# Patient Record
Sex: MALE | Race: WHITE | URBAN - METROPOLITAN AREA
[De-identification: names, ages, dates, MRNs, and addresses within clinical notes are randomized per-mention and may not be internally consistent; named-entity substitution may affect disease eponyms.]

---

## 2017-05-10 ENCOUNTER — INPATIENT (INPATIENT)
Facility: HOSPITAL | Age: 82
LOS: 1 days | Discharge: HOME | End: 2017-05-12
Attending: INTERNAL MEDICINE | Admitting: INTERNAL MEDICINE

## 2017-06-28 DIAGNOSIS — Z95.5 PRESENCE OF CORONARY ANGIOPLASTY IMPLANT AND GRAFT: ICD-10-CM

## 2017-06-28 DIAGNOSIS — J45.909 UNSPECIFIED ASTHMA, UNCOMPLICATED: ICD-10-CM

## 2017-06-28 DIAGNOSIS — M54.30 SCIATICA, UNSPECIFIED SIDE: ICD-10-CM

## 2017-06-28 DIAGNOSIS — E11.9 TYPE 2 DIABETES MELLITUS WITHOUT COMPLICATIONS: ICD-10-CM

## 2017-06-28 DIAGNOSIS — E87.2 ACIDOSIS: ICD-10-CM

## 2017-06-28 DIAGNOSIS — Z87.891 PERSONAL HISTORY OF NICOTINE DEPENDENCE: ICD-10-CM

## 2017-06-28 DIAGNOSIS — R00.1 BRADYCARDIA, UNSPECIFIED: ICD-10-CM

## 2017-06-28 DIAGNOSIS — I44.2 ATRIOVENTRICULAR BLOCK, COMPLETE: ICD-10-CM

## 2017-06-28 DIAGNOSIS — I10 ESSENTIAL (PRIMARY) HYPERTENSION: ICD-10-CM

## 2017-06-28 DIAGNOSIS — Z79.84 LONG TERM (CURRENT) USE OF ORAL HYPOGLYCEMIC DRUGS: ICD-10-CM

## 2017-06-28 DIAGNOSIS — I25.10 ATHEROSCLEROTIC HEART DISEASE OF NATIVE CORONARY ARTERY WITHOUT ANGINA PECTORIS: ICD-10-CM

## 2017-10-30 ENCOUNTER — INPATIENT (INPATIENT)
Facility: HOSPITAL | Age: 82
LOS: 3 days | Discharge: HOME HEALTH | End: 2017-11-03
Attending: INTERNAL MEDICINE | Admitting: INTERNAL MEDICINE

## 2017-10-30 DIAGNOSIS — E11.9 TYPE 2 DIABETES MELLITUS WITHOUT COMPLICATIONS: ICD-10-CM

## 2017-10-30 DIAGNOSIS — N19 UNSPECIFIED KIDNEY FAILURE: ICD-10-CM

## 2017-10-30 DIAGNOSIS — E78.5 HYPERLIPIDEMIA, UNSPECIFIED: ICD-10-CM

## 2017-10-30 DIAGNOSIS — J45.909 UNSPECIFIED ASTHMA, UNCOMPLICATED: ICD-10-CM

## 2017-10-30 DIAGNOSIS — N12 TUBULO-INTERSTITIAL NEPHRITIS, NOT SPECIFIED AS ACUTE OR CHRONIC: ICD-10-CM

## 2017-10-30 DIAGNOSIS — I10 ESSENTIAL (PRIMARY) HYPERTENSION: ICD-10-CM

## 2017-10-30 DIAGNOSIS — I44.2 ATRIOVENTRICULAR BLOCK, COMPLETE: ICD-10-CM

## 2017-10-30 DIAGNOSIS — I97.190 OTHER POSTPROCEDURAL CARDIAC FUNCTIONAL DISTURBANCES FOLLOWING CARDIAC SURGERY: ICD-10-CM

## 2017-10-30 DIAGNOSIS — R55 SYNCOPE AND COLLAPSE: ICD-10-CM

## 2017-11-06 DIAGNOSIS — N10 ACUTE PYELONEPHRITIS: ICD-10-CM

## 2017-11-06 DIAGNOSIS — E11.22 TYPE 2 DIABETES MELLITUS WITH DIABETIC CHRONIC KIDNEY DISEASE: ICD-10-CM

## 2017-11-06 DIAGNOSIS — B96.20 UNSPECIFIED ESCHERICHIA COLI [E. COLI] AS THE CAUSE OF DISEASES CLASSIFIED ELSEWHERE: ICD-10-CM

## 2017-11-06 DIAGNOSIS — Z79.84 LONG TERM (CURRENT) USE OF ORAL HYPOGLYCEMIC DRUGS: ICD-10-CM

## 2017-11-06 DIAGNOSIS — A41.51 SEPSIS DUE TO ESCHERICHIA COLI [E. COLI]: ICD-10-CM

## 2017-11-06 DIAGNOSIS — N32.1 VESICOINTESTINAL FISTULA: ICD-10-CM

## 2017-11-06 DIAGNOSIS — N39.0 URINARY TRACT INFECTION, SITE NOT SPECIFIED: ICD-10-CM

## 2017-11-06 DIAGNOSIS — N18.3 CHRONIC KIDNEY DISEASE, STAGE 3 (MODERATE): ICD-10-CM

## 2017-11-06 DIAGNOSIS — E88.09 OTHER DISORDERS OF PLASMA-PROTEIN METABOLISM, NOT ELSEWHERE CLASSIFIED: ICD-10-CM

## 2017-11-06 DIAGNOSIS — N17.9 ACUTE KIDNEY FAILURE, UNSPECIFIED: ICD-10-CM

## 2017-11-06 DIAGNOSIS — I44.2 ATRIOVENTRICULAR BLOCK, COMPLETE: ICD-10-CM

## 2017-11-06 DIAGNOSIS — J45.909 UNSPECIFIED ASTHMA, UNCOMPLICATED: ICD-10-CM

## 2017-11-06 DIAGNOSIS — Z95.0 PRESENCE OF CARDIAC PACEMAKER: ICD-10-CM

## 2017-11-06 DIAGNOSIS — N28.1 CYST OF KIDNEY, ACQUIRED: ICD-10-CM

## 2017-11-06 DIAGNOSIS — Z95.5 PRESENCE OF CORONARY ANGIOPLASTY IMPLANT AND GRAFT: ICD-10-CM

## 2017-11-06 DIAGNOSIS — I12.9 HYPERTENSIVE CHRONIC KIDNEY DISEASE WITH STAGE 1 THROUGH STAGE 4 CHRONIC KIDNEY DISEASE, OR UNSPECIFIED CHRONIC KIDNEY DISEASE: ICD-10-CM

## 2017-11-06 DIAGNOSIS — Z87.891 PERSONAL HISTORY OF NICOTINE DEPENDENCE: ICD-10-CM

## 2017-11-06 DIAGNOSIS — I25.10 ATHEROSCLEROTIC HEART DISEASE OF NATIVE CORONARY ARTERY WITHOUT ANGINA PECTORIS: ICD-10-CM

## 2017-11-06 DIAGNOSIS — K63.2 FISTULA OF INTESTINE: ICD-10-CM

## 2017-11-25 ENCOUNTER — INPATIENT (INPATIENT)
Facility: HOSPITAL | Age: 82
LOS: 3 days | Discharge: HOME | End: 2017-11-29
Attending: INTERNAL MEDICINE | Admitting: INTERNAL MEDICINE

## 2017-11-25 DIAGNOSIS — I10 ESSENTIAL (PRIMARY) HYPERTENSION: ICD-10-CM

## 2017-11-25 DIAGNOSIS — N19 UNSPECIFIED KIDNEY FAILURE: ICD-10-CM

## 2017-11-25 DIAGNOSIS — J45.909 UNSPECIFIED ASTHMA, UNCOMPLICATED: ICD-10-CM

## 2017-11-25 DIAGNOSIS — R55 SYNCOPE AND COLLAPSE: ICD-10-CM

## 2017-11-25 DIAGNOSIS — N12 TUBULO-INTERSTITIAL NEPHRITIS, NOT SPECIFIED AS ACUTE OR CHRONIC: ICD-10-CM

## 2017-11-25 DIAGNOSIS — I97.190 OTHER POSTPROCEDURAL CARDIAC FUNCTIONAL DISTURBANCES FOLLOWING CARDIAC SURGERY: ICD-10-CM

## 2017-11-25 DIAGNOSIS — E11.9 TYPE 2 DIABETES MELLITUS WITHOUT COMPLICATIONS: ICD-10-CM

## 2017-11-25 DIAGNOSIS — E78.5 HYPERLIPIDEMIA, UNSPECIFIED: ICD-10-CM

## 2017-11-25 DIAGNOSIS — I44.2 ATRIOVENTRICULAR BLOCK, COMPLETE: ICD-10-CM

## 2017-12-01 DIAGNOSIS — N18.4 CHRONIC KIDNEY DISEASE, STAGE 4 (SEVERE): ICD-10-CM

## 2017-12-01 DIAGNOSIS — Z87.891 PERSONAL HISTORY OF NICOTINE DEPENDENCE: ICD-10-CM

## 2017-12-01 DIAGNOSIS — E11.22 TYPE 2 DIABETES MELLITUS WITH DIABETIC CHRONIC KIDNEY DISEASE: ICD-10-CM

## 2017-12-01 DIAGNOSIS — J45.909 UNSPECIFIED ASTHMA, UNCOMPLICATED: ICD-10-CM

## 2017-12-01 DIAGNOSIS — N39.0 URINARY TRACT INFECTION, SITE NOT SPECIFIED: ICD-10-CM

## 2017-12-01 DIAGNOSIS — Z79.4 LONG TERM (CURRENT) USE OF INSULIN: ICD-10-CM

## 2017-12-01 DIAGNOSIS — B96.20 UNSPECIFIED ESCHERICHIA COLI [E. COLI] AS THE CAUSE OF DISEASES CLASSIFIED ELSEWHERE: ICD-10-CM

## 2017-12-01 DIAGNOSIS — K63.2 FISTULA OF INTESTINE: ICD-10-CM

## 2017-12-01 DIAGNOSIS — A41.9 SEPSIS, UNSPECIFIED ORGANISM: ICD-10-CM

## 2017-12-01 DIAGNOSIS — I12.9 HYPERTENSIVE CHRONIC KIDNEY DISEASE WITH STAGE 1 THROUGH STAGE 4 CHRONIC KIDNEY DISEASE, OR UNSPECIFIED CHRONIC KIDNEY DISEASE: ICD-10-CM

## 2017-12-01 DIAGNOSIS — N17.9 ACUTE KIDNEY FAILURE, UNSPECIFIED: ICD-10-CM

## 2017-12-01 DIAGNOSIS — Z95.5 PRESENCE OF CORONARY ANGIOPLASTY IMPLANT AND GRAFT: ICD-10-CM

## 2017-12-01 DIAGNOSIS — Z95.0 PRESENCE OF CARDIAC PACEMAKER: ICD-10-CM

## 2017-12-01 DIAGNOSIS — I25.10 ATHEROSCLEROTIC HEART DISEASE OF NATIVE CORONARY ARTERY WITHOUT ANGINA PECTORIS: ICD-10-CM

## 2017-12-26 PROBLEM — Z00.00 ENCOUNTER FOR PREVENTIVE HEALTH EXAMINATION: Status: ACTIVE | Noted: 2017-12-26

## 2017-12-27 ENCOUNTER — OUTPATIENT (OUTPATIENT)
Dept: OUTPATIENT SERVICES | Facility: HOSPITAL | Age: 82
LOS: 1 days | Discharge: HOME | End: 2017-12-27

## 2017-12-27 DIAGNOSIS — I97.190 OTHER POSTPROCEDURAL CARDIAC FUNCTIONAL DISTURBANCES FOLLOWING CARDIAC SURGERY: ICD-10-CM

## 2017-12-27 DIAGNOSIS — E78.5 HYPERLIPIDEMIA, UNSPECIFIED: ICD-10-CM

## 2017-12-27 DIAGNOSIS — R55 SYNCOPE AND COLLAPSE: ICD-10-CM

## 2017-12-27 DIAGNOSIS — J45.909 UNSPECIFIED ASTHMA, UNCOMPLICATED: ICD-10-CM

## 2017-12-27 DIAGNOSIS — I44.2 ATRIOVENTRICULAR BLOCK, COMPLETE: ICD-10-CM

## 2017-12-27 DIAGNOSIS — N19 UNSPECIFIED KIDNEY FAILURE: ICD-10-CM

## 2017-12-27 DIAGNOSIS — I10 ESSENTIAL (PRIMARY) HYPERTENSION: ICD-10-CM

## 2017-12-27 DIAGNOSIS — K63.2 FISTULA OF INTESTINE: ICD-10-CM

## 2017-12-27 DIAGNOSIS — E11.9 TYPE 2 DIABETES MELLITUS WITHOUT COMPLICATIONS: ICD-10-CM

## 2017-12-27 DIAGNOSIS — Z01.818 ENCOUNTER FOR OTHER PREPROCEDURAL EXAMINATION: ICD-10-CM

## 2017-12-27 DIAGNOSIS — K57.92 DIVERTICULITIS OF INTESTINE, PART UNSPECIFIED, WITHOUT PERFORATION OR ABSCESS WITHOUT BLEEDING: ICD-10-CM

## 2017-12-27 DIAGNOSIS — N12 TUBULO-INTERSTITIAL NEPHRITIS, NOT SPECIFIED AS ACUTE OR CHRONIC: ICD-10-CM

## 2018-01-04 ENCOUNTER — INPATIENT (INPATIENT)
Facility: HOSPITAL | Age: 83
LOS: 7 days | Discharge: HOME | End: 2018-01-12
Attending: SPECIALIST

## 2018-01-04 DIAGNOSIS — R55 SYNCOPE AND COLLAPSE: ICD-10-CM

## 2018-01-04 DIAGNOSIS — K57.92 DIVERTICULITIS OF INTESTINE, PART UNSPECIFIED, WITHOUT PERFORATION OR ABSCESS WITHOUT BLEEDING: ICD-10-CM

## 2018-01-04 DIAGNOSIS — N19 UNSPECIFIED KIDNEY FAILURE: ICD-10-CM

## 2018-01-04 DIAGNOSIS — I10 ESSENTIAL (PRIMARY) HYPERTENSION: ICD-10-CM

## 2018-01-04 DIAGNOSIS — E11.9 TYPE 2 DIABETES MELLITUS WITHOUT COMPLICATIONS: ICD-10-CM

## 2018-01-04 DIAGNOSIS — J45.909 UNSPECIFIED ASTHMA, UNCOMPLICATED: ICD-10-CM

## 2018-01-04 DIAGNOSIS — I44.2 ATRIOVENTRICULAR BLOCK, COMPLETE: ICD-10-CM

## 2018-01-04 DIAGNOSIS — E78.5 HYPERLIPIDEMIA, UNSPECIFIED: ICD-10-CM

## 2018-01-04 DIAGNOSIS — I97.190 OTHER POSTPROCEDURAL CARDIAC FUNCTIONAL DISTURBANCES FOLLOWING CARDIAC SURGERY: ICD-10-CM

## 2018-01-04 DIAGNOSIS — N12 TUBULO-INTERSTITIAL NEPHRITIS, NOT SPECIFIED AS ACUTE OR CHRONIC: ICD-10-CM

## 2018-01-11 DIAGNOSIS — J45.909 UNSPECIFIED ASTHMA, UNCOMPLICATED: ICD-10-CM

## 2018-01-11 DIAGNOSIS — Z95.5 PRESENCE OF CORONARY ANGIOPLASTY IMPLANT AND GRAFT: ICD-10-CM

## 2018-01-11 DIAGNOSIS — I25.10 ATHEROSCLEROTIC HEART DISEASE OF NATIVE CORONARY ARTERY WITHOUT ANGINA PECTORIS: ICD-10-CM

## 2018-01-11 DIAGNOSIS — E78.00 PURE HYPERCHOLESTEROLEMIA, UNSPECIFIED: ICD-10-CM

## 2018-01-11 DIAGNOSIS — N39.0 URINARY TRACT INFECTION, SITE NOT SPECIFIED: ICD-10-CM

## 2018-01-11 DIAGNOSIS — E11.9 TYPE 2 DIABETES MELLITUS WITHOUT COMPLICATIONS: ICD-10-CM

## 2018-01-11 DIAGNOSIS — I10 ESSENTIAL (PRIMARY) HYPERTENSION: ICD-10-CM

## 2018-01-11 DIAGNOSIS — I49.9 CARDIAC ARRHYTHMIA, UNSPECIFIED: ICD-10-CM

## 2018-01-13 ENCOUNTER — INPATIENT (INPATIENT)
Facility: HOSPITAL | Age: 83
LOS: 11 days | Discharge: HOME HEALTH | End: 2018-01-25
Attending: SPECIALIST | Admitting: INTERNAL MEDICINE

## 2018-01-17 DIAGNOSIS — K57.32 DIVERTICULITIS OF LARGE INTESTINE WITHOUT PERFORATION OR ABSCESS WITHOUT BLEEDING: ICD-10-CM

## 2018-01-17 DIAGNOSIS — J45.909 UNSPECIFIED ASTHMA, UNCOMPLICATED: ICD-10-CM

## 2018-01-17 DIAGNOSIS — I12.9 HYPERTENSIVE CHRONIC KIDNEY DISEASE WITH STAGE 1 THROUGH STAGE 4 CHRONIC KIDNEY DISEASE, OR UNSPECIFIED CHRONIC KIDNEY DISEASE: ICD-10-CM

## 2018-01-17 DIAGNOSIS — N18.9 CHRONIC KIDNEY DISEASE, UNSPECIFIED: ICD-10-CM

## 2018-01-17 DIAGNOSIS — K63.2 FISTULA OF INTESTINE: ICD-10-CM

## 2018-01-17 DIAGNOSIS — Z95.0 PRESENCE OF CARDIAC PACEMAKER: ICD-10-CM

## 2018-01-17 DIAGNOSIS — E11.22 TYPE 2 DIABETES MELLITUS WITH DIABETIC CHRONIC KIDNEY DISEASE: ICD-10-CM

## 2018-01-17 DIAGNOSIS — E78.5 HYPERLIPIDEMIA, UNSPECIFIED: ICD-10-CM

## 2018-01-17 DIAGNOSIS — I25.10 ATHEROSCLEROTIC HEART DISEASE OF NATIVE CORONARY ARTERY WITHOUT ANGINA PECTORIS: ICD-10-CM

## 2018-01-17 DIAGNOSIS — N32.1 VESICOINTESTINAL FISTULA: ICD-10-CM

## 2018-01-17 DIAGNOSIS — N39.0 URINARY TRACT INFECTION, SITE NOT SPECIFIED: ICD-10-CM

## 2018-01-30 DIAGNOSIS — Z85.828 PERSONAL HISTORY OF OTHER MALIGNANT NEOPLASM OF SKIN: ICD-10-CM

## 2018-01-30 DIAGNOSIS — Z95.5 PRESENCE OF CORONARY ANGIOPLASTY IMPLANT AND GRAFT: ICD-10-CM

## 2018-01-30 DIAGNOSIS — E87.1 HYPO-OSMOLALITY AND HYPONATREMIA: ICD-10-CM

## 2018-01-30 DIAGNOSIS — J45.909 UNSPECIFIED ASTHMA, UNCOMPLICATED: ICD-10-CM

## 2018-01-30 DIAGNOSIS — I10 ESSENTIAL (PRIMARY) HYPERTENSION: ICD-10-CM

## 2018-01-30 DIAGNOSIS — E78.5 HYPERLIPIDEMIA, UNSPECIFIED: ICD-10-CM

## 2018-01-30 DIAGNOSIS — Z95.0 PRESENCE OF CARDIAC PACEMAKER: ICD-10-CM

## 2018-01-30 DIAGNOSIS — K65.0 GENERALIZED (ACUTE) PERITONITIS: ICD-10-CM

## 2018-01-30 DIAGNOSIS — I25.10 ATHEROSCLEROTIC HEART DISEASE OF NATIVE CORONARY ARTERY WITHOUT ANGINA PECTORIS: ICD-10-CM

## 2018-01-30 DIAGNOSIS — K63.1 PERFORATION OF INTESTINE (NONTRAUMATIC): ICD-10-CM
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
## 2018-01-30 DIAGNOSIS — E83.42 HYPOMAGNESEMIA: ICD-10-CM

## 2018-01-30 DIAGNOSIS — A41.9 SEPSIS, UNSPECIFIED ORGANISM: ICD-10-CM

## 2018-01-30 DIAGNOSIS — K94.12 ENTEROSTOMY INFECTION: ICD-10-CM

## 2018-01-30 DIAGNOSIS — I47.2 VENTRICULAR TACHYCARDIA: ICD-10-CM

## 2018-01-30 DIAGNOSIS — Z87.891 PERSONAL HISTORY OF NICOTINE DEPENDENCE: ICD-10-CM

## 2018-01-30 DIAGNOSIS — T82.338A: ICD-10-CM

## 2018-01-30 DIAGNOSIS — E11.9 TYPE 2 DIABETES MELLITUS WITHOUT COMPLICATIONS: ICD-10-CM

## 2018-02-02 DIAGNOSIS — A41.9 SEPSIS, UNSPECIFIED ORGANISM: ICD-10-CM

## 2018-02-04 DIAGNOSIS — I44.2 ATRIOVENTRICULAR BLOCK, COMPLETE: ICD-10-CM

## 2018-02-04 DIAGNOSIS — K57.92 DIVERTICULITIS OF INTESTINE, PART UNSPECIFIED, WITHOUT PERFORATION OR ABSCESS WITHOUT BLEEDING: ICD-10-CM

## 2018-02-04 DIAGNOSIS — I97.190 OTHER POSTPROCEDURAL CARDIAC FUNCTIONAL DISTURBANCES FOLLOWING CARDIAC SURGERY: ICD-10-CM

## 2018-02-04 DIAGNOSIS — I10 ESSENTIAL (PRIMARY) HYPERTENSION: ICD-10-CM

## 2018-02-04 DIAGNOSIS — E11.9 TYPE 2 DIABETES MELLITUS WITHOUT COMPLICATIONS: ICD-10-CM

## 2018-02-04 DIAGNOSIS — E78.5 HYPERLIPIDEMIA, UNSPECIFIED: ICD-10-CM

## 2018-02-04 DIAGNOSIS — N12 TUBULO-INTERSTITIAL NEPHRITIS, NOT SPECIFIED AS ACUTE OR CHRONIC: ICD-10-CM

## 2018-02-04 DIAGNOSIS — J45.909 UNSPECIFIED ASTHMA, UNCOMPLICATED: ICD-10-CM

## 2018-02-04 DIAGNOSIS — N19 UNSPECIFIED KIDNEY FAILURE: ICD-10-CM

## 2018-02-04 DIAGNOSIS — R55 SYNCOPE AND COLLAPSE: ICD-10-CM

## 2018-02-04 RX ADMIN — Medication 50 MILLIGRAM(S): at 06:36

## 2018-02-08 ENCOUNTER — INPATIENT (INPATIENT)
Facility: HOSPITAL | Age: 83
LOS: 5 days | Discharge: HOME HEALTH | End: 2018-02-14
Attending: INTERNAL MEDICINE | Admitting: INTERNAL MEDICINE

## 2018-02-08 VITALS
RESPIRATION RATE: 19 BRPM | DIASTOLIC BLOOD PRESSURE: 74 MMHG | TEMPERATURE: 96 F | SYSTOLIC BLOOD PRESSURE: 136 MMHG | HEART RATE: 115 BPM | OXYGEN SATURATION: 96 %

## 2018-02-08 LAB
ALBUMIN SERPL ELPH-MCNC: 2.8 G/DL — LOW (ref 3–5.5)
ALP SERPL-CCNC: 83 U/L — SIGNIFICANT CHANGE UP (ref 30–115)
ALT FLD-CCNC: 12 U/L — SIGNIFICANT CHANGE UP (ref 0–41)
ANION GAP SERPL CALC-SCNC: 11 MMOL/L — SIGNIFICANT CHANGE UP (ref 7–14)
APPEARANCE UR: CLEAR — SIGNIFICANT CHANGE UP
APTT BLD: 24.2 SEC — LOW (ref 27–39.2)
AST SERPL-CCNC: 16 U/L — SIGNIFICANT CHANGE UP (ref 0–41)
BASOPHILS # BLD AUTO: 0.05 K/UL — SIGNIFICANT CHANGE UP (ref 0–0.2)
BASOPHILS NFR BLD AUTO: 0.3 % — SIGNIFICANT CHANGE UP (ref 0–1)
BILIRUB DIRECT SERPL-MCNC: 0.2 MG/DL — SIGNIFICANT CHANGE UP (ref 0–0.2)
BILIRUB INDIRECT FLD-MCNC: 0.5 MG/DL — SIGNIFICANT CHANGE UP
BILIRUB SERPL-MCNC: 0.7 MG/DL — SIGNIFICANT CHANGE UP (ref 0.2–1.2)
BILIRUB UR-MCNC: NEGATIVE — SIGNIFICANT CHANGE UP
BUN SERPL-MCNC: 35 MG/DL — HIGH (ref 10–20)
CALCIUM SERPL-MCNC: 9.8 MG/DL — SIGNIFICANT CHANGE UP (ref 8.5–10.1)
CHLORIDE SERPL-SCNC: 103 MMOL/L — SIGNIFICANT CHANGE UP (ref 98–110)
CO2 SERPL-SCNC: 21 MMOL/L — SIGNIFICANT CHANGE UP (ref 17–32)
COLOR SPEC: YELLOW — SIGNIFICANT CHANGE UP
CREAT SERPL-MCNC: 1.8 MG/DL — HIGH (ref 0.7–1.5)
DIFF PNL FLD: NEGATIVE — SIGNIFICANT CHANGE UP
EOSINOPHIL # BLD AUTO: 0.08 K/UL — SIGNIFICANT CHANGE UP (ref 0–0.7)
EOSINOPHIL NFR BLD AUTO: 0.5 % — SIGNIFICANT CHANGE UP (ref 0–8)
GLUCOSE SERPL-MCNC: 118 MG/DL — HIGH (ref 70–110)
GLUCOSE UR QL: NEGATIVE — SIGNIFICANT CHANGE UP
HCT VFR BLD CALC: 32.8 % — LOW (ref 42–52)
HGB BLD-MCNC: 10.9 G/DL — LOW (ref 14–18)
IMM GRANULOCYTES NFR BLD AUTO: 1 % — HIGH (ref 0.1–0.3)
INR BLD: 1.14 RATIO — SIGNIFICANT CHANGE UP (ref 0.65–1.3)
KETONES UR-MCNC: NEGATIVE — SIGNIFICANT CHANGE UP
LACTATE SERPL-SCNC: 1.3 MMOL/L — SIGNIFICANT CHANGE UP (ref 0.5–2.2)
LEUKOCYTE ESTERASE UR-ACNC: NEGATIVE — SIGNIFICANT CHANGE UP
LIDOCAIN IGE QN: 39 U/L — SIGNIFICANT CHANGE UP (ref 7–60)
LYMPHOCYTES # BLD AUTO: 1.19 K/UL — LOW (ref 1.2–3.4)
LYMPHOCYTES # BLD AUTO: 7.8 % — LOW (ref 20.5–51.1)
MCHC RBC-ENTMCNC: 27.9 PG — SIGNIFICANT CHANGE UP (ref 27–31)
MCHC RBC-ENTMCNC: 33.2 G/DL — SIGNIFICANT CHANGE UP (ref 32–37)
MCV RBC AUTO: 83.9 FL — SIGNIFICANT CHANGE UP (ref 80–94)
MONOCYTES # BLD AUTO: 0.95 K/UL — HIGH (ref 0.1–0.6)
MONOCYTES NFR BLD AUTO: 6.2 % — SIGNIFICANT CHANGE UP (ref 1.7–9.3)
NEUTROPHILS # BLD AUTO: 12.88 K/UL — HIGH (ref 1.4–6.5)
NEUTROPHILS NFR BLD AUTO: 84.2 % — HIGH (ref 42.2–75.2)
NITRITE UR-MCNC: NEGATIVE — SIGNIFICANT CHANGE UP
NRBC # BLD: 0 /100 WBCS — SIGNIFICANT CHANGE UP (ref 0–0)
PH UR: 8 — SIGNIFICANT CHANGE UP (ref 5–8)
PLATELET # BLD AUTO: 324 K/UL — SIGNIFICANT CHANGE UP (ref 130–400)
POTASSIUM SERPL-MCNC: 4.6 MMOL/L — SIGNIFICANT CHANGE UP (ref 3.5–5)
POTASSIUM SERPL-SCNC: 4.6 MMOL/L — SIGNIFICANT CHANGE UP (ref 3.5–5)
PROT SERPL-MCNC: 5.7 G/DL — LOW (ref 6–8)
PROT UR-MCNC: NEGATIVE — SIGNIFICANT CHANGE UP
PROTHROM AB SERPL-ACNC: 12.3 SEC — SIGNIFICANT CHANGE UP (ref 9.95–12.87)
RBC # BLD: 3.91 M/UL — LOW (ref 4.7–6.1)
RBC # FLD: 16.8 % — HIGH (ref 11.5–14.5)
SODIUM SERPL-SCNC: 135 MMOL/L — SIGNIFICANT CHANGE UP (ref 135–146)
SP GR SPEC: 1.02 — SIGNIFICANT CHANGE UP (ref 1.01–1.03)
UROBILINOGEN FLD QL: 0.2 — SIGNIFICANT CHANGE UP (ref 0.2–0.2)
WBC # BLD: 15.3 K/UL — HIGH (ref 4.8–10.8)
WBC # FLD AUTO: 15.3 K/UL — HIGH (ref 4.8–10.8)

## 2018-02-08 RX ORDER — SODIUM CHLORIDE 9 MG/ML
3 INJECTION INTRAMUSCULAR; INTRAVENOUS; SUBCUTANEOUS ONCE
Qty: 0 | Refills: 0 | Status: COMPLETED | OUTPATIENT
Start: 2018-02-08 | End: 2018-02-08

## 2018-02-08 RX ORDER — ONDANSETRON 8 MG/1
4 TABLET, FILM COATED ORAL ONCE
Qty: 0 | Refills: 0 | Status: COMPLETED | OUTPATIENT
Start: 2018-02-08 | End: 2018-02-08

## 2018-02-08 RX ORDER — SODIUM CHLORIDE 9 MG/ML
1000 INJECTION, SOLUTION INTRAVENOUS
Qty: 0 | Refills: 0 | Status: DISCONTINUED | OUTPATIENT
Start: 2018-02-08 | End: 2018-02-14

## 2018-02-08 RX ORDER — DIATRIZOATE MEGLUMINE 180 MG/ML
30 INJECTION, SOLUTION INTRAVESICAL ONCE
Qty: 0 | Refills: 0 | Status: COMPLETED | OUTPATIENT
Start: 2018-02-08 | End: 2018-02-08

## 2018-02-08 RX ADMIN — ONDANSETRON 4 MILLIGRAM(S): 8 TABLET, FILM COATED ORAL at 20:05

## 2018-02-08 RX ADMIN — DIATRIZOATE MEGLUMINE 30 MILLILITER(S): 180 INJECTION, SOLUTION INTRAVESICAL at 17:28

## 2018-02-08 RX ADMIN — ONDANSETRON 4 MILLIGRAM(S): 8 TABLET, FILM COATED ORAL at 23:14

## 2018-02-08 RX ADMIN — SODIUM CHLORIDE 3 MILLILITER(S): 9 INJECTION INTRAMUSCULAR; INTRAVENOUS; SUBCUTANEOUS at 19:20

## 2018-02-08 RX ADMIN — ONDANSETRON 4 MILLIGRAM(S): 8 TABLET, FILM COATED ORAL at 19:20

## 2018-02-08 RX ADMIN — SODIUM CHLORIDE 1000 MILLILITER(S): 9 INJECTION, SOLUTION INTRAVENOUS at 17:28

## 2018-02-08 NOTE — ED PROVIDER NOTE - CARE PLAN
Assessment and plan of treatment:	UTI vs colitis vs gastritis/ gasrtoenteritis. P: labs, ct abd, iv,f antiemetic, surgery consult, reassess. Principal Discharge DX:	Dehydration  Assessment and plan of treatment:	UTI vs colitis vs gastritis/ gasrtoenteritis. P: labs, ct abd, iv,f antiemetic, surgery consult, reassess.  Secondary Diagnosis:	Vomiting, intractability of vomiting not specified, presence of nausea not specified, unspecified vomiting type

## 2018-02-08 NOTE — ED PROVIDER NOTE - PLAN OF CARE
UTI vs colitis vs gastritis/ gasrtoenteritis. P: labs, ct abd, iv,f antiemetic, surgery consult, reassess.

## 2018-02-08 NOTE — ED PROVIDER NOTE - PHYSICAL EXAMINATION
VITAL SIGNS: I have reviewed nursing notes and confirm.  CONSTITUTIONAL: tired  SKIN: Warm dry  HEAD: NCAT  EYES: NL inspection  ENT: dry mm  NECK: Supple; non tender.  CARD: RRR  RESP: CTAB  ABD: soft, non ttp, no r/g; + R colostomy, + pink stoma, + brown stool, non ttp  EXT: no pedal edema  NEURO: Grossly unremarkable  PSYCH: Cooperative, appropriate.

## 2018-02-08 NOTE — ED PROVIDER NOTE - NS ED ROS FT
Constitutional:  See HPI + generalized weakness  Eyes:  No visual changes  ENMT: No neck pain or stiffness  Cardiac:  No chest pain  Respiratory:  No cough or respiratory distress.   GI:  See HPi  :  + dysuria, See Hpi  MS:  No back pain.  Neuro:  No headache   Skin:  No skin rash  Except as documented in the HPI,  all other systems are negative

## 2018-02-08 NOTE — ED PROVIDER NOTE - OBJECTIVE STATEMENT
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 Huston 1/4/18, dc'ed 2 wks ago, on ertpenem w/ picc, p/w progressive nausea, poor PO, dehydration, straining to urinate, 1wk.  colostomy outpt firm, consistent.  No fever, cp, back pain, sob.  sx Gradual onset, no clear relieving or exacerbating factors.  Sx are moderate.

## 2018-02-08 NOTE — ED PROVIDER NOTE - PROGRESS NOTE DETAILS
I spoke w/ Dr. Whalen who spoke w/ Dr. Geiger.  Aware of CT reading, but feel that pt does not have SBO, jaylin since pt has good peristent outpt; feel problem is dehydration, and recc medical admission for IVF. I spoke w/ Dr. Mcelroy, accepts admission.

## 2018-02-08 NOTE — ED PROVIDER NOTE - MEDICAL DECISION MAKING DETAILS
Pt felt better after IVhydration.  CT reading concerning for SBO, but clinically pt is very comfortable, abd not distended, and good output from ileostomy.  Surgery felt pt clinically does not have SBO.  Will admit to medicine for dehydration, and vomiting, for cont ivf, anti emetic, close reasesemtn for response to tx.

## 2018-02-08 NOTE — CONSULT NOTE ADULT - SUBJECTIVE AND OBJECTIVE BOX
General Surgery Consultation  Date/Time: 18 @ 22:11  Patient Location: [facility] ED ([facility])  Patient: ZEESHAN MCCLENDON , MRN: [id number]  Requesting Physician: Ryan Madrid ,   Consultant: Dr. Martinez  Resident/PA: Dr. Bey    CC:   Reason for Consult: Nausea and vomiting, lethargy, d/c from hospital 2 weeks ago    HPI:  This patient is a 84y Male with h/o sigmoid diverticulitis and colo vesicular fistula, w/ recurrent UTI, s/p sigmoid colon resection + resection of colo vesicular fistula, primary anastomosis 18 complicated by perforated viscus, was subsequently reoperated on 18 had diverting loop ileostomy, was recently discharged home from hospital 18. Presents now with 2 weeks of intermittent NBNB nausea and vomiting poor PO intake, and lethargy. Associated fevers and chills. Has PICC line R arm was receiving IV abx. He called Dr. Martinez's office, was told to come to ED    PMH:  Diverticulitis, colo vesicular fistula, perforated viscus  DM  CAD s/p PCI + stents x2  HTN  HLD  Basal cell Ca    PSH:  as above, see HPI    Home Medications:  Lipitor, metformin, metoprolol, plavix, Pro Air HFA, Ranexa, Viagra    Hospital Medications:  diatrizoate meglumine/diatrizoate sodium 30 milliLiter(s) Oral once  ondansetron    Tablet 4 milliGRAM(s) Oral Once  ondansetron Injectable 4 milliGRAM(s) IV Push once  sodium chloride 0.9% lock flush 3 milliLiter(s) IV Push once    Allergies:  No Known Allergies    Social History:  Tobacco: past smoker    Physical Examination  Vital Signs: T(F): 99 (18 @ 19:38), Max: 99 (18 @ 19:38)  HR: 105 (18 @ 19:38)  BP: 120/63 (18 @ 19:38)  RR: 18 (18 @ 19:38)  SpO2: 97% (18 @ 19:38)  General Appearance: NAD, alert and cooperative  HEENT: WNL  Heart: S1 and S2. No murmurs. Rhythm is regular   Lungs: Clear to auscultation BL without rales, rhonchi, wheezing or diminished breath sounds.  Abdomen:  Positive bowel sounds. Soft, nondistended, nontender. No rigidity, guarding, or rebound tenderness. No masses palpated. Ileostomy RLQ w/ liquid stool in ostomy bag, mucosa pink and viable, no e/o bleeding  MSK/Extremities: WNL, R arm PICC line  Neuro: WNL  Skin: Warm/dry, Normal color    Labs:  CBC: 18 @ 17:10  WBC: 15.30 K/uL<H> N-- M-- L-- E--  HEMO: 10.9 g/dL<L> RBC3.91 M/uL<L>  HCT: 32.8 %<L>  PLT: 324 K/uL , --    BMP:18 @ 17:10   mmol/L  mmol/L BUN 35 mg/dL<H> GFR 38 mL/min/1.73M2<L>  K 4.6 mmol/L CO2 21 mmol/L CR 1.8 mg/dL<H> GLUC 118 mg/dL<H>    CA 9.8 mg/dL  MG --  PO4 --    LFT: 18 @ 17:10  TPROT 5.7 g/dL<L> TBILI 0.7 mg/dL AST 16 U/L ALP 83 U/L  ALB 2.8 g/dL<L> DBILI 0.5 mg/dL ALT 12 U/L    COA18 @ 17:10  PT: 12.30 sec  PTT: 24.2 sec<L>  INR: 1.14 ratio    OTHER LABS:   Lactate, Blood: 1.3 mmol/L (18 @ 17:10)  Lipase, Serum: 39 U/L (18 @ 17:10)    Imaging:  CT Abdomen and Pelvis w/ Oral Cont:   EXAM:  CT ABDOMEN AND PELVIS OC        PROCEDURE DATE:  2018    Comparison made with CT abdomen and pelvis 2018.  FINDINGS:  LOWER CHEST: Partially imaged pacing leads.  HEPATOBILIARY: Unremarkable.  SPLEEN: Unremarkable.  PANCREAS: Unremarkable.  ADRENAL GLANDS: Unremarkable.  KIDNEYS: Right lower renal parapelvic cyst.  Multifocal left renal cortical thinning, likely scarring. Subcentimeter bilateral renal hypodensities, too small to fully characterize. No hydronephrosis.  ABDOMINOPELVIC NODES: Unremarkable.  PELVIC ORGANS: Unremarkable.  PERITONEUM/MESENTERY/BOWEL: Diffuse dilation of small bowel up to 3.7 cm up to point of right lower quadrant ileostomy (series 5, image 301). Small free pelvic fluid. No gross free air. Colonic diverticulosis. Status post sigmoidectomy with anastomotic chain.BONES/SOFT TISSUES: Degenerative change, lumbar spine.  OTHER: Atherosclerotic calcification of the aorta and its major branches.  IMPRESSION:   1. Diffuse dilation of small bowel up to 3.7 cm up to point of right lower quadrant ileostomy; findings suspicious for small bowel obstruction.  KAL MATHIS M.D., ATTENDING RADIOLOGIST  This document has been electronically signed. 2018  9:00PM     (18 @ 21:00)

## 2018-02-08 NOTE — CONSULT NOTE ADULT - ASSESSMENT
Assessment:  84y Male patient with multiple recent surgeries, good ileostomy output, poor PO intake , likely dehydration , Physical exam, labs, and imaging findings as above.    Plan:   no acute surgical intervention at this time  IVF rehydration  anti nausea medsavelina    02-08-18 Emergency

## 2018-02-09 DIAGNOSIS — R63.4 ABNORMAL WEIGHT LOSS: ICD-10-CM

## 2018-02-09 DIAGNOSIS — I10 ESSENTIAL (PRIMARY) HYPERTENSION: ICD-10-CM

## 2018-02-09 DIAGNOSIS — Z98.890 OTHER SPECIFIED POSTPROCEDURAL STATES: Chronic | ICD-10-CM

## 2018-02-09 DIAGNOSIS — R11.10 VOMITING, UNSPECIFIED: ICD-10-CM

## 2018-02-09 DIAGNOSIS — I50.20 UNSPECIFIED SYSTOLIC (CONGESTIVE) HEART FAILURE: ICD-10-CM

## 2018-02-09 DIAGNOSIS — Z95.0 PRESENCE OF CARDIAC PACEMAKER: Chronic | ICD-10-CM

## 2018-02-09 DIAGNOSIS — E86.0 DEHYDRATION: ICD-10-CM

## 2018-02-09 DIAGNOSIS — N17.9 ACUTE KIDNEY FAILURE, UNSPECIFIED: ICD-10-CM

## 2018-02-09 DIAGNOSIS — D72.829 ELEVATED WHITE BLOOD CELL COUNT, UNSPECIFIED: ICD-10-CM

## 2018-02-09 DIAGNOSIS — E11.9 TYPE 2 DIABETES MELLITUS WITHOUT COMPLICATIONS: ICD-10-CM

## 2018-02-09 LAB
ANION GAP SERPL CALC-SCNC: 11 MMOL/L — SIGNIFICANT CHANGE UP (ref 7–14)
BASOPHILS # BLD AUTO: 0.03 K/UL — SIGNIFICANT CHANGE UP (ref 0–0.2)
BASOPHILS NFR BLD AUTO: 0.2 % — SIGNIFICANT CHANGE UP (ref 0–1)
BUN SERPL-MCNC: 30 MG/DL — HIGH (ref 10–20)
CALCIUM SERPL-MCNC: 9.4 MG/DL — SIGNIFICANT CHANGE UP (ref 8.5–10.1)
CHLORIDE SERPL-SCNC: 98 MMOL/L — SIGNIFICANT CHANGE UP (ref 98–110)
CK MB CFR SERPL CALC: 2.2 NG/ML — SIGNIFICANT CHANGE UP (ref 0.6–6.3)
CK SERPL-CCNC: 18 U/L — SIGNIFICANT CHANGE UP (ref 0–225)
CO2 SERPL-SCNC: 24 MMOL/L — SIGNIFICANT CHANGE UP (ref 17–32)
CREAT SERPL-MCNC: 1.7 MG/DL — HIGH (ref 0.7–1.5)
EOSINOPHIL # BLD AUTO: 0.1 K/UL — SIGNIFICANT CHANGE UP (ref 0–0.7)
EOSINOPHIL NFR BLD AUTO: 0.7 % — SIGNIFICANT CHANGE UP (ref 0–8)
ESTIMATED AVERAGE GLUCOSE: 140 MG/DL — HIGH (ref 68–114)
GLUCOSE SERPL-MCNC: 90 MG/DL — SIGNIFICANT CHANGE UP (ref 70–110)
HBA1C BLD-MCNC: 6.5 % — HIGH (ref 4–5.6)
HCT VFR BLD CALC: 32.7 % — LOW (ref 42–52)
HGB BLD-MCNC: 10.9 G/DL — LOW (ref 14–18)
IMM GRANULOCYTES NFR BLD AUTO: 0.6 % — HIGH (ref 0.1–0.3)
LYMPHOCYTES # BLD AUTO: 0.9 K/UL — LOW (ref 1.2–3.4)
LYMPHOCYTES # BLD AUTO: 6.3 % — LOW (ref 20.5–51.1)
MCHC RBC-ENTMCNC: 28.1 PG — SIGNIFICANT CHANGE UP (ref 27–31)
MCHC RBC-ENTMCNC: 33.3 G/DL — SIGNIFICANT CHANGE UP (ref 32–37)
MCV RBC AUTO: 84.3 FL — SIGNIFICANT CHANGE UP (ref 80–94)
MONOCYTES # BLD AUTO: 0.97 K/UL — HIGH (ref 0.1–0.6)
MONOCYTES NFR BLD AUTO: 6.8 % — SIGNIFICANT CHANGE UP (ref 1.7–9.3)
NEUTROPHILS # BLD AUTO: 12.23 K/UL — HIGH (ref 1.4–6.5)
NEUTROPHILS NFR BLD AUTO: 85.4 % — HIGH (ref 42.2–75.2)
PLATELET # BLD AUTO: 299 K/UL — SIGNIFICANT CHANGE UP (ref 130–400)
POTASSIUM SERPL-MCNC: 4.2 MMOL/L — SIGNIFICANT CHANGE UP (ref 3.5–5)
POTASSIUM SERPL-SCNC: 4.2 MMOL/L — SIGNIFICANT CHANGE UP (ref 3.5–5)
RBC # BLD: 3.88 M/UL — LOW (ref 4.7–6.1)
RBC # FLD: 16.8 % — HIGH (ref 11.5–14.5)
SODIUM SERPL-SCNC: 133 MMOL/L — LOW (ref 135–146)
TROPONIN I SERPL-MCNC: 0.02 NG/ML — SIGNIFICANT CHANGE UP (ref 0–0.05)
WBC # BLD: 14.31 K/UL — HIGH (ref 4.8–10.8)
WBC # FLD AUTO: 14.31 K/UL — HIGH (ref 4.8–10.8)

## 2018-02-09 RX ORDER — RANOLAZINE 500 MG/1
500 TABLET, FILM COATED, EXTENDED RELEASE ORAL
Qty: 0 | Refills: 0 | Status: DISCONTINUED | OUTPATIENT
Start: 2018-02-09 | End: 2018-02-14

## 2018-02-09 RX ORDER — ACETAMINOPHEN 500 MG
650 TABLET ORAL EVERY 4 HOURS
Qty: 0 | Refills: 0 | Status: DISCONTINUED | OUTPATIENT
Start: 2018-02-09 | End: 2018-02-14

## 2018-02-09 RX ORDER — HEPARIN SODIUM 5000 [USP'U]/ML
5000 INJECTION INTRAVENOUS; SUBCUTANEOUS EVERY 8 HOURS
Qty: 0 | Refills: 0 | Status: DISCONTINUED | OUTPATIENT
Start: 2018-02-09 | End: 2018-02-11

## 2018-02-09 RX ORDER — METOPROLOL TARTRATE 50 MG
50 TABLET ORAL DAILY
Qty: 0 | Refills: 0 | Status: DISCONTINUED | OUTPATIENT
Start: 2018-02-09 | End: 2018-02-14

## 2018-02-09 RX ORDER — ONDANSETRON 8 MG/1
4 TABLET, FILM COATED ORAL EVERY 8 HOURS
Qty: 0 | Refills: 0 | Status: DISCONTINUED | OUTPATIENT
Start: 2018-02-09 | End: 2018-02-14

## 2018-02-09 RX ORDER — ASPIRIN/CALCIUM CARB/MAGNESIUM 324 MG
81 TABLET ORAL DAILY
Qty: 0 | Refills: 0 | Status: DISCONTINUED | OUTPATIENT
Start: 2018-02-09 | End: 2018-02-13

## 2018-02-09 RX ORDER — CLOPIDOGREL BISULFATE 75 MG/1
75 TABLET, FILM COATED ORAL DAILY
Qty: 0 | Refills: 0 | Status: DISCONTINUED | OUTPATIENT
Start: 2018-02-09 | End: 2018-02-14

## 2018-02-09 RX ORDER — ATORVASTATIN CALCIUM 80 MG/1
40 TABLET, FILM COATED ORAL AT BEDTIME
Qty: 0 | Refills: 0 | Status: DISCONTINUED | OUTPATIENT
Start: 2018-02-09 | End: 2018-02-11

## 2018-02-09 RX ORDER — SODIUM CHLORIDE 9 MG/ML
1000 INJECTION, SOLUTION INTRAVENOUS
Qty: 0 | Refills: 0 | Status: DISCONTINUED | OUTPATIENT
Start: 2018-02-09 | End: 2018-02-14

## 2018-02-09 RX ORDER — SODIUM CHLORIDE 9 MG/ML
1000 INJECTION INTRAMUSCULAR; INTRAVENOUS; SUBCUTANEOUS
Qty: 0 | Refills: 0 | Status: DISCONTINUED | OUTPATIENT
Start: 2018-02-09 | End: 2018-02-14

## 2018-02-09 RX ADMIN — SODIUM CHLORIDE 1000 MILLILITER(S): 9 INJECTION, SOLUTION INTRAVENOUS at 03:53

## 2018-02-09 RX ADMIN — HEPARIN SODIUM 5000 UNIT(S): 5000 INJECTION INTRAVENOUS; SUBCUTANEOUS at 06:25

## 2018-02-09 RX ADMIN — HEPARIN SODIUM 5000 UNIT(S): 5000 INJECTION INTRAVENOUS; SUBCUTANEOUS at 21:58

## 2018-02-09 RX ADMIN — HEPARIN SODIUM 5000 UNIT(S): 5000 INJECTION INTRAVENOUS; SUBCUTANEOUS at 13:12

## 2018-02-09 NOTE — H&P ADULT - NSHPLABSRESULTS_GEN_ALL_CORE
10.9   15.30 )-----------( 324      ( 2018 17:10 )             32.8           135  |  103  |  35<H>  ----------------------------<  118<H>  4.6   |  21  |  1.8<H>  Baseline Cr: 1.1  Ca    9.8      2018 17:10    TPro  5.7<L>  /  Alb  2.8<L>  /  TBili  0.7  /  DBili  0.2  /  AST  16  /  ALT  12  /  AlkPhos  83          Urinalysis Basic - ( 2018 16:07 )    Color: Yellow / Appearance: Clear / S.025 / pH: x  Gluc: x / Ketone: Negative  / Bili: Negative / Urobili: 0.2   Blood: x / Protein: Negative / Nitrite: Negative   Leuk Esterase: Negative / RBC: x / WBC x   Sq Epi: x / Non Sq Epi: x / Bacteria: x    PT/INR - ( 2018 17:10 )   PT: 12.30 sec;   INR: 1.14 ratio       PTT - ( 2018 17:10 )  PTT:24.2 sec    Lactate Trend   @ 17:10 Lactate:1.3

## 2018-02-09 NOTE — H&P ADULT - HISTORY OF PRESENT ILLNESS
84M with pmhx of sigmoid diverticultis resulting in colo-vesicular fistula which underwent sigmoid colon resection and resection of colo vesicular fistula, primary anastomosis 1/4/18 complicated by perforated viscus, was subsequently reoperated on 1/13/18 had diverting loop ileostomy and hospital discharge on 1/25 presented to hospital today for Decreased PO intake, persistent nausea, intermittent vomiting (non-bloody) and intermittent abdominal pain which spontaneously resolved. States ileostomy has had good out put. Patient has denied any fevers, chills, CP, palpitations, diarrhea, pain with eating, or focal neurological symptoms. Spoke with home nurse and Dr. Martinez today and was told to come to hospital for admission.   Pt was previously treated with Ertapenem requiring a PICC line.    Patient also states he has been having worsening dyspnea on exertion since surgery but denies any CP, Palpitations, leg swelling, prolonged periods of immobility or recent travel, myalgias. Able to walk less than 1 block before onset of symptoms. Walk with a walker.

## 2018-02-09 NOTE — H&P ADULT - NSHPPHYSICALEXAM_GEN_ALL_CORE
Vital Signs Last 24 Hrs  T(C): 36.7 (09 Feb 2018 02:22), Max: 37.2 (08 Feb 2018 19:38)  T(F): 98 (09 Feb 2018 02:22), Max: 99 (08 Feb 2018 19:38)  HR: 102 (09 Feb 2018 02:22) (102 - 115)  BP: 103/67 (09 Feb 2018 02:22) (103/67 - 136/74)  RR: 17 (09 Feb 2018 02:22) (17 - 19)  SpO2: 97% (09 Feb 2018 02:22) (96% - 97%)      GENERAL: NAD, well-developed, Non-toxic, stated age   HEAD:  Atraumatic, Normocephalic  EYES: EOMI, PERRLA, conjunctiva and sclera clear  NECK: Supple, No JVD  CHEST/LUNG: Clear to auscultation bilaterally; No wheezing, rhonchi, or crackles  HEART: Regular rate and rhythm; s1, s2, No murmurs, rubs, or gallops  ABDOMEN: Soft, Nontender, Distended; Bowel sounds present, No rebound or guarding noted. Ileostomy present with mucosa showing, soft stool in bag, no blood noted. Actively vomiting small amounts of green liquid during interview.   EXTREMITIES:  No lower extremity edema or calf tenderness to palpation.  No clubbing or cyanosis  PSYCH: AAOx3  NEUROLOGY: non-focal  SKIN: No rashes or lesions

## 2018-02-09 NOTE — H&P ADULT - PROBLEM SELECTOR PLAN 6
Not on ace at home, unclear as to why. Last Echo in 5/2017 showed ef of 45-50%   Continue home meds.   Will repeat echo due to SANTAMARIA, but no evidence of decompensated HF at this time Not on ace at home, unclear as to why. Last Echo in 5/2017 showed ef of 45-50%   Continue home meds.   Will repeat echo due to SANTAMARIA, but no evidence of decompensated HF at this time  check Venous Duplex, Wells score for DVT:1 but weakness, long hospitalization higher risk

## 2018-02-09 NOTE — H&P ADULT - ATTENDING COMMENTS
Patient seen today remains concerned about not eating and his nutritional status. Patient has not been eating since his last discharge home on 1/25/2018. Patient has NGT. Patient remains on IVF, give with caution given history of mld systolic heart failure. Surgery notes appreciated. Will consult Dr Alatorre for nutrition.

## 2018-02-09 NOTE — H&P ADULT - ASSESSMENT
84M with pmhx of sigmoid diverticultis resulting in colo-vesicular fistula which underwent sigmoid colon resection and resection of colo vesicular fistula, primary anastomosis 1/4/18 complicated by perforated viscus, was subsequently reoperated on 1/13/18 had diverting loop ileostomy and hospital discharge on 1/25 presented to hospital today for Decreased PO intake, persistent nausea, intermittent vomiting 84M with pmhx of sigmoid diverticultis resulting in colo-vesicular fistula which underwent sigmoid colon resection and resection of colo vesicular fistula, primary anastomosis 1/4/18 complicated by perforated viscus, was subsequently reoperated on 1/13/18 had diverting loop ileostomy and hospital discharge on 1/25 presented to hospital today for Decreased PO intake, persistent nausea, intermittent vomiting      Diet: NPO  GI PPX: No indicated  DVT Ppx: Heparin  Dnr/Dni

## 2018-02-09 NOTE — H&P ADULT - NSHPOUTPATIENTPROVIDERS_GEN_ALL_CORE
Surgeon: Dr. Martinez PMD: Dr. De aL Torre  Cardio:Dr. Fowler   EPS: Dr. Arnold  Surgeon: Dr. Martinez

## 2018-02-09 NOTE — H&P ADULT - NSHPSOCIALHISTORY_GEN_ALL_CORE
Marital Status:  ( x  )    (   ) Single    (   )    (  )   Lives with: (  ) alone  (  ) children   ( x ) spouse   (  ) parents  (  ) other  Recent Travel: None    Substance Use (street drugs): ( x ) never used  (  ) other:  Tobacco Usage:  (   ) never smoked   ( x  ) former smoker   (   ) current smoker  (     ) pack year: 3 pack years, quit 50 years ago  Alcohol Usage: Socially

## 2018-02-09 NOTE — H&P ADULT - NSHPREVIEWOFSYSTEMS_GEN_ALL_CORE
CONSTITUTIONAL: No weakness, fevers or chills  EYES/ENT: No visual changes;  No vertigo or throat pain   NECK: No pain or stiffness  RESPIRATORY: No cough, wheezing, hemoptysis; No shortness of breath  CARDIOVASCULAR: No chest pain or palpitations  GASTROINTESTINAL: See HPI  GENITOURINARY: No dysuria, frequency or hematuria  NEUROLOGICAL: No numbness or weakness  MUSCULOSKELETAL: No myalgias, arthralgias, or joint swelling  SKIN: No itching, rashes

## 2018-02-09 NOTE — H&P ADULT - PROBLEM SELECTOR PLAN 4
Doubt presence of infection at this time, likely stress response.  UA negative, CXR clear, no fever  No Abx at this time.

## 2018-02-09 NOTE — H&P ADULT - PROBLEM SELECTOR PLAN 3
Baseline Cr: 1.1  Likely pre-renal given history  Trend Cr.  If no improvement after fluids will need Urine studies

## 2018-02-09 NOTE — H&P ADULT - PROBLEM SELECTOR PLAN 1
SBO vs Gastroparesis   Surgery following: Doesn't thing its SBO  Placed patient on NGT suctioning for active vomiting and abdo distention  Zofran  NPO   Advance diet as tolerated

## 2018-02-09 NOTE — H&P ADULT - PMH
CAD (coronary artery disease)    Diabetes    Fistula of large intestine  colo-vesicula fistula  H/O diverticulitis of colon    HLD (hyperlipidemia)    HTN (hypertension)    Recurrent UTI (urinary tract infection)

## 2018-02-10 DIAGNOSIS — N39.0 URINARY TRACT INFECTION, SITE NOT SPECIFIED: ICD-10-CM

## 2018-02-10 DIAGNOSIS — E11.22 TYPE 2 DIABETES MELLITUS WITH DIABETIC CHRONIC KIDNEY DISEASE: ICD-10-CM

## 2018-02-10 DIAGNOSIS — I10 ESSENTIAL (PRIMARY) HYPERTENSION: ICD-10-CM

## 2018-02-10 DIAGNOSIS — E78.2 MIXED HYPERLIPIDEMIA: ICD-10-CM

## 2018-02-10 DIAGNOSIS — K63.2 FISTULA OF INTESTINE: ICD-10-CM

## 2018-02-10 DIAGNOSIS — D72.829 ELEVATED WHITE BLOOD CELL COUNT, UNSPECIFIED: ICD-10-CM

## 2018-02-10 DIAGNOSIS — I50.22 CHRONIC SYSTOLIC (CONGESTIVE) HEART FAILURE: ICD-10-CM

## 2018-02-10 DIAGNOSIS — I25.10 ATHEROSCLEROTIC HEART DISEASE OF NATIVE CORONARY ARTERY WITHOUT ANGINA PECTORIS: ICD-10-CM

## 2018-02-10 LAB
ALBUMIN SERPL ELPH-MCNC: 2.3 G/DL — LOW (ref 3–5.5)
ALP SERPL-CCNC: 68 U/L — SIGNIFICANT CHANGE UP (ref 30–115)
ALT FLD-CCNC: 9 U/L — SIGNIFICANT CHANGE UP (ref 0–41)
ANION GAP SERPL CALC-SCNC: 9 MMOL/L — SIGNIFICANT CHANGE UP (ref 7–14)
AST SERPL-CCNC: 13 U/L — SIGNIFICANT CHANGE UP (ref 0–41)
BILIRUB SERPL-MCNC: 1.1 MG/DL — SIGNIFICANT CHANGE UP (ref 0.2–1.2)
BUN SERPL-MCNC: 25 MG/DL — HIGH (ref 10–20)
CALCIUM SERPL-MCNC: 8.9 MG/DL — SIGNIFICANT CHANGE UP (ref 8.5–10.1)
CHLORIDE SERPL-SCNC: 107 MMOL/L — SIGNIFICANT CHANGE UP (ref 98–110)
CO2 SERPL-SCNC: 23 MMOL/L — SIGNIFICANT CHANGE UP (ref 17–32)
CREAT SERPL-MCNC: 1.5 MG/DL — SIGNIFICANT CHANGE UP (ref 0.7–1.5)
CULTURE RESULTS: NO GROWTH — SIGNIFICANT CHANGE UP
GLUCOSE SERPL-MCNC: 69 MG/DL — LOW (ref 70–110)
HCT VFR BLD CALC: 28.9 % — LOW (ref 42–52)
HGB BLD-MCNC: 9.3 G/DL — LOW (ref 14–18)
MAGNESIUM SERPL-MCNC: 1.6 MG/DL — LOW (ref 1.8–2.4)
MCHC RBC-ENTMCNC: 27.6 PG — SIGNIFICANT CHANGE UP (ref 27–31)
MCHC RBC-ENTMCNC: 32.2 G/DL — SIGNIFICANT CHANGE UP (ref 32–37)
MCV RBC AUTO: 85.8 FL — SIGNIFICANT CHANGE UP (ref 80–94)
NRBC # BLD: 0 /100 WBCS — SIGNIFICANT CHANGE UP (ref 0–0)
PLATELET # BLD AUTO: 245 K/UL — SIGNIFICANT CHANGE UP (ref 130–400)
POTASSIUM SERPL-MCNC: 4.4 MMOL/L — SIGNIFICANT CHANGE UP (ref 3.5–5)
POTASSIUM SERPL-SCNC: 4.4 MMOL/L — SIGNIFICANT CHANGE UP (ref 3.5–5)
PROT SERPL-MCNC: 4.6 G/DL — LOW (ref 6–8)
RBC # BLD: 3.37 M/UL — LOW (ref 4.7–6.1)
RBC # FLD: 16.8 % — HIGH (ref 11.5–14.5)
SODIUM SERPL-SCNC: 139 MMOL/L — SIGNIFICANT CHANGE UP (ref 135–146)
SPECIMEN SOURCE: SIGNIFICANT CHANGE UP
WBC # BLD: 10.94 K/UL — HIGH (ref 4.8–10.8)
WBC # FLD AUTO: 10.94 K/UL — HIGH (ref 4.8–10.8)

## 2018-02-10 RX ADMIN — HEPARIN SODIUM 5000 UNIT(S): 5000 INJECTION INTRAVENOUS; SUBCUTANEOUS at 05:23

## 2018-02-10 RX ADMIN — HEPARIN SODIUM 5000 UNIT(S): 5000 INJECTION INTRAVENOUS; SUBCUTANEOUS at 22:08

## 2018-02-10 RX ADMIN — SODIUM CHLORIDE 100 MILLILITER(S): 9 INJECTION INTRAMUSCULAR; INTRAVENOUS; SUBCUTANEOUS at 01:46

## 2018-02-10 RX ADMIN — Medication 50 MILLIGRAM(S): at 05:23

## 2018-02-10 RX ADMIN — RANOLAZINE 500 MILLIGRAM(S): 500 TABLET, FILM COATED, EXTENDED RELEASE ORAL at 18:51

## 2018-02-10 RX ADMIN — RANOLAZINE 500 MILLIGRAM(S): 500 TABLET, FILM COATED, EXTENDED RELEASE ORAL at 05:23

## 2018-02-10 RX ADMIN — ATORVASTATIN CALCIUM 40 MILLIGRAM(S): 80 TABLET, FILM COATED ORAL at 22:08

## 2018-02-10 RX ADMIN — Medication 81 MILLIGRAM(S): at 12:39

## 2018-02-10 NOTE — PROGRESS NOTE ADULT - SUBJECTIVE AND OBJECTIVE BOX
ZEESHAN MCCLENDON  84y  Male      Patient is a 84y old  Male who presents with a chief complaint of Generalized weakness, decreased PO intake, nausea, intermittent vomittng (09 Feb 2018 02:12)     History of Present Illness:  Chief Complaint/Reason for Admission: Generalized weakness, decreased PO intake, nausea, intermittent vomittng  History of Present Illness:   84M with pmhx of sigmoid diverticultis resulting in colo-vesicular fistula which underwent sigmoid colon resection and resection of colo vesicular fistula, primary anastomosis 1/4/18 complicated by perforated viscus, was subsequently reoperated on 1/13/18 had diverting loop ileostomy and hospital discharge on 1/25 presented to hospital today for Decreased PO intake, persistent nausea, intermittent vomiting (non-bloody) and intermittent abdominal pain which spontaneously resolved. States ileostomy has had good out put. Patient has denied any fevers, chills, CP, palpitations, diarrhea, pain with eating, or focal neurological symptoms. Spoke with home nurse and Dr. Martinez today and was told to come to hospital for admission.   Pt was previously treated with Ertapenem requiring a PICC line.    Patient also states he has been having worsening dyspnea on exertion since surgery but denies any CP, Palpitations, leg swelling, prolonged periods of immobility or recent travel, myalgias. Able to walk less than 1 block before onset of symptoms. Walk with a walker.       DEHYDRATION  No pertinent family history in first degree relatives        INTERVAL HPI/OVERNIGHT EVENTS: drinking clear liquids, & SX much less; had his ileostomy break-RN just replaced.  Daily     Daily   Vital Signs Last 24 Hrs  T(C): 35.9 (10 Feb 2018 07:28), Max: 36.4 (09 Feb 2018 21:49)  T(F): 96.7 (10 Feb 2018 07:28), Max: 97.6 (09 Feb 2018 21:49)  HR: 104 (10 Feb 2018 07:28) (18 - 110)  BP: 91/60 (10 Feb 2018 07:28) (91/60 - 120/58)  BP(mean): --  RR: 18 (10 Feb 2018 07:28) (16 - 18)  SpO2: 100% (10 Feb 2018 07:28) (95% - 100%)    PE:  GEN; WD, WN, NAD, A&Ox  H: AT/NC  TH: MMM; AIRWAY- 2/4  Neck- NO, LN, JVD, Thyroid  LUNGS- Clear;  [ -] rales, [- ] rhonchi, [ - ]wheezing  HT-   RR, No M, R, G  ABD-    [ ] obese, [ ] Flat,  BS-DEC; ileostomy w bilious drainage -R ABD wall; NT, No HSM  EXT- No CCE  NEURO- see MS, CNI, No gross Focal Deficit      02-09-18 @ 07:01  -  02-10-18 @ 07:00  --------------------------------------------------------  IN: 1200 mL / OUT: 1500 mL / NET: -300 mL      LABS:                        9.3    10.94 )-----------( 245      ( 10 Feb 2018 04:30 )             28.9   02-10    139  |  107  |  25<H>  ----------------------------<  69<L>  4.4   |  23  |  1.5    Ca    8.9      10 Feb 2018 04:30  Mg     1.6     02-10    TPro  4.6<L>  /  Alb  2.3<L>  /  TBili  1.1  /  DBili  x   /  AST  13  /  ALT  9   /  AlkPhos  68  02-10          RADIOLOGY & ADDITIONAL TESTS:  < from: VA Duplex Lower Ext Vein Scan, Bilat (02.09.18 @ 08:30) >  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    Impression:    Venous thrombosis bilateral soleal veins.    < end of copied text >      < from: CT Abdomen and Pelvis w/ Oral Cont (02.08.18 @ 21:00) >    IMPRESSION:   1. Diffuse dilation of small bowel up to 3.7 cm up to point of right   lower quadrant ileostomy; findings suspicious for small bowel obstruction.            < end of copied text >        Imaging Personally Reviewed:  [ ] YES  [x ] NO    acetaminophen   Tablet. 650 milliGRAM(s) Oral every 4 hours PRN  aspirin enteric coated 81 milliGRAM(s) Oral daily  atorvastatin Oral Tab/Cap - Peds 40 milliGRAM(s) Oral at bedtime  clopidogrel Tablet 75 milliGRAM(s) Oral daily  heparin SubCutaneous Injection - Peds 5000 Unit(s) SubCutaneous every 8 hours  lactated ringers. 1000 milliLiter(s) IV Continuous <Continuous>  lactated ringers. 1000 milliLiter(s) IV Continuous <Continuous>  metoprolol succinate ER 50 milliGRAM(s) Oral daily  ondansetron  IVPB 4 milliGRAM(s) IV Intermittent every 8 hours PRN  ranolazine 500 milliGRAM(s) Oral two times a day  sodium chloride 0.9%. 1000 milliLiter(s) IV Continuous <Continuous>          HEALTH ISSUES - PROBLEM Dx:  Weight loss: Weight loss  Leukocytosis: Leukocytosis  HTN (hypertension): HTN   Systolic heart failure: Systolic heart failure  BUBBA (acute kidney injury)  Diabetes: Diabetes w CKD  Dehydration: Dehydration  Vomiting, intractability of vomiting not specified, presence of nausea not specified, unspecified vomiting type: Vomiting, intractability of vomiting not specified, presence of nausea not specified, unspecified vomiting type

## 2018-02-10 NOTE — PROGRESS NOTE ADULT - SUBJECTIVE AND OBJECTIVE BOX
ZEESHAN MCCLENDON  84y Male   [id number]    Hospital Day:   Post Operative Day:  Procedure:  Patient is a 84y old  Male who presents with a chief complaint of Generalized weakness, decreased PO intake, nausea, intermittent vomittng (2018 02:12)    PAST MEDICAL & SURGICAL HISTORY:  HLD (hyperlipidemia)  HTN (hypertension)  Fistula of large intestine: colo-vesicula fistula  H/O diverticulitis of colon  Recurrent UTI (urinary tract infection)  Diabetes  CAD (coronary artery disease)  Artificial cardiac pacemaker  History of partial colectomy  H/O ileostomy      Events of the Last 24h:  Vital Signs Last 24 Hrs  T(C): 36.4 (2018 21:49), Max: 36.6 (2018 07:57)  T(F): 97.6 (2018 21:49), Max: 97.9 (2018 07:57)  HR: 110 (2018 21:49) (18 - 110)  BP: 103/62 (2018 21:49) (103/62 - 120/58)  BP(mean): --  RR: 18 (2018 21:49) (18 - 19)  SpO2: 95% (2018 21:49) (95% - 96%)        Diet, NPO:   Except Medications (18 @ 03:13)      I&O's Summary    2018 07:  -  2018 07:00  --------------------------------------------------------  IN: 300 mL / OUT: 650 mL / NET: -350 mL    2018 07:01  -  10 Feb 2018 05:13  --------------------------------------------------------  IN: 600 mL / OUT: 1350 mL / NET: -750 mL     I&O's Detail    2018 07:  -  2018 07:00  --------------------------------------------------------  IN:    sodium chloride 0.9%.: 300 mL  Total IN: 300 mL    OUT:    Nasoenteral Tube: 600 mL    Voided: 50 mL  Total OUT: 650 mL    Total NET: -350 mL      2018 07:01  -  10 Feb 2018 05:13  --------------------------------------------------------  IN:    sodium chloride 0.9%.: 600 mL  Total IN: 600 mL    OUT:    Colostomy: 200 mL    Nasoenteral Tube: 550 mL    Voided: 600 mL  Total OUT: 1350 mL    Total NET: -750 mL          MEDICATIONS  (STANDING):  aspirin enteric coated 81 milliGRAM(s) Oral daily  atorvastatin Oral Tab/Cap - Peds 40 milliGRAM(s) Oral at bedtime  clopidogrel Tablet 75 milliGRAM(s) Oral daily  heparin SubCutaneous Injection - Peds 5000 Unit(s) SubCutaneous every 8 hours  lactated ringers. 1000 milliLiter(s) (1000 mL/Hr) IV Continuous <Continuous>  lactated ringers. 1000 milliLiter(s) (1000 mL/Hr) IV Continuous <Continuous>  metoprolol succinate ER 50 milliGRAM(s) Oral daily  ranolazine 500 milliGRAM(s) Oral two times a day  sodium chloride 0.9%. 1000 milliLiter(s) (100 mL/Hr) IV Continuous <Continuous>    MEDICATIONS  (PRN):  acetaminophen   Tablet. 650 milliGRAM(s) Oral every 4 hours PRN Mild Pain (1 - 3)  ondansetron  IVPB 4 milliGRAM(s) IV Intermittent every 8 hours PRN Nausea and/or Vomiting      PHYSICAL EXAM:    GENERAL: NAD    HEENT: NCAT    CHEST/LUNGS: CTAB    HEART: RRR,  No murmurs, rubs, or gallops    ABDOMEN: SNTND +BS    EXTREMITIES:  FROM, No clubbing, cyanosis, or edema, palpable pulse    NEURO: No focal neurological deficits    SKIN: No rashes or lesions    INCISION/WOUNDS:                          10.9   14.31 )-----------( 299      ( 2018 07:55 )             32.7        CBC Full  -  ( 2018 07:55 )  WBC Count : 14.31 K/uL  Hemoglobin : 10.9 g/dL  Hematocrit : 32.7 %  Platelet Count - Automated : 299 K/uL  Mean Cell Volume : 84.3 fL  Mean Cell Hemoglobin : 28.1 pg  Mean Cell Hemoglobin Concentration : 33.3 g/dL  Auto Neutrophil # : 12.23 K/uL  Auto Lymphocyte # : 0.90 K/uL  Auto Monocyte # : 0.97 K/uL  Auto Eosinophil # : 0.10 K/uL  Auto Basophil # : 0.03 K/uL  Auto Neutrophil % : 85.4 %  Auto Lymphocyte % : 6.3 %  Auto Monocyte % : 6.8 %  Auto Eosinophil % : 0.7 %  Auto Basophil % : 0.2 %               133   |  98    |  30                 Ca: 9.4    BMP:   ----------------------------< 90     Mg: x     (18 @ 07:55)             4.2    |  24    | 1.7                Ph: x        LFT:     TPro: 5.7 / Alb: 2.8 / TBili: 0.7 / DBili: 0.2 / AST: 16 / ALT: 12 / AlkPhos: 83   (18 @ 17:10)    LIVER FUNCTIONS - ( 2018 17:10 )  Alb: 2.8 g/dL / Pro: 5.7 g/dL / ALK PHOS: 83 U/L / ALT: 12 U/L / AST: 16 U/L / GGT: x           PT/INR - ( 2018 17:10 )   PT: 12.30 sec;   INR: 1.14 ratio         PTT - ( 2018 17:10 )  PTT:24.2 sec  CARDIAC MARKERS ( 2018 07:55 )  0.02 ng/mL / x     / 18 U/L / x     / 2.2 ng/mL      Urinalysis Basic - ( 2018 16:07 )    Color: Yellow / Appearance: Clear / S.025 / pH: x  Gluc: x / Ketone: Negative  / Bili: Negative / Urobili: 0.2   Blood: x / Protein: Negative / Nitrite: Negative   Leuk Esterase: Negative / RBC: x / WBC x   Sq Epi: x / Non Sq Epi: x / Bacteria: x          IMAGING:    PATHOLOGY:      SPECTRA: ZEESHAN MCCLENDON  84y Male   [id number]    Patient is a 84y old  Male who presents with a chief complaint of Generalized weakness, decreased PO intake, nausea, intermittent vomittng (2018 02:12)    PAST MEDICAL & SURGICAL HISTORY:  HLD (hyperlipidemia)  HTN (hypertension)  Fistula of large intestine: colo-vesicula fistula  H/O diverticulitis of colon  Recurrent UTI (urinary tract infection)  Diabetes  CAD (coronary artery disease)  Artificial cardiac pacemaker  History of partial colectomy  H/O ileostomy      Events of the Last 24h: no acute events   Vital Signs Last 24 Hrs  T(C): 36.4 (2018 21:49), Max: 36.6 (2018 07:57)  T(F): 97.6 (2018 21:49), Max: 97.9 (2018 07:57)  HR: 110 (2018 21:49) (18 - 110)  BP: 103/62 (2018 21:49) (103/62 - 120/58)  BP(mean): --  RR: 18 (2018 21:49) (18 - 19)  SpO2: 95% (2018 21:49) (95% - 96%)        Diet, NPO:   Except Medications (18 @ 03:13)      I&O's Summary    2018 07:  -  2018 07:00  --------------------------------------------------------  IN: 300 mL / OUT: 650 mL / NET: -350 mL    2018 07:01  -  10 Feb 2018 05:13  --------------------------------------------------------  IN: 600 mL / OUT: 1350 mL / NET: -750 mL     I&O's Detail    2018 07:  -  2018 07:00  --------------------------------------------------------  IN:    sodium chloride 0.9%.: 300 mL  Total IN: 300 mL    OUT:    Nasoenteral Tube: 600 mL    Voided: 50 mL  Total OUT: 650 mL    Total NET: -350 mL      2018 07:01  -  10 Feb 2018 05:13  --------------------------------------------------------  IN:    sodium chloride 0.9%.: 600 mL  Total IN: 600 mL    OUT:    Colostomy: 200 mL    Nasoenteral Tube: 550 mL    Voided: 600 mL  Total OUT: 1350 mL    Total NET: -750 mL          MEDICATIONS  (STANDING):  aspirin enteric coated 81 milliGRAM(s) Oral daily  atorvastatin Oral Tab/Cap - Peds 40 milliGRAM(s) Oral at bedtime  clopidogrel Tablet 75 milliGRAM(s) Oral daily  heparin SubCutaneous Injection - Peds 5000 Unit(s) SubCutaneous every 8 hours  lactated ringers. 1000 milliLiter(s) (1000 mL/Hr) IV Continuous <Continuous>  lactated ringers. 1000 milliLiter(s) (1000 mL/Hr) IV Continuous <Continuous>  metoprolol succinate ER 50 milliGRAM(s) Oral daily  ranolazine 500 milliGRAM(s) Oral two times a day  sodium chloride 0.9%. 1000 milliLiter(s) (100 mL/Hr) IV Continuous <Continuous>    MEDICATIONS  (PRN):  acetaminophen   Tablet. 650 milliGRAM(s) Oral every 4 hours PRN Mild Pain (1 - 3)  ondansetron  IVPB 4 milliGRAM(s) IV Intermittent every 8 hours PRN Nausea and/or Vomiting      PHYSICAL EXAM:    GENERAL: NAD    HEENT: NCAT    CHEST/LUNGS: CTAB    HEART: RRR,  No murmurs, rubs, or gallops    ABDOMEN: SNTND +BS    EXTREMITIES:  FROM, No clubbing, cyanosis, or edema, palpable pulse    NEURO: No focal neurological deficits    SKIN: No rashes or lesions    INCISION/WOUNDS:                          10.9   14.31 )-----------( 299      ( 2018 07:55 )             32.7        CBC Full  -  ( 2018 07:55 )  WBC Count : 14.31 K/uL  Hemoglobin : 10.9 g/dL  Hematocrit : 32.7 %  Platelet Count - Automated : 299 K/uL  Mean Cell Volume : 84.3 fL  Mean Cell Hemoglobin : 28.1 pg  Mean Cell Hemoglobin Concentration : 33.3 g/dL  Auto Neutrophil # : 12.23 K/uL  Auto Lymphocyte # : 0.90 K/uL  Auto Monocyte # : 0.97 K/uL  Auto Eosinophil # : 0.10 K/uL  Auto Basophil # : 0.03 K/uL  Auto Neutrophil % : 85.4 %  Auto Lymphocyte % : 6.3 %  Auto Monocyte % : 6.8 %  Auto Eosinophil % : 0.7 %  Auto Basophil % : 0.2 %               133   |  98    |  30                 Ca: 9.4    BMP:   ----------------------------< 90     Mg: x     (18 @ 07:55)             4.2    |  24    | 1.7                Ph: x        LFT:     TPro: 5.7 / Alb: 2.8 / TBili: 0.7 / DBili: 0.2 / AST: 16 / ALT: 12 / AlkPhos: 83   (18 @ 17:10)    LIVER FUNCTIONS - ( 2018 17:10 )  Alb: 2.8 g/dL / Pro: 5.7 g/dL / ALK PHOS: 83 U/L / ALT: 12 U/L / AST: 16 U/L / GGT: x           PT/INR - ( 2018 17:10 )   PT: 12.30 sec;   INR: 1.14 ratio         PTT - ( 2018 17:10 )  PTT:24.2 sec  CARDIAC MARKERS ( 2018 07:55 )  0.02 ng/mL / x     / 18 U/L / x     / 2.2 ng/mL      Urinalysis Basic - ( 2018 16:07 )    Color: Yellow / Appearance: Clear / S.025 / pH: x  Gluc: x / Ketone: Negative  / Bili: Negative / Urobili: 0.2   Blood: x / Protein: Negative / Nitrite: Negative   Leuk Esterase: Negative / RBC: x / WBC x   Sq Epi: x / Non Sq Epi: x / Bacteria: x

## 2018-02-11 DIAGNOSIS — M62.50 MUSCLE WASTING AND ATROPHY, NOT ELSEWHERE CLASSIFIED, UNSPECIFIED SITE: ICD-10-CM

## 2018-02-11 RX ORDER — SIMETHICONE 80 MG/1
80 TABLET, CHEWABLE ORAL
Qty: 0 | Refills: 0 | Status: DISCONTINUED | OUTPATIENT
Start: 2018-02-11 | End: 2018-02-11

## 2018-02-11 RX ORDER — HEPARIN SODIUM 5000 [USP'U]/ML
5000 INJECTION INTRAVENOUS; SUBCUTANEOUS EVERY 8 HOURS
Qty: 0 | Refills: 0 | Status: DISCONTINUED | OUTPATIENT
Start: 2018-02-11 | End: 2018-02-14

## 2018-02-11 RX ORDER — ATORVASTATIN CALCIUM 80 MG/1
40 TABLET, FILM COATED ORAL AT BEDTIME
Qty: 0 | Refills: 0 | Status: DISCONTINUED | OUTPATIENT
Start: 2018-02-11 | End: 2018-02-14

## 2018-02-11 RX ADMIN — HEPARIN SODIUM 5000 UNIT(S): 5000 INJECTION INTRAVENOUS; SUBCUTANEOUS at 14:58

## 2018-02-11 RX ADMIN — ATORVASTATIN CALCIUM 40 MILLIGRAM(S): 80 TABLET, FILM COATED ORAL at 21:37

## 2018-02-11 RX ADMIN — Medication 50 MILLIGRAM(S): at 05:53

## 2018-02-11 RX ADMIN — Medication 81 MILLIGRAM(S): at 12:48

## 2018-02-11 RX ADMIN — HEPARIN SODIUM 5000 UNIT(S): 5000 INJECTION INTRAVENOUS; SUBCUTANEOUS at 06:29

## 2018-02-11 RX ADMIN — HEPARIN SODIUM 5000 UNIT(S): 5000 INJECTION INTRAVENOUS; SUBCUTANEOUS at 21:37

## 2018-02-11 NOTE — PROGRESS NOTE ADULT - SUBJECTIVE AND OBJECTIVE BOX
Procedure:    PAST MEDICAL & SURGICAL HISTORY:  HLD (hyperlipidemia)  HTN (hypertension)  Fistula of large intestine: colo-vesicula fistula  H/O diverticulitis of colon  Recurrent UTI (urinary tract infection)  Diabetes  CAD (coronary artery disease)  Artificial cardiac pacemaker  History of partial colectomy  H/O ileostomy      HPI:  84M with pmhx of sigmoid diverticultis resulting in colo-vesicular fistula which underwent sigmoid colon resection and resection of colo vesicular fistula, primary anastomosis 18 complicated by perforated viscus, was subsequently reoperated on 18 had diverting loop ileostomy and hospital discharge on  presented to hospital today for Decreased PO intake, persistent nausea, intermittent vomiting (non-bloody) and intermittent abdominal pain which spontaneously resolved. States ileostomy has had good out put. Patient has denied any fevers, chills, CP, palpitations, diarrhea, pain with eating, or focal neurological symptoms. Spoke with home nurse and Dr. Martinez today and was told to come to hospital for admission.   Pt was previously treated with Ertapenem requiring a PICC line.    Patient also states he has been having worsening dyspnea on exertion since surgery but denies any CP, Palpitations, leg swelling, prolonged periods of immobility or recent travel, myalgias. Able to walk less than 1 block before onset of symptoms. Walk with a walker. (2018 02:12)      Vital Signs Last 24 Hrs  T(C): 36.4 (10 Feb 2018 23:07), Max: 36.6 (10 Feb 2018 16:52)  T(F): 97.5 (10 Feb 2018 23:07), Max: 97.8 (10 Feb 2018 16:52)  HR: 95 (10 Feb 2018 23:07) (95 - 108)  BP: 105/58 (10 Feb 2018 23:07) (91/60 - 111/63)  BP(mean): --  RR: 20 (10 Feb 2018 23:07) (16 - 20)  SpO2: 96% (10 Feb 2018 23:07) (96% - 100%)        I&O's Detail    2018 07:01  -  10 Feb 2018 07:00  --------------------------------------------------------  IN:    sodium chloride 0.9%.: 1200 mL  Total IN: 1200 mL    OUT:    Colostomy: 200 mL    Nasoenteral Tube: 550 mL    Voided: 750 mL  Total OUT: 1500 mL    Total NET: -300 mL      10 Feb 2018 07:01  -  2018 05:05  --------------------------------------------------------  IN:  Total IN: 0 mL    OUT:    Colostomy: 1500 mL    Voided: 400 mL  Total OUT: 1900 mL    Total NET: -1900 mL            Culture - Urine (collected 2018 16:52)  Source: .Urine Clean Catch (Midstream)  Final Report (10 Feb 2018 13:07):    No growth                       9.3    10.94 )-----------( 245      ( 02-10 @ 04:30 )             28.9                10.9   14.31 )-----------( 299      (  @ 07:55 )             32.7                    139   |  107   |  25                 Ca: 8.9    BMP:   ----------------------------< 69     M.6   (02-10-18 @ 04:30)             4.4    |  23    | 1.5                Ph: x        LFT:     TPro: 4.6 / Alb: 2.3 / TBili: 1.1 / DBili: x / AST: 13 / ALT: 9 / AlkPhos: 68   (02-10-18 @ 04:30)              CARDIAC MARKERS ( 2018 07:55 )  0.02 ng/mL / x     / 18 U/L / x     / 2.2 ng/mL                  Culture - Urine (collected 2018 16:52)  Source: .Urine Clean Catch (Midstream)  Final Report (10 Feb 2018 13:07):    No growth                MEDICATIONS  (STANDING):  aspirin enteric coated 81 milliGRAM(s) Oral daily  atorvastatin Oral Tab/Cap - Peds 40 milliGRAM(s) Oral at bedtime  clopidogrel Tablet 75 milliGRAM(s) Oral daily  heparin SubCutaneous Injection - Peds 5000 Unit(s) SubCutaneous every 8 hours  lactated ringers. 1000 milliLiter(s) (1000 mL/Hr) IV Continuous <Continuous>  lactated ringers. 1000 milliLiter(s) (1000 mL/Hr) IV Continuous <Continuous>  metoprolol succinate ER 50 milliGRAM(s) Oral daily  ranolazine 500 milliGRAM(s) Oral two times a day  sodium chloride 0.9%. 1000 milliLiter(s) (100 mL/Hr) IV Continuous <Continuous>    MEDICATIONS  (PRN):  acetaminophen   Tablet. 650 milliGRAM(s) Oral every 4 hours PRN Mild Pain (1 - 3)  ondansetron  IVPB 4 milliGRAM(s) IV Intermittent every 8 hours PRN Nausea and/or Vomiting      Diet, Full Liquid (02-10-18 @ 09:03)  Diet, Low Fiber (02-10-18 @ 14:02)  Diet, Consistent Carbohydrate Clear Liquid (02-10-18 @ 14:02)  Diet, Consistent Carbohydrate Full Liquid (02-10-18 @ 14:02)  Diet, Consistent Carbohydrate/No Snacks (02-10-18 @ 14:02)      General Appearance: Appears well, NAD  Neck: Supple  Chest: Equal expansion bilaterally, equal breath sounds  CV: S1, S2, RRR  Abdomen: Soft, NT, ND, ostomy working  Extremities: Grossly symmetric, WNL  Neuro: A&Ox3

## 2018-02-11 NOTE — PROGRESS NOTE ADULT - SUBJECTIVE AND OBJECTIVE BOX
ZEESHAN MCCLENDON  84y  Male      Patient is a 84y old  Male who presents with a chief complaint of Generalized weakness, decreased PO intake, nausea, intermittent vomittng (09 Feb 2018 02:12)  Pt is feeling sl better, stronger, and eating, drinking better. Ileostomy output remains high.   History of Present Illness:  Chief Complaint/Reason for Admission: Generalized weakness, decreased PO intake, nausea, intermittent vomittng  History of Present Illness:   84M with pmhx of sigmoid diverticultis resulting in colo-vesicular fistula which underwent sigmoid colon resection and resection of colo vesicular fistula, primary anastomosis 1/4/18 complicated by perforated viscus, was subsequently reoperated on 1/13/18 had diverting loop ileostomy and hospital discharge on 1/25 presented to hospital today for Decreased PO intake, persistent nausea, intermittent vomiting (non-bloody) and intermittent abdominal pain which spontaneously resolved. States ileostomy has had good out put. Patient has denied any fevers, chills, CP, palpitations, diarrhea, pain with eating, or focal neurological symptoms. Spoke with home nurse and Dr. Martinez today and was told to come to hospital for admission.   Pt was previously treated with Ertapenem requiring a PICC line.    Patient also states he has been having worsening dyspnea on exertion since surgery but denies any CP, Palpitations, leg swelling, prolonged periods of immobility or recent travel, myalgias. Able to walk less than 1 block before onset of symptoms. Walk with a walker.       DEHYDRATION  No pertinent family history in first degree relatives        INTERVAL HPI/OVERNIGHT EVENTS: drinking clear liquids, & SX much less; had his ileostomy break-RN just replaced.  Daily     Daily   Vital Signs Last 24 Hrs  T(F): 96.7 (10 Feb 2018 07:28), Max: 97.6 (09 Feb 2018 21:49)  HR: 104 (10 Feb 2018 07:28) (18 - 110)  BP: 91/60 (10 Feb 2018 07:28) (91/60 - 120/58)  BP(mean): --  RR: 18 (10 Feb 2018 07:28) (16 - 18)  SpO2: 100% (10 Feb 2018 07:28) (95% - 100%)    Vital Signs Last 24 Hrs  T(C): 36.8 (11 Feb 2018 07:57), Max: 36.8 (11 Feb 2018 07:57)  T(F): 98.2 (11 Feb 2018 07:57), Max: 98.2 (11 Feb 2018 07:57)  HR: 97 (11 Feb 2018 05:50) (95 - 103)  BP: 106/63 (11 Feb 2018 05:50) (99/57 - 111/63)  BP(mean): --  RR: 20 (10 Feb 2018 23:07) (18 - 20)  SpO2: 96% (10 Feb 2018 23:07) (96% - 98%)  I&O's Summary    10 Feb 2018 07:01  -  11 Feb 2018 07:00  --------------------------------------------------------  IN: 0 mL / OUT: 1900 mL / NET: -1900 mL    11 Feb 2018 07:01  -  11 Feb 2018 10:51  --------------------------------------------------------  IN: 0 mL / OUT: 450 mL / NET: -450 mL      PE:  GEN; WD, WN, NAD, A&Ox  H: AT/NC  TH: MMM; AIRWAY- 2/4  Neck- NO, LN, JVD, Thyroid  LUNGS- Clear;  [ -] rales, [- ] rhonchi, [ - ]wheezing  HT-   RR, No M, R, G  ABD-    [ ] obese, [ ] Flat,  BS-DEC; ileostomy w bilious drainage -R ABD wall; NT, No HSM  EXT- No CCE; PICC in RUE-nontender  NEURO- see MS, CNI, No gross Focal Deficit      02-09-18 @ 07:01  -  02-10-18 @ 07:00  --------------------------------------------------------  IN: 1200 mL / OUT: 1500 mL / NET: -300 mL; getting IV /HR + PO      LABS:                                9.3    10.94 )-----------( 245      ( 10 Feb 2018 04:30 )             28.9   02-10    139  |  107  |  25<H>  ----------------------------<  69<L>  4.4   |  23  |  1.5    Ca    8.9      10 Feb 2018 04:30  Mg     1.6     02-10    TPro  4.6<L>  /  Alb  2.3<L>  /  TBili  1.1  /  DBili  x   /  AST  13  /  ALT  9   /  AlkPhos  68  02-10          RADIOLOGY & ADDITIONAL TESTS:  < from: [facility] Duplex Lower Ext Vein Scan, Bilat (02.09.18 @ 08:30) >  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    Impression:    Venous thrombosis bilateral soleal veins.    < end of copied text >      < from: CT Abdomen and Pelvis w/ Oral Cont (02.08.18 @ 21:00) >    IMPRESSION:   1. Diffuse dilation of small bowel up to 3.7 cm up to point of right   lower quadrant ileostomy; findings suspicious for small bowel obstruction.            < end of copied text >        Imaging Personally Reviewed:  [ ] YES  [x ] NO    acetaminophen   Tablet. 650 milliGRAM(s) Oral every 4 hours PRN  aspirin enteric coated 81 milliGRAM(s) Oral daily  atorvastatin Oral Tab/Cap - Peds 40 milliGRAM(s) Oral at bedtime  clopidogrel Tablet 75 milliGRAM(s) Oral daily  heparin SubCutaneous Injection - Peds 5000 Unit(s) SubCutaneous every 8 hours  lactated ringers. 1000 milliLiter(s) IV Continuous; S/P  metoprolol succinate ER 50 milliGRAM(s) Oral daily  ondansetron  IVPB 4 milliGRAM(s) IV Intermittent every 8 hours PRN  ranolazine 500 milliGRAM(s) Oral two times a day  sodium chloride 0.9%. 1000 milliLiter(s) IV Continuous <Continuous>-getting 100ml/hr          HEALTH ISSUES - PROBLEM Dx:  Weight loss: Weight loss  Leukocytosis: Leukocytosis  HTN (hypertension): HTN   Systolic heart failure: Systolic heart failure  BUBBA (acute kidney injury)  Diabetes: Diabetes w CKD  Dehydration: Dehydration  Vomiting, intractability of vomiting not specified, presence of nausea not specified, unspecified vomiting type: Vomiting, intractability of vomiting not specified, presence of nausea not specified, unspecified vomiting type

## 2018-02-12 ENCOUNTER — TRANSCRIPTION ENCOUNTER (OUTPATIENT)
Age: 83
End: 2018-02-12

## 2018-02-12 LAB
ANION GAP SERPL CALC-SCNC: 6 MMOL/L — LOW (ref 7–14)
BUN SERPL-MCNC: 17 MG/DL — SIGNIFICANT CHANGE UP (ref 10–20)
CALCIUM SERPL-MCNC: 8 MG/DL — LOW (ref 8.5–10.1)
CHLORIDE SERPL-SCNC: 111 MMOL/L — HIGH (ref 98–110)
CO2 SERPL-SCNC: 20 MMOL/L — SIGNIFICANT CHANGE UP (ref 17–32)
CREAT SERPL-MCNC: 1.4 MG/DL — SIGNIFICANT CHANGE UP (ref 0.7–1.5)
GLUCOSE SERPL-MCNC: 153 MG/DL — HIGH (ref 70–110)
HCT VFR BLD CALC: 28.3 % — LOW (ref 42–52)
HGB BLD-MCNC: 9.2 G/DL — LOW (ref 14–18)
MCHC RBC-ENTMCNC: 27.6 PG — SIGNIFICANT CHANGE UP (ref 27–31)
MCHC RBC-ENTMCNC: 32.5 G/DL — SIGNIFICANT CHANGE UP (ref 32–37)
MCV RBC AUTO: 85 FL — SIGNIFICANT CHANGE UP (ref 80–94)
NRBC # BLD: 0 /100 WBCS — SIGNIFICANT CHANGE UP (ref 0–0)
PLATELET # BLD AUTO: 223 K/UL — SIGNIFICANT CHANGE UP (ref 130–400)
POTASSIUM SERPL-MCNC: 4.1 MMOL/L — SIGNIFICANT CHANGE UP (ref 3.5–5)
POTASSIUM SERPL-SCNC: 4.1 MMOL/L — SIGNIFICANT CHANGE UP (ref 3.5–5)
RBC # BLD: 3.33 M/UL — LOW (ref 4.7–6.1)
RBC # FLD: 16.9 % — HIGH (ref 11.5–14.5)
SODIUM SERPL-SCNC: 137 MMOL/L — SIGNIFICANT CHANGE UP (ref 135–146)
WBC # BLD: 7.91 K/UL — SIGNIFICANT CHANGE UP (ref 4.8–10.8)
WBC # FLD AUTO: 7.91 K/UL — SIGNIFICANT CHANGE UP (ref 4.8–10.8)

## 2018-02-12 RX ADMIN — HEPARIN SODIUM 5000 UNIT(S): 5000 INJECTION INTRAVENOUS; SUBCUTANEOUS at 05:24

## 2018-02-12 RX ADMIN — HEPARIN SODIUM 5000 UNIT(S): 5000 INJECTION INTRAVENOUS; SUBCUTANEOUS at 22:21

## 2018-02-12 RX ADMIN — ATORVASTATIN CALCIUM 40 MILLIGRAM(S): 80 TABLET, FILM COATED ORAL at 22:20

## 2018-02-12 RX ADMIN — HEPARIN SODIUM 5000 UNIT(S): 5000 INJECTION INTRAVENOUS; SUBCUTANEOUS at 13:15

## 2018-02-12 RX ADMIN — Medication 50 MILLIGRAM(S): at 05:24

## 2018-02-12 RX ADMIN — Medication 81 MILLIGRAM(S): at 11:19

## 2018-02-12 NOTE — PROGRESS NOTE ADULT - ATTENDING COMMENTS
Case discussed with HO & MS. Soleal Thrombosis also noted , await Vascular advice. DC planning when surgery ckears- feel ongoing IV [id number]/day; pt instructed that measuring Ostomy output is doable & important. IV fluis at SNF or ? at home.

## 2018-02-12 NOTE — PROGRESS NOTE ADULT - SUBJECTIVE AND OBJECTIVE BOX
ZEESHAN MCCLENDON  84y  Male; HD-#4      Patient is a 84y old  Male who presents with a chief complaint of Generalized weakness, decreased PO intake, nausea, intermittent vomiting (09 Feb 2018 02:12)  Pt is feeling sl better, stronger, and eating, drinking better. Ileostomy output remains high.   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 States ileostomy has had good out put. Patient has denied any fevers, chills, CP, palpitations, diarrhea, pain with eating, or focal neurological symptoms. Spoke with home nurse  and  came to hospital for admission.   Pt was previously treated with Ertapenem requiring a PICC line. That finished 2days PTA. The PICC remains in place    Patient also states he has been having worsening dyspnea on exertion since surgery but denies any CP, Palpitations, leg swelling, prolonged periods of immobility or recent travel, myalgias. Able to walk less than 1 block before onset of symptoms. Walk with a walker.     ient History:    Past Medical History:  CAD (coronary artery disease)    Diabetes    Fistula of large intestine  colo-vesicula fistula  H/O diverticulitis of colon    HLD (hyperlipidemia)    HTN (hypertension)    Recurrent UTI (urinary tract infection).     Past Surgical History:  Artificial cardiac pacemaker    H/O ileostomy    History of partial colectomy.    Family History:  No pertinent family history in first degree relatives.     Social History:  Social History (marital status, living situation, occupation, tobacco use, alcohol and drug use, and sexual history): Marital Status:  ( x  )    (   ) Single    (   )    (  )   Lives with: (  ) alone  (  ) children   ( x ) spouse   (  ) parents  (  ) other  Recent Travel: None   Substance Use (street drugs): ( x ) never used  (  ) other:  Tobacco Usage:  (   ) never smoked   ( x  ) former smoker   (   ) current smoker  (     ) pack year: 3 pack years, quit 50 years ago Alcohol Usage: Socially	    DEHYDRATION  No pertinent family history in first degree relatives        INTERVAL HPI/OVERNIGHT EVENTS: drinking clear liquids, & SX much less; had his ileostomy break-RN just replaced.  Daily     Daily   Vital Signs Last 24 Hrs  TVital Signs Last 24 Hrs  T(C): 36.8 (11 Feb 2018 07:57), Max: 36.8 (11 Feb 2018 07:57)  T(F): 98.2 (11 Feb 2018 07:57), Max: 98.2 (11 Feb 2018 07:57)  HR: 97 (11 Feb 2018 05:50) (95 - 103)  BP: 106/63 (11 Feb 2018 05:50) (99/57 - 111/63)  BP(mean): --  RR: 20 (10 Feb 2018 23:07) (18 - 20)  SpO2: 96% (10 Feb 2018 23:07) (96% - 98)    Vital Signs Last 24 Hrs  T(C): 36.3 (11 Feb 2018 23:40), Max: 36.7 (11 Feb 2018 15:30)  T(F): 97.3 (11 Feb 2018 23:40), Max: 98.1 (11 Feb 2018 15:30)  HR: 103 (12 Feb 2018 05:22) (75 - 103)  BP: 118/59 (12 Feb 2018 05:22) (105/48 - 118/59)  BP(mean): --  RR: 20 (11 Feb 2018 23:40) (19 - 20)  SpO2: 98% (11 Feb 2018 23:40) (98% - 98%)  I&O's Summary    10 Feb 2018 07:01  -  11 Feb 2018 07:00  --------------------------------------------------------  IN: 0 mL / OUT: 1900 mL / NET: -1900 mL    11 Feb 2018 07:01  -  11 Feb 2018 10:51  --------------------------------------------------------  IN: 0 mL / OUT: 450 mL / NET: -450 mL      PE:  GEN; WD, WN, NAD, A&Ox3; appears comfortable  H: AT/NC  TH: MMM; AIRWAY- 2/4  Neck- NO, LN, JVD, Thyroid  LUNGS- Clear;  [ -] rales, [- ] rhonchi, [ - ]wheezing  HT-   RR, No M, R, G  ABD-    [ ] obese, [ ] Flat,  BS-DEC; ileostomy w semisolid  drainage -R ABD wall; NT, No HSM  EXT- No CCE; PICC in RUE-nontender  NEURO- see MS, CNI, No gross Focal Deficit      02-09-18 @ 07:01  -  02-10-18 @ 07:00  --------------------------------------------------------  IN: 1200 mL / OUT: 1500 mL / NET: -300 mL; getting IV /HR + PO      LABS:                            9.3    10.94 )-----------( 245      ( 10 Feb 2018 04:30 )             28.9   02-10    139  |  107  |  25<H>  ----------------------------<  69<L>  4.4   |  23  |  1.5    Ca    8.9      10 Feb 2018 04:30  Mg     1.6     02-10    TPro  4.6<L>  /  Alb  2.3<L>  /  TBili  1.1  /  DBili  x   /  AST  13  /  ALT  9   /  AlkPhos  68  02-10          RADIOLOGY & ADDITIONAL TESTS:  < from: [facility] Duplex Lower Ext Vein Scan, Bilat (02.09.18 @ 08:30) >  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    Impression:    Venous thrombosis bilateral soleal veins.    < end of copied text >      < from: CT Abdomen and Pelvis w/ Oral Cont (02.08.18 @ 21:00) >    IMPRESSION:   1. Diffuse dilation of small bowel up to 3.7 cm up to point of right   lower quadrant ileostomy; findings suspicious for small bowel obstruction.            < end of copied text >        Imaging Personally Reviewed:  [ ] YES  [x ] NO    acetaminophen   Tablet. 650 milliGRAM(s) Oral every 4 hours PRN  aspirin enteric coated 81 milliGRAM(s) Oral daily  atorvastatin Oral Tab/Cap - Peds 40 milliGRAM(s) Oral at bedtime  clopidogrel Tablet 75 milliGRAM(s) Oral daily  heparin SubCutaneous Injection - Peds 5000 Unit(s) SubCutaneous every 8 hours  lactated ringers. 1000 milliLiter(s) IV Continuous; S/P  metoprolol succinate ER 50 milliGRAM(s) Oral daily  ondansetron  IVPB 4 milliGRAM(s) IV Intermittent every 8 hours PRN  ranolazine 500 milliGRAM(s) Oral two times a day  sodium chloride 0.9%. 1000 milliLiter(s) IV Continuous <Continuous>-getting 100ml/hr          HEALTH ISSUES - PROBLEM Dx:  Weight loss: Weight loss  Leukocytosis: Leukocytosis  HTN (hypertension): HTN   Systolic heart failure: Systolic heart failure  BUBBA (acute kidney injury)  Diabetes: Diabetes w CKD  Dehydration: Dehydration  Vomiting, intractability of vomiting not specified, presence of nausea not specified, unspecified vomiting type: Vomiting, intractability of vomiting not specified, presence of nausea not specified, unspecified vomiting type

## 2018-02-12 NOTE — DISCHARGE NOTE ADULT - INSTRUCTIONS
Take multivitamin with minerals; low sugar diet; avoid raw vegetables and unpeeled fruits Take multivitamin with minerals; low sugar diet; avoid raw vegetables and unpeeled fruits; glucerna 1-2 shakes daily Illeostomy instructions and care provided

## 2018-02-12 NOTE — CONSULT NOTE ADULT - ATTENDING COMMENTS
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 No need for anticoagulation at this time. no activity restrictions.     Start ASA if no contraindications.  f\u duplex in 4 weeks

## 2018-02-12 NOTE — DISCHARGE NOTE ADULT - CARE PROVIDER_API CALL
Deniz Martinez), Surgery; Surgical Critical Care  [address] [place] [address]  Phone: [phone]  Fax: [phone]    Dominga Alatorre), Nutritional Support  [address] [place] [address]  Phone: [phone]  Fax: [phone]

## 2018-02-12 NOTE — DISCHARGE NOTE ADULT - PLAN OF CARE
prevention of recurrence administer 1 L NS every other day; avoid sugary foods, raw vegetables, and unpeeled fruits

## 2018-02-12 NOTE — CONSULT NOTE ADULT - SUBJECTIVE AND OBJECTIVE BOX
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 States ileostomy has had good out put. Patient has denied any fevers, chills, CP, palpitations, diarrhea, pain with eating, or focal neurological symptoms. Spoke with home nurse and Dr. Martinez today and was told to come to hospital for admission.   Pt was previously treated with Ertapenem requiring a PICC line.    Patient also states he has been having worsening dyspnea on exertion since surgery but denies any CP, Palpitations, leg swelling, prolonged periods of immobility or recent travel, myalgias. Able to walk less than 1 block before onset of symptoms. Walk with a walker. (2018 02:12)      Vital Signs Last 24 Hrs  T(C): 36.3 (2018 23:40), Max: 36.7 (2018 15:30)  T(F): 97.3 (2018 23:40), Max: 98.1 (2018 15:30)  HR: 103 (2018 05:22) (75 - 103)  BP: 118/59 (2018 05:22) (105/48 - 118/59)  BP(mean): --  RR: 20 (2018 23:40) (19 - 20)  SpO2: 98% (2018 23:40) (98% - 98%)        I&O's Detail    2018 07:01  -  2018 07:00  --------------------------------------------------------  IN:    Oral Fluid: 600 mL    sodium chloride 0.9%.: 800 mL  Total IN: 1400 mL    OUT:    Colostomy: 1410 mL    Voided: 400 mL  Total OUT: 1810 mL    Total NET: -410 mL      2018 07:01  -  2018 10:26  --------------------------------------------------------  IN:    sodium chloride 0.9%.: 1200 mL  Total IN: 1200 mL    OUT:    Colostomy: 650 mL  Total OUT: 650 mL    Total NET: 550 mL                 139   |  107   |  25                 Ca: 8.9    BMP:   ----------------------------< 69     M.6   (02-10-18 @ 04:30)             4.4    |  23    | 1.5                Ph: x        LFT:     TPro: 4.6 / Alb: 2.3 / TBili: 1.1 / DBili: x / AST: 13 / ALT: 9 / AlkPhos: 68   (02-10-18 @ 04:30)      MEDICATIONS  (STANDING):  aspirin enteric coated 81 milliGRAM(s) Oral daily  atorvastatin 40 milliGRAM(s) Oral at bedtime  clopidogrel Tablet 75 milliGRAM(s) Oral daily  heparin  Injectable 5000 Unit(s) SubCutaneous every 8 hours  lactated ringers. 1000 milliLiter(s) (1000 mL/Hr) IV Continuous <Continuous>  lactated ringers. 1000 milliLiter(s) (1000 mL/Hr) IV Continuous <Continuous>  metoprolol succinate ER 50 milliGRAM(s) Oral daily  ranolazine 500 milliGRAM(s) Oral two times a day  sodium chloride 0.9%. 1000 milliLiter(s) (100 mL/Hr) IV Continuous <Continuous>    MEDICATIONS  (PRN):  acetaminophen   Tablet. 650 milliGRAM(s) Oral every 4 hours PRN Mild Pain (1 - 3)  ondansetron  IVPB 4 milliGRAM(s) IV Intermittent every 8 hours PRN Nausea and/or Vomiting      RADIOLOGY RESULTS:        Diet, Low Fiber (02-10-18 @ 14:02)  Diet, Consistent Carbohydrate/No Snacks (18 @ 16:52)      General Appearance: Appears well, NAD  Neck: Supple  Chest: Equal expansion bilaterally, equal breath sounds  CV: S1, S2, RRR  Abdomen: Soft, NT, ND, ileostomy functioning  Extremities: Grossly symmetric, pulses b/l, no swelling, erythema or tenderness on LLE  Neuro: A&Ox3

## 2018-02-12 NOTE — CONSULT NOTE ADULT - CONSULT REASON
"Pt readmitted. Has PICC from prior admit and intraabdominal infection. His ID f/u was for Thursday-the day he was admitted. DC PICC ?"
B/l soleal DVT
pt with ileostomy, adm c/o vomiting
weakness
Nausea and vomiting, lethargy, d/c from hospital 2 weeks ago

## 2018-02-12 NOTE — DISCHARGE NOTE ADULT - HOSPITAL COURSE
2/12: Per Rehab, not a candidate for SNF, as patient is ambulating on own with walker. Per vascular, no AC for soleal vein thrombosis.    2/13: Need strict in's & outs to accurately assess fluid status. Discussed with nursing.

## 2018-02-12 NOTE — CONSULT NOTE ADULT - ASSESSMENT
1. ileostomy  2. persistent vs recurrent infection  3. dehydration/ BUBBA  4. ?? weight loss  5. mod acute malnutrition 1. ileostomy  2. persistent vs recurrent infection  3. dehydration/ BUBBA  4. ?? weight loss  5. mod acute malnutrition    SUGGESTIONS:  1. weigh the pt !! It has been ordered repeatedly but never done. How can we "diagnose" weight loss" yet never have weighed him?  2. unclear why peripheral IV access placed, and PICC never used.   3. await f/u with Infectious Diseases regarding his course of antibiotics, which seems to have been completed from the previous admission, as well as his being on contact isolation.  4. repeat CBC if indicated  5. check Zinc level, 25-OH vitamin D, B12 level, Fe and ferritin  6. After the above are drawn, start once daily MV with minerals - for home would advise [employer] women[employer] or equivalent, for at least a month.  7. most importantly - diet order: carb consistent diet (no reason for the "no snacks" order)       pt instructed to avoid all sugared drinks and frosted or concentrated sugared foods       drink low sugar hydration fluids such as G2 or CeryLyte or broths       he will likely do better with well cooked vegetables than raw, and with peeled fruits, not r/t nutrition or absorption, but possibly w comfort   8. nurses, and once he's home the pt, must monitor ileostomy output and urine output volumes

## 2018-02-12 NOTE — PROGRESS NOTE ADULT - SUBJECTIVE AND OBJECTIVE BOX
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 States ileostomy has had good out put. Patient has denied any fevers, chills, CP, palpitations, diarrhea, pain with eating, or focal neurological symptoms. Spoke with home nurse and Dr. Martinez today and was told to come to hospital for admission.   Pt was previously treated with Ertapenem requiring a PICC line.    Patient also states he has been having worsening dyspnea on exertion since surgery but denies any CP, Palpitations, leg swelling, prolonged periods of immobility or recent travel, myalgias. Able to walk less than 1 block before onset of symptoms. Walk with a walker. (09 Feb 2018 02:12)    Home Medications:  Aspirin Low Dose 81 mg oral delayed release tablet: 1 tab(s) orally once a day (09 Feb 2018 02:28)  Lipitor 40 mg oral tablet: 1 tab(s) orally once a day (09 Feb 2018 02:28)  metFORMIN 500 mg oral tablet: 1 tab(s) orally 2 times a day (09 Feb 2018 02:28)  Metoprolol Succinate ER 50 mg oral tablet, extended release: 1 tab(s) orally once a day (09 Feb 2018 02:28)  Plavix 75 mg oral tablet: 1 tab(s) orally once a day (09 Feb 2018 02:28)  Ranexa 500 mg oral tablet, extended release: 1 tab(s) orally 2 times a day (09 Feb 2018 02:28)    Allergies    No Known Allergies    Intolerances    ADMISSION DIAGNOSIS:HEALTH ISSUES - PROBLEM Dx:  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  Mixed hyperlipidemia: Mixed hyperlipidemia  Recurrent UTI (urinary tract infection): Recurrent UTI (urinary tract infection)  Essential hypertension: Essential hypertension  Leukocytosis, unspecified type: Leukocytosis, unspecified type  Coronary artery disease involving native coronary artery of native heart without angina pectoris: Coronary artery disease involving native coronary artery of native heart without angina pectoris  Chronic systolic heart failure: Chronic systolic heart failure  Type 2 diabetes mellitus with stage 3 chronic kidney disease, unspecified long term insulin use status: Type 2 diabetes mellitus with stage 3 chronic kidney disease, unspecified long term insulin use status  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  Weight loss: Weight loss  Leukocytosis: Leukocytosis  HTN (hypertension): HTN (hypertension)  Systolic heart failure: Systolic heart failure  BUBBA (acute kidney injury): BUBBA (acute kidney injury)  Diabetes: Diabetes  Dehydration: Dehydration  Vomiting, intractability of vomiting not specified, presence of nausea not specified, unspecified vomiting type: Vomiting, intractability of vomiting not specified, presence of nausea not specified, unspecified vomiting type    VITALS: Vital Signs Last 24 Hrs  T(C): 36.3 (11 Feb 2018 23:40), Max: 36.8 (11 Feb 2018 07:57)  T(F): 97.3 (11 Feb 2018 23:40), Max: 98.2 (11 Feb 2018 07:57)  HR: 103 (12 Feb 2018 05:22) (75 - 103)  BP: 118/59 (12 Feb 2018 05:22) (105/48 - 118/59)  BP(mean): --  RR: 20 (11 Feb 2018 23:40) (19 - 20)  SpO2: 98% (11 Feb 2018 23:40) (98% - 98%)  I/O:I&O's Summary    11 Feb 2018 07:01  -  12 Feb 2018 07:00  --------------------------------------------------------  IN: 1400 mL / OUT: 1810 mL / NET: -410 mL    12 Feb 2018 07:01 - 12 Feb 2018 07:51  --------------------------------------------------------  IN: 1200 mL / OUT: 600 mL / NET: 600 mL       02-11-18 @ 07:01 - 02-12-18 @ 07:00  --------------------------------------------------------  IN: 1400 mL / OUT: 1810 mL / NET: -410 mL    02-12-18 @ 07:01  - 02-12-18 @ 07:51  --------------------------------------------------------  IN: 1200 mL / OUT: 600 mL / NET: 600 mL      BOWEL MOVEMENT: Y/N YES  FLATUS: Y/N YES    PHYSICAL EXAM: Elderly male in no acute distress. Abdomen soft and nontender. Ileosttomy functioning with gas and stool in bag.       MEDICATIONS: MEDICATIONS  (STANDING):  aspirin enteric coated 81 milliGRAM(s) Oral daily  atorvastatin 40 milliGRAM(s) Oral at bedtime  clopidogrel Tablet 75 milliGRAM(s) Oral daily  heparin  Injectable 5000 Unit(s) SubCutaneous every 8 hours  lactated ringers. 1000 milliLiter(s) (1000 mL/Hr) IV Continuous <Continuous>  lactated ringers. 1000 milliLiter(s) (1000 mL/Hr) IV Continuous <Continuous>  metoprolol succinate ER 50 milliGRAM(s) Oral daily  ranolazine 500 milliGRAM(s) Oral two times a day  sodium chloride 0.9%. 1000 milliLiter(s) (100 mL/Hr) IV Continuous <Continuous>    MEDICATIONS  (PRN):  acetaminophen   Tablet. 650 milliGRAM(s) Oral every 4 hours PRN Mild Pain (1 - 3)  ondansetron  IVPB 4 milliGRAM(s) IV Intermittent every 8 hours PRN Nausea and/or Vomiting      LABS:                          RADIOLOGY & ADDITIONAL STUDIES:

## 2018-02-12 NOTE — DISCHARGE NOTE ADULT - CARE PLAN
Principal Discharge DX:	Dehydration  Goal:	prevention of recurrence  Assessment and plan of treatment:	administer 1 L NS every other day; avoid sugary foods, raw vegetables, and unpeeled fruits

## 2018-02-12 NOTE — PROGRESS NOTE ADULT - SUBJECTIVE AND OBJECTIVE BOX
SUBJECTIVE:    Patient is a 84y old Male who presents with a chief complaint of Generalized weakness, decreased PO intake, nausea, intermittent vomittng (12 Feb 2018 12:09)    Currently admitted to medicine with the primary diagnosis of Dehydration     Today is hospital day 3d. This morning he is resting comfortably in bed and reports no new issues or overnight events.     PAST MEDICAL & SURGICAL HISTORY  HLD (hyperlipidemia)  HTN (hypertension)  Fistula of large intestine: colo-vesicula fistula  H/O diverticulitis of colon  Recurrent UTI (urinary tract infection)  Diabetes  CAD (coronary artery disease)  Artificial cardiac pacemaker  History of partial colectomy  H/O ileostomy    SOCIAL HISTORY:  Negative for smoking/alcohol/drug use.     ALLERGIES:  No Known Allergies    MEDICATIONS:  STANDING MEDICATIONS  aspirin enteric coated 81 milliGRAM(s) Oral daily  atorvastatin 40 milliGRAM(s) Oral at bedtime  clopidogrel Tablet 75 milliGRAM(s) Oral daily  heparin  Injectable 5000 Unit(s) SubCutaneous every 8 hours  lactated ringers. 1000 milliLiter(s) IV Continuous <Continuous>  lactated ringers. 1000 milliLiter(s) IV Continuous <Continuous>  metoprolol succinate ER 50 milliGRAM(s) Oral daily  ranolazine 500 milliGRAM(s) Oral two times a day  Complete Blood Count in AM (02.10.18 @ 04:30)    Nucleated RBC: 0 /100 WBCs    WBC Count: 10.94 K/uL    RBC Count: 3.37 M/uL    Hemoglobin: 9.3 g/dL    Hematocrit: 28.9 %    Mean Cell Volume: 85.8 fL    Mean Cell Hemoglobin: 27.6 pg    Mean Cell Hemoglobin Conc: 32.2 g/dL    Red Cell Distrib Width: 16.8 %    Platelet Count - Automated: 245 K/uL    sodium chloride 0.9%. 1000 milliLiter(s) IV Continuous <Continuous>    PRN MEDICATIONS  acetaminophen   Tablet. 650 milliGRAM(s) Oral every 4 hours PRN  ondansetron  IVPB 4 milliGRAM(s) IV Intermittent every 8 hours PRN    VITALS:   T(F): 97.3  HR: 103  BP: 118/59  RR: 20  SpO2: 98%    LABS:    Complete Blood Count in AM (02.10.18 @ 04:30)    Nucleated RBC: 0 /100 WBCs    WBC Count: 10.94 K/uL    RBC Count: 3.37 M/uL    Hemoglobin: 9.3 g/dL    Hematocrit: 28.9 %    Mean Cell Volume: 85.8 fL    Mean Cell Hemoglobin: 27.6 pg    Mean Cell Hemoglobin Conc: 32.2 g/dL    Red Cell Distrib Width: 16.8 %    Platelet Count - Automated: 245 K/uL    Basic Metabolic Panel (02.09.18 @ 07:55)    Sodium, Serum: 133 mmol/L    Potassium, Serum: 4.2 mmol/L    Chloride, Serum: 98 mmol/L    Carbon Dioxide, Serum: 24 mmol/L    Anion Gap, Serum: 11 mmol/L    Blood Urea Nitrogen, Serum: 30 mg/dL    Creatinine, Serum: 1.7 mg/dL    Glucose, Serum: 90 mg/dL    Calcium, Total Serum: 9.4 mg/dL    eGFR if Non : 35    RADIOLOGY:    Venous Duplex: Venous thrombosis bilateral soleal veins.  CXR: No radiographic evidence of acute cardiopulmonary disease.  CT a/p: Diffuse dilation of small bowel up to 3.7 cm up to point of right lower quadrant ileostomy; findings suspicious for small bowel obstruction.    PHYSICAL EXAM:  GEN: No acute distress  LUNGS: Clear to auscultation bilaterally   HEART: S1/S2 present. RRR.   ABD: Soft, non-tender, non-distended. Bowel sounds present  EXT: NC/NC/NE/2+PP/BURROWS  NEURO: AAOX3

## 2018-02-12 NOTE — PROGRESS NOTE ADULT - PROBLEM SELECTOR PLAN 10
from repeated hospitalization; rehab evaluation pending. PT/rehab, ? SNF
continue meds
from repeated hospitalization; rehab evaluation pending. PT/rehab, ? SNF

## 2018-02-12 NOTE — CONSULT NOTE ADULT - ASSESSMENT
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 He completed a course of abx for perforated viscus and peritonitis (meropenem while here in hospital from 1/18-1/25, then switched to ?ertapenem). 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 He was found to have ESBL peritonitis on that admission as peritoneal culture from ex-lap on 1/13 grew ESBL. He received a PICC line and was sent home on 14d of ertapenem successfully completed 2/5. He presented to hospital now w/ nausea, anorexia found to be dehydrated and CT findings concerning for SBO but per surgery clincally w/ no signs of acute abdomen.   Pt was supposed to f/u w/ Dr. Weiss on the day he was admitted and thus ID was c/s for need for further abx.     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 He was found to have ESBL peritonitis on that admission as peritoneal culture from ex-lap on 1/13 grew ESBL. He received a PICC line and was sent home on 14d of ertapenem successfully completed 2/5. He presented to hospital now w/ nausea, anorexia found to be dehydrated and CT findings concerning for SBO but per surgery clincally w/ no signs of acute abdomen.   Pt was supposed to f/u w/ Dr. Weiss on the day he was admitted and thus ID was c/s for need for further abx.     #H/o ESBL peritonitis (1/13)  - completed ertapenem 14d course  - no signs/sxs of infection    Will d/w attending, changes to follow. 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 He was found to have ESBL peritonitis on that admission as peritoneal culture from ex-lap on 1/13 grew ESBL. He received a PICC line and was sent home on 14d of ertapenem successfully completed 2/5. He presented to hospital now w/ nausea, anorexia found to be dehydrated and CT findings concerning for SBO but per surgery clincally w/ no signs of acute abdomen.   Pt was supposed to f/u w/ Dr. Weiss on the day he was admitted and thus ID was c/s for need for further abx.     #H/o ESBL peritonitis (1/13)  on PE on ongoing peritonitis  - completed ertapenem 14d course  - no signs/sxs of infection  see no need for ABx.    PICC:  site looks clean

## 2018-02-12 NOTE — DISCHARGE NOTE ADULT - MEDICATION SUMMARY - MEDICATIONS TO TAKE
I will START or STAY ON the medications listed below when I get home from the hospital:    Aspirin Low Dose 81 mg oral delayed release tablet  -- 1 tab(s) by mouth once a day  -- Indication: For CAD (coronary artery disease)    Ranexa 500 mg oral tablet, extended release  -- 1 tab(s) by mouth 2 times a day  -- Indication: For CAD (coronary artery disease)    metFORMIN 500 mg oral tablet  -- 1 tab(s) by mouth 2 times a day  -- Indication: For Diabetes    Lipitor 40 mg oral tablet  -- 1 tab(s) by mouth once a day  -- Indication: For CAD (coronary artery disease)    Plavix 75 mg oral tablet  -- 1 tab(s) by mouth once a day  -- Indication: For CAD (coronary artery disease)    Metoprolol Succinate ER 50 mg oral tablet, extended release  -- 1 tab(s) by mouth once a day  -- Indication: For CAD (coronary artery disease)    sodium chloride 0.9% injectable solution  -- 1 liter(s) injectable every 48 hours  -- Indication: For Dehydration    Multiple Vitamins with Minerals oral tablet  -- 1 tab(s) by mouth once a day  -- Indication: For Dehydration

## 2018-02-12 NOTE — CONSULT NOTE ADULT - ASSESSMENT
Asymptomatic bilateral soleal DVTs. No anticoagulation needed. Continue with patient ambulation and prophylactic HSQ. Recheck venous duplex in 1 week. Recall as needed. Dr Posey attending to see patient and sign off.

## 2018-02-12 NOTE — CONSULT NOTE ADULT - SUBJECTIVE AND OBJECTIVE BOX
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 States ileostomy has had good out put. Patient has denied any fevers, chills, CP, palpitations, diarrhea, pain with eating, or focal neurological symptoms. Spoke with home nurse and Dr. Martinez today and was told to come to hospital for admission.   Pt was previously treated with Ertapenem requiring a PICC line.    Patient also states he has been having worsening dyspnea on exertion since surgery but denies any CP, Palpitations, leg swelling, prolonged periods of immobility or recent travel, myalgias. Able to walk less than 1 block before onset of symptoms. Walk with a walker. (09 Feb 2018 02:12)    PAST MEDICAL & SURGICAL HISTORY:  HLD (hyperlipidemia)  HTN (hypertension)  Fistula of large intestine: colo-vesicula fistula due to     diverticulitis of colon  Recurrent UTI (urinary tract infection) due to the above  Diabetes  CAD (coronary artery disease)  Artificial cardiac pacemaker  recent laparoscopic takedown of fistula, followed by HPI:  84M with pmhx of sigmoid diverticultis resulting in colo-vesicular fistula which underwent sigmoid colon resection and resection of colo vesicular fistula, primary anastomosis 1/4/18 complicated by perforated viscus, was subsequently reoperated on 1/13/18 had diverting loop ileostomy and hospital discharge on 1/25 presented to hospital today for Decreased PO intake, persistent nausea, intermittent vomiting (non-bloody) and intermittent abdominal pain which spontaneously resolved. States ileostomy has had good out put. Patient has denied any fevers, chills, CP, palpitations, diarrhea, pain with eating, or focal neurological symptoms. Spoke with home nurse and Dr. Martinez today and was told to come to hospital for admission.   Pt was previously treated with Ertapenem requiring a PICC line.    Patient also states he has been having worsening dyspnea on exertion since surgery but denies any CP, Palpitations, leg swelling, prolonged periods of immobility or recent travel, myalgias. Able to walk less than 1 block before onset of symptoms. Walk with a walker. 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 - lap sigmoidectomy & rsx of fistula & closure of bladder 1/4/18  anastamotic leak w peritonitis then surgical revision, drainage, diverting loop ileostomy 1/13/18  Recurrent UTI (urinary tract infection) due to the above- mentioned fistula  Diabetes  CAD (coronary artery disease) w stents  Artificial cardiac pacemaker    PHYSICAL EXAM:  A&O x 3  HEENT: anicteric, mouth moist, no oral or lip lesions, no c/o oral discomfort or issue with eating  Respiratory: no distress, no O2 required at present  Gastrointestinal: abd soft, NT, + ileostomy in LLQ with dark green liquid as well as muddy brown stool output  Extremities: noc/c, repoerts +LE edema, but he's been in bed and no signif seen now  Vascular: L ac periph IV access, RUE single lumen PICC  Skin: turgor good    CT abd on 2/8 + diffusely dilated SB to 3.7 cm up to point of RLQ ileostomy, suspicious for SBO. 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 States ileostomy has had good out put. Patient has denied any fevers, chills, CP, palpitations, diarrhea, pain with eating, or focal neurological symptoms. Spoke with home nurse and Dr. Martinez today and was told to come to hospital for admission.   Pt was previously treated with Ertapenem requiring a PICC line.    Patient also states he has been having worsening dyspnea on exertion since surgery but denies any CP, Palpitations, leg swelling, prolonged periods of immobility or recent travel, myalgias. Able to walk less than 1 block before onset of symptoms. Walk with a walker. 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 - lap sigmoidectomy & rsx of fistula & closure of bladder 1/4/18  anastamotic leak w peritonitis then surgical revision, drainage, diverting loop ileostomy 1/13/18  Recurrent UTI (urinary tract infection) due to the above- mentioned fistula  Diabetes  CAD (coronary artery disease) w stents  Artificial cardiac pacemaker    PHYSICAL EXAM:  A&O x 3  HEENT: anicteric, mouth moist, no oral or lip lesions, no c/o oral discomfort or issue with eating  Respiratory: no distress, no O2 required at present  Gastrointestinal: abd soft, NT, + ileostomy in LLQ with dark green liquid as well as muddy brown stool output  Extremities: noc/c, repoerts +LE edema, but he's been in bed and no signif seen now  Vascular: L ac periph IV access, RUE single lumen PICC  Skin: turgor good  pt with bitemporal wasting c/w age, some decreased LBM over the past month due to repeated hospitalizations and episodes of being bedbound or relatively inactive. He denies inability to walk or perform his usual ADLs, but he is less able to perform these than on his initial admission, and erquires help due to resultant weakness.   pt has been eating since shortly after admission. He has had no further c/0 vomiting or nausea. He states being somewhat surprised that he was told he was dehydrated.    CT abd on 2/8 + diffusely dilated SB to 3.7 cm up to point of RLQ ileostomy, suspicious for SBO.  CBC on adm and 2/10 noted - no repeats seen. 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 States ileostomy has had good out put. Patient has denied any fevers, chills, CP, palpitations, diarrhea, pain with eating, or focal neurological symptoms. Spoke with home nurse and Dr. Martinez today and was told to come to hospital for admission.   Pt was previously treated with Ertapenem requiring a PICC line.    Patient also states he has been having worsening dyspnea on exertion since surgery but denies any CP, Palpitations, leg swelling, prolonged periods of immobility or recent travel, myalgias. Able to walk less than 1 block before onset of symptoms. Walk with a walker. 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 - lap sigmoidectomy & rsx of fistula & closure of bladder 1/4/18  anastamotic leak w peritonitis then surgical revision, drainage, diverting loop ileostomy 1/13/18  Recurrent UTI (urinary tract infection) due to the above- mentioned fistula  Diabetes  CAD (coronary artery disease) w stents  Artificial cardiac pacemaker    PHYSICAL EXAM:  A&O x 3  HEENT: anicteric, mouth moist, no oral or lip lesions, no c/o oral discomfort or issue with eating  Respiratory: no distress, no O2 required at present  Gastrointestinal: abd soft, NT, + ileostomy in LLQ with dark green liquid as well as muddy brown stool output  Extremities: noc/c, repoerts +LE edema, but he's been in bed and no signif seen now  Vascular: L ac periph IV access, RUE single lumen PICC  Skin: turgor good  pt with bitemporal wasting c/w age, some decreased LBM over the past month due to repeated hospitalizations and episodes of being bedbound or relatively inactive. He denies inability to walk or perform his usual ADLs, but he is less able to perform these than on his initial admission, and erquires help due to resultant weakness.   pt has been eating since shortly after admission. He has had no further c/0 vomiting or nausea. He states being somewhat surprised that he was told he was dehydrated.    CT abd on 2/8 + diffusely dilated SB to 3.7 cm up to point of RLQ ileostomy, suspicious for SBO.  Adm BUN 35/1.8, wbc 15.3 w 84.2% gran, Hb 10.9  2/10 BUN 25/1.5, wbc 10.94 but no diff, Hb 9.3, tp 4.6, alb 2.3

## 2018-02-12 NOTE — DISCHARGE NOTE ADULT - PATIENT PORTAL LINK FT
You can access the [facility], offered by [facility], by registering with the following website: [email or web address]

## 2018-02-12 NOTE — CONSULT NOTE ADULT - SUBJECTIVE AND OBJECTIVE BOX
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 States ileostomy has had good out put. Patient has denied any fevers, chills, CP, palpitations, diarrhea, pain with eating, or focal neurological symptoms. Spoke with home nurse and Dr. Martinez today and was told to come to hospital for admission.   Pt was previously treated with Ertapenem requiring a PICC line (R arm).    Patient also states he has been having worsening dyspnea on exertion since surgery but denies any CP, Palpitations, leg swelling, prolonged periods of immobility or recent travel, myalgias. Able to walk less than 1 block before onset of symptoms. Walk with a walker. (09 Feb 2018 02:12)    FAMILY HISTORY:  No pertinent family history in first degree relatives    PAST MEDICAL & SURGICAL HISTORY:  HLD (hyperlipidemia)  HTN (hypertension)  Fistula of large intestine: colo-vesicula fistula  H/O diverticulitis of colon  Recurrent UTI (urinary tract infection)  Diabetes  CAD (coronary artery disease)  Artificial cardiac pacemaker  History of partial colectomy  H/O ileostomy        VITALS:  T(F): 97.3, Max: 98.1 (02-11-18 @ 15:30)  HR: 103  BP: 118/59  RR: 20Vital Signs Last 24 Hrs  T(C): 36.3 (11 Feb 2018 23:40), Max: 36.7 (11 Feb 2018 15:30)  T(F): 97.3 (11 Feb 2018 23:40), Max: 98.1 (11 Feb 2018 15:30)  HR: 103 (12 Feb 2018 05:22) (75 - 103)  BP: 118/59 (12 Feb 2018 05:22) (105/48 - 118/59)  BP(mean): --  RR: 20 (11 Feb 2018 23:40) (19 - 20)  SpO2: 98% (11 Feb 2018 23:40) (98% - 98%)    TESTS & MEASUREMENTS:      Culture - Urine (collected 02-08-18 @ 16:52)  Source: .Urine Clean Catch (Midstream)  Final Report (02-10-18 @ 13:07):    No growth        RADIOLOGY & ADDITIONAL TESTS:  < from: CT Abdomen and Pelvis w/ Oral Cont (02.08.18 @ 21:00) >  IMPRESSION:   1. Diffuse dilation of small bowel up to 3.7 cm up to point of right   lower quadrant ileostomy; findings suspicious for small bowel obstruction.    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 States ileostomy has had good out put. Patient has denied any fevers, chills, CP, palpitations, diarrhea, pain with eating, or focal neurological symptoms. Spoke with home nurse and Dr. Martinez today and was told to come to hospital for admission.   Pt was previously treated with Ertapenem requiring a PICC line (R arm).    Patient also states he has been having worsening dyspnea on exertion since surgery but denies any CP, Palpitations, leg swelling, prolonged periods of immobility or recent travel, myalgias. Able to walk less than 1 block before onset of symptoms. Walk with a walker. (09 Feb 2018 02:12)    FAMILY HISTORY:  No pertinent family history in first degree relatives    PAST MEDICAL & SURGICAL HISTORY:  HLD (hyperlipidemia)  HTN (hypertension)  Fistula of large intestine: colo-vesicula fistula  H/O diverticulitis of colon  Recurrent UTI (urinary tract infection)  Diabetes  CAD (coronary artery disease)  Artificial cardiac pacemaker  History of partial colectomy  H/O ileostomy        VITALS:  T(F): 97.3, Max: 98.1 (02-11-18 @ 15:30)  HR: 103  BP: 118/59  RR: 20Vital Signs Last 24 Hrs  T(C): 36.3 (11 Feb 2018 23:40), Max: 36.7 (11 Feb 2018 15:30)  T(F): 97.3 (11 Feb 2018 23:40), Max: 98.1 (11 Feb 2018 15:30)  HR: 103 (12 Feb 2018 05:22) (75 - 103)  BP: 118/59 (12 Feb 2018 05:22) (105/48 - 118/59)  BP(mean): --  RR: 20 (11 Feb 2018 23:40) (19 - 20)  SpO2: 98% (11 Feb 2018 23:40) (98% - 98%)    PHYSICAL EXAM:  Gen: well appearing in NAD  HEENT: NCAT  Pulm: CTA BL  Abd: R ostomy w/ yellow-brown bilious o/p, abd soft       TESTS & MEASUREMENTS:    Culture - Urine (collected 02-08-18 @ 16:52)  Source: .Urine Clean Catch (Midstream)  Final Report (02-10-18 @ 13:07):    No growth        RADIOLOGY & ADDITIONAL TESTS:  < from: CT Abdomen and Pelvis w/ Oral Cont (02.08.18 @ 21:00) >  IMPRESSION:   1. Diffuse dilation of small bowel up to 3.7 cm up to point of right   lower quadrant ileostomy; findings suspicious for small bowel obstruction.    < end of copied text >    ANTIBIOTICS:

## 2018-02-12 NOTE — CONSULT NOTE ADULT - SUBJECTIVE AND OBJECTIVE BOX
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 States ileostomy has had good out put. Patient has denied any fevers, chills, CP, palpitations, diarrhea, pain with eating, or focal neurological symptoms. Spoke with home nurse and Dr. Martinez today and was told to come to hospital for admission.   Pt was previously treated with Ertapenem requiring a PICC line.    Patient also states he has been having worsening dyspnea on exertion since surgery but denies any CP, Palpitations, leg swelling, prolonged periods of immobility or recent travel, myalgias. Able to walk less than 1 block before onset of symptoms. Walk with a walker. (09 Feb 2018 02:12)      T(C): 36.6 (02-12-18 @ 16:07), Max: 36.6 (02-12-18 @ 16:07)  HR: 103 (02-12-18 @ 16:07) (101 - 103)  BP: 115/65 (02-12-18 @ 16:07) (110/62 - 118/59)  RR: 18 (02-12-18 @ 16:07) (18 - 20)  SpO2: 98% (02-11-18 @ 23:40) (98% - 98%)    MOTOR EXAM ms 5/5      Physical Medicine And Rehabilitation Services are not indicated in this patient for the following Reason(s):    [    ] Patient is medically unstable      [    ]  Patient does not have appropriate activity orders      [     ] Patient has no weight bearing status for:      [   x  ] Patient is independently ambulating AMBULATING ON UNIT WITH ROLLING WALKER HAS ONE TO USE AT HOME      [     ]  Patient is from Skilled Nursing Facility and is appropriate to return      [     ] Patient was non-ambulatory prior to admission      DC HOME WITH VN ASSESSMENT- CONTINUE AMBULATION ON UNIT WITH WALKER WITH STAFF      WILL CANCEL PM&R / PT request

## 2018-02-13 DIAGNOSIS — Z87.19 PERSONAL HISTORY OF OTHER DISEASES OF THE DIGESTIVE SYSTEM: ICD-10-CM

## 2018-02-13 LAB
ALBUMIN SERPL ELPH-MCNC: 2.1 G/DL — LOW (ref 3–5.5)
ALP SERPL-CCNC: 65 U/L — SIGNIFICANT CHANGE UP (ref 30–115)
ALT FLD-CCNC: 8 U/L — SIGNIFICANT CHANGE UP (ref 0–41)
ANION GAP SERPL CALC-SCNC: 9 MMOL/L — SIGNIFICANT CHANGE UP (ref 7–14)
AST SERPL-CCNC: 17 U/L — SIGNIFICANT CHANGE UP (ref 0–41)
BILIRUB SERPL-MCNC: 0.5 MG/DL — SIGNIFICANT CHANGE UP (ref 0.2–1.2)
BUN SERPL-MCNC: 12 MG/DL — SIGNIFICANT CHANGE UP (ref 10–20)
CALCIUM SERPL-MCNC: 7.9 MG/DL — LOW (ref 8.5–10.1)
CHLORIDE SERPL-SCNC: 109 MMOL/L — SIGNIFICANT CHANGE UP (ref 98–110)
CO2 SERPL-SCNC: 17 MMOL/L — SIGNIFICANT CHANGE UP (ref 17–32)
CREAT SERPL-MCNC: 1.3 MG/DL — SIGNIFICANT CHANGE UP (ref 0.7–1.5)
GLUCOSE SERPL-MCNC: 145 MG/DL — HIGH (ref 70–110)
HCT VFR BLD CALC: 32.6 % — LOW (ref 42–52)
HGB BLD-MCNC: 10.4 G/DL — LOW (ref 14–18)
IRON SATN MFR SERPL: 25 UG/DL — LOW (ref 35–150)
MAGNESIUM SERPL-MCNC: 1.3 MG/DL — LOW (ref 1.8–2.4)
MCHC RBC-ENTMCNC: 28.2 PG — SIGNIFICANT CHANGE UP (ref 27–31)
MCHC RBC-ENTMCNC: 31.9 G/DL — LOW (ref 32–37)
MCV RBC AUTO: 88.3 FL — SIGNIFICANT CHANGE UP (ref 80–94)
NRBC # BLD: 0 /100 WBCS — SIGNIFICANT CHANGE UP (ref 0–0)
PLATELET # BLD AUTO: 196 K/UL — SIGNIFICANT CHANGE UP (ref 130–400)
POTASSIUM SERPL-MCNC: 4.4 MMOL/L — SIGNIFICANT CHANGE UP (ref 3.5–5)
POTASSIUM SERPL-SCNC: 4.4 MMOL/L — SIGNIFICANT CHANGE UP (ref 3.5–5)
PROT SERPL-MCNC: 4.9 G/DL — LOW (ref 6–8)
RBC # BLD: 3.69 M/UL — LOW (ref 4.7–6.1)
RBC # FLD: 17 % — HIGH (ref 11.5–14.5)
SODIUM SERPL-SCNC: 135 MMOL/L — SIGNIFICANT CHANGE UP (ref 135–146)
WBC # BLD: 7.09 K/UL — SIGNIFICANT CHANGE UP (ref 4.8–10.8)
WBC # FLD AUTO: 7.09 K/UL — SIGNIFICANT CHANGE UP (ref 4.8–10.8)

## 2018-02-13 RX ORDER — ASPIRIN/CALCIUM CARB/MAGNESIUM 324 MG
81 TABLET ORAL DAILY
Qty: 0 | Refills: 0 | Status: DISCONTINUED | OUTPATIENT
Start: 2018-02-13 | End: 2018-02-14

## 2018-02-13 RX ADMIN — HEPARIN SODIUM 5000 UNIT(S): 5000 INJECTION INTRAVENOUS; SUBCUTANEOUS at 21:50

## 2018-02-13 RX ADMIN — HEPARIN SODIUM 5000 UNIT(S): 5000 INJECTION INTRAVENOUS; SUBCUTANEOUS at 14:32

## 2018-02-13 RX ADMIN — ATORVASTATIN CALCIUM 40 MILLIGRAM(S): 80 TABLET, FILM COATED ORAL at 21:49

## 2018-02-13 RX ADMIN — HEPARIN SODIUM 5000 UNIT(S): 5000 INJECTION INTRAVENOUS; SUBCUTANEOUS at 06:35

## 2018-02-13 RX ADMIN — Medication 81 MILLIGRAM(S): at 17:21

## 2018-02-13 RX ADMIN — Medication 50 MILLIGRAM(S): at 06:35

## 2018-02-13 NOTE — PROGRESS NOTE ADULT - SUBJECTIVE AND OBJECTIVE BOX
ZEESHAN MCCLENDON  84y  Male    INTERVAL EVENTS: Patient seen today without distress, Patient feeling much better. Patient has multiple concerns and is overwhelmed with changing of ostomy, PO intake, etc/    T(C): 36.1 (02-13-18 @ 09:03), Max: 36.7 (02-13-18 @ 00:01)  HR: 76 (02-13-18 @ 09:03) (76 - 107)  BP: 116/60 (02-13-18 @ 09:03) (114/68 - 116/60)  RR: 20 (02-13-18 @ 09:03) (18 - 20)  SpO2: 99% (02-13-18 @ 09:03) (99% - 99%)  Wt(kg): --Vital Signs Last 24 Hrs  T(C): 36.1 (13 Feb 2018 09:03), Max: 36.7 (13 Feb 2018 00:01)  T(F): 97 (13 Feb 2018 09:03), Max: 98.1 (13 Feb 2018 00:01)  HR: 76 (13 Feb 2018 09:03) (76 - 107)  BP: 116/60 (13 Feb 2018 09:03) (114/68 - 116/60)  BP(mean): --  RR: 20 (13 Feb 2018 09:03) (18 - 20)  SpO2: 99% (13 Feb 2018 09:03) (99% - 99%)    PHYSICAL EXAM:  GENERAL: NAD  NECK: Supple, No JVD  CHEST/LUNG: Clear; No crackles or wheezing  HEART: S1, S2, Regular rate and rhythm;   ABDOMEN: Soft, Nontender, Nondistended; Bowel sounds present  EXTREMITIES: No edema  SKIN: No rashes or lesions    LABS:  Labs:                        10.4   7.09  )-----------( 196      ( 13 Feb 2018 09:56 )             32.6             02-12    137  |  111<H>  |  17  ----------------------------<  153<H>  4.1   |  20  |  1.4    Ca    8.0<L>      12 Feb 2018 20:38        RADIOLOGY & ADDITIONAL TESTS:  no new imaging

## 2018-02-13 NOTE — PROGRESS NOTE ADULT - SUBJECTIVE AND OBJECTIVE BOX
SUBJECTIVE:    Patient is a 84y old Male who presents with a chief complaint of Generalized weakness, decreased PO intake, nausea, intermittent vomittng (12 Feb 2018 12:09)    Currently admitted to medicine with the primary diagnosis of Dehydration     Today is hospital day 4d. This morning he is resting comfortably in bed and reports no new issues or overnight events.     PAST MEDICAL & SURGICAL HISTORY  HLD (hyperlipidemia)  HTN (hypertension)  Fistula of large intestine: colo-vesicula fistula  H/O diverticulitis of colon  Recurrent UTI (urinary tract infection)  Diabetes  CAD (coronary artery disease)  Artificial cardiac pacemaker  History of partial colectomy  H/O ileostomy    SOCIAL HISTORY:  Negative for smoking/alcohol/drug use.     ALLERGIES:  No Known Allergies    MEDICATIONS:  STANDING MEDICATIONS  aspirin  chewable 81 milliGRAM(s) Oral daily  atorvastatin 40 milliGRAM(s) Oral at bedtime  clopidogrel Tablet 75 milliGRAM(s) Oral daily  heparin  Injectable 5000 Unit(s) SubCutaneous every 8 hours  lactated ringers. 1000 milliLiter(s) IV Continuous <Continuous>  lactated ringers. 1000 milliLiter(s) IV Continuous <Continuous>  metoprolol succinate ER 50 milliGRAM(s) Oral daily  ranolazine 500 milliGRAM(s) Oral two times a day  sodium chloride 0.9%. 1000 milliLiter(s) IV Continuous <Continuous>    PRN MEDICATIONS  acetaminophen   Tablet. 650 milliGRAM(s) Oral every 4 hours PRN  ondansetron  IVPB 4 milliGRAM(s) IV Intermittent every 8 hours PRN    VITALS:   T(F): 97  HR: 76  BP: 116/60  RR: 20  SpO2: 99%    LABS:                        10.4   7.09  )-----------( 196      ( 13 Feb 2018 09:56 )             32.6     02-13    135  |  109  |  12  ----------------------------<  145<H>  4.4   |  17  |  1.3    Ca    7.9<L>      13 Feb 2018 09:56  Mg     1.3     02-13    TPro  4.9<L>  /  Alb  2.1<L>  /  TBili  0.5  /  DBili  x   /  AST  17  /  ALT  8   /  AlkPhos  65  02-13    RADIOLOGY:    Venous Duplex: Venous thrombosis bilateral soleal veins.  CXR: No radiographic evidence of acute cardiopulmonary disease.  CT a/p: Diffuse dilation of small bowel up to 3.7 cm up to point of right lower quadrant ileostomy; findings suspicious for small bowel obstruction.    PHYSICAL EXAM:  GEN: No acute distress; ambulates around unit with walker.  LUNGS: Clear to auscultation bilaterally   HEART: S1/S2 present. RRR.   ABD: Soft, non-tender, non-distended. Bowel sounds present  EXT: NC/NC/NE/2+PP/BURROWS  NEURO: AAOX3

## 2018-02-13 NOTE — PROGRESS NOTE ADULT - PROBLEM SELECTOR PLAN 6
monitor with all care; PMH of HTN-controlled-also
Continue home meds.
monitor with all care;
monitor with all care; PMH of HTN-controlled-also

## 2018-02-13 NOTE — PROGRESS NOTE ADULT - PROBLEM SELECTOR PROBLEM 4
Type 2 diabetes mellitus with stage 3 chronic kidney disease, unspecified long term insulin use status
H/O diverticulitis of colon
Leukocytosis
Type 2 diabetes mellitus with stage 3 chronic kidney disease, unspecified long term insulin use status
Type 2 diabetes mellitus with stage 3 chronic kidney disease, unspecified long term insulin use status

## 2018-02-13 NOTE — PROGRESS NOTE ADULT - PROBLEM SELECTOR PROBLEM 5
Chronic systolic heart failure
Diabetes

## 2018-02-13 NOTE — PROGRESS NOTE ADULT - SUBJECTIVE AND OBJECTIVE BOX
ZEESHAN MCCLENDON  84y, Male      OVERNIGHT EVENTS:    no fevers, no abdominal pain, feels well    VITALS:  T(F): 97, Max: 98.1 (02-13-18 @ 00:01)  HR: 76  BP: 116/60  RR: 20Vital Signs Last 24 Hrs  T(C): 36.1 (13 Feb 2018 09:03), Max: 36.7 (13 Feb 2018 00:01)  T(F): 97 (13 Feb 2018 09:03), Max: 98.1 (13 Feb 2018 00:01)  HR: 76 (13 Feb 2018 09:03) (76 - 107)  BP: 116/60 (13 Feb 2018 09:03) (114/68 - 116/60)  BP(mean): --  RR: 20 (13 Feb 2018 09:03) (18 - 20)  SpO2: 99% (13 Feb 2018 09:03) (99% - 99%)    TESTS & MEASUREMENTS:                        10.4   7.09  )-----------( 196      ( 13 Feb 2018 09:56 )             32.6     02-12    137  |  111<H>  |  17  ----------------------------<  153<H>  4.1   |  20  |  1.4    Ca    8.0<L>      12 Feb 2018 20:38          Culture - Urine (collected 02-08-18 @ 16:52)  Source: .Urine Clean Catch (Midstream)  Final Report (02-10-18 @ 13:07):    No growth            RADIOLOGY & ADDITIONAL TESTS:    ANTIBIOTICS:

## 2018-02-13 NOTE — PROGRESS NOTE ADULT - PROBLEM SELECTOR PROBLEM 6
Coronary artery disease involving native coronary artery of native heart without angina pectoris
Systolic heart failure

## 2018-02-13 NOTE — PROGRESS NOTE ADULT - SUBJECTIVE AND OBJECTIVE BOX
[facility]  ZEESHAN MCCLENDON  84yMale  [id number]    SURGICAL INTERN PROGRESS NOTE  HPI: 84M with pmhx of sigmoid diverticultis resulting in colo-vesicular fistula which underwent sigmoid colon resection and resection of colo vesicular fistula, primary anastomosis 1/4/18 complicated by perforated viscus, was subsequently reoperated on 1/13/18 had diverting loop ileostomy and hospital discharge on 1/25 presented to hospital today for Decreased PO intake, persistent nausea, intermittent vomiting (non-bloody) and intermittent abdominal pain which spontaneously resolved. States ileostomy has had good out put. Patient has denied any fevers, chills, CP, palpitations, diarrhea, pain with eating, or focal neurological symptoms. Spoke with home nurse and Dr. Martinez today and was told to come to hospital for admission.   Pt was previously treated with Ertapenem requiring a PICC line.    Patient also states he has been having worsening dyspnea on exertion since surgery but denies any CP, Palpitations, leg swelling, prolonged periods of immobility or recent travel, myalgias. Able to walk less than 1 block before onset of symptoms. Walk with a walker. (09 Feb 2018 02:12)      VITALS: Vital Signs Last 24 Hrs  T(C): 36.7 (13 Feb 2018 00:01), Max: 36.7 (13 Feb 2018 00:01)  T(F): 98.1 (13 Feb 2018 00:01), Max: 98.1 (13 Feb 2018 00:01)  HR: 107 (13 Feb 2018 00:01) (103 - 107)  BP: 114/68 (13 Feb 2018 00:01) (114/68 - 115/65)  BP(mean): --  RR: 20 (13 Feb 2018 00:01) (18 - 20)  SpO2: --  I/O:I&O's Summary    12 Feb 2018 07:01  -  13 Feb 2018 07:00  --------------------------------------------------------  IN: 2400 mL / OUT: 4300 mL / NET: -1900 mL       02-12-18 @ 07:01  -  02-13-18 @ 07:00  --------------------------------------------------------  IN: 2400 mL / OUT: 4300 mL / NET: -1900 mL      BOWEL MOVEMENT: YES (ileostomy)  FLATUS: YES    PHYSICAL EXAM: Elderly  male in no acute distress. Abdomen soft and nontender with ileostomy bag, stool and gas present.     MEDICATIONS: MEDICATIONS  (STANDING):  aspirin enteric coated 81 milliGRAM(s) Oral daily  atorvastatin 40 milliGRAM(s) Oral at bedtime  clopidogrel Tablet 75 milliGRAM(s) Oral daily  heparin  Injectable 5000 Unit(s) SubCutaneous every 8 hours  lactated ringers. 1000 milliLiter(s) (1000 mL/Hr) IV Continuous <Continuous>  lactated ringers. 1000 milliLiter(s) (1000 mL/Hr) IV Continuous <Continuous>  metoprolol succinate ER 50 milliGRAM(s) Oral daily  ranolazine 500 milliGRAM(s) Oral two times a day  sodium chloride 0.9%. 1000 milliLiter(s) (100 mL/Hr) IV Continuous <Continuous>    MEDICATIONS  (PRN):  acetaminophen   Tablet. 650 milliGRAM(s) Oral every 4 hours PRN Mild Pain (1 - 3)  ondansetron  IVPB 4 milliGRAM(s) IV Intermittent every 8 hours PRN Nausea and/or Vomiting      LABS:                        9.2    7.91  )-----------( 223      ( 12 Feb 2018 20:38 )             28.3     02-12    137  |  111<H>  |  17  ----------------------------<  153<H>  4.1   |  20  |  1.4    Ca    8.0<L>      12 Feb 2018 20:38 [facility]  ZEESHAN MCCLENDON  84yMale  [id number]    SURGICAL INTERN PROGRESS NOTE  HPI: 84M with pmhx of sigmoid diverticultis resulting in colo-vesicular fistula which underwent sigmoid colon resection and resection of colo vesicular fistula, primary anastomosis 1/4/18 complicated by perforated viscus, was subsequently reoperated on 1/13/18 had diverting loop ileostomy and hospital discharge on 1/25 presented to hospital today for Decreased PO intake, persistent nausea, intermittent vomiting (non-bloody) and intermittent abdominal pain which spontaneously resolved. States ileostomy has had good out put. Patient has denied any fevers, chills, CP, palpitations, diarrhea, pain with eating, or focal neurological symptoms. Spoke with home nurse and Dr. Martinez today and was told to come to hospital for admission.   Pt was previously treated with Ertapenem requiring a PICC line.    Patient also states he has been having worsening dyspnea on exertion since surgery but denies any CP, Palpitations, leg swelling, prolonged periods of immobility or recent travel, myalgias. Able to walk less than 1 block before onset of symptoms. Walk with a walker. (09 Feb 2018 02:12)      VITALS: Vital Signs Last 24 Hrs  T(C): 36.7 (13 Feb 2018 00:01), Max: 36.7 (13 Feb 2018 00:01)  T(F): 98.1 (13 Feb 2018 00:01), Max: 98.1 (13 Feb 2018 00:01)  HR: 107 (13 Feb 2018 00:01) (103 - 107)  BP: 114/68 (13 Feb 2018 00:01) (114/68 - 115/65)  BP(mean): --  RR: 20 (13 Feb 2018 00:01) (18 - 20)  SpO2: --  I/O:I&O's Summary    12 Feb 2018 07:01  -  13 Feb 2018 07:00  --------------------------------------------------------  IN: 2400 mL / OUT: 4300 mL / NET: -1900 mL       02-12-18 @ 07:01  -  02-13-18 @ 07:00  --------------------------------------------------------  IN: 2400 mL / OUT: 4300 mL / NET: -1900 mL      BOWEL MOVEMENT: YES (ileostomy)  FLATUS: YES    PHYSICAL EXAM: Elderly  male in no acute distress. Abdomen soft and nontender with ileostomy bag, stool and gas present.     MEDICATIONS: MEDICATIONS  (STANDING):  aspirin enteric coated 81 milliGRAM(s) Oral daily  atorvastatin 40 milliGRAM(s) Oral at bedtime  clopidogrel Tablet 75 milliGRAM(s) Oral daily  heparin  Injectable 5000 Unit(s) SubCutaneous every 8 hours  lactated ringers. 1000 milliLiter(s) (1000 mL/Hr) IV Continuous <Continuous>  lactated ringers. 1000 milliLiter(s) (1000 mL/Hr) IV Continuous <Continuous>  metoprolol succinate ER 50 milliGRAM(s) Oral daily  ranolazine 500 milliGRAM(s) Oral two times a day  sodium chloride 0.9%. 1000 milliLiter(s) (100 mL/Hr) IV Continuous <Continuous>    MEDICATIONS  (PRN):  acetaminophen   Tablet. 650 milliGRAM(s) Oral every 4 hours PRN Mild Pain (1 - 3)  ondansetron  IVPB 4 milliGRAM(s) IV Intermittent every 8 hours PRN Nausea and/or Vomiting      LABS:                        9.2    7.91  )-----------( 223      ( 12 Feb 2018 20:38 )             28.3     02-12    137  |  111<H>  |  17  ----------------------------<  153<H>  4.1   |  20  |  1.4    Ca    8.0<L>      12 Feb 2018 20:38

## 2018-02-13 NOTE — PROGRESS NOTE ADULT - PROBLEM SELECTOR PLAN 4
monitor BS, w control; maintain hydration
resolved
s/p resection  Nutrition c/s- omit raw fruits and vegetables, sugar as these will increase ostomy output

## 2018-02-13 NOTE — PROGRESS NOTE ADULT - PROBLEM SELECTOR PROBLEM 1
Dehydration
Vomiting, intractability of vomiting not specified, presence of nausea not specified, unspecified vomiting type

## 2018-02-13 NOTE — PROGRESS NOTE ADULT - PROBLEM SELECTOR PROBLEM 2
Vomiting, intractability of vomiting not specified, presence of nausea not specified, unspecified vomiting type
BUBBA (acute kidney injury)
Dehydration
Vomiting, intractability of vomiting not specified, presence of nausea not specified, unspecified vomiting type
Vomiting, intractability of vomiting not specified, presence of nausea not specified, unspecified vomiting type

## 2018-02-13 NOTE — PROGRESS NOTE ADULT - PROBLEM SELECTOR PLAN 2
as above and anti nausea meds
Cr trending down. Baseline 1.1. Currently 1.4. Admission 1.8
as above and anti nausea meds
as above and anti nausea meds
resolved  continues on IVF

## 2018-02-13 NOTE — PROGRESS NOTE ADULT - PROBLEM SELECTOR PLAN 8
monitor
Protein malnutrition mild  consult with Dr Alatorre  noted  await her input for discharge  will patient need continued IVF?  Discharge planning pending re-evaluation  patient will require nutrition f/u as outpatient
monitor
monitor & control

## 2018-02-13 NOTE — PROGRESS NOTE ADULT - PROBLEM SELECTOR PROBLEM 8
Recurrent UTI (urinary tract infection)
Essential hypertension
Recurrent UTI (urinary tract infection)
Weight loss

## 2018-02-13 NOTE — PROGRESS NOTE ADULT - PROBLEM SELECTOR PROBLEM 3
Fistula of large intestine
BUBBA (acute kidney injury)
Fistula of large intestine

## 2018-02-14 VITALS
DIASTOLIC BLOOD PRESSURE: 59 MMHG | TEMPERATURE: 96 F | HEART RATE: 89 BPM | OXYGEN SATURATION: 98 % | RESPIRATION RATE: 18 BRPM | SYSTOLIC BLOOD PRESSURE: 102 MMHG

## 2018-02-14 LAB
ANION GAP SERPL CALC-SCNC: 5 MMOL/L — LOW (ref 7–14)
BUN SERPL-MCNC: 12 MG/DL — SIGNIFICANT CHANGE UP (ref 10–20)
CALCIUM SERPL-MCNC: 8 MG/DL — LOW (ref 8.5–10.1)
CHLORIDE SERPL-SCNC: 112 MMOL/L — HIGH (ref 98–110)
CO2 SERPL-SCNC: 18 MMOL/L — SIGNIFICANT CHANGE UP (ref 17–32)
CREAT SERPL-MCNC: 1.3 MG/DL — SIGNIFICANT CHANGE UP (ref 0.7–1.5)
FERRITIN SERPL-MCNC: 300 NG/ML — SIGNIFICANT CHANGE UP (ref 30–400)
GLUCOSE SERPL-MCNC: 102 MG/DL — SIGNIFICANT CHANGE UP (ref 70–110)
POTASSIUM SERPL-MCNC: 4.5 MMOL/L — SIGNIFICANT CHANGE UP (ref 3.5–5)
POTASSIUM SERPL-SCNC: 4.5 MMOL/L — SIGNIFICANT CHANGE UP (ref 3.5–5)
SODIUM SERPL-SCNC: 135 MMOL/L — SIGNIFICANT CHANGE UP (ref 135–146)
VIT B12 SERPL-MCNC: 1066 PG/ML — SIGNIFICANT CHANGE UP (ref 232–1245)

## 2018-02-14 RX ORDER — SODIUM CHLORIDE 9 MG/ML
1 INJECTION INTRAMUSCULAR; INTRAVENOUS; SUBCUTANEOUS
Qty: 0 | Refills: 0 | COMMUNITY
Start: 2018-02-14 | End: 2018-02-28

## 2018-02-14 RX ORDER — SODIUM CHLORIDE 9 MG/ML
1 INJECTION INTRAMUSCULAR; INTRAVENOUS; SUBCUTANEOUS
Qty: 0 | Refills: 0 | COMMUNITY
Start: 2018-02-14 | End: 2018-07-30

## 2018-02-14 RX ORDER — MAGNESIUM SULFATE 500 MG/ML
4 VIAL (ML) INJECTION
Qty: 40 | Refills: 0 | Status: DISCONTINUED | OUTPATIENT
Start: 2018-02-14 | End: 2018-02-14

## 2018-02-14 RX ORDER — MULTIVIT-MIN/FERROUS GLUCONATE 9 MG/15 ML
1 LIQUID (ML) ORAL
Qty: 0 | Refills: 0 | COMMUNITY
Start: 2018-02-14

## 2018-02-14 RX ORDER — MULTIVIT-MIN/FERROUS GLUCONATE 9 MG/15 ML
1 LIQUID (ML) ORAL DAILY
Qty: 0 | Refills: 0 | Status: DISCONTINUED | OUTPATIENT
Start: 2018-02-14 | End: 2018-02-14

## 2018-02-14 RX ORDER — MAGNESIUM OXIDE 400 MG ORAL TABLET 241.3 MG
1 TABLET ORAL
Qty: 0 | Refills: 0 | COMMUNITY
Start: 2018-02-14 | End: 2018-02-28

## 2018-02-14 RX ORDER — MAGNESIUM SULFATE 500 MG/ML
2 VIAL (ML) INJECTION ONCE
Qty: 0 | Refills: 0 | Status: DISCONTINUED | OUTPATIENT
Start: 2018-02-14 | End: 2018-02-14

## 2018-02-14 RX ADMIN — Medication 81 MILLIGRAM(S): at 13:51

## 2018-02-14 RX ADMIN — HEPARIN SODIUM 5000 UNIT(S): 5000 INJECTION INTRAVENOUS; SUBCUTANEOUS at 05:34

## 2018-02-14 RX ADMIN — Medication 50 MILLIGRAM(S): at 05:34

## 2018-02-14 RX ADMIN — Medication 1 TABLET(S): at 13:52

## 2018-02-14 NOTE — PROGRESS NOTE ADULT - ASSESSMENT
Pt with some clinical improvement. Tolerated breakfast, will advance. Was OOB, and into a medical bed. Await nutrition, ID, & Rehab consults/plans.
84M with PSH sigmoidectomy for diverticulitis complicated by anastamotic leak necessitating ex-lap, washout and ileostomy. Now admitted on 2/8 with weakness, BUBBA, and dehydration.
84M with past surgical history of partial colon resection and primary anastomosis for recurrent diverticulitis, complicated by anastomotic leak encessitating ex-lap and ileostomy. Patient now admitted with lethargy, weakness, BUBBA, dehydration.
84M with pmhx of sigmoid diverticultis resulting in colo-vesicular fistula which underwent sigmoid colon resection and resection of colo vesicular fistula, primary anastomosis 1/4/18 complicated by perforated viscus, was subsequently reoperated on 1/13/18 had diverting loop ileostomy and hospital discharge on 1/25 presented to hospital today for Decreased PO intake, persistent nausea, intermittent vomiting present on admission, now resolved.      Diet: PO diet tolerated
NG tube out. Tolerated full liquids. Possible advance diet today per Dr Martinez.
Problem/Plan - 1:  ·  Problem: Dehydration.  Plan: IV & PO hydration; monitor SMP/GFR.      Problem/Plan - 2:  ·  Problem: Vomiting, intractability of vomiting not specified, presence of nausea not specified, unspecified vomiting type.  Plan: as above and anti nausea meds.      Problem/Plan - 3:  ·  Problem: Fistula of large intestine.  Plan: s/p-was reason for original surgery; with post op complications, including re-op and multi weak antibiotics-just completed. I rev'd with surg. HO-large ileostomy output a primary cause of the patient's SX and presentation. They feel a SNF will provide better support, & hydration for the patient.      Problem/Plan - 4:  ·  Problem: Type 2 diabetes mellitus with stage 3 chronic kidney disease, unspecified long term insulin use status.  Plan: monitor BS, w control; maintain hydration.      Problem/Plan - 5:  ·  Problem: Chronic systolic heart failure.  Plan: hydrate with caution.      Problem/Plan - 6:  Problem: Coronary artery disease involving native coronary artery of native heart without angina pectoris. Plan: monitor with all care; PMH of HTN-controlled-also.     Problem/Plan - 7:  ·  Problem: Leukocytosis, unspecified type.  Plan: improving; finished.      Problem/Plan - 8:  ·  Problem: Recurrent UTI (urinary tract infection).  Plan: monitor.      Problem/Plan - 9:  ·  Problem: Mixed hyperlipidemia.  Plan: continue meds.      Problem/Plan - 10:  Problem: Disuse atrophy of muscle. Plan; from repeated hospitalization; rehab evaluation pending. PT/rehab, ? SNF.     Problem/Plan - 11:  Problem: Soleal Vein DVT. Per vascular, no indication for AC at this time.
Problem/Plan - 1:  ·  Problem: Vomiting, intractability of vomiting not specified, presence of nausea not specified, unspecified vomiting type.  Plan: resolved  remains on IVF tolerating PO diet.     Problem/Plan - 2:  ·  Problem: Dehydration.  Plan: resolved  continues on IVF.     Problem/Plan - 3:  ·  Problem: BUBBA (acute kidney injury).  Plan: resolved  If no improvement after fluids will need Urine studies.     Problem/Plan - 4:  ·  Problem: Leukocytosis.  Plan: resolved.     Problem/Plan - 5:  ·  Problem: Diabetes.  Plan: monitor FSG  coverage as needed.     Problem/Plan - 6:  Problem: Systolic heart failure. Plan: Continue home meds.    Problem/Plan - 7:  ·  Problem: HTN (hypertension).  Plan: Cont home medications.     Problem/Plan - 8:  ·  Problem: Weight loss.  Plan: Protein malnutrition mild  consult with Dr Alatorre  noted  await her input for discharge  will patient need continued IVF?  Discharge planning pending re-evaluation  patient will require nutrition f/u as outpatient.
Problem/Plan - 1:  ·  Problem: Vomiting, intractability of vomiting not specified, presence of nausea not specified, unspecified vomiting type.  Plan: resolved  remains on IVF tolerating PO diet.     Problem/Plan - 2:  ·  Problem: Dehydration.  Plan: resolved  continues on IVF. DC plan includes 1L IV NS q48h. Pt agrees    Problem/Plan - 3:  ·  Problem: BUBBA (acute kidney injury).  Plan: resolved  If no improvement after fluids will need Urine studies.     Problem/Plan - 4:  ·  Problem: Leukocytosis.  Plan: resolved.     Problem/Plan - 5:  ·  Problem: Diabetes.  Plan: monitor FSG  coverage as needed.  Continue metformin.    Problem/Plan - 6:  Problem: Systolic heart failure. Plan: Continue home meds.    Problem/Plan - 7:  ·  Problem: HTN (hypertension).  Plan: Cont home medications.     Problem/Plan - 8:  ·  Problem: Weight loss.  Plan: Protein malnutrition mild  consult with Dr Alatorre  noted  await her input for discharge  will patient need continued IVF?  Discharge planning pending re-evaluation  patient will require nutrition f/u as outpatient.     Hypomagnesemia:-supplement here and PO at home; recheck CMP & Mg after dc-5-7 days.    Mod Protein Malnutrition- Take supplemental [id number]-5-6/ day at home along with meals.     for home would advise [employer] women[employer] or equivalent, for at least a month.  7. most importantly - diet order: carb consistent diet (no reason for the "no snacks" order)       pt instructed to avoid all sugared drinks and frosted or concentrated sugared foods       drink low sugar hydration fluids such as G2 or CeryLyte or broths       he will likely do better with well cooked vegetables than raw, and with peeled fruits, not r/t nutrition or absorption, but possibly w comfort       Pt to f/u with Dr Alatorre, Myself & Surgery.
Pt with some clinical improvement. Tolerated breakfast, will advance. Was OOB, and into a medical bed. Await nutrition, ID, & Rehab consults/plans.
Pt with some clinical improvement. Tolerating clear liquids, will advance. Try to get OOB, and into a medical bed. Await nutrition, ID, & Rehab consults/plans.
Elevated WBC, likely due to stress response well continue to trend. f/u INR as well as nutrition c/s
#H/o ESBL peritonitis (1/13)  on PE on ongoing peritonitis  - completed ertapenem 14d course  - no signs/sxs of infection  see no need for ABx.    PICC:  site looks clean    recall prn please

## 2018-02-14 NOTE — PROGRESS NOTE ADULT - NSHPATTENDINGPLANDISCUSS_GEN_ALL_CORE
HO, PT & message to son
HO, PT, and DTR-in-law,Nieves; and Surg. HO
NANCY PT; and Surg. HO
NANCY, PT, and DTR-in-law,Nieves

## 2018-02-14 NOTE — PROGRESS NOTE ADULT - SUBJECTIVE AND OBJECTIVE BOX
SUBJECTIVE:    Patient is a 84y old Male who presents with a chief complaint of Generalized weakness, decreased PO intake, nausea, intermittent vomittng (12 Feb 2018 12:09)    Currently admitted to medicine with the primary diagnosis of Dehydration     Today is hospital day #6. This morning he is resting comfortably in bed and reports no new issues or overnight events. He continues to feel stronger.    PAST MEDICAL & SURGICAL HISTORY  HLD (hyperlipidemia)  HTN (hypertension)  Fistula of large intestine: colo-vesicula fistula  H/O diverticulitis of colon  Recurrent UTI (urinary tract infection)  Diabetes  CAD (coronary artery disease)  Artificial cardiac pacemaker  History of partial colectomy  H/O ileostomy    SOCIAL HISTORY:  Negative for smoking/alcohol/drug use.     ALLERGIES:  No Known Allergies    MEDICATIONS:  STANDING MEDICATIONS  aspirin  chewable 81 milliGRAM(s) Oral daily  atorvastatin 40 milliGRAM(s) Oral at bedtime  clopidogrel Tablet 75 milliGRAM(s) Oral daily  heparin  Injectable 5000 Unit(s) SubCutaneous every 8 hours  lactated ringers. 1000 milliLiter(s) IV Continuous <Continuous>  lactated ringers. 1000 milliLiter(s) IV Continuous <Continuous>  metoprolol succinate ER 50 milliGRAM(s) Oral daily  ranolazine 500 milliGRAM(s) Oral two times a day  sodium chloride 0.9%. 1000 milliLiter(s) IV Continuous <Continuous>    PRN MEDICATIONS  acetaminophen   Tablet. 650 milliGRAM(s) Oral every 4 hours PRN  ondansetron  IVPB 4 milliGRAM(s) IV Intermittent every 8 hours PRN    VITALS:   Vital Signs Last 24 Hrs  T(C): 35.7 (14 Feb 2018 07:42), Max: 37.1 (13 Feb 2018 15:48)  T(F): 96.3 (14 Feb 2018 07:42), Max: 98.8 (13 Feb 2018 15:48)  HR: 104 (14 Feb 2018 07:42) (90 - 111)  BP: 113/70 (14 Feb 2018 07:42) (107/61 - 114/59)  BP(mean): --  RR: 19 (14 Feb 2018 07:42) (19 - 20)  SpO2: --  LABS:                        10.4   7.09  )-----------( 196      ( 13 Feb 2018 09:56 )             32.6     02-13    135  |  109  |  12  ----------------------------<  145<H>  4.4   |  17  |  1.3    Ca    7.9<L>      13 Feb 2018 09:56  Mg     1.3     02-13    TPro  4.9<L>  /  Alb  2.1<L>  /  TBili  0.5  /  DBili  x   /  AST  17  /  ALT  8   /  AlkPhos  65  02-13    RADIOLOGY:    Venous Duplex: Venous thrombosis bilateral soleal veins.  CXR: No radiographic evidence of acute cardiopulmonary disease.  CT a/p: Diffuse dilation of small bowel up to 3.7 cm up to point of right lower quadrant ileostomy; findings suspicious for small bowel obstruction.    PHYSICAL EXAM:  GEN: No acute distress; ambulates around unit with walker.  LUNGS: Clear to auscultation bilaterally   HEART: S1/S2 present. RR; No M,R,G  ABD: Soft, non-tender, non-distended. Bowel sounds present  EXT: NC/NC/NE/2+PP/BURROWS  NEURO: AAOX3

## 2018-02-14 NOTE — CHART NOTE - NSCHARTNOTEFT_GEN_A_CORE
Patient is a 84y old  Male who presents with a chief complaint of Generalized weakness, decreased PO intake, nausea, intermittent vomiting. Currently comfortable and has been tolerating diet with increased intake. Plan for d/c today.    Afebrile, VSS  Awake, alert and comfortable  Abd: soft, ND  +ileostomy 1620cc/24hr    I&O's Detail    13 Feb 2018 07:01  -  14 Feb 2018 07:00  --------------------------------------------------------  IN:    Oral Fluid: 300 mL    sodium chloride 0.9%.: 400 mL  Total IN: 700 mL    OUT:    Colostomy: 1620 mL    Voided: 180 mL  Total OUT: 1800 mL    Total NET: -1100 mL    02-14    135  |  112<H>  |  12  ----------------------------<  102  4.5   |  18  |  1.3    Ca    8.0<L>      14 Feb 2018 06:09  Mg     1.3     02-13    TPro  4.9<L>  /  Alb  2.1<L>  /  TBili  0.5  /  DBili  x   /  AST  17  /  ALT  8   /  AlkPhos  65  02-13                         10.4   7.09  )-----------( 196      ( 13 Feb 2018 09:56 )             32.6     A/P: Continue PO diet- carb consistent diet (no reason for the "no snacks" order).    Avoid all sugared drinks and frosted or concentrated sugared foods.   Drink low sugar hydration fluids such as G2 or CeryLyte or broths. Only well cooked vegetables not raw, and only peeled fruits.   Add Glucerna shake 240ml PO q12hr.   Once he's home the pt, must monitor ileostomy output and urine output volumes.   Needs replacement of magnesium (1.3) and suggested earlier to give 2g Mg sulfate in 500ml 1/2 NS over 12 hrs.   F/U with Dr. Alatorre as an outpatient prn

## 2018-02-14 NOTE — PROGRESS NOTE ADULT - PROVIDER SPECIALTY LIST ADULT
Infectious Disease
Internal Medicine
Surgery
Internal Medicine

## 2018-02-16 DIAGNOSIS — I82.493 ACUTE EMBOLISM AND THROMBOSIS OF OTHER SPECIFIED DEEP VEIN OF LOWER EXTREMITY, BILATERAL: ICD-10-CM

## 2018-02-16 DIAGNOSIS — E44.0 MODERATE PROTEIN-CALORIE MALNUTRITION: ICD-10-CM

## 2018-02-16 DIAGNOSIS — I13.0 HYPERTENSIVE HEART AND CHRONIC KIDNEY DISEASE WITH HEART FAILURE AND STAGE 1 THROUGH STAGE 4 CHRONIC KIDNEY DISEASE, OR UNSPECIFIED CHRONIC KIDNEY DISEASE: ICD-10-CM

## 2018-02-16 DIAGNOSIS — Z79.84 LONG TERM (CURRENT) USE OF ORAL HYPOGLYCEMIC DRUGS: ICD-10-CM

## 2018-02-16 DIAGNOSIS — E86.0 DEHYDRATION: ICD-10-CM

## 2018-02-16 DIAGNOSIS — Z90.49 ACQUIRED ABSENCE OF OTHER SPECIFIED PARTS OF DIGESTIVE TRACT: ICD-10-CM

## 2018-02-16 DIAGNOSIS — I25.10 ATHEROSCLEROTIC HEART DISEASE OF NATIVE CORONARY ARTERY WITHOUT ANGINA PECTORIS: ICD-10-CM

## 2018-02-16 DIAGNOSIS — N17.9 ACUTE KIDNEY FAILURE, UNSPECIFIED: ICD-10-CM

## 2018-02-16 DIAGNOSIS — Z93.2 ILEOSTOMY STATUS: ICD-10-CM

## 2018-02-16 DIAGNOSIS — K56.609 UNSPECIFIED INTESTINAL OBSTRUCTION, UNSPECIFIED AS TO PARTIAL VERSUS COMPLETE OBSTRUCTION: ICD-10-CM

## 2018-02-16 DIAGNOSIS — E83.42 HYPOMAGNESEMIA: ICD-10-CM

## 2018-02-16 DIAGNOSIS — N18.3 CHRONIC KIDNEY DISEASE, STAGE 3 (MODERATE): ICD-10-CM

## 2018-02-16 DIAGNOSIS — I50.22 CHRONIC SYSTOLIC (CONGESTIVE) HEART FAILURE: ICD-10-CM

## 2018-02-16 DIAGNOSIS — Z95.0 PRESENCE OF CARDIAC PACEMAKER: ICD-10-CM

## 2018-02-16 DIAGNOSIS — N39.0 URINARY TRACT INFECTION, SITE NOT SPECIFIED: ICD-10-CM

## 2018-02-16 DIAGNOSIS — E11.22 TYPE 2 DIABETES MELLITUS WITH DIABETIC CHRONIC KIDNEY DISEASE: ICD-10-CM

## 2018-02-16 LAB
24R-OH-CALCIDIOL SERPL-MCNC: 28 NG/ML — LOW (ref 30–100)
ZINC SERPL-MCNC: 58 UG/DL — SIGNIFICANT CHANGE UP (ref 56–134)

## 2018-07-13 ENCOUNTER — OUTPATIENT (OUTPATIENT)
Dept: OUTPATIENT SERVICES | Facility: HOSPITAL | Age: 83
LOS: 1 days | Discharge: HOME | End: 2018-07-13

## 2018-07-13 VITALS
HEIGHT: 65 IN | RESPIRATION RATE: 18 BRPM | SYSTOLIC BLOOD PRESSURE: 135 MMHG | WEIGHT: 136.69 LBS | DIASTOLIC BLOOD PRESSURE: 71 MMHG | TEMPERATURE: 97 F | OXYGEN SATURATION: 99 % | HEART RATE: 68 BPM

## 2018-07-13 DIAGNOSIS — Z41.9 ENCOUNTER FOR PROCEDURE FOR PURPOSES OTHER THAN REMEDYING HEALTH STATE, UNSPECIFIED: Chronic | ICD-10-CM

## 2018-07-13 DIAGNOSIS — Z98.890 OTHER SPECIFIED POSTPROCEDURAL STATES: Chronic | ICD-10-CM

## 2018-07-13 DIAGNOSIS — C44.91 BASAL CELL CARCINOMA OF SKIN, UNSPECIFIED: Chronic | ICD-10-CM

## 2018-07-13 DIAGNOSIS — Z01.818 ENCOUNTER FOR OTHER PREPROCEDURAL EXAMINATION: ICD-10-CM

## 2018-07-13 DIAGNOSIS — K63.2 FISTULA OF INTESTINE: ICD-10-CM

## 2018-07-13 DIAGNOSIS — Z95.0 PRESENCE OF CARDIAC PACEMAKER: Chronic | ICD-10-CM

## 2018-07-13 LAB
ALBUMIN SERPL ELPH-MCNC: 4.4 G/DL — SIGNIFICANT CHANGE UP (ref 3.5–5.2)
ALP SERPL-CCNC: 69 U/L — SIGNIFICANT CHANGE UP (ref 30–115)
ALT FLD-CCNC: 47 U/L — HIGH (ref 0–41)
ANION GAP SERPL CALC-SCNC: 11 MMOL/L — SIGNIFICANT CHANGE UP (ref 7–14)
APTT BLD: 27.6 SEC — SIGNIFICANT CHANGE UP (ref 27–39.2)
AST SERPL-CCNC: 29 U/L — SIGNIFICANT CHANGE UP (ref 0–41)
BASOPHILS # BLD AUTO: 0.05 K/UL — SIGNIFICANT CHANGE UP (ref 0–0.2)
BASOPHILS NFR BLD AUTO: 0.6 % — SIGNIFICANT CHANGE UP (ref 0–1)
BILIRUB SERPL-MCNC: 0.4 MG/DL — SIGNIFICANT CHANGE UP (ref 0.2–1.2)
BLD GP AB SCN SERPL QL: SIGNIFICANT CHANGE UP
BUN SERPL-MCNC: 41 MG/DL — HIGH (ref 10–20)
CALCIUM SERPL-MCNC: 10 MG/DL — SIGNIFICANT CHANGE UP (ref 8.5–10.1)
CHLORIDE SERPL-SCNC: 105 MMOL/L — SIGNIFICANT CHANGE UP (ref 98–110)
CO2 SERPL-SCNC: 23 MMOL/L — SIGNIFICANT CHANGE UP (ref 17–32)
CREAT SERPL-MCNC: 1.9 MG/DL — HIGH (ref 0.7–1.5)
EOSINOPHIL # BLD AUTO: 0.3 K/UL — SIGNIFICANT CHANGE UP (ref 0–0.7)
EOSINOPHIL NFR BLD AUTO: 3.4 % — SIGNIFICANT CHANGE UP (ref 0–8)
ESTIMATED AVERAGE GLUCOSE: 160 MG/DL — HIGH (ref 68–114)
GLUCOSE SERPL-MCNC: 112 MG/DL — HIGH (ref 70–99)
HBA1C BLD-MCNC: 7.2 % — HIGH (ref 4–5.6)
HCT VFR BLD CALC: 35.3 % — LOW (ref 42–52)
HGB BLD-MCNC: 11.4 G/DL — LOW (ref 14–18)
IMM GRANULOCYTES NFR BLD AUTO: 0.3 % — SIGNIFICANT CHANGE UP (ref 0.1–0.3)
INR BLD: 1.01 RATIO — SIGNIFICANT CHANGE UP (ref 0.65–1.3)
LYMPHOCYTES # BLD AUTO: 1.63 K/UL — SIGNIFICANT CHANGE UP (ref 1.2–3.4)
LYMPHOCYTES # BLD AUTO: 18.5 % — LOW (ref 20.5–51.1)
MCHC RBC-ENTMCNC: 28.5 PG — SIGNIFICANT CHANGE UP (ref 27–31)
MCHC RBC-ENTMCNC: 32.3 G/DL — SIGNIFICANT CHANGE UP (ref 32–37)
MCV RBC AUTO: 88.3 FL — SIGNIFICANT CHANGE UP (ref 80–94)
MONOCYTES # BLD AUTO: 0.69 K/UL — HIGH (ref 0.1–0.6)
MONOCYTES NFR BLD AUTO: 7.8 % — SIGNIFICANT CHANGE UP (ref 1.7–9.3)
NEUTROPHILS # BLD AUTO: 6.09 K/UL — SIGNIFICANT CHANGE UP (ref 1.4–6.5)
NEUTROPHILS NFR BLD AUTO: 69.4 % — SIGNIFICANT CHANGE UP (ref 42.2–75.2)
NRBC # BLD: 0 /100 WBCS — SIGNIFICANT CHANGE UP (ref 0–0)
PLATELET # BLD AUTO: 162 K/UL — SIGNIFICANT CHANGE UP (ref 130–400)
POTASSIUM SERPL-MCNC: 6.1 MMOL/L — CRITICAL HIGH (ref 3.5–5)
POTASSIUM SERPL-SCNC: 6.1 MMOL/L — CRITICAL HIGH (ref 3.5–5)
PROT SERPL-MCNC: 7.2 G/DL — SIGNIFICANT CHANGE UP (ref 6–8)
PROTHROM AB SERPL-ACNC: 10.9 SEC — SIGNIFICANT CHANGE UP (ref 9.95–12.87)
RBC # BLD: 4 M/UL — LOW (ref 4.7–6.1)
RBC # FLD: 14.4 % — SIGNIFICANT CHANGE UP (ref 11.5–14.5)
SODIUM SERPL-SCNC: 139 MMOL/L — SIGNIFICANT CHANGE UP (ref 135–146)
TYPE + AB SCN PNL BLD: SIGNIFICANT CHANGE UP
WBC # BLD: 8.79 K/UL — SIGNIFICANT CHANGE UP (ref 4.8–10.8)
WBC # FLD AUTO: 8.79 K/UL — SIGNIFICANT CHANGE UP (ref 4.8–10.8)

## 2018-07-13 RX ORDER — ATORVASTATIN CALCIUM 80 MG/1
1 TABLET, FILM COATED ORAL
Qty: 0 | Refills: 0 | COMMUNITY

## 2018-07-13 RX ORDER — CLOPIDOGREL BISULFATE 75 MG/1
1 TABLET, FILM COATED ORAL
Qty: 0 | Refills: 0 | COMMUNITY

## 2018-07-13 RX ORDER — METOPROLOL TARTRATE 50 MG
1 TABLET ORAL
Qty: 0 | Refills: 0 | COMMUNITY

## 2018-07-13 RX ORDER — RANOLAZINE 500 MG/1
1 TABLET, FILM COATED, EXTENDED RELEASE ORAL
Qty: 0 | Refills: 0 | COMMUNITY

## 2018-07-13 NOTE — H&P PST ADULT - FAMILY HISTORY
CVA (cerebral vascular accident), hemorrhage     Mother  Still living? Unknown  Family history of colon cancer in mother, Age at diagnosis: Age Unknown     Grandparent  Still living? No  Family history of colon cancer in mother, Age at diagnosis: Age Unknown

## 2018-07-13 NOTE — H&P PST ADULT - HISTORY OF PRESENT ILLNESS
Patient is scheduled  for reversal of ileostomy. Denies any c/o Cp ,palpitation, fever, cough or dysuria. Patientc/o SANTAMARIA. Exercise tolerance of 1/2 flight stairs walk with out SOB. No QUANG. Patient is scheduled  for reversal of ileostomy. Denies any c/o Cp ,palpitation, fever, cough or dysuria. Patient c/o SANTAMARIA. Exercise tolerance of 1/2 flight stairs walk with out SOB. No QUANG. Patient has left upper arm PICC line and receiving 0.9% normal saline infusion 1000ml alternate with 500ml Daily for dehydration.

## 2018-07-13 NOTE — H&P PST ADULT - PMH
Anemia  slight  Asthma  MIld only lasy attack yrs ago  CAD (coronary artery disease)    Diabetes    Eye problem  with diabetes  Fistula of large intestine  colo-vesicula fistula  GERD without esophagitis    H/O diverticulitis of colon    HLD (hyperlipidemia)    HTN (hypertension)    Recurrent UTI (urinary tract infection)    SOB (shortness of breath) on exertion    Stented coronary artery  Proximal LAD 08/31/2016 Anemia  slight  Asthma  Mild only last attack yrs ago  CAD (coronary artery disease)    Diabetes    Eye problem  with diabetes  Fistula of large intestine  colo-vesicula fistula  GERD without esophagitis    H/O diverticulitis of colon    HLD (hyperlipidemia)    HTN (hypertension)    Recurrent UTI (urinary tract infection)    SOB (shortness of breath) on exertion    Stented coronary artery  Proximal LAD 08/31/2016

## 2018-07-13 NOTE — H&P PST ADULT - PSH
Artificial cardiac pacemaker    Basal cell carcinoma  removal 07/18/2018  Elective surgery  PCI with 2 stents  H/O ileostomy    History of eye surgery  laser  History of partial colectomy

## 2018-07-15 PROBLEM — J45.909 UNSPECIFIED ASTHMA, UNCOMPLICATED: Chronic | Status: ACTIVE | Noted: 2018-07-13

## 2018-07-25 ENCOUNTER — OUTPATIENT (OUTPATIENT)
Dept: OUTPATIENT SERVICES | Facility: HOSPITAL | Age: 83
LOS: 1 days | Discharge: HOME | End: 2018-07-25

## 2018-07-25 DIAGNOSIS — Z01.818 ENCOUNTER FOR OTHER PREPROCEDURAL EXAMINATION: ICD-10-CM

## 2018-07-25 DIAGNOSIS — Z95.0 PRESENCE OF CARDIAC PACEMAKER: Chronic | ICD-10-CM

## 2018-07-25 DIAGNOSIS — C44.91 BASAL CELL CARCINOMA OF SKIN, UNSPECIFIED: Chronic | ICD-10-CM

## 2018-07-25 DIAGNOSIS — Z98.890 OTHER SPECIFIED POSTPROCEDURAL STATES: Chronic | ICD-10-CM

## 2018-07-25 DIAGNOSIS — Z41.9 ENCOUNTER FOR PROCEDURE FOR PURPOSES OTHER THAN REMEDYING HEALTH STATE, UNSPECIFIED: Chronic | ICD-10-CM

## 2018-07-25 PROBLEM — D64.9 ANEMIA, UNSPECIFIED: Chronic | Status: ACTIVE | Noted: 2018-07-13

## 2018-07-25 PROBLEM — R06.02 SHORTNESS OF BREATH: Chronic | Status: ACTIVE | Noted: 2018-07-13

## 2018-07-25 PROBLEM — K63.2 FISTULA OF INTESTINE: Chronic | Status: ACTIVE | Noted: 2018-02-09

## 2018-07-25 PROBLEM — K21.9 GASTRO-ESOPHAGEAL REFLUX DISEASE WITHOUT ESOPHAGITIS: Chronic | Status: ACTIVE | Noted: 2018-07-13

## 2018-07-25 PROBLEM — Z95.5 PRESENCE OF CORONARY ANGIOPLASTY IMPLANT AND GRAFT: Chronic | Status: ACTIVE | Noted: 2018-07-13

## 2018-07-25 PROBLEM — N39.0 URINARY TRACT INFECTION, SITE NOT SPECIFIED: Chronic | Status: ACTIVE | Noted: 2018-02-09

## 2018-07-25 PROBLEM — I10 ESSENTIAL (PRIMARY) HYPERTENSION: Chronic | Status: ACTIVE | Noted: 2018-02-09

## 2018-07-25 PROBLEM — E11.9 TYPE 2 DIABETES MELLITUS WITHOUT COMPLICATIONS: Chronic | Status: ACTIVE | Noted: 2018-02-09

## 2018-07-25 PROBLEM — I25.10 ATHEROSCLEROTIC HEART DISEASE OF NATIVE CORONARY ARTERY WITHOUT ANGINA PECTORIS: Chronic | Status: ACTIVE | Noted: 2018-02-09

## 2018-07-25 PROBLEM — E78.5 HYPERLIPIDEMIA, UNSPECIFIED: Chronic | Status: ACTIVE | Noted: 2018-02-09

## 2018-07-25 PROBLEM — Z87.19 PERSONAL HISTORY OF OTHER DISEASES OF THE DIGESTIVE SYSTEM: Chronic | Status: ACTIVE | Noted: 2018-02-09

## 2018-07-25 PROBLEM — H57.9 UNSPECIFIED DISORDER OF EYE AND ADNEXA: Chronic | Status: ACTIVE | Noted: 2018-07-13

## 2018-07-25 LAB
ANION GAP SERPL CALC-SCNC: 15 MMOL/L — HIGH (ref 7–14)
BUN SERPL-MCNC: 44 MG/DL — HIGH (ref 10–20)
CALCIUM SERPL-MCNC: 10 MG/DL — SIGNIFICANT CHANGE UP (ref 8.5–10.1)
CHLORIDE SERPL-SCNC: 100 MMOL/L — SIGNIFICANT CHANGE UP (ref 98–110)
CO2 SERPL-SCNC: 22 MMOL/L — SIGNIFICANT CHANGE UP (ref 17–32)
CREAT SERPL-MCNC: 2 MG/DL — HIGH (ref 0.7–1.5)
GLUCOSE SERPL-MCNC: 171 MG/DL — HIGH (ref 70–99)
POTASSIUM SERPL-MCNC: 5.2 MMOL/L — HIGH (ref 3.5–5)
POTASSIUM SERPL-SCNC: 5.2 MMOL/L — HIGH (ref 3.5–5)
SODIUM SERPL-SCNC: 137 MMOL/L — SIGNIFICANT CHANGE UP (ref 135–146)

## 2018-07-26 DIAGNOSIS — K63.2 FISTULA OF INTESTINE: ICD-10-CM

## 2018-07-27 ENCOUNTER — INPATIENT (INPATIENT)
Facility: HOSPITAL | Age: 83
LOS: 7 days | Discharge: HOME HEALTH | End: 2018-08-04
Attending: SPECIALIST | Admitting: SPECIALIST
Payer: MEDICARE

## 2018-07-27 ENCOUNTER — RESULT REVIEW (OUTPATIENT)
Age: 83
End: 2018-07-27

## 2018-07-27 VITALS
HEART RATE: 68 BPM | WEIGHT: 136.69 LBS | SYSTOLIC BLOOD PRESSURE: 135 MMHG | RESPIRATION RATE: 18 BRPM | DIASTOLIC BLOOD PRESSURE: 71 MMHG | HEIGHT: 65 IN | OXYGEN SATURATION: 99 % | TEMPERATURE: 97 F

## 2018-07-27 DIAGNOSIS — Z41.9 ENCOUNTER FOR PROCEDURE FOR PURPOSES OTHER THAN REMEDYING HEALTH STATE, UNSPECIFIED: Chronic | ICD-10-CM

## 2018-07-27 DIAGNOSIS — Z98.890 OTHER SPECIFIED POSTPROCEDURAL STATES: Chronic | ICD-10-CM

## 2018-07-27 DIAGNOSIS — Z95.0 PRESENCE OF CARDIAC PACEMAKER: Chronic | ICD-10-CM

## 2018-07-27 DIAGNOSIS — C44.91 BASAL CELL CARCINOMA OF SKIN, UNSPECIFIED: Chronic | ICD-10-CM

## 2018-07-27 RX ORDER — OXYCODONE AND ACETAMINOPHEN 5; 325 MG/1; MG/1
1 TABLET ORAL ONCE
Qty: 0 | Refills: 0 | Status: DISCONTINUED | OUTPATIENT
Start: 2018-07-27 | End: 2018-07-27

## 2018-07-27 RX ORDER — MEPERIDINE HYDROCHLORIDE 50 MG/ML
12.5 INJECTION INTRAMUSCULAR; INTRAVENOUS; SUBCUTANEOUS
Qty: 0 | Refills: 0 | Status: DISCONTINUED | OUTPATIENT
Start: 2018-07-27 | End: 2018-07-27

## 2018-07-27 RX ORDER — LISINOPRIL 2.5 MG/1
2.5 TABLET ORAL DAILY
Qty: 0 | Refills: 0 | Status: DISCONTINUED | OUTPATIENT
Start: 2018-07-27 | End: 2018-07-28

## 2018-07-27 RX ORDER — ACETAMINOPHEN 500 MG
975 TABLET ORAL ONCE
Qty: 0 | Refills: 0 | Status: DISCONTINUED | OUTPATIENT
Start: 2018-07-27 | End: 2018-07-27

## 2018-07-27 RX ORDER — SODIUM CHLORIDE 9 MG/ML
1000 INJECTION, SOLUTION INTRAVENOUS
Qty: 0 | Refills: 0 | Status: DISCONTINUED | OUTPATIENT
Start: 2018-07-27 | End: 2018-07-27

## 2018-07-27 RX ORDER — ATORVASTATIN CALCIUM 80 MG/1
20 TABLET, FILM COATED ORAL AT BEDTIME
Qty: 0 | Refills: 0 | Status: DISCONTINUED | OUTPATIENT
Start: 2018-07-27 | End: 2018-08-04

## 2018-07-27 RX ORDER — HEPARIN SODIUM 5000 [USP'U]/ML
5000 INJECTION INTRAVENOUS; SUBCUTANEOUS EVERY 8 HOURS
Qty: 0 | Refills: 0 | Status: DISCONTINUED | OUTPATIENT
Start: 2018-07-27 | End: 2018-08-04

## 2018-07-27 RX ORDER — CEFOTETAN DISODIUM 1 G
2 VIAL (EA) INJECTION EVERY 12 HOURS
Qty: 0 | Refills: 0 | Status: COMPLETED | OUTPATIENT
Start: 2018-07-27 | End: 2018-07-28

## 2018-07-27 RX ORDER — SODIUM CHLORIDE 9 MG/ML
1000 INJECTION INTRAMUSCULAR; INTRAVENOUS; SUBCUTANEOUS
Qty: 0 | Refills: 0 | Status: DISCONTINUED | OUTPATIENT
Start: 2018-07-27 | End: 2018-07-30

## 2018-07-27 RX ORDER — HYDROMORPHONE HYDROCHLORIDE 2 MG/ML
0.5 INJECTION INTRAMUSCULAR; INTRAVENOUS; SUBCUTANEOUS
Qty: 0 | Refills: 0 | Status: DISCONTINUED | OUTPATIENT
Start: 2018-07-27 | End: 2018-07-27

## 2018-07-27 RX ORDER — HYDROMORPHONE HYDROCHLORIDE 2 MG/ML
1 INJECTION INTRAMUSCULAR; INTRAVENOUS; SUBCUTANEOUS
Qty: 0 | Refills: 0 | Status: DISCONTINUED | OUTPATIENT
Start: 2018-07-27 | End: 2018-07-27

## 2018-07-27 RX ORDER — ONDANSETRON 8 MG/1
4 TABLET, FILM COATED ORAL ONCE
Qty: 0 | Refills: 0 | Status: DISCONTINUED | OUTPATIENT
Start: 2018-07-27 | End: 2018-07-27

## 2018-07-27 RX ORDER — METOPROLOL TARTRATE 50 MG
25 TABLET ORAL DAILY
Qty: 0 | Refills: 0 | Status: DISCONTINUED | OUTPATIENT
Start: 2018-07-27 | End: 2018-08-04

## 2018-07-27 RX ORDER — PANTOPRAZOLE SODIUM 20 MG/1
40 TABLET, DELAYED RELEASE ORAL DAILY
Qty: 0 | Refills: 0 | Status: DISCONTINUED | OUTPATIENT
Start: 2018-07-27 | End: 2018-07-30

## 2018-07-27 RX ORDER — ASPIRIN/CALCIUM CARB/MAGNESIUM 324 MG
81 TABLET ORAL DAILY
Qty: 0 | Refills: 0 | Status: DISCONTINUED | OUTPATIENT
Start: 2018-07-27 | End: 2018-08-04

## 2018-07-27 RX ORDER — OXYCODONE HYDROCHLORIDE 5 MG/1
5 TABLET ORAL ONCE
Qty: 0 | Refills: 0 | Status: DISCONTINUED | OUTPATIENT
Start: 2018-07-27 | End: 2018-07-27

## 2018-07-27 RX ADMIN — Medication 25 MILLIGRAM(S): at 23:52

## 2018-07-27 RX ADMIN — HYDROMORPHONE HYDROCHLORIDE 0.5 MILLIGRAM(S): 2 INJECTION INTRAMUSCULAR; INTRAVENOUS; SUBCUTANEOUS at 18:08

## 2018-07-27 RX ADMIN — SODIUM CHLORIDE 100 MILLILITER(S): 9 INJECTION INTRAMUSCULAR; INTRAVENOUS; SUBCUTANEOUS at 18:43

## 2018-07-27 RX ADMIN — HYDROMORPHONE HYDROCHLORIDE 0.5 MILLIGRAM(S): 2 INJECTION INTRAMUSCULAR; INTRAVENOUS; SUBCUTANEOUS at 17:53

## 2018-07-27 RX ADMIN — HYDROMORPHONE HYDROCHLORIDE 0.5 MILLIGRAM(S): 2 INJECTION INTRAMUSCULAR; INTRAVENOUS; SUBCUTANEOUS at 18:30

## 2018-07-27 RX ADMIN — OXYCODONE HYDROCHLORIDE 5 MILLIGRAM(S): 5 TABLET ORAL at 23:51

## 2018-07-27 RX ADMIN — SODIUM CHLORIDE 100 MILLILITER(S): 9 INJECTION, SOLUTION INTRAVENOUS at 17:17

## 2018-07-27 RX ADMIN — Medication 100 GRAM(S): at 18:02

## 2018-07-27 RX ADMIN — HEPARIN SODIUM 5000 UNIT(S): 5000 INJECTION INTRAVENOUS; SUBCUTANEOUS at 21:29

## 2018-07-27 RX ADMIN — HYDROMORPHONE HYDROCHLORIDE 0.5 MILLIGRAM(S): 2 INJECTION INTRAMUSCULAR; INTRAVENOUS; SUBCUTANEOUS at 18:07

## 2018-07-27 NOTE — CHART NOTE - NSCHARTNOTEFT_GEN_A_CORE
PACU ANESTHESIA ADMISSION NOTE      Procedure: Small bowel resection, exteriorized segment  Closure, revision, or reversal of colostomy or ileostomy: reversal of loop ileostomy    Post op diagnosis:  History of ileostomy      ____  Intubated  TV:______       Rate: ______      FiO2: ______    ____  Patent Airway    ___X_  Full return of protective reflexes    __X__  Full recovery from anesthesia / back to baseline status    Vitals:X___ Awake   __X___ Alert   _____ Drowsy   _____ Sedated    Nausea/Vomiting:  _X___ NO  ______Yes,   See Post - Op Orders          Pain Scale (0-10):  _____    Treatment: ____ None    _X___ See Post - Op/PCA Orders    Post - Operative Fluids:   ____ Oral   __X__ See Post - Op Orders    Plan: Discharge:   ____Home       __X___Floor     _____Critical Care    _____  Other:_________________    Comments: Patient had smooth intraoperative course, no event, no anesthesia complications.

## 2018-07-27 NOTE — BRIEF OPERATIVE NOTE - PROCEDURE
<<-----Click on this checkbox to enter Procedure Closure, revision, or reversal of colostomy or ileostomy  07/27/2018  reversal of loop ileostomy  Active  APAL1 Small bowel resection, exteriorized segment  07/27/2018    Active  APAL1

## 2018-07-28 LAB
ANION GAP SERPL CALC-SCNC: 16 MMOL/L — HIGH (ref 7–14)
ANION GAP SERPL CALC-SCNC: 17 MMOL/L — HIGH (ref 7–14)
ANION GAP SERPL CALC-SCNC: 17 MMOL/L — HIGH (ref 7–14)
APPEARANCE UR: CLEAR — SIGNIFICANT CHANGE UP
BASOPHILS # BLD AUTO: 0.01 K/UL — SIGNIFICANT CHANGE UP (ref 0–0.2)
BASOPHILS NFR BLD AUTO: 0.1 % — SIGNIFICANT CHANGE UP (ref 0–1)
BILIRUB UR-MCNC: NEGATIVE — SIGNIFICANT CHANGE UP
BUN SERPL-MCNC: 44 MG/DL — HIGH (ref 10–20)
BUN SERPL-MCNC: 48 MG/DL — HIGH (ref 10–20)
BUN SERPL-MCNC: 49 MG/DL — HIGH (ref 10–20)
CALCIUM SERPL-MCNC: 9.2 MG/DL — SIGNIFICANT CHANGE UP (ref 8.5–10.1)
CALCIUM SERPL-MCNC: 9.3 MG/DL — SIGNIFICANT CHANGE UP (ref 8.5–10.1)
CALCIUM SERPL-MCNC: 9.5 MG/DL — SIGNIFICANT CHANGE UP (ref 8.5–10.1)
CHLORIDE SERPL-SCNC: 101 MMOL/L — SIGNIFICANT CHANGE UP (ref 98–110)
CHLORIDE SERPL-SCNC: 101 MMOL/L — SIGNIFICANT CHANGE UP (ref 98–110)
CHLORIDE SERPL-SCNC: 99 MMOL/L — SIGNIFICANT CHANGE UP (ref 98–110)
CO2 SERPL-SCNC: 16 MMOL/L — LOW (ref 17–32)
CO2 SERPL-SCNC: 18 MMOL/L — SIGNIFICANT CHANGE UP (ref 17–32)
CO2 SERPL-SCNC: 19 MMOL/L — SIGNIFICANT CHANGE UP (ref 17–32)
COLOR SPEC: YELLOW — SIGNIFICANT CHANGE UP
CREAT ?TM UR-MCNC: 53 MG/DL — SIGNIFICANT CHANGE UP
CREAT SERPL-MCNC: 2.3 MG/DL — HIGH (ref 0.7–1.5)
CREAT SERPL-MCNC: 2.5 MG/DL — HIGH (ref 0.7–1.5)
CREAT SERPL-MCNC: 2.5 MG/DL — HIGH (ref 0.7–1.5)
DIFF PNL FLD: NEGATIVE — SIGNIFICANT CHANGE UP
EOSINOPHIL # BLD AUTO: 0 K/UL — SIGNIFICANT CHANGE UP (ref 0–0.7)
EOSINOPHIL NFR BLD AUTO: 0 % — SIGNIFICANT CHANGE UP (ref 0–8)
EPI CELLS # UR: ABNORMAL /HPF
GLUCOSE SERPL-MCNC: 111 MG/DL — HIGH (ref 70–99)
GLUCOSE SERPL-MCNC: 168 MG/DL — HIGH (ref 70–99)
GLUCOSE SERPL-MCNC: 233 MG/DL — HIGH (ref 70–99)
GLUCOSE UR QL: NEGATIVE MG/DL — SIGNIFICANT CHANGE UP
HCT VFR BLD CALC: 34.6 % — LOW (ref 42–52)
HCT VFR BLD CALC: 37.7 % — LOW (ref 42–52)
HGB BLD-MCNC: 11.4 G/DL — LOW (ref 14–18)
HGB BLD-MCNC: 12.2 G/DL — LOW (ref 14–18)
IMM GRANULOCYTES NFR BLD AUTO: 0.8 % — HIGH (ref 0.1–0.3)
KETONES UR-MCNC: NEGATIVE — SIGNIFICANT CHANGE UP
LEUKOCYTE ESTERASE UR-ACNC: NEGATIVE — SIGNIFICANT CHANGE UP
LYMPHOCYTES # BLD AUTO: 0.62 K/UL — LOW (ref 1.2–3.4)
LYMPHOCYTES # BLD AUTO: 5.4 % — LOW (ref 20.5–51.1)
MAGNESIUM SERPL-MCNC: 1.8 MG/DL — SIGNIFICANT CHANGE UP (ref 1.8–2.4)
MAGNESIUM SERPL-MCNC: 1.9 MG/DL — SIGNIFICANT CHANGE UP (ref 1.8–2.4)
MCHC RBC-ENTMCNC: 28.1 PG — SIGNIFICANT CHANGE UP (ref 27–31)
MCHC RBC-ENTMCNC: 28.4 PG — SIGNIFICANT CHANGE UP (ref 27–31)
MCHC RBC-ENTMCNC: 32.4 G/DL — SIGNIFICANT CHANGE UP (ref 32–37)
MCHC RBC-ENTMCNC: 32.9 G/DL — SIGNIFICANT CHANGE UP (ref 32–37)
MCV RBC AUTO: 86.3 FL — SIGNIFICANT CHANGE UP (ref 80–94)
MCV RBC AUTO: 86.9 FL — SIGNIFICANT CHANGE UP (ref 80–94)
MONOCYTES # BLD AUTO: 0.25 K/UL — SIGNIFICANT CHANGE UP (ref 0.1–0.6)
MONOCYTES NFR BLD AUTO: 2.2 % — SIGNIFICANT CHANGE UP (ref 1.7–9.3)
NEUTROPHILS # BLD AUTO: 10.54 K/UL — HIGH (ref 1.4–6.5)
NEUTROPHILS NFR BLD AUTO: 91.5 % — HIGH (ref 42.2–75.2)
NITRITE UR-MCNC: NEGATIVE — SIGNIFICANT CHANGE UP
NRBC # BLD: 0 /100 WBCS — SIGNIFICANT CHANGE UP (ref 0–0)
PH UR: 5.5 — SIGNIFICANT CHANGE UP (ref 5–8)
PHOSPHATE SERPL-MCNC: 4.2 MG/DL — SIGNIFICANT CHANGE UP (ref 2.1–4.9)
PHOSPHATE SERPL-MCNC: 5.7 MG/DL — HIGH (ref 2.1–4.9)
PLATELET # BLD AUTO: 163 K/UL — SIGNIFICANT CHANGE UP (ref 130–400)
PLATELET # BLD AUTO: 177 K/UL — SIGNIFICANT CHANGE UP (ref 130–400)
POTASSIUM SERPL-MCNC: 4.6 MMOL/L — SIGNIFICANT CHANGE UP (ref 3.5–5)
POTASSIUM SERPL-MCNC: 5.1 MMOL/L — HIGH (ref 3.5–5)
POTASSIUM SERPL-MCNC: 6 MMOL/L — CRITICAL HIGH (ref 3.5–5)
POTASSIUM SERPL-SCNC: 4.6 MMOL/L — SIGNIFICANT CHANGE UP (ref 3.5–5)
POTASSIUM SERPL-SCNC: 5.1 MMOL/L — HIGH (ref 3.5–5)
POTASSIUM SERPL-SCNC: 6 MMOL/L — CRITICAL HIGH (ref 3.5–5)
PROT UR-MCNC: 30 MG/DL
RBC # BLD: 4.01 M/UL — LOW (ref 4.7–6.1)
RBC # BLD: 4.34 M/UL — LOW (ref 4.7–6.1)
RBC # FLD: 14.3 % — SIGNIFICANT CHANGE UP (ref 11.5–14.5)
RBC # FLD: 14.5 % — SIGNIFICANT CHANGE UP (ref 11.5–14.5)
SODIUM SERPL-SCNC: 134 MMOL/L — LOW (ref 135–146)
SODIUM SERPL-SCNC: 135 MMOL/L — SIGNIFICANT CHANGE UP (ref 135–146)
SODIUM SERPL-SCNC: 135 MMOL/L — SIGNIFICANT CHANGE UP (ref 135–146)
SODIUM UR-SCNC: 44 MMOL/L — SIGNIFICANT CHANGE UP
SP GR SPEC: 1.01 — SIGNIFICANT CHANGE UP (ref 1.01–1.03)
UROBILINOGEN FLD QL: 0.2 MG/DL — SIGNIFICANT CHANGE UP (ref 0.2–0.2)
WBC # BLD: 11.51 K/UL — HIGH (ref 4.8–10.8)
WBC # BLD: 15.1 K/UL — HIGH (ref 4.8–10.8)
WBC # FLD AUTO: 11.51 K/UL — HIGH (ref 4.8–10.8)
WBC # FLD AUTO: 15.1 K/UL — HIGH (ref 4.8–10.8)

## 2018-07-28 RX ORDER — DEXTROSE 50 % IN WATER 50 %
12.5 SYRINGE (ML) INTRAVENOUS ONCE
Qty: 0 | Refills: 0 | Status: DISCONTINUED | OUTPATIENT
Start: 2018-07-28 | End: 2018-08-04

## 2018-07-28 RX ORDER — ACETAMINOPHEN 500 MG
650 TABLET ORAL EVERY 6 HOURS
Qty: 0 | Refills: 0 | Status: DISCONTINUED | OUTPATIENT
Start: 2018-07-28 | End: 2018-07-28

## 2018-07-28 RX ORDER — GLUCAGON INJECTION, SOLUTION 0.5 MG/.1ML
1 INJECTION, SOLUTION SUBCUTANEOUS ONCE
Qty: 0 | Refills: 0 | Status: DISCONTINUED | OUTPATIENT
Start: 2018-07-28 | End: 2018-08-04

## 2018-07-28 RX ORDER — CALCIUM GLUCONATE 100 MG/ML
1 VIAL (ML) INTRAVENOUS DAILY
Qty: 0 | Refills: 0 | Status: DISCONTINUED | OUTPATIENT
Start: 2018-07-28 | End: 2018-07-28

## 2018-07-28 RX ORDER — IPRATROPIUM/ALBUTEROL SULFATE 18-103MCG
3 AEROSOL WITH ADAPTER (GRAM) INHALATION EVERY 6 HOURS
Qty: 0 | Refills: 0 | Status: DISCONTINUED | OUTPATIENT
Start: 2018-07-28 | End: 2018-08-04

## 2018-07-28 RX ORDER — HYDROMORPHONE HYDROCHLORIDE 2 MG/ML
0.5 INJECTION INTRAMUSCULAR; INTRAVENOUS; SUBCUTANEOUS EVERY 4 HOURS
Qty: 0 | Refills: 0 | Status: DISCONTINUED | OUTPATIENT
Start: 2018-07-28 | End: 2018-08-01

## 2018-07-28 RX ORDER — OXYCODONE HYDROCHLORIDE 5 MG/1
5 TABLET ORAL EVERY 6 HOURS
Qty: 0 | Refills: 0 | Status: DISCONTINUED | OUTPATIENT
Start: 2018-07-28 | End: 2018-08-01

## 2018-07-28 RX ORDER — MORPHINE SULFATE 50 MG/1
2 CAPSULE, EXTENDED RELEASE ORAL EVERY 6 HOURS
Qty: 0 | Refills: 0 | Status: DISCONTINUED | OUTPATIENT
Start: 2018-07-28 | End: 2018-07-28

## 2018-07-28 RX ORDER — CALCIUM GLUCONATE 100 MG/ML
1 VIAL (ML) INTRAVENOUS ONCE
Qty: 0 | Refills: 0 | Status: COMPLETED | OUTPATIENT
Start: 2018-07-28 | End: 2018-07-28

## 2018-07-28 RX ORDER — DEXTROSE 50 % IN WATER 50 %
25 SYRINGE (ML) INTRAVENOUS ONCE
Qty: 0 | Refills: 0 | Status: DISCONTINUED | OUTPATIENT
Start: 2018-07-28 | End: 2018-08-04

## 2018-07-28 RX ORDER — DEXTROSE 50 % IN WATER 50 %
15 SYRINGE (ML) INTRAVENOUS ONCE
Qty: 0 | Refills: 0 | Status: DISCONTINUED | OUTPATIENT
Start: 2018-07-28 | End: 2018-08-04

## 2018-07-28 RX ORDER — INSULIN HUMAN 100 [IU]/ML
10 INJECTION, SOLUTION SUBCUTANEOUS ONCE
Qty: 0 | Refills: 0 | Status: COMPLETED | OUTPATIENT
Start: 2018-07-28 | End: 2018-07-28

## 2018-07-28 RX ORDER — SODIUM POLYSTYRENE SULFONATE 4.1 MEQ/G
30 POWDER, FOR SUSPENSION ORAL ONCE
Qty: 0 | Refills: 0 | Status: COMPLETED | OUTPATIENT
Start: 2018-07-28 | End: 2018-07-28

## 2018-07-28 RX ORDER — ACETAMINOPHEN 500 MG
650 TABLET ORAL EVERY 6 HOURS
Qty: 0 | Refills: 0 | Status: DISCONTINUED | OUTPATIENT
Start: 2018-07-28 | End: 2018-08-04

## 2018-07-28 RX ORDER — INSULIN LISPRO 100/ML
VIAL (ML) SUBCUTANEOUS EVERY 4 HOURS
Qty: 0 | Refills: 0 | Status: DISCONTINUED | OUTPATIENT
Start: 2018-07-28 | End: 2018-08-01

## 2018-07-28 RX ORDER — DEXTROSE 50 % IN WATER 50 %
50 SYRINGE (ML) INTRAVENOUS ONCE
Qty: 0 | Refills: 0 | Status: COMPLETED | OUTPATIENT
Start: 2018-07-28 | End: 2018-07-28

## 2018-07-28 RX ORDER — SODIUM CHLORIDE 9 MG/ML
1000 INJECTION, SOLUTION INTRAVENOUS
Qty: 0 | Refills: 0 | Status: DISCONTINUED | OUTPATIENT
Start: 2018-07-28 | End: 2018-08-04

## 2018-07-28 RX ADMIN — Medication 25 MILLIGRAM(S): at 23:19

## 2018-07-28 RX ADMIN — PANTOPRAZOLE SODIUM 40 MILLIGRAM(S): 20 TABLET, DELAYED RELEASE ORAL at 12:37

## 2018-07-28 RX ADMIN — LISINOPRIL 2.5 MILLIGRAM(S): 2.5 TABLET ORAL at 05:34

## 2018-07-28 RX ADMIN — HEPARIN SODIUM 5000 UNIT(S): 5000 INJECTION INTRAVENOUS; SUBCUTANEOUS at 05:35

## 2018-07-28 RX ADMIN — HYDROMORPHONE HYDROCHLORIDE 0.5 MILLIGRAM(S): 2 INJECTION INTRAMUSCULAR; INTRAVENOUS; SUBCUTANEOUS at 12:36

## 2018-07-28 RX ADMIN — SODIUM CHLORIDE 100 MILLILITER(S): 9 INJECTION INTRAMUSCULAR; INTRAVENOUS; SUBCUTANEOUS at 12:37

## 2018-07-28 RX ADMIN — Medication 100 GRAM(S): at 05:34

## 2018-07-28 RX ADMIN — HEPARIN SODIUM 5000 UNIT(S): 5000 INJECTION INTRAVENOUS; SUBCUTANEOUS at 14:33

## 2018-07-28 RX ADMIN — INSULIN HUMAN 10 UNIT(S): 100 INJECTION, SOLUTION SUBCUTANEOUS at 03:34

## 2018-07-28 RX ADMIN — SODIUM POLYSTYRENE SULFONATE 30 GRAM(S): 4.1 POWDER, FOR SUSPENSION ORAL at 05:58

## 2018-07-28 RX ADMIN — Medication 81 MILLIGRAM(S): at 12:37

## 2018-07-28 RX ADMIN — Medication 50 MILLILITER(S): at 03:34

## 2018-07-28 RX ADMIN — OXYCODONE HYDROCHLORIDE 5 MILLIGRAM(S): 5 TABLET ORAL at 23:18

## 2018-07-28 RX ADMIN — HEPARIN SODIUM 5000 UNIT(S): 5000 INJECTION INTRAVENOUS; SUBCUTANEOUS at 23:20

## 2018-07-28 RX ADMIN — ATORVASTATIN CALCIUM 20 MILLIGRAM(S): 80 TABLET, FILM COATED ORAL at 23:20

## 2018-07-28 RX ADMIN — MORPHINE SULFATE 2 MILLIGRAM(S): 50 CAPSULE, EXTENDED RELEASE ORAL at 09:07

## 2018-07-28 RX ADMIN — Medication 200 GRAM(S): at 05:35

## 2018-07-28 NOTE — PROGRESS NOTE ADULT - SUBJECTIVE AND OBJECTIVE BOX
Progress Note: General Surgery  Patient: ZEESHAN MCCLENDON , 84y (26-Oct-1933)Male   MRN: [id number]  Location: [place] [address] A  Visit: 07-27-18 Inpatient  Date: 07-28-18 @ 11:43  Hospital Day: 1  Post-op Day: 2    Procedure/Diagnosis: S/p reversal of loop ileostomy, small bowel resection, primary anastomosis  Events over 24h: Patient hyperkalemic on routine midnight blood work, K+ 6, most recently K+ of 5.1. Patient is asymptomatic, and denies palpitations, SOB, chest pain, nausea, vomiting.  Worsening renal function found to have elevated creatinine of 2.5. Patient complains of incisional pain. No recent bowel moments, no flatus.    Vitals: T(F): 98.6 (07-28-18 @ 05:31), Max: 98.6 (07-28-18 @ 05:31)  HR: 69 (07-28-18 @ 05:31)  BP: 137/63 (07-28-18 @ 05:31) (107/66 - 169/77)  RR: 18 (07-28-18 @ 05:31)  SpO2: 96% (07-27-18 @ 18:45)    In:   07-27-18 @ 07:01  -  07-28-18 @ 07:00  --------------------------------------------------------  IN: 1350 mL    07-28-18 @ 07:01  -  07-28-18 @ 11:43  --------------------------------------------------------  IN: 0 mL    Out:   07-27-18 @ 07:01  -  07-28-18 @ 07:00  --------------------------------------------------------  OUT:    Voided: 650 mL  Total OUT: 650 mL    07-28-18 @ 07:01  -  07-28-18 @ 11:43  --------------------------------------------------------  OUT:    Voided: 900 mL  Total OUT: 900 mL    Net:   07-27-18 @ 07:01  -  07-28-18 @ 07:00  --------------------------------------------------------  NET: 700 mL    07-28-18 @ 07:01  -  07-28-18 @ 11:43  --------------------------------------------------------  NET: -900 mL    Diet: Diet, Clear Liquid (07-28-18 @ 08:45)  IV Fluids: yes, Type: sodium chloride 0.9%. 1000 milliLiter(s) (100 mL/Hr) IV Continuous <Continuous>    Physical Examination:  General Appearance: NAD, alert and cooperative  Abdomen:  Soft, nondistended, chon-incisional tenderness. No rigidity, guarding, or rebound tenderness.   Skin: Warm/dry, Normal color  Incisions/Wounds: Dressings in place, clean, dry and intact, no signs of infection/active bleeding/drainage    Medications: [Standing]  aspirin enteric coated 81 milliGRAM(s) Oral daily  atorvastatin 20 milliGRAM(s) Oral at bedtime  heparin  Injectable 5000 Unit(s) SubCutaneous every 8 hours  lisinopril 2.5 milliGRAM(s) Oral daily  metoprolol succinate ER 25 milliGRAM(s) Oral daily  pantoprazole  Injectable 40 milliGRAM(s) IV Push daily  sodium chloride 0.9%. 1000 milliLiter(s) (100 mL/Hr) IV Continuous <Continuous>    DVT Prophylaxis: heparin  Injectable 5000 Unit(s) SubCutaneous every 8 hours  GI Prophylaxis: pantoprazole  Injectable 40 milliGRAM(s) IV Push daily    Medications:[PRN]  acetaminophen   Tablet. 650 milliGRAM(s) Oral every 6 hours PRN  morphine  - Injectable 2 milliGRAM(s) IV Push every 6 hours PRN    Labs:                        12.2   11.51 )-----------( 163      ( 28 Jul 2018 01:24 )             37.7     07-28    135  |  99  |  48<H>  ----------------------------<  233<H>  5.1<H>   |  19  |  2.5<H>    Ca    9.5      28 Jul 2018 04:41  Phos  5.7     07-28  Mg     1.9     07-28    Assessment:  84y Male patient admitted S/P reversal of loop ileostomy, small bowel resection, primary anastomosis    Plan:  Admission to ICU for worsening renal function  Sliding scale for glucose control  Repeat blood work, monitor for hyperkalemia and renal function  FeNa, urinalysis  Continue home medications  F/u with PMD, nephrologist  Pain control with tylenol and morphine q6 hours  Encourage ambulation, ICS use, OOB    Date/Time: 07-28-18 @ 11:43

## 2018-07-28 NOTE — CHART NOTE - NSCHARTNOTEFT_GEN_A_CORE
Post Operative Note  Patient: ZEESHAN MCCLENDON 84y (26-Oct-1933) Male   MRN: [id number]  Location: [place] [address] A  Visit: 07-27-18 Inpatient  Date: 07-28-18 @ 00:16    Procedure: S/P reversal of loop ileostomy, small bowel resection with primary anastomosis      Subjective: Patient feels well overall. He states he does have pain but it is mostly chon-incisional. He has not been out of bed yet and requested a one time dose of pain meds so he would be able to sleep more comfortably.   Nausea: no, Vomiting: no, Ambulating: no, Flatus: no  Pain Assessment: yes     Objective:  Vitals: T(F): 97.8 (07-27-18 @ 20:19), Max: 97.8 (07-27-18 @ 20:19)  HR: 70 (07-27-18 @ 20:19)  BP: 169/77 (07-27-18 @ 20:19) (107/66 - 169/77)  RR: 18 (07-27-18 @ 20:19)  SpO2: 96% (07-27-18 @ 18:45)  Vent Settings:     In:   07-27-18 @ 07:01  -  07-28-18 @ 00:16  --------------------------------------------------------  IN: 150 mL      IV Fluids: sodium chloride 0.9%. 1000 milliLiter(s) (100 mL/Hr) IV Continuous <Continuous>      Out:   07-27-18 @ 07:01  -  07-28-18 @ 00:16  --------------------------------------------------------  OUT: 0 mL      EBL:     Voided Urine:   07-27-18 @ 07:01  -  07-28-18 @ 00:16  --------------------------------------------------------  OUT: 0 mL      Kauffman Catheter: yes no   Drains:   POOJA:    ,   Chest Tube:      NG Tube:       Physical Examination:  General Appearance: NAD  HEENT: NCAT  Heart: S1 and S2. No murmurs. Rhythm is regular  Lungs: Clear to auscultation BL without rales, rhonchi, wheezing  Abdomen: Soft, nondistended, +chon-incisional tenderness nontender.  Wounds/Incions: incisions w/ dressings in place, clean, dry, intact, no signs of infection    Medications: [Standing]  aspirin enteric coated 81 milliGRAM(s) Oral daily  atorvastatin 20 milliGRAM(s) Oral at bedtime  cefoTEtan  IVPB 2 Gram(s) IV Intermittent every 12 hours  heparin  Injectable 5000 Unit(s) SubCutaneous every 8 hours  lisinopril 2.5 milliGRAM(s) Oral daily  metoprolol succinate ER 25 milliGRAM(s) Oral daily  pantoprazole  Injectable 40 milliGRAM(s) IV Push daily  sodium chloride 0.9%. 1000 milliLiter(s) IV Continuous <Continuous>    Medications: [PRN]  aspirin enteric coated 81 milliGRAM(s) Oral daily  atorvastatin 20 milliGRAM(s) Oral at bedtime  cefoTEtan  IVPB 2 Gram(s) IV Intermittent every 12 hours  heparin  Injectable 5000 Unit(s) SubCutaneous every 8 hours  lisinopril 2.5 milliGRAM(s) Oral daily  metoprolol succinate ER 25 milliGRAM(s) Oral daily  pantoprazole  Injectable 40 milliGRAM(s) IV Push daily  sodium chloride 0.9%. 1000 milliLiter(s) IV Continuous <Continuous>    Assessment:  84yMale patient S/P reversal of loop ileostomy, small bowel resection with primary anastomosis. Doing well post-operatively     Plan:  - Restart PO home medications  - Can start on Clears saturday morning   - Admit to tele   -     Date/Time: 07-28-18 @ 00:16

## 2018-07-28 NOTE — CONSULT NOTE ADULT - SUBJECTIVE AND OBJECTIVE BOX
SICU Consultation Note  =====================================================    HPI:  84M with PMH of sigmoid diverticulitis with colo-vesicular fistula S/P sigmoid colon resection and resection of colo vesicular fistula with primary anastomosis on 1/18, complicated by perforated viscus, was subsequently re-operated on 1/13/18 with creation of diverting loop ileostomy and was discharged on 1/25, re-admitted 2/9/18 2/2 dehydration, had PICC line in place from receiving Ertapenem IV, was DC with daily NS infusions via PICC line at home. Ileostomy has had good output. He presents this admission for elective reversal of his ileostomy. He is currently POD1 S/p reversal of loop ileostomy, small bowel resection, and primary anastomosis, procedure was uncomplicated. Patient's pre-op labs significant for K+ 6.1 on 7/13, repeat was 5.1 prior to the procedure. He remains hyperkalemic on routine blood work post-operatively, K+ 6.0 at midnight. Patient is asymptomatic, and denies palpitations, SOB, chest pain, nausea, vomiting.  Worsening renal function found to have elevated creatinine of 2.5. Patient complains of incisional pain. No recent bowel moments, no flatus.    Surgery Information  OR time:      EBL:          IV Fluids:       Blood Products:   UOP:        PAST MEDICAL & SURGICAL HISTORY:  Stented coronary artery: Proximal LAD 08/31/2016  Eye problem: with diabetes  Anemia: slight  GERD without esophagitis  SOB (shortness of breath) on exertion  Asthma: Mild only last attack yrs ago  HLD (hyperlipidemia)  HTN (hypertension)  Fistula of large intestine: colo-vesicula fistula  H/O diverticulitis of colon  Recurrent UTI (urinary tract infection)  Diabetes  CAD (coronary artery disease)  Elective surgery: PCI with 2 stents  Basal cell carcinoma: removal 07/18/2018  History of eye surgery: laser  Artificial cardiac pacemaker  History of partial colectomy  H/O ileostomy    Home Meds: Home Medications:  0.9% Normal saline 1000ml alternate with 500ml: intracavernously once a day (27 Jul 2018 13:14)  Aspirin Low Dose 81 mg oral delayed release tablet: 1 tab(s) orally once a day (27 Jul 2018 13:14)  CoQ10: 100 milligram(s) orally once a day (27 Jul 2018 13:14)  Crestor 20 mg oral tablet: 1 tab(s) orally once a day (at bedtime) (27 Jul 2018 13:14)  lisinopril 2.5 mg oral tablet: 1 tab(s) orally once a day (27 Jul 2018 13:14)  metFORMIN 500 mg oral tablet: 1 tab(s) orally 2 times a day (27 Jul 2018 13:14)  Metoprolol Succinate ER 25 mg oral tablet, extended release: 1 tab(s) orally once a day (27 Jul 2018 13:14)  Multiple Vitamins with Minerals oral tablet: 1 tab(s) orally once a day    OneADay Women&#x27;s or equivalent (27 Jul 2018 13:14)  sodium chloride 0.9% injectable solution: 1 liter(s) injectable once a day (27 Jul 2018 13:14)  Vitamin D3 2000 intl units oral tablet: 1 tab(s) orally once a day (27 Jul 2018 13:14)    Allergies: Allergies  No Known Allergies    Soc:   Advanced Directives: Presumed Full Code     ROS:    REVIEW OF SYSTEMS    [ x ] A ten-point review of systems was otherwise negative except as noted.    CURRENT MEDICATIONS:   --------------------------------------------------------------------------------------  Neurologic Medications  acetaminophen   Tablet. 650 milliGRAM(s) Oral every 6 hours PRN Mild Pain (1 - 3)  HYDROmorphone  Injectable 0.5 milliGRAM(s) IV Push every 4 hours PRN Severe Pain (7 - 10)  oxyCODONE    IR 5 milliGRAM(s) Oral every 6 hours PRN Moderate Pain (4 - 6)    Cardiovascular Medications  lisinopril 2.5 milliGRAM(s) Oral daily  metoprolol succinate ER 25 milliGRAM(s) Oral daily    Gastrointestinal Medications  pantoprazole  Injectable 40 milliGRAM(s) IV Push daily  sodium chloride 0.9%. 1000 milliLiter(s) IV Continuous <Continuous>    Hematologic/Oncologic Medications  aspirin enteric coated 81 milliGRAM(s) Oral daily  heparin  Injectable 5000 Unit(s) SubCutaneous every 8 hours    Endocrine/Metabolic Medications  atorvastatin 20 milliGRAM(s) Oral at bedtime    --------------------------------------------------------------------------------------  VITAL SIGNS, INS/OUTS (last 24 hours):  --------------------------------------------------------------------------------------  ICU Vital Signs Last 24 Hrs  T(C): 36.7 (28 Jul 2018 11:00), Max: 37 (28 Jul 2018 05:31)  T(F): 98 (28 Jul 2018 11:00), Max: 98.6 (28 Jul 2018 05:31)  HR: 70 (28 Jul 2018 12:00) (69 - 74)  BP: 137/63 (28 Jul 2018 05:31) (107/66 - 169/77)  RR: 18 (28 Jul 2018 05:31) (12 - 27)  SpO2: 97% (28 Jul 2018 12:00) (95% - 100%)    I&O's Summary  27 Jul 2018 07:01  -  28 Jul 2018 07:00  --------------------------------------------------------  IN: 1350 mL / OUT: 650 mL / NET: 700 mL  28 Jul 2018 07:01  -  28 Jul 2018 13:05  --------------------------------------------------------  IN: 500 mL / OUT: 900 mL / NET: -400 mL    --------------------------------------------------------------------------------------    EXAM:  General/Neuro  RASS:   GCS:   Exam: Normal, NAD, alert, oriented x 3, no focal deficits. PERRLA  ***    Respiratory  Exam: Lungs clear to auscultation, Normal expansion/effort.  ***  [] Tracheostomy   [] Intubated  Mechanical Ventilation:     Cardiovascular  Exam: S1, S2.  Regular rate and rhythm.  Peripheral edema  ***  Cardiac Rhythm: Normal Sinus Rhythm  ECHO:     GI  Exam: Abdomen soft, Non-tender, Non-distended.  Gastrostomy / Jejunostomy tube in place.  Nasogastric tube in place.  Colostomy / Ileostomy.  ***  Wound:   ***  Current Diet:  NPO***    Tubes/Lines/Drains  ***  [x] Peripheral IV  [] Central Venous Line     	[] R	[] L	[] IJ	[] Fem	[] SC        Type:	    Date Placed:   [] Arterial Line		[] R	[] L	[] Fem	[] Rad	[] Ax	Date Placed:   [] PICC:         	[] Midline		[] Mediport           [] Urinary Catheter		Date Placed:     Extremities  Exam: Extremities warm, pink, well-perfused.      Derm:  Exam: Good skin turgor, no skin breakdown.      :   Exam: Kauffman catheter in place.     LABS  --------------------------------------------------------------------------------------  Labs:  CAPILLARY BLOOD GLUCOSE  189 (28 Jul 2018 08:41)  156 (27 Jul 2018 20:45)                       12.2   11.51 )-----------( 163      ( 28 Jul 2018 01:24 )             37.7       Auto Immature Granulocyte %: 0.8 % (07-28-18 @ 01:24)  Auto Neutrophil %: 91.5 % (07-28-18 @ 01:24)    07-28    135  |  99  |  48<H>  ----------------------------<  233<H>  5.1<H>   |  19  |  2.5<H>    Calcium, Total Serum: 9.5 mg/dL (07-28-18 @ 04:41)  Calcium, Total Serum: 9.3 mg/dL (07-28-18 @ 01:24)  Magnesium, Serum: 1.9 mg/dL (07-28-18 @ 01:24)    --------------------------------------------------------------------------------------  IMAGING RESULTS  None this admission    ASSESSMENT:  84y Male ***    PLAN:   Neurologic:   Respiratory:   Cardiovascular:   Gastrointestinal/Nutrition:   Renal/Genitourinary:   Hematologic:   Infectious Disease:   Lines/Tubes:  Endocrine:   Disposition:     --------------------------------------------------------------------------------------    Critical Care Diagnoses: SICU Consultation Note  =====================================================    HPI:  84M with PMH of sigmoid diverticulitis with colo-vesicular fistula S/P sigmoid colon resection and resection of colo vesicular fistula with primary anastomosis on 1/18, complicated by perforated viscus, was subsequently re-operated on 1/13/18 with creation of diverting loop ileostomy and was discharged on 1/25, re-admitted 2/9/18 2/2 dehydration, had PICC line in place from receiving Ertapenem IV, was DC with daily NS infusions via PICC line at home. Ileostomy has had good output. He presents this admission for elective reversal of his ileostomy. He is currently POD1 S/p reversal of loop ileostomy, small bowel resection, and primary anastomosis, procedure was uncomplicated. Patient's pre-op labs significant for K+ 6.1 on 7/13, repeat was 5.1 prior to the procedure. He remains hyperkalemic on routine blood work post-operatively, K+ 6.0 at midnight. Patient is asymptomatic, and denies palpitations, SOB, chest pain, nausea, vomiting, diarrhea. Also with BUBBA on AM labs, elevated creatinine of 2.5 was 1.9 on pre-op labs. Patient given 1g Ca++ gluconate, 10U insulin, and Kayexalate repeat K+ 5.1. EKG relatively unchanged from pre-op EKG, ventricularly paced rhythm. Consulted for ICU for cardiac and renal monitoring.    PAST MEDICAL & SURGICAL HISTORY:  Stented coronary artery: Proximal LAD 08/31/2016  Eye problem: with diabetes  Anemia: slight  GERD without esophagitis  SOB (shortness of breath) on exertion  Asthma: Mild only last attack yrs ago  HLD (hyperlipidemia)  HTN (hypertension)  Fistula of large intestine: colo-vesicula fistula  H/O diverticulitis of colon  Recurrent UTI (urinary tract infection)  Diabetes  CAD (coronary artery disease)  Elective surgery: PCI with 2 stents  Basal cell carcinoma: removal 07/18/2018  History of eye surgery: laser  Artificial cardiac pacemaker  History of partial colectomy  H/O ileostomy    Home Meds: Home Medications:  0.9% Normal saline 1000ml alternate with 500ml: intracavernously once a day (27 Jul 2018 13:14)  Aspirin Low Dose 81 mg oral delayed release tablet: 1 tab(s) orally once a day (27 Jul 2018 13:14)  CoQ10: 100 milligram(s) orally once a day (27 Jul 2018 13:14)  Crestor 20 mg oral tablet: 1 tab(s) orally once a day (at bedtime) (27 Jul 2018 13:14)  lisinopril 2.5 mg oral tablet: 1 tab(s) orally once a day (27 Jul 2018 13:14)  metFORMIN 500 mg oral tablet: 1 tab(s) orally 2 times a day (27 Jul 2018 13:14)  Metoprolol Succinate ER 25 mg oral tablet, extended release: 1 tab(s) orally once a day (27 Jul 2018 13:14)  Multiple Vitamins with Minerals oral tablet: 1 tab(s) orally once a day    OneADay Women&#x27;s or equivalent (27 Jul 2018 13:14)  sodium chloride 0.9% injectable solution: 1 liter(s) injectable once a day (27 Jul 2018 13:14)  Vitamin D3 2000 intl units oral tablet: 1 tab(s) orally once a day (27 Jul 2018 13:14)    Allergies: Allergies  No Known Allergies    Soc:   Advanced Directives: Presumed Full Code     ROS:    REVIEW OF SYSTEMS    [ x ] A ten-point review of systems was otherwise negative except as noted.    CURRENT MEDICATIONS:   --------------------------------------------------------------------------------------  Neurologic Medications  acetaminophen   Tablet. 650 milliGRAM(s) Oral every 6 hours PRN Mild Pain (1 - 3)  HYDROmorphone  Injectable 0.5 milliGRAM(s) IV Push every 4 hours PRN Severe Pain (7 - 10)  oxyCODONE    IR 5 milliGRAM(s) Oral every 6 hours PRN Moderate Pain (4 - 6)    Cardiovascular Medications  lisinopril 2.5 milliGRAM(s) Oral daily  metoprolol succinate ER 25 milliGRAM(s) Oral daily    Gastrointestinal Medications  pantoprazole  Injectable 40 milliGRAM(s) IV Push daily  sodium chloride 0.9%. 1000 milliLiter(s) IV Continuous <Continuous>    Hematologic/Oncologic Medications  aspirin enteric coated 81 milliGRAM(s) Oral daily  heparin  Injectable 5000 Unit(s) SubCutaneous every 8 hours    Endocrine/Metabolic Medications  atorvastatin 20 milliGRAM(s) Oral at bedtime    --------------------------------------------------------------------------------------  VITAL SIGNS, INS/OUTS (last 24 hours):  --------------------------------------------------------------------------------------  ICU Vital Signs Last 24 Hrs  T(C): 36.7 (28 Jul 2018 11:00), Max: 37 (28 Jul 2018 05:31)  T(F): 98 (28 Jul 2018 11:00), Max: 98.6 (28 Jul 2018 05:31)  HR: 70 (28 Jul 2018 12:00) (69 - 74)  BP: 137/63 (28 Jul 2018 05:31) (107/66 - 169/77)  RR: 18 (28 Jul 2018 05:31) (12 - 27)  SpO2: 97% (28 Jul 2018 12:00) (95% - 100%)    I&O's Summary  27 Jul 2018 07:01  -  28 Jul 2018 07:00  --------------------------------------------------------  IN: 1350 mL / OUT: 650 mL / NET: 700 mL  28 Jul 2018 07:01  -  28 Jul 2018 13:05  --------------------------------------------------------  IN: 500 mL / OUT: 900 mL / NET: -400 mL  --------------------------------------------------------------------------------------    EXAM:  General/Neuro  GCS: 15  Exam: Normal, NAD, alert, oriented x 3, no focal deficits. PERRLA    Respiratory  Exam: Lungs clear to auscultation, Normal expansion/effort. Saturating well on RA    Cardiovascular  Exam: S1, S2.  Regular rate and rhythm.  Cardiac Rhythm: ventricular paced Rhythm    GI  Exam: Abdomen soft, + per-incisional tenderness, Non-distended.  Wound:     Current Diet:  clear liquids    Tubes/Lines/Drains   [x] Peripheral IV  [x] PICC:      R arm   	    Extremities  Exam: Extremities warm, pink, well-perfused.      Derm:  Exam: Good skin turgor, no skin breakdown.      :   Exam: voiding freely, no Kauffman catheter in place.     LABS  --------------------------------------------------------------------------------------  Labs:  CAPILLARY BLOOD GLUCOSE  189 (28 Jul 2018 08:41)  156 (27 Jul 2018 20:45)                       12.2   11.51 )-----------( 163      ( 28 Jul 2018 01:24 )             37.7       Auto Immature Granulocyte %: 0.8 % (07-28-18 @ 01:24)  Auto Neutrophil %: 91.5 % (07-28-18 @ 01:24)    07-28    135  |  99  |  48<H>  ----------------------------<  233<H>  5.1<H>   |  19  |  2.5<H>    Calcium, Total Serum: 9.5 mg/dL (07-28-18 @ 04:41)  Calcium, Total Serum: 9.3 mg/dL (07-28-18 @ 01:24)  Magnesium, Serum: 1.9 mg/dL (07-28-18 @ 01:24)    --------------------------------------------------------------------------------------  IMAGING RESULTS  None this admission

## 2018-07-28 NOTE — CONSULT NOTE ADULT - ASSESSMENT
ASSESSMENT:  84y Male with hyperkalemia S/P 1g Ca++ gluconate, 10U insulin, and Kayexalate and BUBBA S/P elective reversal of ileostomy    PLAN:   Neurologic: Pain control: tylenol/oxycodone/dilaudid  Respiratory: maintains saturation on RA, h/o mild, controlled asthma on duonebs q6 PRN  Cardiovascular: h/o CAD/HTN on home antihypertensives, held lisinopril 2/2 hyperkalemia, continued on ASA 81mg and atorvastatin, on telemetry monitor, will recheck K+ on STAT labs, h/o CHF last Echo 2/18, EF 35-40%, G1DD, PPM, cardiology C/S  Gastrointestinal/Nutrition: On clear liquid diet POD1 s/p SBR and Primary anastomosis, IVF NS @100mL/h, GI ppx PPI  Renal/Genitourinary: BUBBA BL Cr 1.9 currently 2.5, C/W IVF, Nephrology consult  Hematologic: H/H stable post op, will re-check on STAT labs  Infectious Disease: no indication for antibiotics  Lines/Tubes: R arm PICC line, PIV  Endocrine: h/o DM, held metformin, started on Insulin SS, q4h FS  Disposition: ICU for cardiac and renal monitoring    --------------------------------------------------------------------------------------    Critical Care Diagnoses:

## 2018-07-29 LAB
ANION GAP SERPL CALC-SCNC: 13 MMOL/L — SIGNIFICANT CHANGE UP (ref 7–14)
BASOPHILS # BLD AUTO: 0.06 K/UL — SIGNIFICANT CHANGE UP (ref 0–0.2)
BASOPHILS NFR BLD AUTO: 0.5 % — SIGNIFICANT CHANGE UP (ref 0–1)
BUN SERPL-MCNC: 41 MG/DL — HIGH (ref 10–20)
CALCIUM SERPL-MCNC: 8.8 MG/DL — SIGNIFICANT CHANGE UP (ref 8.5–10.1)
CHLORIDE SERPL-SCNC: 103 MMOL/L — SIGNIFICANT CHANGE UP (ref 98–110)
CO2 SERPL-SCNC: 18 MMOL/L — SIGNIFICANT CHANGE UP (ref 17–32)
CREAT SERPL-MCNC: 2.1 MG/DL — HIGH (ref 0.7–1.5)
EOSINOPHIL # BLD AUTO: 0.09 K/UL — SIGNIFICANT CHANGE UP (ref 0–0.7)
EOSINOPHIL NFR BLD AUTO: 0.7 % — SIGNIFICANT CHANGE UP (ref 0–8)
GLUCOSE SERPL-MCNC: 135 MG/DL — HIGH (ref 70–99)
HCT VFR BLD CALC: 34.5 % — LOW (ref 42–52)
HGB BLD-MCNC: 11.3 G/DL — LOW (ref 14–18)
IMM GRANULOCYTES NFR BLD AUTO: 0.6 % — HIGH (ref 0.1–0.3)
LYMPHOCYTES # BLD AUTO: 1.44 K/UL — SIGNIFICANT CHANGE UP (ref 1.2–3.4)
LYMPHOCYTES # BLD AUTO: 11.6 % — LOW (ref 20.5–51.1)
MAGNESIUM SERPL-MCNC: 1.7 MG/DL — LOW (ref 1.8–2.4)
MCHC RBC-ENTMCNC: 28.4 PG — SIGNIFICANT CHANGE UP (ref 27–31)
MCHC RBC-ENTMCNC: 32.8 G/DL — SIGNIFICANT CHANGE UP (ref 32–37)
MCV RBC AUTO: 86.7 FL — SIGNIFICANT CHANGE UP (ref 80–94)
MONOCYTES # BLD AUTO: 0.78 K/UL — HIGH (ref 0.1–0.6)
MONOCYTES NFR BLD AUTO: 6.3 % — SIGNIFICANT CHANGE UP (ref 1.7–9.3)
NEUTROPHILS # BLD AUTO: 9.99 K/UL — HIGH (ref 1.4–6.5)
NEUTROPHILS NFR BLD AUTO: 80.3 % — HIGH (ref 42.2–75.2)
NRBC # BLD: 0 /100 WBCS — SIGNIFICANT CHANGE UP (ref 0–0)
PHOSPHATE SERPL-MCNC: 3.2 MG/DL — SIGNIFICANT CHANGE UP (ref 2.1–4.9)
PLATELET # BLD AUTO: 165 K/UL — SIGNIFICANT CHANGE UP (ref 130–400)
POTASSIUM SERPL-MCNC: 4.6 MMOL/L — SIGNIFICANT CHANGE UP (ref 3.5–5)
POTASSIUM SERPL-SCNC: 4.6 MMOL/L — SIGNIFICANT CHANGE UP (ref 3.5–5)
RBC # BLD: 3.98 M/UL — LOW (ref 4.7–6.1)
RBC # FLD: 14.5 % — SIGNIFICANT CHANGE UP (ref 11.5–14.5)
SODIUM SERPL-SCNC: 134 MMOL/L — LOW (ref 135–146)
WBC # BLD: 12.44 K/UL — HIGH (ref 4.8–10.8)
WBC # FLD AUTO: 12.44 K/UL — HIGH (ref 4.8–10.8)

## 2018-07-29 PROCEDURE — 99233 SBSQ HOSP IP/OBS HIGH 50: CPT

## 2018-07-29 RX ORDER — MAGNESIUM SULFATE 500 MG/ML
2 VIAL (ML) INJECTION ONCE
Qty: 0 | Refills: 0 | Status: COMPLETED | OUTPATIENT
Start: 2018-07-29 | End: 2018-07-29

## 2018-07-29 RX ORDER — PANTOPRAZOLE SODIUM 20 MG/1
40 TABLET, DELAYED RELEASE ORAL
Qty: 0 | Refills: 0 | Status: DISCONTINUED | OUTPATIENT
Start: 2018-07-29 | End: 2018-08-04

## 2018-07-29 RX ORDER — ONDANSETRON 8 MG/1
4 TABLET, FILM COATED ORAL ONCE
Qty: 0 | Refills: 0 | Status: COMPLETED | OUTPATIENT
Start: 2018-07-29 | End: 2018-07-29

## 2018-07-29 RX ADMIN — HYDROMORPHONE HYDROCHLORIDE 0.5 MILLIGRAM(S): 2 INJECTION INTRAMUSCULAR; INTRAVENOUS; SUBCUTANEOUS at 08:25

## 2018-07-29 RX ADMIN — PANTOPRAZOLE SODIUM 40 MILLIGRAM(S): 20 TABLET, DELAYED RELEASE ORAL at 12:53

## 2018-07-29 RX ADMIN — Medication 25 MILLIGRAM(S): at 21:54

## 2018-07-29 RX ADMIN — Medication 81 MILLIGRAM(S): at 12:53

## 2018-07-29 RX ADMIN — Medication 3 MILLILITER(S): at 19:58

## 2018-07-29 RX ADMIN — HYDROMORPHONE HYDROCHLORIDE 0.5 MILLIGRAM(S): 2 INJECTION INTRAMUSCULAR; INTRAVENOUS; SUBCUTANEOUS at 04:22

## 2018-07-29 RX ADMIN — HEPARIN SODIUM 5000 UNIT(S): 5000 INJECTION INTRAVENOUS; SUBCUTANEOUS at 06:38

## 2018-07-29 RX ADMIN — Medication 50 GRAM(S): at 04:14

## 2018-07-29 RX ADMIN — ONDANSETRON 4 MILLIGRAM(S): 8 TABLET, FILM COATED ORAL at 01:00

## 2018-07-29 RX ADMIN — Medication 3 MILLILITER(S): at 09:09

## 2018-07-29 RX ADMIN — OXYCODONE HYDROCHLORIDE 5 MILLIGRAM(S): 5 TABLET ORAL at 04:16

## 2018-07-29 RX ADMIN — HEPARIN SODIUM 5000 UNIT(S): 5000 INJECTION INTRAVENOUS; SUBCUTANEOUS at 21:54

## 2018-07-29 RX ADMIN — HEPARIN SODIUM 5000 UNIT(S): 5000 INJECTION INTRAVENOUS; SUBCUTANEOUS at 15:01

## 2018-07-29 RX ADMIN — HYDROMORPHONE HYDROCHLORIDE 0.5 MILLIGRAM(S): 2 INJECTION INTRAMUSCULAR; INTRAVENOUS; SUBCUTANEOUS at 20:06

## 2018-07-29 RX ADMIN — HYDROMORPHONE HYDROCHLORIDE 0.5 MILLIGRAM(S): 2 INJECTION INTRAMUSCULAR; INTRAVENOUS; SUBCUTANEOUS at 14:06

## 2018-07-29 RX ADMIN — SODIUM CHLORIDE 100 MILLILITER(S): 9 INJECTION INTRAMUSCULAR; INTRAVENOUS; SUBCUTANEOUS at 20:07

## 2018-07-29 RX ADMIN — HYDROMORPHONE HYDROCHLORIDE 0.5 MILLIGRAM(S): 2 INJECTION INTRAMUSCULAR; INTRAVENOUS; SUBCUTANEOUS at 20:07

## 2018-07-29 RX ADMIN — ATORVASTATIN CALCIUM 20 MILLIGRAM(S): 80 TABLET, FILM COATED ORAL at 21:54

## 2018-07-29 NOTE — CONSULT NOTE ADULT - ASSESSMENT
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 Torres) sigmoid diverticulitis s/p elective reversal of ileostomy on 7/27    complicated by BUBBA likely due to periop hemodynamic changes  Now cr stable and near baseline     - trend Cr  - avoid contrast/renal toxins   - monitor UOP  - as increases PO intake, can cut back on IVF  - hyperK likely due to BUBBA- resolved     Anemia  - Hgb at goal    HTN  controlled    MBD  - check phos, PTH, vit D

## 2018-07-29 NOTE — CONSULT NOTE ADULT - SUBJECTIVE AND OBJECTIVE BOX
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 Torres) sigmoid diverticulitis with colo-vesicular fistula S/P sigmoid colon resection and resection of colo vesicular fistula with primary anastomosis on , complicated by perforated viscus, was subsequently re-operated on 18 with creation of diverting loop ileostomy and was discharged on , re-admitted 18 2/2 dehydration, had PICC line in place from receiving Ertapenem IV, was DC with daily NS infusions via PICC line at home due t oincreased output via ostomy.    underwent elective reversal of his ileostomy on . complicated by BUBBA Cr 1.9 -> 2.5 and hyperK (6)      Allergies:  No Known Allergies    Hospital Medications:   MEDICATIONS  (STANDING):  ALBUTerol/ipratropium for Nebulization 3 milliLiter(s) Nebulizer every 6 hours  aspirin enteric coated 81 milliGRAM(s) Oral daily  atorvastatin 20 milliGRAM(s) Oral at bedtime  dextrose 5%. 1000 milliLiter(s) (50 mL/Hr) IV e  heparin  Injectable 5000 Unit(s) SubCutaneous every 8 hours  insulin lispro (HumaLOG) corrective regimen sliding scale   SubCutaneous every 4 hours  metoprolol succinate ER 25 milliGRAM(s) Oral daily  pantoprazole  Injectable 40 milliGRAM(s) IV Push daily  sodium chloride 0.9%. 1000 milliLiter(s) (100 mL/Hr) IV Continuous <Continuous>        VITALS:  T(F): 97.6 (18 @ 08:00), Max: 97.6 (18 @ 08:00)  HR: 70 (18 @ 10:00)  BP: 146/71 (18 @ 10:00)  RR: --  SpO2: 99% (18 @ 10:00)  Wt(kg): --     @ 07:01  -   @ 07:00  --------------------------------------------------------  IN: 1350 mL / OUT: 650 mL / NET: 700 mL     @ 07:01   @ 07:00  --------------------------------------------------------  IN: 1800 mL / OUT: 1800 mL / NET: 0 mL     @ 07: @ 12:12  --------------------------------------------------------  IN: 520 mL / OUT: 300 mL / NET: 220 mL          PHYSICAL EXAM:  Constitutional: NAD  HEENT: anicteric sclera, oropharynx clear, MMM  Neck: No JVD  Respiratory: CTAB, no wheezes, rales or rhonchi  Cardiovascular: S1, S2, RRR  Gastrointestinal: BS+, soft, NT/ND  Extremities: No cyanosis or clubbing. No peripheral edema  :  No armendariz.   Skin: No rashes  Rt {PICC    LABS:      134<L>  |  103  |  41<H>  ----------------------------<  135<H>  4.6   |  18  |  2.1<H>    Ca    8.8      2018 01:07  Phos  3.2       Mg     1.7                                 11.3   12.44 )-----------( 165      ( 2018 01:07 )             34.5       Urine Studies:  Urinalysis Basic - ( 2018 18:00 )    Color: Yellow / Appearance: Clear / S.015 / pH:   Gluc:  / Ketone: Negative  / Bili: Negative / Urobili: 0.2 mg/dL   Blood:  / Protein: 30 mg/dL / Nitrite: Negative   Leuk Esterase: Negative / RBC:  / WBC    Sq Epi:  / Non Sq Epi: Occasional /HPF / Bacteria:       Creatinine, Random Urine: 53 mg/dL ( @ 18:00)  Sodium, Random Urine: 44.0 mmoL/L ( @ 18:00)    RADIOLOGY & ADDITIONAL STUDIES:

## 2018-07-29 NOTE — PROGRESS NOTE ADULT - SUBJECTIVE AND OBJECTIVE BOX
ZEESHAN STEINMATTHEW  [id number]  84y Male    Indication for ICU admission: BUBBA and hyperkalemia S/P reversal of ileostomy, SBR and primary anastomosis  Admit Date:07-27-18  ICU Date: 7/28/18  OR Date: 7/27/18    Allergies:   No Known Allergies    PAST MEDICAL & SURGICAL HISTORY:  Stented coronary artery: Proximal LAD 08/31/2016  Eye problem: with diabetes  Anemia: slight  GERD without esophagitis  SOB (shortness of breath) on exertion  Asthma: Mild only last attack yrs ago  HLD (hyperlipidemia)  HTN (hypertension)  Fistula of large intestine: colo-vesicula fistula  H/O diverticulitis of colon  Recurrent UTI (urinary tract infection)  Diabetes  CAD (coronary artery disease)  Elective surgery: PCI with 2 stents  Basal cell carcinoma: removal 07/18/2018  History of eye surgery: laser  Artificial cardiac pacemaker  History of partial colectomy  H/O ileostomy    Home Medications:  0.9% Normal saline 1000ml alternate with 500ml: intracavernously once a day (27 Jul 2018 13:14)  Aspirin Low Dose 81 mg oral delayed release tablet: 1 tab(s) orally once a day (27 Jul 2018 13:14)  CoQ10: 100 milligram(s) orally once a day (27 Jul 2018 13:14)  Crestor 20 mg oral tablet: 1 tab(s) orally once a day (at bedtime) (27 Jul 2018 13:14)  lisinopril 2.5 mg oral tablet: 1 tab(s) orally once a day (27 Jul 2018 13:14)  metFORMIN 500 mg oral tablet: 1 tab(s) orally 2 times a day (27 Jul 2018 13:14)  Metoprolol Succinate ER 25 mg oral tablet, extended release: 1 tab(s) orally once a day (27 Jul 2018 13:14)  Multiple Vitamins with Minerals oral tablet: 1 tab(s) orally once a day    OneADay Women&#x27;s or equivalent (27 Jul 2018 13:14)  sodium chloride 0.9% injectable solution: 1 liter(s) injectable once a day (27 Jul 2018 13:14)  Vitamin D3 2000 intl units oral tablet: 1 tab(s) orally once a day (27 Jul 2018 13:14)    24HRS EVENT:  POD #2, s/p reversal of ileostomy, small bowel resection, primary anastomosis    K+ 6.0 at midnight 7/28: Patient remained asymptomatic, no palpitations, SOB, chest pain, nausea, vomiting, diarrhea. Also with elevated creatinine of 2.5 was 1.9 on pre-op labs. Patient given 1g Ca++ gluconate, 10U insulin, and Kayexalate, repeat K+ 5.1. EKG relatively unchanged from pre-op EKG, ventricularly paced rhythm    Neurologic: Pain controlled: tylenol/oxycodone/dilaudid  Respiratory: started on duonebs q6 PRN for h/o asthma   Cardiovascular: held lisinopril 2/2 hyperkalemia, continued on ASA 81mg and atorvastatin, repeat K+ 4.6, cardiology C/S: h/o CHF last Echo 2/18, EF 35-40%, G1DD, PPM  Gastrointestinal/Nutrition: On clear liquid diet POD1, IVF NS @100mL/h for BUBBA, GI ppx PPI, no flatus/BM  Renal/Genitourinary: BUBBA BL Cr 1.9 currently 2.3, C/W IVF, FENA 1.4% (intrinsic), Nephrology consult  Hematologic: H/H stable: 11.4  Infectious Disease: WBC increased 15 <-- 11, no abx  Endocrine: h/o DM, held metformin, started on Insulin SS, q4h FS    DVT PTX: HSQ    GI PTX: PTX    Code Status: Full Code    ***Tubes/Lines/Drains  ***  Peripheral IV  [x] PICC: R arm           REVIEW OF SYSTEMS  [x] A ten-point review of systems was otherwise negative except as noted. ZEESHAN STEINMATTHEW  [id number]  84y Male    Indication for ICU admission: BUBBA and hyperkalemia S/P reversal of ileostomy, SBR and primary anastomosis  Admit Date:18  ICU Date: 18  OR Date: 18    Allergies:   No Known Allergies    PAST MEDICAL & SURGICAL HISTORY:  Stented coronary artery: Proximal LAD 2016  Eye problem: with diabetes  Anemia: slight  GERD without esophagitis  SOB (shortness of breath) on exertion  Asthma: Mild only last attack yrs ago  HLD (hyperlipidemia)  HTN (hypertension)  Fistula of large intestine: colo-vesicula fistula  H/O diverticulitis of colon  Recurrent UTI (urinary tract infection)  Diabetes  CAD (coronary artery disease)  Elective surgery: PCI with 2 stents  Basal cell carcinoma: removal 2018  History of eye surgery: laser  Artificial cardiac pacemaker  History of partial colectomy  H/O ileostomy    Home Medications:  0.9% Normal saline 1000ml alternate with 500ml: intracavernously once a day (2018 13:14)  Aspirin Low Dose 81 mg oral delayed release tablet: 1 tab(s) orally once a day (2018 13:14)  CoQ10: 100 milligram(s) orally once a day (2018 13:14)  Crestor 20 mg oral tablet: 1 tab(s) orally once a day (at bedtime) (2018 13:14)  lisinopril 2.5 mg oral tablet: 1 tab(s) orally once a day (2018 13:14)  metFORMIN 500 mg oral tablet: 1 tab(s) orally 2 times a day (2018 13:14)  Metoprolol Succinate ER 25 mg oral tablet, extended release: 1 tab(s) orally once a day (2018 13:14)  Multiple Vitamins with Minerals oral tablet: 1 tab(s) orally once a day    OneADay Women&#x27;s or equivalent (2018 13:14)  sodium chloride 0.9% injectable solution: 1 liter(s) injectable once a day (2018 13:14)  Vitamin D3 2000 intl units oral tablet: 1 tab(s) orally once a day (2018 13:14)    24HRS EVENT:  POD #2, s/p reversal of ileostomy, small bowel resection, primary anastomosis    K+ 6.0 at midnight : Patient remained asymptomatic, no palpitations, SOB, chest pain, nausea, vomiting, diarrhea. Also with elevated creatinine of 2.5 was 1.9 on pre-op labs. Patient given 1g Ca++ gluconate, 10U insulin, and Kayexalate, repeat K+ 5.1. EKG relatively unchanged from pre-op EKG, ventricularly paced rhythm    Neurologic: Pain controlled: tylenol/oxycodone/dilaudid  Respiratory: started on duonebs q6 PRN for h/o asthma   Cardiovascular: held lisinopril 2/2 hyperkalemia, continued on ASA 81mg and atorvastatin, repeat K+ 4.6, cardiology C/S: h/o CHF last Echo , EF 35-40%, G1DD, PPM  Gastrointestinal/Nutrition: On clear liquid diet POD1, IVF NS @100mL/h for BUBBA, GI ppx PPI, no flatus/BM  Renal/Genitourinary: BUBBA BL Cr 1.9 currently 2.3, C/W IVF, FENA 1.4% (intrinsic), Nephrology consult  Hematologic: H/H stable: 11.4  Infectious Disease: WBC increased 15 <-- 11, no abx  Endocrine: h/o DM, held metformin, started on Insulin SS, q4h FS    DVT PTX: HSQ    GI PTX: PTX    Code Status: Full Code    ***Tubes/Lines/Drains  ***  Peripheral IV  [x] PICC: R arm           REVIEW OF SYSTEMS  [x] A ten-point review of systems was otherwise negative except as noted.    HD: 2d  Daily     Daily Weight in k.3 (2018 12:00)    Diet, Clear Liquid (18 @ 08:45)    CURRENT MEDS:  Neurologic Medications  acetaminophen   Tablet. 650 milliGRAM(s) Oral every 6 hours PRN Mild Pain (1 - 3)  HYDROmorphone  Injectable 0.5 milliGRAM(s) IV Push every 4 hours PRN Severe Pain (7 - 10)  oxyCODONE    IR 5 milliGRAM(s) Oral every 6 hours PRN Moderate Pain (4 - 6)    Respiratory Medications  ALBUTerol/ipratropium for Nebulization 3 milliLiter(s) Nebulizer every 6 hours    Cardiovascular Medications  metoprolol succinate ER 25 milliGRAM(s) Oral daily    Gastrointestinal Medications  dextrose 5%. 1000 milliLiter(s) IV Continuous <Continuous>  pantoprazole  Injectable 40 milliGRAM(s) IV Push daily  sodium chloride 0.9%. 1000 milliLiter(s) IV Continuous <Continuous>    Hematologic/Oncologic Medications  aspirin enteric coated 81 milliGRAM(s) Oral daily  heparin  Injectable 5000 Unit(s) SubCutaneous every 8 hours    Endocrine/Metabolic Medications  atorvastatin 20 milliGRAM(s) Oral at bedtime  dextrose 40% Gel 15 Gram(s) Oral once PRN Blood Glucose LESS THAN 70 milliGRAM(s)/deciliter  dextrose 50% Injectable 12.5 Gram(s) IV Push once  dextrose 50% Injectable 25 Gram(s) IV Push once  dextrose 50% Injectable 25 Gram(s) IV Push once  glucagon  Injectable 1 milliGRAM(s) IntraMuscular once PRN Glucose LESS THAN 70 milligrams/deciliter  insulin lispro (HumaLOG) corrective regimen sliding scale   SubCutaneous every 4 hours    ICU Vital Signs Last 24 Hrs  T(C): 35.6 (2018 04:00), Max: 36.7 (2018 11:00)  T(F): 96 (2018 04:00), Max: 98 (2018 11:00)  HR: 70 (2018 06:00) (70 - 70)  BP: 119/58 (2018 06:00) (100/58 - 148/70)  BP(mean): 84 (2018 06:00) (75 - 121)  SpO2: 98% (2018 06:00) (95% - 100%)    I&O's Summary    2018 07:  -  2018 07:00  --------------------------------------------------------  IN: 1800 mL / OUT: 1800 mL / NET: 0 mL      I&O's Detail    2018 07:  -  2018 07:00  --------------------------------------------------------  IN:    sodium chloride 0.9%.: 1800 mL  Total IN: 1800 mL    OUT:    Voided: 1800 mL  Total OUT: 1800 mL    Total NET: 0 mL    PHYSICAL EXAM:  General/Neuro  GCS: 15  Exam: Normal, NAD, alert, oriented x 3, no focal deficits. PERRLA    Respiratory  Exam: Lungs clear to auscultation, Normal expansion/effort. Saturating well on RA    Cardiovascular  Exam: S1, S2.  Regular rate and rhythm.  Cardiac Rhythm: ventricular paced Rhythm    GI  Exam: Abdomen soft, + per-incisional tenderness, Non-distended.  Wound:     Current Diet:  clear liquids    Tubes/Lines/Drains   [x] Peripheral IV  [x] PICC:      R arm   	    Extremities  Exam: Extremities warm, pink, well-perfused.      Derm:  Exam: Good skin turgor, no skin breakdown.      :   Exam: voiding freely, no Kauffman catheter in place.       LABS:  Labs:  CAPILLARY BLOOD GLUCOSE  138 (2018 06:00)  143 (2018 03:00)  125 (2018 22:22)  189 (2018 08:41)                      11.3   12.44 )-----------( 165      ( 2018 01:07 )             34.5       Auto Neutrophil %: 80.3 % (18 @ 01:07)  Auto Immature Granulocyte %: 0.6 % (18 @ 01:07)        134<L>  |  103  |  41<H>  ----------------------------<  135<H>  4.6   |  18  |  2.1<H>    Calcium, Total Serum: 8.8 mg/dL (18 @ 01:07)  Magnesium, Serum: 1.7 mg/dL (18 @ 01:07)  Phosphorus Level, Serum: 3.2 mg/dL (18 @ 01:07)    Urinalysis Basic - ( 2018 18:00 )  Color: Yellow / Appearance: Clear / S.015 / pH: x  Gluc: x / Ketone: Negative  / Bili: Negative / Urobili: 0.2 mg/dL   Blood: x / Protein: 30 mg/dL / Nitrite: Negative   Leuk Esterase: Negative / RBC: x / WBC x   Sq Epi: x / Non Sq Epi: Occasional /HPF / Bacteria: x

## 2018-07-29 NOTE — PROGRESS NOTE ADULT - SUBJECTIVE AND OBJECTIVE BOX
Progress Note: General Surgery  Patient: ZEESHAN MCCLENDON , 84y (26-Oct-1933)Male   MRN: [id number]  Location: [facility] [address]  Visit: 18 Inpatient  Date: 18 @ 00:14  Hospital Day: 2  Post-op Day: 1    Procedure/Diagnosis: S/p reversal of loop ileostomy, small bowel resection, primary anastomosis  Events over 24h: No acute overnight events. Patient reports feeling well and has no active complaints. Admitted to ICU for cardiac and renal monitoring. Repeat labs show resolving hyperkalemia - potassium 4.6, creatinine still elevated 2.3. Calculated FENa 1.4%. Patient is tolerating diet, voiding. Home medications restarted. Insulin sliding scale for glucose control.     Vitals: T(F): 98 (18 @ 11:00), Max: 98.6 (18 @ 05:31)  HR: 70 (18 @ 22:00)  BP: 113/54 (18 @ 22:00) (113/54 - 145/81)  RR: 18 (18 @ 05:31)  SpO2: 99% (18 @ 22:00)    In:   18 @ 07:01  -  18 @ 07:00  --------------------------------------------------------  IN: 1350 mL    18 @ 07:  -  18 @ 00:14  --------------------------------------------------------  IN: 1300 mL    Out:   18 @ 07:01  -  18 @ 07:00  --------------------------------------------------------  OUT:    Voided: 650 mL  Total OUT: 650 mL    18 @ 07:01  -  18 @ 00:14  --------------------------------------------------------  OUT:    Voided: 1500 mL  Total OUT: 1500 mL    Net:   18 @ 07:01  -  18 @ 07:00  --------------------------------------------------------  NET: 700 mL    18 @ 07:01  -  18 @ 00:14  --------------------------------------------------------  NET: -200 mL    Diet: Diet, Clear Liquid (18 @ 08:45)    IV Fluids: yes, Type: dextrose 5%. 1000 milliLiter(s) (50 mL/Hr) IV Continuous <Continuous>  sodium chloride 0.9%. 1000 milliLiter(s) (100 mL/Hr) IV Continuous <Continuous>    Physical Examination:  General Appearance: NAD, alert and cooperative  Abdomen:  Soft, nondistended, chon-incisional tenderness. No rigidity, guarding, or rebound tenderness.   Skin: Warm/dry, Normal color  Incisions/Wounds: Dressings in place, clean, dry and intact, no signs of infection/active bleeding/drainage    Medications: [Standing]  ALBUTerol/ipratropium for Nebulization 3 milliLiter(s) Nebulizer every 6 hours  aspirin enteric coated 81 milliGRAM(s) Oral daily  atorvastatin 20 milliGRAM(s) Oral at bedtime  dextrose 5%. 1000 milliLiter(s) (50 mL/Hr) IV Continuous <Continuous>  dextrose 50% Injectable 12.5 Gram(s) IV Push once  dextrose 50% Injectable 25 Gram(s) IV Push once  dextrose 50% Injectable 25 Gram(s) IV Push once  heparin  Injectable 5000 Unit(s) SubCutaneous every 8 hours  insulin lispro (HumaLOG) corrective regimen sliding scale   SubCutaneous every 4 hours  metoprolol succinate ER 25 milliGRAM(s) Oral daily  pantoprazole  Injectable 40 milliGRAM(s) IV Push daily  sodium chloride 0.9%. 1000 milliLiter(s) (100 mL/Hr) IV Continuous <Continuous>    DVT Prophylaxis: heparin  Injectable 5000 Unit(s) SubCutaneous every 8 hours  GI Prophylaxis: pantoprazole  Injectable 40 milliGRAM(s) IV Push daily    Antibiotics:   Anticoagulation:   Medications:[PRN]  acetaminophen   Tablet. 650 milliGRAM(s) Oral every 6 hours PRN  dextrose 40% Gel 15 Gram(s) Oral once PRN  glucagon  Injectable 1 milliGRAM(s) IntraMuscular once PRN  HYDROmorphone  Injectable 0.5 milliGRAM(s) IV Push every 4 hours PRN  oxyCODONE    IR 5 milliGRAM(s) Oral every 6 hours PRN    Labs:                        11.4   15.10 )-----------( 177      ( 2018 12:50 )             34.6     -28    135  |  101  |  44<H>  ----------------------------<  111<H>  4.6   |  18  |  2.3<H>    Ca    9.2      2018 12:50  Phos  4.2       Mg     1.8         Urine/Micro:    Urinalysis Basic - ( 2018 18:00 )    Color: Yellow / Appearance: Clear / S.015 / pH: x  Gluc: x / Ketone: Negative  / Bili: Negative / Urobili: 0.2 mg/dL   Blood: x / Protein: 30 mg/dL / Nitrite: Negative   Leuk Esterase: Negative / RBC: x / WBC x   Sq Epi: x / Non Sq Epi: Occasional /HPF / Bacteria: x    Assessment:  84y Male patient admitted S/P *** , with the above physical exam, labs, and imaging findings.    Plan:  Assessment:  84y Male patient admitted S/P reversal of loop ileostomy, small bowel resection, primary anastomosis    Plan:  Admission to ICU for worsening renal function and cardiac monitoring  Sliding scale for glucose control  Resolving hyperkalemia  F/u with PMD, nephrologist, cardiology  Pain control with tylenol and morphine q6 hours  Encourage ambulation, ICS use, OOB    Date/Time: 18 @ 00:14

## 2018-07-29 NOTE — CHART NOTE - [id number]_GEN_A_CORE
SICU Transfer Note:    Transfer from: SICU  Transfer to:  (x  ) Surgery    (  x) Telemetry    (  ) Medicine    (  ) Palliative    (  ) Stroke Unit    (  ) _______________      SICU COURSE:    84M with PMH of sigmoid diverticulitis with colo-vesicular fistula S/P sigmoid colon resection and resection of colo vesicular fistula with primary anastomosis on , complicated by perforated viscus, was subsequently re-operated on 18 with creation of diverting loop ileostomy and was discharged on , re-admitted 18 2/2 dehydration, had PICC line in place from receiving Ertapenem IV, was DC with daily NS infusions via PICC line at home. Ileostomy has had good output. He presents this admission for elective reversal of his ileostomy. He is currently POD1 S/p reversal of loop ileostomy, small bowel resection, and primary anastomosis, procedure was uncomplicated. Patient's pre-op labs significant for K+ 6.1 on , repeat was 5.1 prior to the procedure. He remains hyperkalemic on routine blood work post-operatively, K+ 6.0 at midnight. Patient is asymptomatic, and denies palpitations, SOB, chest pain, nausea, vomiting, diarrhea. Also with BUBBA on AM labs, elevated creatinine of 2.5 was 1.9 on pre-op labs. Patient given 1g Ca++ gluconate, 10U insulin, and Kayexalate repeat K+ 5.1. EKG relatively unchanged from pre-op EKG, ventricularly paced rhythm. Consulted for ICU for cardiac and renal monitoring.      PAST MEDICAL & SURGICAL HISTORY:  Stented coronary artery: Proximal LAD 2016  Eye problem: with diabetes  Anemia: slight  GERD without esophagitis  SOB (shortness of breath) on exertion  Asthma: Mild only last attack yrs ago  HLD (hyperlipidemia)  HTN (hypertension)  Fistula of large intestine: colo-vesicula fistula  H/O diverticulitis of colon  Recurrent UTI (urinary tract infection)  Diabetes  CAD (coronary artery disease)  Elective surgery: PCI with 2 stents  Basal cell carcinoma: removal 2018  History of eye surgery: laser  Artificial cardiac pacemaker  History of partial colectomy  H/O ileostomy    Allergies    No Known Allergies    Intolerances      MEDICATIONS  (STANDING):  ALBUTerol/ipratropium for Nebulization 3 milliLiter(s) Nebulizer every 6 hours  aspirin enteric coated 81 milliGRAM(s) Oral daily  atorvastatin 20 milliGRAM(s) Oral at bedtime  dextrose 5%. 1000 milliLiter(s) (50 mL/Hr) IV Continuous <Continuous>  dextrose 50% Injectable 12.5 Gram(s) IV Push once  dextrose 50% Injectable 25 Gram(s) IV Push once  dextrose 50% Injectable 25 Gram(s) IV Push once  heparin  Injectable 5000 Unit(s) SubCutaneous every 8 hours  insulin lispro (HumaLOG) corrective regimen sliding scale   SubCutaneous every 4 hours  metoprolol succinate ER 25 milliGRAM(s) Oral daily  pantoprazole    Tablet 40 milliGRAM(s) Oral before breakfast  pantoprazole  Injectable 40 milliGRAM(s) IV Push daily  sodium chloride 0.9%. 1000 milliLiter(s) (100 mL/Hr) IV Continuous <Continuous>    MEDICATIONS  (PRN):  acetaminophen   Tablet. 650 milliGRAM(s) Oral every 6 hours PRN Mild Pain (1 - 3)  dextrose 40% Gel 15 Gram(s) Oral once PRN Blood Glucose LESS THAN 70 milliGRAM(s)/deciliter  glucagon  Injectable 1 milliGRAM(s) IntraMuscular once PRN Glucose LESS THAN 70 milligrams/deciliter  HYDROmorphone  Injectable 0.5 milliGRAM(s) IV Push every 4 hours PRN Severe Pain (7 - 10)  oxyCODONE    IR 5 milliGRAM(s) Oral every 6 hours PRN Moderate Pain (4 - 6)        ASSESSMENT/PLAN: 84yMale      Neurologic:    Respiratory:    Cardiovascular:    Gastrointestinal/Nutrition:    Genitourinary/Renal:    Hematologic:    Infectious Disease:    Endocrine:    Disposition:      For Follow-Up:      Vital Signs Last 24 Hrs  T(C): 36.4 (2018 08:00), Max: 36.4 (2018 08:00)  T(F): 97.6 (2018 08:00), Max: 97.6 (2018 08:00)  HR: 70 (2018 10:00) (70 - 70)  BP: 146/71 (2018 10:00) (100/58 - 148/70)  BP(mean): 104 (2018 10:00) (75 - 121)  RR: --  SpO2: 99% (2018 10:00) (95% - 99%)  I&O's Summary    2018 07:  -  2018 07:00  --------------------------------------------------------  IN: 1800 mL / OUT: 1800 mL / NET: 0 mL    2018 07:01  -  2018 12:30  --------------------------------------------------------  IN: 520 mL / OUT: 300 mL / NET: 220 mL        LABS  LABS:  CAPILLARY BLOOD GLUCOSE  138 (2018 06:00)  143 (2018 03:00)  125 (2018 22:22)                              11.3   12.44 )-----------( 165      ( 2018 01:07 )             34.5       07-29    134<L>  |  103  |  41<H>  ----------------------------<  135<H>  4.6   |  18  |  2.1<H>    Ca    8.8      2018 01:07  Phos  3.2     07-29  Mg     1.7     29              Urinalysis Basic - ( 2018 18:00 )    Color: Yellow / Appearance: Clear / S.015 / pH: x  Gluc: x / Ketone: Negative  / Bili: Negative / Urobili: 0.2 mg/dL   Blood: x / Protein: 30 mg/dL / Nitrite: Negative   Leuk Esterase: Negative / RBC: x / WBC x   Sq Epi: x / Non Sq Epi: Occasional /HPF / Bacteria: x SICU Transfer Note:    Transfer from: SICU  Transfer to:  (x  ) Surgery    (  x) Telemetry    (  ) Medicine    (  ) Palliative    (  ) Stroke Unit    (  ) _______________      SICU COURSE:    84M with PMH of sigmoid diverticulitis with colo-vesicular fistula S/P sigmoid colon resection and resection of colo vesicular fistula with primary anastomosis on , complicated by perforated viscus, was subsequently re-operated on 18 with creation of diverting loop ileostomy and was discharged on , re-admitted 18 2/2 dehydration, had PICC line in place from receiving Ertapenem IV, was DC with daily NS infusions via PICC line at home. Ileostomy has had good output. He presents this admission for elective reversal of his ileostomy. S/p reversal of loop ileostomy, small bowel resection, and primary anastomosis, procedure was uncomplicated. Patient's pre-op labs significant for K+ 6.1 on , repeat was 5.1 prior to the procedure. He remains hyperkalemic on routine blood work post-operatively, K+ 6.0 at midnight. Patient remained asymptomatic, Also noted BUBBA on CKD on AM labs, creatinine of 2.5 from 1.9 on pre-op labs. Patient was given 1g Ca++ gluconate, 10U insulin, and Kayexalate repeat K+ 5.1. EKG relatively unchanged from pre-op EKG, ventricularly paced rhythm. Patient was upgraded to ICU for close monitoring in the settings of hyperkalemia, worsening renal failure.  In ICU patient remained asymptomatic, FENA 1.4, hyperkalemia resolved, BUBBA improved. Patient was on clear liquid diet, tolerating well, had BM. Ambulating  Stable for downgrade, back to [facility].  Nephrology consulted, Dr. Torres, consult in chart, resolving BUBBA, cut back IVF once PO intake increases.  Cardiology consulted, Dr. Fowler. Not seen yet. No active issues  PMD consulted, Dr. De La Torre. Not seen yet. No active issues.    ASSESSMENT/PLAN: 84yMale      Neurologic: Pain controlled: tylenol/oxycodone/dilaudid  Respiratory: started on duonebs q6 PRN for h/o asthma   Cardiovascular: held lisinopril 2/2 hyperkalemia, continued on ASA 81mg and atorvastatin, f/u cardiology consult  Gastrointestinal/Nutrition: On clear liquid diet POD1, IVF NS @100mL/h for BUBBA, GI ppx PPI, + flatus/BM  Renal/Genitourinary: BUBBA on CKD improving, gental hydration, decrease as PO intake increases.  Hematologic: H/H stable: 11.4, on HSQ and ASA  Infectious Disease: No active issues, no abx  Endocrine: h/o DM, heod metformin, started on Insulin SS, q4h FS    Sign out given to Blue team, Dr Lopez      PAST MEDICAL & SURGICAL HISTORY:  Stented coronary artery: Proximal LAD 2016  Eye problem: with diabetes  Anemia: slight  GERD without esophagitis  SOB (shortness of breath) on exertion  Asthma: Mild only last attack yrs ago  HLD (hyperlipidemia)  HTN (hypertension)  Fistula of large intestine: colo-vesicula fistula  H/O diverticulitis of colon  Recurrent UTI (urinary tract infection)  Diabetes  CAD (coronary artery disease)  Elective surgery: PCI with 2 stents  Basal cell carcinoma: removal 2018  History of eye surgery: laser  Artificial cardiac pacemaker  History of partial colectomy  H/O ileostomy    Allergies    No Known Allergies    Intolerances      MEDICATIONS  (STANDING):  ALBUTerol/ipratropium for Nebulization 3 milliLiter(s) Nebulizer every 6 hours  aspirin enteric coated 81 milliGRAM(s) Oral daily  atorvastatin 20 milliGRAM(s) Oral at bedtime  dextrose 5%. 1000 milliLiter(s) (50 mL/Hr) IV Continuous <Continuous>  dextrose 50% Injectable 12.5 Gram(s) IV Push once  dextrose 50% Injectable 25 Gram(s) IV Push once  dextrose 50% Injectable 25 Gram(s) IV Push once  heparin  Injectable 5000 Unit(s) SubCutaneous every 8 hours  insulin lispro (HumaLOG) corrective regimen sliding scale   SubCutaneous every 4 hours  metoprolol succinate ER 25 milliGRAM(s) Oral daily  pantoprazole    Tablet 40 milliGRAM(s) Oral before breakfast  pantoprazole  Injectable 40 milliGRAM(s) IV Push daily  sodium chloride 0.9%. 1000 milliLiter(s) (100 mL/Hr) IV Continuous <Continuous>    MEDICATIONS  (PRN):  acetaminophen   Tablet. 650 milliGRAM(s) Oral every 6 hours PRN Mild Pain (1 - 3)  dextrose 40% Gel 15 Gram(s) Oral once PRN Blood Glucose LESS THAN 70 milliGRAM(s)/deciliter  glucagon  Injectable 1 milliGRAM(s) IntraMuscular once PRN Glucose LESS THAN 70 milligrams/deciliter  HYDROmorphone  Injectable 0.5 milliGRAM(s) IV Push every 4 hours PRN Severe Pain (7 - 10)  oxyCODONE    IR 5 milliGRAM(s) Oral every 6 hours PRN Moderate Pain (4 - 6)        Vital Signs Last 24 Hrs  T(C): 36.4 (2018 08:00), Max: 36.4 (2018 08:00)  T(F): 97.6 (2018 08:00), Max: 97.6 (2018 08:00)  HR: 70 (2018 10:00) (70 - 70)  BP: 146/71 (2018 10:00) (100/58 - 148/70)  BP(mean): 104 (2018 10:00) (75 - 121)  RR: --  SpO2: 99% (2018 10:00) (95% - 99%)  I&O's Summary    2018 07:  -  2018 07:00  --------------------------------------------------------  IN: 1800 mL / OUT: 1800 mL / NET: 0 mL    2018 07:  -  2018 12:30  --------------------------------------------------------  IN: 520 mL / OUT: 300 mL / NET: 220 mL        LABS  LABS:  CAPILLARY BLOOD GLUCOSE  138 (2018 06:00)  143 (2018 03:00)  125 (2018 22:22)                              11.3   12.44 )-----------( 165      ( 2018 01:07 )             34.5       07-29    134<L>  |  103  |  41<H>  ----------------------------<  135<H>  4.6   |  18  |  2.1<H>    Ca    8.8      2018 01:07  Phos  3.2       Mg     1.7               Urinalysis Basic - ( 2018 18:00 )    Color: Yellow / Appearance: Clear / S.015 / pH: x  Gluc: x / Ketone: Negative  / Bili: Negative / Urobili: 0.2 mg/dL   Blood: x / Protein: 30 mg/dL / Nitrite: Negative   Leuk Esterase: Negative / RBC: x / WBC x   Sq Epi: x / Non Sq Epi: Occasional /HPF / Bacteria: x

## 2018-07-29 NOTE — CONSULT NOTE ADULT - CONSULT REASON
"BUBBA post POD1 s/p revarsal of ileostomy, Cr 1.9 pre-op now 2.5 post-op, S/P Kayexelate this AM for K+ 6.0, now 5.1 post 10U insulin, FENA ordered, on IVF @100"

## 2018-07-29 NOTE — PROGRESS NOTE ADULT - ASSESSMENT
ASSESSMENT:  S/P reversal of ileostomy, small bowel resection, primary anastomosis; asymptomatic hyperkalemia with no EKG change and ventricularly paced rhythm. Also with elevated creatinine of 2.5 was 1.9 on pre-op labs. Patient given 1g Ca++ gluconate, 10U insulin, and Kayexalate, repeat K+ 5.1.     PLAN:   Neurologic: Pain control: tylenol/oxycodone/dilaudid  Respiratory: maintains saturation on RA, h/o mild, controlled asthma on duonebs q6 PRN  Cardiovascular: h/o CAD/HTN on home antihypertensives, held lisinopril 2/2 hyperkalemia, continued on ASA 81mg and atorvastatin, on telemetry monitor, will recheck K+ on STAT labs, h/o CHF last Echo 2/18, EF 35-40%, G1DD, PPM, cardiology C/S  Gastrointestinal/Nutrition: On clear liquid diet POD1 s/p SBR and Primary anastomosis, IVF NS @100mL/h, GI ppx PPI  Renal/Genitourinary: BUBBA BL Cr 1.9 currently 2.5, C/W IVF, Nephrology consult  Hematologic: H/H stable post op, will re-check on STAT labs  Infectious Disease: no indication for antibiotics  Lines/Tubes: R arm PICC line, PIV  Endocrine: h/o DM, held metformin, started on Insulin SS, q4h FS  Disposition: ICU for cardiac and renal monitoring

## 2018-07-30 DIAGNOSIS — Z02.9 ENCOUNTER FOR ADMINISTRATIVE EXAMINATIONS, UNSPECIFIED: ICD-10-CM

## 2018-07-30 LAB
ANION GAP SERPL CALC-SCNC: 12 MMOL/L — SIGNIFICANT CHANGE UP (ref 7–14)
BUN SERPL-MCNC: 35 MG/DL — HIGH (ref 10–20)
CALCIUM SERPL-MCNC: 8.2 MG/DL — LOW (ref 8.4–10.5)
CALCIUM SERPL-MCNC: 8.5 MG/DL — SIGNIFICANT CHANGE UP (ref 8.5–10.1)
CHLORIDE SERPL-SCNC: 103 MMOL/L — SIGNIFICANT CHANGE UP (ref 98–110)
CO2 SERPL-SCNC: 19 MMOL/L — SIGNIFICANT CHANGE UP (ref 17–32)
CREAT SERPL-MCNC: 1.9 MG/DL — HIGH (ref 0.7–1.5)
GLUCOSE SERPL-MCNC: 130 MG/DL — HIGH (ref 70–99)
HCT VFR BLD CALC: 31.9 % — LOW (ref 42–52)
HGB BLD-MCNC: 10.3 G/DL — LOW (ref 14–18)
MAGNESIUM SERPL-MCNC: 2.1 MG/DL — SIGNIFICANT CHANGE UP (ref 1.8–2.4)
MCHC RBC-ENTMCNC: 28.1 PG — SIGNIFICANT CHANGE UP (ref 27–31)
MCHC RBC-ENTMCNC: 32.3 G/DL — SIGNIFICANT CHANGE UP (ref 32–37)
MCV RBC AUTO: 86.9 FL — SIGNIFICANT CHANGE UP (ref 80–94)
NRBC # BLD: 0 /100 WBCS — SIGNIFICANT CHANGE UP (ref 0–0)
PHOSPHATE SERPL-MCNC: 2.8 MG/DL — SIGNIFICANT CHANGE UP (ref 2.1–4.9)
PLATELET # BLD AUTO: 162 K/UL — SIGNIFICANT CHANGE UP (ref 130–400)
POTASSIUM SERPL-MCNC: 4.3 MMOL/L — SIGNIFICANT CHANGE UP (ref 3.5–5)
POTASSIUM SERPL-SCNC: 4.3 MMOL/L — SIGNIFICANT CHANGE UP (ref 3.5–5)
PTH-INTACT FLD-MCNC: 49 PG/ML — SIGNIFICANT CHANGE UP (ref 15–65)
RBC # BLD: 3.67 M/UL — LOW (ref 4.7–6.1)
RBC # FLD: 14.8 % — HIGH (ref 11.5–14.5)
SODIUM SERPL-SCNC: 134 MMOL/L — LOW (ref 135–146)
VIT D25+D1,25 OH+D1,25 PNL SERPL-MCNC: 27.9 PG/ML — SIGNIFICANT CHANGE UP (ref 19.9–79.3)
WBC # BLD: 12.54 K/UL — HIGH (ref 4.8–10.8)
WBC # FLD AUTO: 12.54 K/UL — HIGH (ref 4.8–10.8)

## 2018-07-30 RX ORDER — SODIUM CHLORIDE 9 MG/ML
1000 INJECTION INTRAMUSCULAR; INTRAVENOUS; SUBCUTANEOUS
Qty: 0 | Refills: 0 | Status: DISCONTINUED | OUTPATIENT
Start: 2018-07-30 | End: 2018-07-31

## 2018-07-30 RX ADMIN — HYDROMORPHONE HYDROCHLORIDE 0.5 MILLIGRAM(S): 2 INJECTION INTRAMUSCULAR; INTRAVENOUS; SUBCUTANEOUS at 22:00

## 2018-07-30 RX ADMIN — PANTOPRAZOLE SODIUM 40 MILLIGRAM(S): 20 TABLET, DELAYED RELEASE ORAL at 12:27

## 2018-07-30 RX ADMIN — HYDROMORPHONE HYDROCHLORIDE 0.5 MILLIGRAM(S): 2 INJECTION INTRAMUSCULAR; INTRAVENOUS; SUBCUTANEOUS at 12:17

## 2018-07-30 RX ADMIN — HYDROMORPHONE HYDROCHLORIDE 0.5 MILLIGRAM(S): 2 INJECTION INTRAMUSCULAR; INTRAVENOUS; SUBCUTANEOUS at 13:03

## 2018-07-30 RX ADMIN — HYDROMORPHONE HYDROCHLORIDE 0.5 MILLIGRAM(S): 2 INJECTION INTRAMUSCULAR; INTRAVENOUS; SUBCUTANEOUS at 16:58

## 2018-07-30 RX ADMIN — Medication 81 MILLIGRAM(S): at 12:22

## 2018-07-30 RX ADMIN — Medication 3 MILLILITER(S): at 13:53

## 2018-07-30 RX ADMIN — HYDROMORPHONE HYDROCHLORIDE 0.5 MILLIGRAM(S): 2 INJECTION INTRAMUSCULAR; INTRAVENOUS; SUBCUTANEOUS at 04:58

## 2018-07-30 RX ADMIN — SODIUM CHLORIDE 50 MILLILITER(S): 9 INJECTION INTRAMUSCULAR; INTRAVENOUS; SUBCUTANEOUS at 08:44

## 2018-07-30 RX ADMIN — Medication 1: at 21:36

## 2018-07-30 RX ADMIN — HYDROMORPHONE HYDROCHLORIDE 0.5 MILLIGRAM(S): 2 INJECTION INTRAMUSCULAR; INTRAVENOUS; SUBCUTANEOUS at 05:37

## 2018-07-30 RX ADMIN — HYDROMORPHONE HYDROCHLORIDE 0.5 MILLIGRAM(S): 2 INJECTION INTRAMUSCULAR; INTRAVENOUS; SUBCUTANEOUS at 16:21

## 2018-07-30 RX ADMIN — HYDROMORPHONE HYDROCHLORIDE 0.5 MILLIGRAM(S): 2 INJECTION INTRAMUSCULAR; INTRAVENOUS; SUBCUTANEOUS at 21:36

## 2018-07-30 RX ADMIN — Medication 3 MILLILITER(S): at 07:36

## 2018-07-30 RX ADMIN — Medication 1: at 17:44

## 2018-07-30 RX ADMIN — Medication 62.5 MILLIMOLE(S): at 11:30

## 2018-07-30 RX ADMIN — Medication 25 MILLIGRAM(S): at 22:55

## 2018-07-30 RX ADMIN — Medication 3 MILLILITER(S): at 20:15

## 2018-07-30 RX ADMIN — HEPARIN SODIUM 5000 UNIT(S): 5000 INJECTION INTRAVENOUS; SUBCUTANEOUS at 21:35

## 2018-07-30 RX ADMIN — ATORVASTATIN CALCIUM 20 MILLIGRAM(S): 80 TABLET, FILM COATED ORAL at 21:35

## 2018-07-30 RX ADMIN — HEPARIN SODIUM 5000 UNIT(S): 5000 INJECTION INTRAVENOUS; SUBCUTANEOUS at 14:48

## 2018-07-30 RX ADMIN — HEPARIN SODIUM 5000 UNIT(S): 5000 INJECTION INTRAVENOUS; SUBCUTANEOUS at 05:36

## 2018-07-30 RX ADMIN — PANTOPRAZOLE SODIUM 40 MILLIGRAM(S): 20 TABLET, DELAYED RELEASE ORAL at 08:44

## 2018-07-30 NOTE — PROGRESS NOTE ADULT - SUBJECTIVE AND OBJECTIVE BOX
Progress Note: General Surgery  Patient: ZEESHAN MCCLENDON , 84y (26-Oct-1933)Male   MRN: [id number]  Location: [address]  Visit: 18 Inpatient  Date: 18 @ 04:32  Hospital Day: 4  Post-op Day: 3    Procedure/Diagnosis: reversal of ileostomy, small bowel resection, primary anastomosis    Events/ 24h: No acute events overnight. Pain controlled.    Vitals: T(F): 97.4 (18 @ 20:38), Max: 98.7 (18 @ 17:46)  HR: 70 (18 @ 20:38)  BP: 125/59 (18 @ 20:38) (108/48 - 148/67)  RR: --  SpO2: 98% (18 @ 16:00)    In:   18 @ 07:01  -  18 @ 07:00  --------------------------------------------------------  IN: 1800 mL    18 @ 07:  -  18 @ 04:32  --------------------------------------------------------  IN: 1940 mL      Out:   18 @ 07:  -  18 @ 07:00  --------------------------------------------------------  OUT:    Voided: 1800 mL  Total OUT: 1800 mL      18 @ 07:01  -  18 @ 04:32  --------------------------------------------------------  OUT:    Voided: 1050 mL  Total OUT: 1050 mL        Net:   18 @ 07:01  -  18 @ 07:00  --------------------------------------------------------  NET: 0 mL    18 @ 07:01  -  18 @ 04:32  --------------------------------------------------------  NET: 890 mL        Diet: Diet, Clear Liquid (18 @ 08:45)    IV Fluids: dextrose 5%. 1000 milliLiter(s) (50 mL/Hr) IV Continuous <Continuous>  sodium chloride 0.9%. 1000 milliLiter(s) (100 mL/Hr) IV Continuous <Continuous>      Physical Examination:  General Appearance: NAD  HEENT: EOMI, sclera non-icteric.  Heart: RRR   Lungs: CTABL.   Abdomen:  Soft, nontender, nondistended. Obese. No rigidity, guarding, or rebound tenderness.   MSK/Extremities: Warm & well-perfused. No significant deformity or joint abnormality. Peripheral pulses intact.  Skin: Warm, dry. No jaundice.       Medications: [Standing]  ALBUTerol/ipratropium for Nebulization 3 milliLiter(s) Nebulizer every 6 hours  aspirin enteric coated 81 milliGRAM(s) Oral daily  atorvastatin 20 milliGRAM(s) Oral at bedtime  dextrose 5%. 1000 milliLiter(s) (50 mL/Hr) IV Continuous <Continuous>  dextrose 50% Injectable 12.5 Gram(s) IV Push once  dextrose 50% Injectable 25 Gram(s) IV Push once  dextrose 50% Injectable 25 Gram(s) IV Push once  heparin  Injectable 5000 Unit(s) SubCutaneous every 8 hours  insulin lispro (HumaLOG) corrective regimen sliding scale   SubCutaneous every 4 hours  metoprolol succinate ER 25 milliGRAM(s) Oral daily  pantoprazole    Tablet 40 milliGRAM(s) Oral before breakfast  pantoprazole  Injectable 40 milliGRAM(s) IV Push daily  sodium chloride 0.9%. 1000 milliLiter(s) (100 mL/Hr) IV Continuous <Continuous>    DVT Prophylaxis: heparin  Injectable 5000 Unit(s) SubCutaneous every 8 hours    GI Prophylaxis: pantoprazole    Tablet 40 milliGRAM(s) Oral before breakfast  pantoprazole  Injectable 40 milliGRAM(s) IV Push daily    Antibiotics:   Anticoagulation:   Medications:[PRN]  acetaminophen   Tablet. 650 milliGRAM(s) Oral every 6 hours PRN  dextrose 40% Gel 15 Gram(s) Oral once PRN  glucagon  Injectable 1 milliGRAM(s) IntraMuscular once PRN  HYDROmorphone  Injectable 0.5 milliGRAM(s) IV Push every 4 hours PRN  oxyCODONE    IR 5 milliGRAM(s) Oral every 6 hours PRN      Labs:                        10.3   12.54 )-----------( 162      ( 2018 00:10 )             31.9     07-30    134<L>  |  103  |  35<H>  ----------------------------<  130<H>  4.3   |  19  |  1.9<H>    Ca    8.5      2018 00:10  Phos  2.8     07-30  Mg     2.1                     Urine/Micro:    Urinalysis Basic - ( 2018 18:00 )    Color: Yellow / Appearance: Clear / S.015 / pH: x  Gluc: x / Ketone: Negative  / Bili: Negative / Urobili: 0.2 mg/dL   Blood: x / Protein: 30 mg/dL / Nitrite: Negative   Leuk Esterase: Negative / RBC: x / WBC x   Sq Epi: x / Non Sq Epi: Occasional /HPF / Bacteria: x        Imaging:     Assessment:  84y Male patient admitted S/P reversal of ileostomy, small bowel resection, primary anastomosis    Doing well    Plan:  Calculated FENa yesterday 1.3  Cr down today to 1.9 from 2.1  On reg diet, can IVL  OOB  IS  Pain control  F/u cardiology  F/u medicine  Neph: BUBBA resolving. Appreciate recs    Date/Time: 18 @ 04:32 Progress Note: General Surgery  Patient: ZEESHAN MCCLENDON , 84y (26-Oct-1933)Male   MRN: [id number]  Location: [address]  Visit: 18 Inpatient  Date: 18 @ 04:32  Hospital Day: 4  Post-op Day: 3    Procedure/Diagnosis: reversal of ileostomy, small bowel resection, primary anastomosis    Events/ 24h: No acute events overnight. Pain controlled.    Vitals: T(F): 97.4 (18 @ 20:38), Max: 98.7 (18 @ 17:46)  HR: 70 (18 @ 20:38)  BP: 125/59 (18 @ 20:38) (108/48 - 148/67)  RR: --  SpO2: 98% (18 @ 16:00)    In:   18 @ 07:01  -  18 @ 07:00  --------------------------------------------------------  IN: 1800 mL    18 @ 07:  -  18 @ 04:32  --------------------------------------------------------  IN: 1940 mL      Out:   18 @ 07:  -  18 @ 07:00  --------------------------------------------------------  OUT:    Voided: 1800 mL  Total OUT: 1800 mL      18 @ 07:01  -  18 @ 04:32  --------------------------------------------------------  OUT:    Voided: 1050 mL  Total OUT: 1050 mL        Net:   18 @ 07:01  -  18 @ 07:00  --------------------------------------------------------  NET: 0 mL    18 @ 07:01  -  18 @ 04:32  --------------------------------------------------------  NET: 890 mL        Diet: Diet, Clear Liquid (18 @ 08:45)    IV Fluids: dextrose 5%. 1000 milliLiter(s) (50 mL/Hr) IV Continuous <Continuous>  sodium chloride 0.9%. 1000 milliLiter(s) (100 mL/Hr) IV Continuous <Continuous>      Physical Examination:  General Appearance: NAD  HEENT: EOMI, sclera non-icteric.  Heart: RRR   Lungs: CTABL.   Abdomen:  Soft, nontender, nondistended. Obese. No rigidity, guarding, or rebound tenderness.   MSK/Extremities: Warm & well-perfused. No significant deformity or joint abnormality. Peripheral pulses intact.  Skin: Warm, dry. No jaundice.       Medications: [Standing]  ALBUTerol/ipratropium for Nebulization 3 milliLiter(s) Nebulizer every 6 hours  aspirin enteric coated 81 milliGRAM(s) Oral daily  atorvastatin 20 milliGRAM(s) Oral at bedtime  dextrose 5%. 1000 milliLiter(s) (50 mL/Hr) IV Continuous <Continuous>  dextrose 50% Injectable 12.5 Gram(s) IV Push once  dextrose 50% Injectable 25 Gram(s) IV Push once  dextrose 50% Injectable 25 Gram(s) IV Push once  heparin  Injectable 5000 Unit(s) SubCutaneous every 8 hours  insulin lispro (HumaLOG) corrective regimen sliding scale   SubCutaneous every 4 hours  metoprolol succinate ER 25 milliGRAM(s) Oral daily  pantoprazole    Tablet 40 milliGRAM(s) Oral before breakfast  pantoprazole  Injectable 40 milliGRAM(s) IV Push daily  sodium chloride 0.9%. 1000 milliLiter(s) (100 mL/Hr) IV Continuous <Continuous>    DVT Prophylaxis: heparin  Injectable 5000 Unit(s) SubCutaneous every 8 hours    GI Prophylaxis: pantoprazole    Tablet 40 milliGRAM(s) Oral before breakfast  pantoprazole  Injectable 40 milliGRAM(s) IV Push daily    Antibiotics:   Anticoagulation:   Medications:[PRN]  acetaminophen   Tablet. 650 milliGRAM(s) Oral every 6 hours PRN  dextrose 40% Gel 15 Gram(s) Oral once PRN  glucagon  Injectable 1 milliGRAM(s) IntraMuscular once PRN  HYDROmorphone  Injectable 0.5 milliGRAM(s) IV Push every 4 hours PRN  oxyCODONE    IR 5 milliGRAM(s) Oral every 6 hours PRN      Labs:                        10.3   12.54 )-----------( 162      ( 2018 00:10 )             31.9     07-30    134<L>  |  103  |  35<H>  ----------------------------<  130<H>  4.3   |  19  |  1.9<H>    Ca    8.5      2018 00:10  Phos  2.8     07-30  Mg     2.1     07-30          Urine/Micro:    Urinalysis Basic - ( 2018 18:00 )    Color: Yellow / Appearance: Clear / S.015 / pH: x  Gluc: x / Ketone: Negative  / Bili: Negative / Urobili: 0.2 mg/dL   Blood: x / Protein: 30 mg/dL / Nitrite: Negative   Leuk Esterase: Negative / RBC: x / WBC x   Sq Epi: x / Non Sq Epi: Occasional /HPF / Bacteria: x        Imaging:     INTERPRETATION: REPORT:   TRANSTHORACIC ECHOCARDIOGRAM REPORT   Date of Exam: 2018 10:58:54 AM     Summary:   1. Left ventricular ejection fraction, by visual estimation, is 50%.   2. Mildly decreased global left ventricular systolic function.   3. LV Ejection Fraction by Warren's Method with a biplane EF of 48 %.   4. Mid and apical anterior septum, apical inferior segment, and apex are   abnormal as described above.   5. Sclerotic aortic valve.   6. No evidence of aortic stenosis.   7. Trivial aortic regurgitation.   8. Mild tricuspid regurgitation.   9. Estimated pulmonary artery systolic pressure is 35.5 mmHg assuming a   right atrial pressure of 3 mmHg, which is consistent with borderline   pulmonary hypertension.       Assessment:  84y Male patient admitted S/P reversal of ileostomy, small bowel resection, primary anastomosis    Doing well    Plan:  Calculated FENa yesterday 1.3  Cr down today to 1.9 from 2.1  On reg diet, can IVL  OOB  IS  Pain control  F/u cardiology  F/u medicine  Neph: BUBBA resolving. Appreciate recs    Date/Time: 18 @ 04:32 Progress Note: General Surgery  Patient: ZEESHAN MCCLENDON , 84y (26-Oct-1933)Male   MRN: [id number]  Location: [address]  Visit: 18 Inpatient  Date: 18 @ 04:32  Hospital Day: 4  Post-op Day: 3    Procedure/Diagnosis: reversal of ileostomy, small bowel resection, primary anastomosis    Events/ 24h: No acute events overnight. Pain controlled. No n/v/d. Tolerating clears. 2-3 soft BMs. Voiding.     Vitals: T(F): 97.4 (18 @ 20:38), Max: 98.7 (18 @ 17:46)  HR: 70 (18 @ 20:38)  BP: 125/59 (18 @ 20:38) (108/48 - 148/67)  RR: --  SpO2: 98% (18 @ 16:00)    In:   18 @ 07:01  -  18 @ 07:00  --------------------------------------------------------  IN: 1800 mL    18 @ 07:  -  18 @ 04:32  --------------------------------------------------------  IN: 1940 mL      Out:   18 @ 07:01  -  18 @ 07:00  --------------------------------------------------------  OUT:    Voided: 1800 mL  Total OUT: 1800 mL      18 @ 07:01  -  18 @ 04:32  --------------------------------------------------------  OUT:    Voided: 1050 mL  Total OUT: 1050 mL        Net:   18 @ 07:01  -  18 @ 07:00  --------------------------------------------------------  NET: 0 mL    18 @ 07:01  -  18 @ 04:32  --------------------------------------------------------  NET: 890 mL        Diet: Diet, Clear Liquid (18 @ 08:45)    IV Fluids: dextrose 5%. 1000 milliLiter(s) (50 mL/Hr) IV Continuous <Continuous>  sodium chloride 0.9%. 1000 milliLiter(s) (100 mL/Hr) IV Continuous <Continuous>      Physical Examination:  General Appearance: NAD  HEENT: EOMI, sclera non-icteric.  Heart: RRR   Lungs: CTABL.   Abdomen:  Soft, nontender, nondistended. Obese. No rigidity, guarding, or rebound tenderness.   MSK/Extremities: Warm & well-perfused. No significant deformity or joint abnormality. Peripheral pulses intact.  Skin: Warm, dry. No jaundice.       Medications: [Standing]  ALBUTerol/ipratropium for Nebulization 3 milliLiter(s) Nebulizer every 6 hours  aspirin enteric coated 81 milliGRAM(s) Oral daily  atorvastatin 20 milliGRAM(s) Oral at bedtime  dextrose 5%. 1000 milliLiter(s) (50 mL/Hr) IV Continuous <Continuous>  dextrose 50% Injectable 12.5 Gram(s) IV Push once  dextrose 50% Injectable 25 Gram(s) IV Push once  dextrose 50% Injectable 25 Gram(s) IV Push once  heparin  Injectable 5000 Unit(s) SubCutaneous every 8 hours  insulin lispro (HumaLOG) corrective regimen sliding scale   SubCutaneous every 4 hours  metoprolol succinate ER 25 milliGRAM(s) Oral daily  pantoprazole    Tablet 40 milliGRAM(s) Oral before breakfast  pantoprazole  Injectable 40 milliGRAM(s) IV Push daily  sodium chloride 0.9%. 1000 milliLiter(s) (100 mL/Hr) IV Continuous <Continuous>    DVT Prophylaxis: heparin  Injectable 5000 Unit(s) SubCutaneous every 8 hours    GI Prophylaxis: pantoprazole    Tablet 40 milliGRAM(s) Oral before breakfast  pantoprazole  Injectable 40 milliGRAM(s) IV Push daily    Antibiotics:   Anticoagulation:   Medications:[PRN]  acetaminophen   Tablet. 650 milliGRAM(s) Oral every 6 hours PRN  dextrose 40% Gel 15 Gram(s) Oral once PRN  glucagon  Injectable 1 milliGRAM(s) IntraMuscular once PRN  HYDROmorphone  Injectable 0.5 milliGRAM(s) IV Push every 4 hours PRN  oxyCODONE    IR 5 milliGRAM(s) Oral every 6 hours PRN      Labs:                        10.3   12.54 )-----------( 162      ( 2018 00:10 )             31.9     07-30    134<L>  |  103  |  35<H>  ----------------------------<  130<H>  4.3   |  19  |  1.9<H>    Ca    8.5      2018 00:10  Phos  2.8     07-30  Mg     2.1     07-30          Urine/Micro:    Urinalysis Basic - ( 2018 18:00 )    Color: Yellow / Appearance: Clear / S.015 / pH: x  Gluc: x / Ketone: Negative  / Bili: Negative / Urobili: 0.2 mg/dL   Blood: x / Protein: 30 mg/dL / Nitrite: Negative   Leuk Esterase: Negative / RBC: x / WBC x   Sq Epi: x / Non Sq Epi: Occasional /HPF / Bacteria: x        Imaging:     INTERPRETATION: REPORT:   TRANSTHORACIC ECHOCARDIOGRAM REPORT   Date of Exam: 2018 10:58:54 AM     Summary:   1. Left ventricular ejection fraction, by visual estimation, is 50%.   2. Mildly decreased global left ventricular systolic function.   3. LV Ejection Fraction by Warren's Method with a biplane EF of 48 %.   4. Mid and apical anterior septum, apical inferior segment, and apex are   abnormal as described above.   5. Sclerotic aortic valve.   6. No evidence of aortic stenosis.   7. Trivial aortic regurgitation.   8. Mild tricuspid regurgitation.   9. Estimated pulmonary artery systolic pressure is 35.5 mmHg assuming a   right atrial pressure of 3 mmHg, which is consistent with borderline   pulmonary hypertension.       Assessment:  84y Male patient admitted S/P reversal of ileostomy, small bowel resection, primary anastomosis    Doing well    Plan:  Calculated FENa yesterday 1.3  Cr down today to 1.9 from 2.1  On reg diet, can IVL  OOB  IS  Pain control  F/u cardiology  F/u medicine  Neph: BUBBA resolving. Appreciate recs    Date/Time: 18 @ 04:32

## 2018-07-30 NOTE — CONSULT NOTE ADULT - ASSESSMENT
IMPRESSION: Rehab of gait dysfunction      PRECAUTIONS: [  ] Cardiac  [  ] Respiratory  [  ] Seizures [  ] Contact Isolation  [  ] Droplet Isolation  [  ] Other    Weight Bearing Status:     RECOMMENDATION:    Out of Bed to Chair     DVT/Decubiti Prophylaxis    REHAB PLAN:     [x   ] Bedside P/T 3-5 times a week   [   ]   Bedside O/T  2-3 times a week             [   ] No Rehab Therapy Indicated                   [   ]  Speech Therapy   Conditioning/ROM                                    ADL  Bed Mobility                                               Conditioning/ROM  Transfers                                                     Bed Mobility  Sitting /Standing Balance                         Transfers                                        Gait Training                                               Sitting/Standing Balance  Stair Training [   ]Applicable                    Home equipment Eval                                                                        Splinting  [   ] Only      GOALS:   ADL   [   ]   Independent                    Transfers  [  x ] Independent                          Ambulation  [ x  ] Independent     [  x  ] With device                            [   ]  CG                                                         [   ]  CG                                                                  [   ] CG                            [    ] Min A                                                   [   ] Min A                                                              [   ] Min  A          DISCHARGE PLAN:   [   ]  Good candidate for Intensive Rehabilitation/Hospital based-[facility]A [facility]                                             Will tolerate 3hrs Intensive Rehab Daily                                       [    ]  Short Term Rehab in Skilled Nursing Facility                                       [  x  ]  Home with Outpatient or VN services                                         [    ]  Possible Candidate for Intensive Hospital based Rehab

## 2018-07-30 NOTE — PROGRESS NOTE ADULT - ASSESSMENT
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 Torres) sigmoid diverticulitis s/p elective reversal of ileostomy on 7/27    complicated by BUBBA likely due to periop hemodynamic changes  Now cr stable and near baseline     - trend Cr  - avoid contrast/renal toxins   - monitor UOP  - as increases PO intake, can cut back on IVF  - hyperK likely due to BUBBA- resolved     Anemia  - Hgb at goal    HTN  controlled    MBD  -  phos at goal no need for binder

## 2018-07-30 NOTE — PROGRESS NOTE ADULT - SUBJECTIVE AND OBJECTIVE BOX
ZEESHAN MCCLENDON  84y  Male  HPI:    MEDICATIONS  (STANDING):  ALBUTerol/ipratropium for Nebulization 3 milliLiter(s) Nebulizer every 6 hours  aspirin enteric coated 81 milliGRAM(s) Oral daily  atorvastatin 20 milliGRAM(s) Oral at bedtime  dextrose 5%. 1000 milliLiter(s) (50 mL/Hr) IV Continuous <Continuous>  dextrose 50% Injectable 12.5 Gram(s) IV Push once  dextrose 50% Injectable 25 Gram(s) IV Push once  dextrose 50% Injectable 25 Gram(s) IV Push once  heparin  Injectable 5000 Unit(s) SubCutaneous every 8 hours  insulin lispro (HumaLOG) corrective regimen sliding scale   SubCutaneous every 4 hours  metoprolol succinate ER 25 milliGRAM(s) Oral daily  pantoprazole    Tablet 40 milliGRAM(s) Oral before breakfast  sodium chloride 0.9%. 1000 milliLiter(s) (50 mL/Hr) IV Continuous <Continuous>    MEDICATIONS  (PRN):  acetaminophen   Tablet. 650 milliGRAM(s) Oral every 6 hours PRN Mild Pain (1 - 3)  dextrose 40% Gel 15 Gram(s) Oral once PRN Blood Glucose LESS THAN 70 milliGRAM(s)/deciliter  glucagon  Injectable 1 milliGRAM(s) IntraMuscular once PRN Glucose LESS THAN 70 milligrams/deciliter  HYDROmorphone  Injectable 0.5 milliGRAM(s) IV Push every 4 hours PRN Severe Pain (7 - 10)  oxyCODONE    IR 5 milliGRAM(s) Oral every 6 hours PRN Moderate Pain (4 - 6)    INTERVAL EVENTS: Patient seen today, chart reviewed. Patient 3 days post op, tolerating clear liquid diet.    T(C): 36.6 (18 @ 15:36), Max: 37.1 (18 @ 17:46)  HR: 71 (18 @ 15:36) (70 - 75)  BP: 114/57 (18 @ 15:36) (94/55 - 148/67)  RR: 18 (18 @ 15:36) (18 - 18)  SpO2: 99% (18 @ 12:16) (99% - 100%)  Wt(kg): --Vital Signs Last 24 Hrs  T(C): 36.6 (2018 15:36), Max: 37.1 (2018 17:46)  T(F): 97.9 (2018 15:36), Max: 98.7 (2018 17:46)  HR: 71 (2018 15:36) (70 - 75)  BP: 114/57 (2018 15:36) (94/55 - 148/67)  BP(mean): --  RR: 18 (2018 15:36) (18 - 18)  SpO2: 99% (2018 12:16) (99% - 100%)    PHYSICAL EXAM:  GENERAL: NAD  NECK: Supple, No JVD  CHEST/LUNG: Clear  HEART: S1, S2  ABDOMEN: Soft, Nontender, Mildly distended; Tempanic, Bowel sounds present  EXTREMITIES: No clubbing, cyanosis, or edema  SKIN: No rashes or lesions    LABS:  Labs:                        10.3   12.54 )-----------( 162      ( 2018 00:10 )             31.9             07-30    134<L>  |  103  |  35<H>  ----------------------------<  130<H>  4.3   |  19  |  1.9<H>    Ca    8.5      2018 00:10  Phos  2.8     07-30  Mg     2.1     07-30      Urinalysis Basic - ( 2018 18:00 )    Color: Yellow / Appearance: Clear / S.015 / pH: x  Gluc: x / Ketone: Negative  / Bili: Negative / Urobili: 0.2 mg/dL   Blood: x / Protein: 30 mg/dL / Nitrite: Negative   Leuk Esterase: Negative / RBC: x / WBC x   Sq Epi: x / Non Sq Epi: Occasional /HPF / Bacteria: x              RADIOLOGY & ADDITIONAL TESTS:  none

## 2018-07-30 NOTE — PROGRESS NOTE ADULT - SUBJECTIVE AND OBJECTIVE BOX
Nephrology progress note    Patient was seen and examined, events over the last 24 h noted .  transferred from ICU  Cr stable and at baseline    Allergies:  No Known Allergies    Hospital Medications:   MEDICATIONS  (STANDING):  ALBUTerol/ipratropium for Nebulization 3 milliLiter(s) Nebulizer every 6 hours  aspirin enteric coated 81 milliGRAM(s) Oral daily  atorvastatin 20 milliGRAM(s) Oral at bedtime  dextrose 5%. 1000 milliLiter(s) (50 mL/Hr) IV Continuous <Continuous>    heparin  Injectable 5000 Unit(s) SubCutaneous every 8 hours  insulin lispro (HumaLOG) corrective regimen sliding scale   SubCutaneous every 4 hours  metoprolol succinate ER 25 milliGRAM(s) Oral daily  pantoprazole    Tablet 40 milliGRAM(s) Oral before breakfast  pantoprazole  Injectable 40 milliGRAM(s) IV Push daily  sodium chloride 0.9%. 1000 milliLiter(s) (50 mL/Hr) IV Continuous <Continuous>        VITALS:  T(F): 97.3 (18 @ 10:48), Max: 98.7 (18 @ 17:46)  HR: 70 (18 @ 10:48)  BP: 117/56 (18 @ 10:48)  RR: 18 (18 @ 10:48)  SpO2: 100% (18 @ 10:48)  Wt(kg): --     @ 07:  -   @ 07:00  --------------------------------------------------------  IN: 1800 mL / OUT: 1800 mL / NET: 0 mL     @ 07:  -   @ 07:00  --------------------------------------------------------  IN: 2340 mL / OUT: 1050 mL / NET: 1290 mL     @ 07:  -   @ 12:16  --------------------------------------------------------  IN: 400 mL / OUT: 690 mL / NET: -290 mL      Height (cm): 165.1 ( @ 17:46)  Weight (kg): 79.9 ( @ 17:46)  BMI (kg/m2): 29.3 ( @ 17:46)  BSA (m2): 1.87 ( @ 17:46)    PHYSICAL EXAM:  Constitutional: NAD  HEENT: anicteric sclera, oropharynx clear, MMM  Neck: No JVD  Respiratory: CTAB, no wheezes, rales or rhonchi  Cardiovascular: S1, S2, RRR  Gastrointestinal: BS+, soft, NT/ND  Extremities: No cyanosis or clubbing. No peripheral edema  :  No armendariz.   Skin: No rashes    LABS:      134<L>  |  103  |  35<H>  ----------------------------<  130<H>  4.3   |  19  |  1.9<H>    Ca    8.5      2018 00:10  Phos  2.8       Mg     2.1                                 10.3   12.54 )-----------( 162      ( 2018 00:10 )             31.9       Urine Studies:  Urinalysis Basic - ( 2018 18:00 )    Color: Yellow / Appearance: Clear / S.015 / pH:   Gluc:  / Ketone: Negative  / Bili: Negative / Urobili: 0.2 mg/dL   Blood:  / Protein: 30 mg/dL / Nitrite: Negative   Leuk Esterase: Negative / RBC:  / WBC    Sq Epi:  / Non Sq Epi: Occasional /HPF / Bacteria:       Creatinine, Random Urine: 53 mg/dL ( @ 18:00)  Sodium, Random Urine: 44.0 mmoL/L ( @ 18:00)    RADIOLOGY & ADDITIONAL STUDIES:

## 2018-07-30 NOTE — CONSULT NOTE ADULT - SUBJECTIVE AND OBJECTIVE BOX
HPI: 83 yo M admitted on  for reversal of ileostomy. Prior to hospitalization he was ambulating with walker. Rehab consulted for eval      PAST MEDICAL & SURGICAL HISTORY:  Stented coronary artery: Proximal LAD 2016  Eye problem: with diabetes  Anemia: slight  GERD without esophagitis  SOB (shortness of breath) on exertion  Asthma: Mild only last attack yrs ago  HLD (hyperlipidemia)  HTN (hypertension)  Fistula of large intestine: colo-vesicula fistula  H/O diverticulitis of colon  Recurrent UTI (urinary tract infection)  Diabetes  CAD (coronary artery disease)  Elective surgery: PCI with 2 stents  Basal cell carcinoma: removal 2018  History of eye surgery: laser  Artificial cardiac pacemaker  History of partial colectomy  H/O ileostomy      Hospital Course:    TODAY'S SUBJECTIVE & REVIEW OF SYMPTOMS:     Constitutional WNL   Cardio WNL   Resp WNL   GI WNL  Heme WNL  Endo WNL  Skin WNL  MSK WNL  Neuro WNL  Cognitive WNL  Psych WNL      MEDICATIONS  (STANDING):  ALBUTerol/ipratropium for Nebulization 3 milliLiter(s) Nebulizer every 6 hours  aspirin enteric coated 81 milliGRAM(s) Oral daily  atorvastatin 20 milliGRAM(s) Oral at bedtime  dextrose 5%. 1000 milliLiter(s) (50 mL/Hr) IV Continuous <Continuous>  dextrose 50% Injectable 12.5 Gram(s) IV Push once  dextrose 50% Injectable 25 Gram(s) IV Push once  dextrose 50% Injectable 25 Gram(s) IV Push once  heparin  Injectable 5000 Unit(s) SubCutaneous every 8 hours  insulin lispro (HumaLOG) corrective regimen sliding scale   SubCutaneous every 4 hours  metoprolol succinate ER 25 milliGRAM(s) Oral daily  pantoprazole    Tablet 40 milliGRAM(s) Oral before breakfast  sodium chloride 0.9%. 1000 milliLiter(s) (50 mL/Hr) IV Continuous <Continuous>    MEDICATIONS  (PRN):  acetaminophen   Tablet. 650 milliGRAM(s) Oral every 6 hours PRN Mild Pain (1 - 3)  dextrose 40% Gel 15 Gram(s) Oral once PRN Blood Glucose LESS THAN 70 milliGRAM(s)/deciliter  glucagon  Injectable 1 milliGRAM(s) IntraMuscular once PRN Glucose LESS THAN 70 milligrams/deciliter  HYDROmorphone  Injectable 0.5 milliGRAM(s) IV Push every 4 hours PRN Severe Pain (7 - 10)  oxyCODONE    IR 5 milliGRAM(s) Oral every 6 hours PRN Moderate Pain (4 - 6)      FAMILY HISTORY:  CVA (cerebral vascular accident): hemorrhage  Family history of colon cancer in mother (Mother, Grandparent)      Allergies    No Known Allergies    Intolerances        SOCIAL HISTORY:    [  ] Etoh  [  ] Smoking  [  ] Substance abuse     Home Environment:  [  ] Home Alone  [ x ] Lives with Family  [  ] Home Health Aid    Dwelling:  [  ] Apartment  [x  ] Private House  [  ] Adult Home  [  ] Skilled Nursing Facility      [  ] Short Term  [  ] Long Term  [ x ] Stairs       Elevator [  ]    FUNCTIONAL STATUS PTA: (Check all that apply)  Ambulation: [ x  ]Independent    [  ] Dependent     [  ] Non-Ambulatory  Assistive Device: [  ] SA Cane  [  ]  Q Cane  [ x ] Walker  [  ]  Wheelchair  ADL : [x  ] Independent  [  ]  Dependent       Vital Signs Last 24 Hrs  T(C): 36.6 (2018 15:36), Max: 37.1 (2018 17:46)  T(F): 97.9 (2018 15:36), Max: 98.7 (2018 17:46)  HR: 71 (2018 15:36) (70 - 75)  BP: 114/57 (2018 15:36) (94/55 - 148/67)  BP(mean): 71 (2018 16:00) (71 - 71)  RR: 18 (2018 15:36) (18 - 18)  SpO2: 99% (2018 12:16) (98% - 100%)      PHYSICAL EXAM: Alert & Oriented X3  GENERAL: NAD, well-groomed, well-developed  HEAD:  Atraumatic, Normocephalic  CHEST/LUNG: Clear  HEART: S1S2+  ABDOMEN: Soft, Nontender  EXTREMITIES:  no calf tenderness    NERVOUS SYSTEM:  Cranial Nerves 2-12 intact [  ] Abnormal  [  ]  ROM: WFL all extremities [x  ]  Abnormal [  ]  Motor Strength: WFL all extremities  [x  ]  Abnormal [  ]  Sensation: intact to light touch [x  ] Abnormal [  ]  Reflexes: Symmetric [  ]  Abnormal [  ]    FUNCTIONAL STATUS:  Bed Mobility: Independent [  ]  Supervision [  ]  Needs Assistance [x  ]  N/A [  ]  Transfers: Independent [  ]  Supervision [  ]  Needs Assistance [x  ]  N/A [  ]   Ambulation: Independent [  ]  Supervision [  ]  Needs Assistance [  ]  N/A [  ]  ADL: Independent [  ] Requires Assistance [  ] N/A [  ]      LABS:                        10.3   12.54 )-----------( 162      ( 2018 00:10 )             31.9     07-30    134<L>  |  103  |  35<H>  ----------------------------<  130<H>  4.3   |  19  |  1.9<H>    Ca    8.5      2018 00:10  Phos  2.8     07-  Mg     2.1     30        Urinalysis Basic - ( 2018 18:00 )    Color: Yellow / Appearance: Clear / S.015 / pH: x  Gluc: x / Ketone: Negative  / Bili: Negative / Urobili: 0.2 mg/dL   Blood: x / Protein: 30 mg/dL / Nitrite: Negative   Leuk Esterase: Negative / RBC: x / WBC x   Sq Epi: x / Non Sq Epi: Occasional /HPF / Bacteria: x        RADIOLOGY & ADDITIONAL STUDIES:    Assesment:

## 2018-07-30 NOTE — PROGRESS NOTE ADULT - ASSESSMENT
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  Patient presented back to the hospital for elective reversal of ileostomy on 7/27    POD #3  - down graded from unit over night  - on clear liquid diet  - mild distension noted  - OOB to chair  - ambulate  - rx as per surgery    BUBBA, on CKD 3 -4  - followed by Dr Torres as out patient  - creat near baseline   - avoid contrast/renal toxins   - monitor UOP     Anemia  - Hgb stale    HTN  - controlled      Will follow with you

## 2018-07-31 ENCOUNTER — TRANSCRIPTION ENCOUNTER (OUTPATIENT)
Age: 83
End: 2018-07-31

## 2018-07-31 LAB
ANION GAP SERPL CALC-SCNC: 15 MMOL/L — HIGH (ref 7–14)
BASOPHILS # BLD AUTO: 0.02 K/UL — SIGNIFICANT CHANGE UP (ref 0–0.2)
BASOPHILS NFR BLD AUTO: 0.2 % — SIGNIFICANT CHANGE UP (ref 0–1)
BUN SERPL-MCNC: 30 MG/DL — HIGH (ref 10–20)
CALCIUM SERPL-MCNC: 8.4 MG/DL — LOW (ref 8.5–10.1)
CHLORIDE SERPL-SCNC: 102 MMOL/L — SIGNIFICANT CHANGE UP (ref 98–110)
CO2 SERPL-SCNC: 16 MMOL/L — LOW (ref 17–32)
CREAT SERPL-MCNC: 1.9 MG/DL — HIGH (ref 0.7–1.5)
EOSINOPHIL # BLD AUTO: 0.12 K/UL — SIGNIFICANT CHANGE UP (ref 0–0.7)
EOSINOPHIL NFR BLD AUTO: 1.1 % — SIGNIFICANT CHANGE UP (ref 0–8)
GLUCOSE SERPL-MCNC: 141 MG/DL — HIGH (ref 70–99)
HCT VFR BLD CALC: 30.5 % — LOW (ref 42–52)
HGB BLD-MCNC: 9.8 G/DL — LOW (ref 14–18)
IMM GRANULOCYTES NFR BLD AUTO: 0.4 % — HIGH (ref 0.1–0.3)
LYMPHOCYTES # BLD AUTO: 0.86 K/UL — LOW (ref 1.2–3.4)
LYMPHOCYTES # BLD AUTO: 8.2 % — LOW (ref 20.5–51.1)
MAGNESIUM SERPL-MCNC: 1.9 MG/DL — SIGNIFICANT CHANGE UP (ref 1.8–2.4)
MCHC RBC-ENTMCNC: 28.2 PG — SIGNIFICANT CHANGE UP (ref 27–31)
MCHC RBC-ENTMCNC: 32.1 G/DL — SIGNIFICANT CHANGE UP (ref 32–37)
MCV RBC AUTO: 87.9 FL — SIGNIFICANT CHANGE UP (ref 80–94)
MONOCYTES # BLD AUTO: 0.69 K/UL — HIGH (ref 0.1–0.6)
MONOCYTES NFR BLD AUTO: 6.6 % — SIGNIFICANT CHANGE UP (ref 1.7–9.3)
NEUTROPHILS # BLD AUTO: 8.76 K/UL — HIGH (ref 1.4–6.5)
NEUTROPHILS NFR BLD AUTO: 83.5 % — HIGH (ref 42.2–75.2)
NRBC # BLD: 0 /100 WBCS — SIGNIFICANT CHANGE UP (ref 0–0)
PHOSPHATE SERPL-MCNC: 3.3 MG/DL — SIGNIFICANT CHANGE UP (ref 2.1–4.9)
PLATELET # BLD AUTO: 163 K/UL — SIGNIFICANT CHANGE UP (ref 130–400)
POTASSIUM SERPL-MCNC: 4.3 MMOL/L — SIGNIFICANT CHANGE UP (ref 3.5–5)
POTASSIUM SERPL-SCNC: 4.3 MMOL/L — SIGNIFICANT CHANGE UP (ref 3.5–5)
RBC # BLD: 3.47 M/UL — LOW (ref 4.7–6.1)
RBC # FLD: 15 % — HIGH (ref 11.5–14.5)
SODIUM SERPL-SCNC: 133 MMOL/L — LOW (ref 135–146)
SURGICAL PATHOLOGY STUDY: SIGNIFICANT CHANGE UP
WBC # BLD: 10.49 K/UL — SIGNIFICANT CHANGE UP (ref 4.8–10.8)
WBC # FLD AUTO: 10.49 K/UL — SIGNIFICANT CHANGE UP (ref 4.8–10.8)

## 2018-07-31 RX ORDER — SODIUM CHLORIDE 9 MG/ML
1000 INJECTION, SOLUTION INTRAVENOUS
Qty: 0 | Refills: 0 | Status: DISCONTINUED | OUTPATIENT
Start: 2018-07-31 | End: 2018-07-31

## 2018-07-31 RX ORDER — SODIUM CHLORIDE 9 MG/ML
1000 INJECTION, SOLUTION INTRAVENOUS
Qty: 0 | Refills: 0 | Status: DISCONTINUED | OUTPATIENT
Start: 2018-07-31 | End: 2018-08-02

## 2018-07-31 RX ADMIN — Medication 81 MILLIGRAM(S): at 11:36

## 2018-07-31 RX ADMIN — Medication 25 MILLIGRAM(S): at 21:06

## 2018-07-31 RX ADMIN — Medication 1: at 05:28

## 2018-07-31 RX ADMIN — HEPARIN SODIUM 5000 UNIT(S): 5000 INJECTION INTRAVENOUS; SUBCUTANEOUS at 13:32

## 2018-07-31 RX ADMIN — HYDROMORPHONE HYDROCHLORIDE 0.5 MILLIGRAM(S): 2 INJECTION INTRAMUSCULAR; INTRAVENOUS; SUBCUTANEOUS at 16:59

## 2018-07-31 RX ADMIN — HYDROMORPHONE HYDROCHLORIDE 0.5 MILLIGRAM(S): 2 INJECTION INTRAMUSCULAR; INTRAVENOUS; SUBCUTANEOUS at 11:16

## 2018-07-31 RX ADMIN — HYDROMORPHONE HYDROCHLORIDE 0.5 MILLIGRAM(S): 2 INJECTION INTRAMUSCULAR; INTRAVENOUS; SUBCUTANEOUS at 21:25

## 2018-07-31 RX ADMIN — HYDROMORPHONE HYDROCHLORIDE 0.5 MILLIGRAM(S): 2 INJECTION INTRAMUSCULAR; INTRAVENOUS; SUBCUTANEOUS at 04:01

## 2018-07-31 RX ADMIN — Medication 3 MILLILITER(S): at 20:21

## 2018-07-31 RX ADMIN — Medication 3 MILLILITER(S): at 14:21

## 2018-07-31 RX ADMIN — HYDROMORPHONE HYDROCHLORIDE 0.5 MILLIGRAM(S): 2 INJECTION INTRAMUSCULAR; INTRAVENOUS; SUBCUTANEOUS at 13:01

## 2018-07-31 RX ADMIN — HYDROMORPHONE HYDROCHLORIDE 0.5 MILLIGRAM(S): 2 INJECTION INTRAMUSCULAR; INTRAVENOUS; SUBCUTANEOUS at 03:13

## 2018-07-31 RX ADMIN — HYDROMORPHONE HYDROCHLORIDE 0.5 MILLIGRAM(S): 2 INJECTION INTRAMUSCULAR; INTRAVENOUS; SUBCUTANEOUS at 13:36

## 2018-07-31 RX ADMIN — SODIUM CHLORIDE 75 MILLILITER(S): 9 INJECTION, SOLUTION INTRAVENOUS at 20:26

## 2018-07-31 RX ADMIN — HEPARIN SODIUM 5000 UNIT(S): 5000 INJECTION INTRAVENOUS; SUBCUTANEOUS at 05:27

## 2018-07-31 RX ADMIN — PANTOPRAZOLE SODIUM 40 MILLIGRAM(S): 20 TABLET, DELAYED RELEASE ORAL at 05:28

## 2018-07-31 RX ADMIN — Medication 3 MILLILITER(S): at 09:32

## 2018-07-31 RX ADMIN — Medication 1: at 13:00

## 2018-07-31 RX ADMIN — HYDROMORPHONE HYDROCHLORIDE 0.5 MILLIGRAM(S): 2 INJECTION INTRAMUSCULAR; INTRAVENOUS; SUBCUTANEOUS at 21:06

## 2018-07-31 RX ADMIN — Medication 1: at 22:55

## 2018-07-31 RX ADMIN — HEPARIN SODIUM 5000 UNIT(S): 5000 INJECTION INTRAVENOUS; SUBCUTANEOUS at 21:07

## 2018-07-31 RX ADMIN — HYDROMORPHONE HYDROCHLORIDE 0.5 MILLIGRAM(S): 2 INJECTION INTRAMUSCULAR; INTRAVENOUS; SUBCUTANEOUS at 08:01

## 2018-07-31 RX ADMIN — ATORVASTATIN CALCIUM 20 MILLIGRAM(S): 80 TABLET, FILM COATED ORAL at 21:06

## 2018-07-31 NOTE — DISCHARGE NOTE ADULT - INSTRUCTIONS
continue full liquid diet  no strenuous activity  keep wound clean and dry continue full liquid diet  no strenuous activity  keep wound clean and dry  no tub bath

## 2018-07-31 NOTE — PROGRESS NOTE ADULT - ASSESSMENT
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  Patient presented back to the hospital for elective reversal of ileostomy on 7/27    POD #4  - down graded from telemetry over night  - on full liquid diet, Dilaudid for pain  - increased distension noted, discussed with primary nurse who noted the same  - concern for developing ileus?  - call placed to surgical house staff to re-evaluate  - instructed patient to hold off eating until re-evaluated by surgey  - rx as per surgery    BUBBA, on CKD 3 -4  - followed by Dr Torres as out patient  - creat near baseline   - avoid contrast/renal toxins   - monitor UOP     Hyponatremia  - receiving fluids  - continue to monitor    Anemia  - Hgb stale    HTN  - BP stable    Will follow with you

## 2018-07-31 NOTE — DISCHARGE NOTE ADULT - CARE PLAN
Principal Discharge DX:	Elective surgery  Goal:	recovery  Assessment and plan of treatment:	A/p elective surgery for reversal of loop ileostomy  recovery  continue full liquid diet  follow up with Dr Martinez

## 2018-07-31 NOTE — DISCHARGE NOTE ADULT - PLAN OF CARE
recovery A/p elective surgery for reversal of loop ileostomy  recovery  continue full liquid diet  follow up with Dr Martinez

## 2018-07-31 NOTE — PHYSICAL THERAPY INITIAL EVALUATION ADULT - GENERAL OBSERVATIONS, REHAB EVAL
Time in: 1000 am Time out: 1039 am Chart reviewed. Pt. seen semirecline in bed in No apparent distress, + PICC line right UE, agreed to activity

## 2018-07-31 NOTE — DISCHARGE NOTE ADULT - HOSPITAL COURSE
84M with PMH of sigmoid diverticulitis with colo-vesicular fistula S/P sigmoid colon resection and resection of colo vesicular fistula with primary anastomosis on 1/18, complicated by perforated viscus, was subsequently re-operated on 1/13/18 with creation of diverting loop ileostomy and was discharged on 1/25, re-admitted 2/9/18 2/2 dehydration, had PICC line in place from receiving Ertapenem IV, was DC with daily NS infusions via PICC line at home. Ileostomy has had good output.   He presents this admission for elective reversal of his ileostomy. S/p reversal of loop ileostomy, small bowel resection, and primary anastomosis, procedure was uncomplicated. Patient's pre-op labs significant for K+ 6.1 on 7/13, repeat was 5.1 prior to the procedure. He remains hyperkalemic on routine blood work post-operatively, K+ 6.0 at midnight. Patient remained asymptomatic, Also noted BUBBA on CKD on AM labs, creatinine of 2.5 from 1.9 on pre-op labs. Patient was given 1g Ca++ gluconate, 10U insulin, and Kayexalate repeat K+ 5.1. EKG relatively unchanged from pre-op EKG, ventricularly paced rhythm. Patient was upgraded to ICU for close monitoring in the settings of hyperkalemia, worsening renal failure.  In ICU patient remained asymptomatic, FENA 1.4, hyperkalemia resolved, BUBBA improved. Patient was on clear liquid diet, tolerating well, had BM. Ambulating  pt downgraded to floor on 7/18/18 . pt was evaluated by nephrology, medicine, PT/Rehab, and nutrition  sec to post op ileus. pt treated medically  on 8/4/18 , TPN discontinued . pt has no acute complaints + flatus, + BM and tolerated well full liquid diet and ambulated. Per Dr Martinez, pt discharged home in stable condition . pt advised to follow up with Dr Martinez in 1 week and PMD and nephrology. precautions provided.

## 2018-07-31 NOTE — PROGRESS NOTE ADULT - SUBJECTIVE AND OBJECTIVE BOX
DANELLEZEESHAN  84y  Male  HPI:    MEDICATIONS  (STANDING):  ALBUTerol/ipratropium for Nebulization 3 milliLiter(s) Nebulizer every 6 hours  aspirin enteric coated 81 milliGRAM(s) Oral daily  atorvastatin 20 milliGRAM(s) Oral at bedtime  dextrose 5%. 1000 milliLiter(s) (50 mL/Hr) IV Continuous <Continuous>  dextrose 50% Injectable 12.5 Gram(s) IV Push once  dextrose 50% Injectable 25 Gram(s) IV Push once  dextrose 50% Injectable 25 Gram(s) IV Push once  heparin  Injectable 5000 Unit(s) SubCutaneous every 8 hours  insulin lispro (HumaLOG) corrective regimen sliding scale   SubCutaneous every 4 hours  metoprolol succinate ER 25 milliGRAM(s) Oral daily  pantoprazole    Tablet 40 milliGRAM(s) Oral before breakfast  sodium chloride 0.9%. 1000 milliLiter(s) (50 mL/Hr) IV Continuous <Continuous>    MEDICATIONS  (PRN):  acetaminophen   Tablet. 650 milliGRAM(s) Oral every 6 hours PRN Mild Pain (1 - 3)  dextrose 40% Gel 15 Gram(s) Oral once PRN Blood Glucose LESS THAN 70 milliGRAM(s)/deciliter  glucagon  Injectable 1 milliGRAM(s) IntraMuscular once PRN Glucose LESS THAN 70 milligrams/deciliter  HYDROmorphone  Injectable 0.5 milliGRAM(s) IV Push every 4 hours PRN Severe Pain (7 - 10)  oxyCODONE    IR 5 milliGRAM(s) Oral every 6 hours PRN Moderate Pain (4 - 6)    INTERVAL EVENTS: Patient seen today now on surgical floor, transitioned from telemetry yesterday.Patient has full liquid tray in front of him, partially consumed. Patient states he had a BM last night?    T(C): 37.4 (07-31-18 @ 08:00), Max: 37.4 (07-30-18 @ 22:56)  HR: 70 (07-31-18 @ 08:00) (70 - 75)  BP: 129/60 (07-31-18 @ 08:00) (98/59 - 131/62)  RR: 18 (07-31-18 @ 08:00) (18 - 18)  SpO2: 95% (07-30-18 @ 22:56) (95% - 100%)  Wt(kg): --Vital Signs Last 24 Hrs  T(C): 37.4 (31 Jul 2018 08:00), Max: 37.4 (30 Jul 2018 22:56)  T(F): 99.4 (31 Jul 2018 08:00), Max: 99.4 (30 Jul 2018 22:56)  HR: 70 (31 Jul 2018 08:00) (70 - 75)  BP: 129/60 (31 Jul 2018 08:00) (98/59 - 131/62)  BP(mean): --  RR: 18 (31 Jul 2018 08:00) (18 - 18)  SpO2: 95% (30 Jul 2018 22:56) (95% - 100%)    PHYSICAL EXAM:  GENERAL: NAD  NECK: Supple  CHEST/LUNG: Clear  HEART: S1, S2  ABDOMEN: Soft, tender, Moderately distended; Bowel sounds diminished  EXTREMITIES: No clubbing, cyanosis, or edema    LABS:  Labs:                        9.8    10.49 )-----------( 163      ( 31 Jul 2018 01:01 )             30.5             07-31    133<L>  |  102  |  30<H>  ----------------------------<  141<H>  4.3   |  16<L>  |  1.9<H>    Ca    8.4<L>      31 Jul 2018 01:01  Phos  3.3     07-31  Mg     1.9     07-31        RADIOLOGY & ADDITIONAL TESTS:  no new studies

## 2018-07-31 NOTE — DISCHARGE NOTE ADULT - CARE PROVIDER_API CALL
Deniz Martinez), Surgery; Surgical Critical Care  [address] [place] [address]  Phone: [phone]  Fax: [phone]

## 2018-07-31 NOTE — PROGRESS NOTE ADULT - SUBJECTIVE AND OBJECTIVE BOX
Progress Note: General Surgery  Patient: ZEESHAN MCCLENDON , 84y (26-Oct-1933)Male   MRN: [id number]  Location: [address]  Visit: 07-27-18 Inpatient  Date: 07-31-18 @ 05:54  Hospital Day: 5  Post-op Day: 4    Procedure/Diagnosis: s/p reversal of ileostomy    Events/ 24h: No acute events overnight. Pain controlled. Ambulating. BM this AM.    Vitals: T(F): 99.4 (07-30-18 @ 22:56), Max: 99.4 (07-30-18 @ 22:56)  HR: 70 (07-30-18 @ 22:56)  BP: 117/57 (07-30-18 @ 22:56) (94/55 - 131/62)  RR: 18 (07-30-18 @ 22:56)  SpO2: 95% (07-30-18 @ 22:56)    In:   07-29-18 @ 07:01  -  07-30-18 @ 07:00  --------------------------------------------------------  IN: 2340 mL    07-30-18 @ 07:01  -  07-31-18 @ 05:54  --------------------------------------------------------  IN: 1140 mL      Out:   07-29-18 @ 07:01  -  07-30-18 @ 07:00  --------------------------------------------------------  OUT:    Voided: 1050 mL  Total OUT: 1050 mL      07-30-18 @ 07:01  -  07-31-18 @ 05:54  --------------------------------------------------------  OUT:    Voided: 1140 mL  Total OUT: 1140 mL        Net:   07-29-18 @ 07:01  -  07-30-18 @ 07:00  --------------------------------------------------------  NET: 1290 mL    07-30-18 @ 07:01  -  07-31-18 @ 05:54  --------------------------------------------------------  NET: 0 mL        Diet: Diet, Full Liquid:   Supplement Feeding Modality:  Oral  Glucerna Shake Cans or Servings Per Day:  1       Frequency:  Three Times a day (07-30-18 @ 17:46)    IV Fluids: dextrose 5%. 1000 milliLiter(s) (50 mL/Hr) IV Continuous <Continuous>  sodium chloride 0.9%. 1000 milliLiter(s) (50 mL/Hr) IV Continuous <Continuous>      Physical Examination:  General Appearance: NAD  HEENT: EOMI, sclera non-icteric.  Heart: RRR   Lungs: CTABL.   Abdomen:  Soft, mildly tender in the BLLQ, nondistended. Obese. No rigidity, guarding, or rebound tenderness.   MSK/Extremities: Warm & well-perfused. No significant deformity or joint abnormality. Peripheral pulses intact.  Skin: Warm, dry. No jaundice.       Medications: [Standing]  ALBUTerol/ipratropium for Nebulization 3 milliLiter(s) Nebulizer every 6 hours  aspirin enteric coated 81 milliGRAM(s) Oral daily  atorvastatin 20 milliGRAM(s) Oral at bedtime  dextrose 5%. 1000 milliLiter(s) (50 mL/Hr) IV Continuous <Continuous>  dextrose 50% Injectable 12.5 Gram(s) IV Push once  dextrose 50% Injectable 25 Gram(s) IV Push once  dextrose 50% Injectable 25 Gram(s) IV Push once  heparin  Injectable 5000 Unit(s) SubCutaneous every 8 hours  insulin lispro (HumaLOG) corrective regimen sliding scale   SubCutaneous every 4 hours  metoprolol succinate ER 25 milliGRAM(s) Oral daily  pantoprazole    Tablet 40 milliGRAM(s) Oral before breakfast  sodium chloride 0.9%. 1000 milliLiter(s) (50 mL/Hr) IV Continuous <Continuous>    DVT Prophylaxis: heparin  Injectable 5000 Unit(s) SubCutaneous every 8 hours    GI Prophylaxis: pantoprazole    Tablet 40 milliGRAM(s) Oral before breakfast    Antibiotics:   Anticoagulation:   Medications:[PRN]  acetaminophen   Tablet. 650 milliGRAM(s) Oral every 6 hours PRN  dextrose 40% Gel 15 Gram(s) Oral once PRN  glucagon  Injectable 1 milliGRAM(s) IntraMuscular once PRN  HYDROmorphone  Injectable 0.5 milliGRAM(s) IV Push every 4 hours PRN  oxyCODONE    IR 5 milliGRAM(s) Oral every 6 hours PRN      Labs:                        9.8    10.49 )-----------( 163      ( 31 Jul 2018 01:01 )             30.5     07-31    133<L>  |  102  |  30<H>  ----------------------------<  141<H>  4.3   |  16<L>  |  1.9<H>    Ca    8.4<L>      31 Jul 2018 01:01  Phos  3.3     07-31  Mg     1.9     07-31        Assessment:  84y Male patient admitted s/p reversal of ileostomy    Doing well    Plan:  Cr stable today at 1.9  On reg diet, can IVL  OOB  IS  Pain control  Neph & med: BUBBA resolving, Cr near baseline. Appreciate recs    followed by Dr Torres as out patient  Date/Time: 07-31-18 @ 05:54

## 2018-07-31 NOTE — DISCHARGE NOTE ADULT - PATIENT PORTAL LINK FT
You can access the [facility] Portal, offered by [facility], by registering with the following website: [email or web address]

## 2018-07-31 NOTE — DISCHARGE NOTE ADULT - ADDITIONAL INSTRUCTIONS
follow up with Dr Martinez in 1 week call office for appointment   if experience fever, shortness of breath, chest pain, dizziness, vomiting , bleeding or drainage from wound or excessive abdominal pain call MD or return to ED follow up with Dr Martinez in 1 week call office for appointment   follow up with PMD  next week call office for appointment   follow up with nephrology call office for appointment  if experience fever, shortness of breath, chest pain, dizziness, vomiting , bleeding or drainage from wound or excessive abdominal pain call MD or return to ED

## 2018-07-31 NOTE — PROGRESS NOTE ADULT - SUBJECTIVE AND OBJECTIVE BOX
afebrile vs stable oob ambulating fatighue. good uo. passing flatus and bm but still distended and abdomen tympanic non tender. lungs clears. no calf pain.  Keep NPO. check labs. Dr Alatorre for ppn.

## 2018-07-31 NOTE — DISCHARGE NOTE ADULT - MEDICATION SUMMARY - MEDICATIONS TO TAKE
I will START or STAY ON the medications listed below when I get home from the hospital:    Aspirin Low Dose 81 mg oral delayed release tablet  -- 1 tab(s) by mouth once a day  -- Indication: For home medication    acetaminophen 325 mg oral tablet  -- 2 tab(s) by mouth every 6 hours, As needed, Mild Pain (1 - 3)  -- Indication: For pain PRN    lisinopril 2.5 mg oral tablet  -- 1 tab(s) by mouth once a day  -- Indication: For home medication    Tradjenta 5 mg oral tablet  -- 1 tab(s) by mouth once a day   -- Check with your doctor before becoming pregnant.  Obtain medical advice before taking any non-prescription drugs as some may affect the action of this medication.    -- Indication: For for diabetes    Crestor 20 mg oral tablet  -- 1 tab(s) by mouth once a day (at bedtime)  -- Indication: For home medication    Metoprolol Succinate ER 25 mg oral tablet, extended release  -- 1 tab(s) by mouth once a day  -- Indication: For home medication    ipratropium-albuterol 0.5 mg-2.5 mg/3 mLinhalation solution  -- 3 milliliter(s) inhaled every 6 hours, As Needed  -- Indication: For home medication    docusate sodium 100 mg oral capsule  -- 1 cap(s) by mouth 3 times a day, As Needed  -- Indication: For constipation     CoQ10  -- 100 milligram(s) by mouth once a day  -- Indication: For home medication    Multiple Vitamins with Minerals oral tablet  -- 1 tab(s) by mouth once a day    [employer] or equivalent  -- Indication: For home medication    Vitamin D3 2000 intl units oral tablet  -- 1 tab(s) by mouth once a day  -- Indication: For home medication

## 2018-08-01 LAB
ANION GAP SERPL CALC-SCNC: 13 MMOL/L — SIGNIFICANT CHANGE UP (ref 7–14)
BASOPHILS # BLD AUTO: 0.01 K/UL — SIGNIFICANT CHANGE UP (ref 0–0.2)
BASOPHILS NFR BLD AUTO: 0.1 % — SIGNIFICANT CHANGE UP (ref 0–1)
BUN SERPL-MCNC: 27 MG/DL — HIGH (ref 10–20)
CALCIUM SERPL-MCNC: 8.2 MG/DL — LOW (ref 8.5–10.1)
CHLORIDE SERPL-SCNC: 102 MMOL/L — SIGNIFICANT CHANGE UP (ref 98–110)
CO2 SERPL-SCNC: 19 MMOL/L — SIGNIFICANT CHANGE UP (ref 17–32)
CREAT SERPL-MCNC: 1.9 MG/DL — HIGH (ref 0.7–1.5)
EOSINOPHIL # BLD AUTO: 0.14 K/UL — SIGNIFICANT CHANGE UP (ref 0–0.7)
EOSINOPHIL NFR BLD AUTO: 1.7 % — SIGNIFICANT CHANGE UP (ref 0–8)
GLUCOSE SERPL-MCNC: 147 MG/DL — HIGH (ref 70–99)
HCT VFR BLD CALC: 27.2 % — LOW (ref 42–52)
HGB BLD-MCNC: 8.8 G/DL — LOW (ref 14–18)
IMM GRANULOCYTES NFR BLD AUTO: 0.4 % — HIGH (ref 0.1–0.3)
LYMPHOCYTES # BLD AUTO: 0.94 K/UL — LOW (ref 1.2–3.4)
LYMPHOCYTES # BLD AUTO: 11.3 % — LOW (ref 20.5–51.1)
MAGNESIUM SERPL-MCNC: 1.8 MG/DL — SIGNIFICANT CHANGE UP (ref 1.8–2.4)
MCHC RBC-ENTMCNC: 28.1 PG — SIGNIFICANT CHANGE UP (ref 27–31)
MCHC RBC-ENTMCNC: 32.4 G/DL — SIGNIFICANT CHANGE UP (ref 32–37)
MCV RBC AUTO: 86.9 FL — SIGNIFICANT CHANGE UP (ref 80–94)
MONOCYTES # BLD AUTO: 0.74 K/UL — HIGH (ref 0.1–0.6)
MONOCYTES NFR BLD AUTO: 8.9 % — SIGNIFICANT CHANGE UP (ref 1.7–9.3)
NEUTROPHILS # BLD AUTO: 6.46 K/UL — SIGNIFICANT CHANGE UP (ref 1.4–6.5)
NEUTROPHILS NFR BLD AUTO: 77.6 % — HIGH (ref 42.2–75.2)
NRBC # BLD: 0 /100 WBCS — SIGNIFICANT CHANGE UP (ref 0–0)
PHOSPHATE SERPL-MCNC: 3 MG/DL — SIGNIFICANT CHANGE UP (ref 2.1–4.9)
PLATELET # BLD AUTO: 166 K/UL — SIGNIFICANT CHANGE UP (ref 130–400)
POTASSIUM SERPL-MCNC: 3.9 MMOL/L — SIGNIFICANT CHANGE UP (ref 3.5–5)
POTASSIUM SERPL-SCNC: 3.9 MMOL/L — SIGNIFICANT CHANGE UP (ref 3.5–5)
RBC # BLD: 3.13 M/UL — LOW (ref 4.7–6.1)
RBC # FLD: 15.2 % — HIGH (ref 11.5–14.5)
SODIUM SERPL-SCNC: 134 MMOL/L — LOW (ref 135–146)
WBC # BLD: 8.32 K/UL — SIGNIFICANT CHANGE UP (ref 4.8–10.8)
WBC # FLD AUTO: 8.32 K/UL — SIGNIFICANT CHANGE UP (ref 4.8–10.8)

## 2018-08-01 RX ORDER — MAGNESIUM SULFATE 500 MG/ML
2 VIAL (ML) INJECTION ONCE
Qty: 0 | Refills: 0 | Status: COMPLETED | OUTPATIENT
Start: 2018-08-01 | End: 2018-08-01

## 2018-08-01 RX ORDER — ELECTROLYTE SOLUTION,INJ
1 VIAL (ML) INTRAVENOUS
Qty: 0 | Refills: 0 | Status: DISCONTINUED | OUTPATIENT
Start: 2018-08-01 | End: 2018-08-01

## 2018-08-01 RX ORDER — DOCUSATE SODIUM 100 MG
100 CAPSULE ORAL THREE TIMES A DAY
Qty: 0 | Refills: 0 | Status: DISCONTINUED | OUTPATIENT
Start: 2018-08-01 | End: 2018-08-04

## 2018-08-01 RX ORDER — INSULIN LISPRO 100/ML
VIAL (ML) SUBCUTANEOUS EVERY 6 HOURS
Qty: 0 | Refills: 0 | Status: DISCONTINUED | OUTPATIENT
Start: 2018-08-01 | End: 2018-08-04

## 2018-08-01 RX ADMIN — Medication 3 MILLILITER(S): at 19:50

## 2018-08-01 RX ADMIN — Medication 81 MILLIGRAM(S): at 11:36

## 2018-08-01 RX ADMIN — HYDROMORPHONE HYDROCHLORIDE 0.5 MILLIGRAM(S): 2 INJECTION INTRAMUSCULAR; INTRAVENOUS; SUBCUTANEOUS at 13:27

## 2018-08-01 RX ADMIN — Medication 50 GRAM(S): at 04:55

## 2018-08-01 RX ADMIN — HEPARIN SODIUM 5000 UNIT(S): 5000 INJECTION INTRAVENOUS; SUBCUTANEOUS at 13:28

## 2018-08-01 RX ADMIN — Medication 1 EACH: at 21:12

## 2018-08-01 RX ADMIN — HYDROMORPHONE HYDROCHLORIDE 0.5 MILLIGRAM(S): 2 INJECTION INTRAMUSCULAR; INTRAVENOUS; SUBCUTANEOUS at 08:14

## 2018-08-01 RX ADMIN — Medication 25 MILLIGRAM(S): at 21:14

## 2018-08-01 RX ADMIN — HYDROMORPHONE HYDROCHLORIDE 0.5 MILLIGRAM(S): 2 INJECTION INTRAMUSCULAR; INTRAVENOUS; SUBCUTANEOUS at 12:28

## 2018-08-01 RX ADMIN — HYDROMORPHONE HYDROCHLORIDE 0.5 MILLIGRAM(S): 2 INJECTION INTRAMUSCULAR; INTRAVENOUS; SUBCUTANEOUS at 03:03

## 2018-08-01 RX ADMIN — HYDROMORPHONE HYDROCHLORIDE 0.5 MILLIGRAM(S): 2 INJECTION INTRAMUSCULAR; INTRAVENOUS; SUBCUTANEOUS at 03:19

## 2018-08-01 RX ADMIN — Medication 3 MILLILITER(S): at 14:05

## 2018-08-01 RX ADMIN — SODIUM CHLORIDE 75 MILLILITER(S): 9 INJECTION, SOLUTION INTRAVENOUS at 13:29

## 2018-08-01 RX ADMIN — Medication 3 MILLILITER(S): at 08:54

## 2018-08-01 RX ADMIN — ATORVASTATIN CALCIUM 20 MILLIGRAM(S): 80 TABLET, FILM COATED ORAL at 21:14

## 2018-08-01 RX ADMIN — Medication 100 MILLIGRAM(S): at 21:14

## 2018-08-01 RX ADMIN — HEPARIN SODIUM 5000 UNIT(S): 5000 INJECTION INTRAVENOUS; SUBCUTANEOUS at 05:23

## 2018-08-01 RX ADMIN — OXYCODONE HYDROCHLORIDE 5 MILLIGRAM(S): 5 TABLET ORAL at 18:45

## 2018-08-01 RX ADMIN — Medication 1: at 06:47

## 2018-08-01 RX ADMIN — HEPARIN SODIUM 5000 UNIT(S): 5000 INJECTION INTRAVENOUS; SUBCUTANEOUS at 21:14

## 2018-08-01 RX ADMIN — PANTOPRAZOLE SODIUM 40 MILLIGRAM(S): 20 TABLET, DELAYED RELEASE ORAL at 05:23

## 2018-08-01 NOTE — PROGRESS NOTE ADULT - SUBJECTIVE AND OBJECTIVE BOX
ZEESHAN MCCLENDON  84y  Male  HPI:    MEDICATIONS  (STANDING):  ALBUTerol/ipratropium for Nebulization 3 milliLiter(s) Nebulizer every 6 hours  aspirin enteric coated 81 milliGRAM(s) Oral daily  atorvastatin 20 milliGRAM(s) Oral at bedtime  dextrose 5% + sodium chloride 0.9%. 1000 milliLiter(s) (75 mL/Hr) IV Continuous <Continuous>  dextrose 5%. 1000 milliLiter(s) (50 mL/Hr) IV Continuous <Continuous>  dextrose 50% Injectable 12.5 Gram(s) IV Push once  dextrose 50% Injectable 25 Gram(s) IV Push once  dextrose 50% Injectable 25 Gram(s) IV Push once  heparin  Injectable 5000 Unit(s) SubCutaneous every 8 hours  insulin lispro (HumaLOG) corrective regimen sliding scale   SubCutaneous every 4 hours  metoprolol succinate ER 25 milliGRAM(s) Oral daily  pantoprazole    Tablet 40 milliGRAM(s) Oral before breakfast    MEDICATIONS  (PRN):  acetaminophen   Tablet. 650 milliGRAM(s) Oral every 6 hours PRN Mild Pain (1 - 3)  dextrose 40% Gel 15 Gram(s) Oral once PRN Blood Glucose LESS THAN 70 milliGRAM(s)/deciliter  glucagon  Injectable 1 milliGRAM(s) IntraMuscular once PRN Glucose LESS THAN 70 milligrams/deciliter  HYDROmorphone  Injectable 0.5 milliGRAM(s) IV Push every 4 hours PRN Severe Pain (7 - 10)  oxyCODONE    IR 5 milliGRAM(s) Oral every 6 hours PRN Moderate Pain (4 - 6)    INTERVAL EVENTS: Patient seen today appear more comfortable, less distended. Patient NPO except for ice chips.    T(C): 36.8 (08-01-18 @ 08:00), Max: 37 (08-01-18 @ 00:00)  HR: 70 (08-01-18 @ 08:00) (69 - 75)  BP: 107/56 (08-01-18 @ 08:00) (106/58 - 118/69)  RR: 18 (08-01-18 @ 08:00) (17 - 18)  SpO2: --  Wt(kg): --Vital Signs Last 24 Hrs  T(C): 36.8 (01 Aug 2018 08:00), Max: 37 (01 Aug 2018 00:00)  T(F): 98.2 (01 Aug 2018 08:00), Max: 98.6 (01 Aug 2018 00:00)  HR: 70 (01 Aug 2018 08:00) (69 - 75)  BP: 107/56 (01 Aug 2018 08:00) (106/58 - 118/69)  BP(mean): --  RR: 18 (01 Aug 2018 08:00) (17 - 18)  SpO2: --    PHYSICAL EXAM:  GENERAL: NAD  NECK: Supple, No JVD  CHEST/LUNG: Clear; No wheeze  HEART: S1, S2, Regular rate and rhythm;   ABDOMEN: Soft, Nontender, Mildly distended - girth apparently less; Bowel sounds present in all quadrants  EXTREMITIES: No clubbing, cyanosis, or edema  SKIN: No rashes or lesions    LABS:  Labs:                        8.8    8.32  )-----------( 166      ( 01 Aug 2018 00:49 )             27.2             08-01    134<L>  |  102  |  27<H>  ----------------------------<  147<H>  3.9   |  19  |  1.9<H>    Ca    8.2<L>      01 Aug 2018 00:49  Phos  3.0     08-01  Mg     1.8     08-01                    RADIOLOGY & ADDITIONAL TESTS:

## 2018-08-01 NOTE — CONSULT NOTE ADULT - SUBJECTIVE AND OBJECTIVE BOX
HPI:  84M with PMH of sigmoid diverticulitis with colo-vesicular fistula S/P sigmoid colon resection and resection of colo vesicular fistula with primary anastomosis on 1/18, complicated by perforated viscus, was subsequently re-operated on 1/13/18 with creation of diverting loop ileostomy and was discharged on 1/25, re-admitted 2/9/18 2/2 dehydration, had PICC line in place from receiving Ertapenem IV, was DC with daily NS infusions via PICC line at home. Ileostomy has had good output. He presents this admission for elective reversal of his ileostomy. He is currently POD1 S/p reversal of loop ileostomy, small bowel resection, and primary anastomosis. pt stated he NPO  now and also did move his bowel twice today     DIET : NPO    PAST MEDICAL & SURGICAL HISTORY:  Stented coronary artery: Proximal LAD 08/31/2016  Eye problem: with diabetes  Anemia: slight  GERD without esophagitis  SOB (shortness of breath) on exertion  Asthma: Mild only last attack yrs ago  HLD (hyperlipidemia)  HTN (hypertension)  Fistula of large intestine: colo-vesicula fistula  H/O diverticulitis of colon  Recurrent UTI (urinary tract infection)  Diabetes  CAD (coronary artery disease)  Elective surgery: PCI with 2 stents  Basal cell carcinoma: removal 07/18/2018  History of eye surgery: laser  Artificial cardiac pacemaker  History of partial colectomy  H/O ileostomy      FAMILY HISTORY:  CVA (cerebral vascular accident): hemorrhage  Family history of colon cancer in mother (Mother, Grandparent)  ICU Vital Signs Last 24 Hrs  T(C): 36.8 (01 Aug 2018 08:00), Max: 37 (01 Aug 2018 00:00)  T(F): 98.2 (01 Aug 2018 08:00), Max: 98.6 (01 Aug 2018 00:00)  HR: 70 (01 Aug 2018 08:00) (69 - 75)  BP: 107/56 (01 Aug 2018 08:00) (106/58 - 118/69)  RR: 18 (01 Aug 2018 08:00) (17 - 18)  Height (cm): 165.1 (07-29-18 @ 17:46), 165.1 (07-13-18 @ 08:51)  Weight (kg): 79.9 (07-29-18 @ 17:46), 62 (07-13-18 @ 08:51)  BMI (kg/m2): 29.3 (07-29-18 @ 17:46), 22.7 (07-13-18 @ 08:51)  BSA (m2): 1.87 (07-29-18 @ 17:46), 1.68 (07-13-18 @ 08:51)    I&O's Detail    31 Jul 2018 07:01  -  01 Aug 2018 07:00  --------------------------------------------------------  IN:    Oral Fluid: 940 mL  Total IN: 940 mL    OUT:    Voided: 800 mL  Total OUT: 800 mL    Total NET: 140 mL            PHYSICAL EXAM:  GENERAL: NAD, well-groomed, well-developed, Alert & Oriented X3  HEENT: + Moist mucous membranes,  ABDOMEN:  + SCAR NO LESIONS, DISTENDED N/T  EXTREMITIES:  no edema  SKIN: No rashes or lesions  IV ACCESS: RIGHT PICC single lumen  FEEDING ACCESS: NPO    MEDICATIONS  (STANDING):  ALBUTerol/ipratropium for Nebulization 3 milliLiter(s) Nebulizer every 6 hours  aspirin enteric coated 81 milliGRAM(s) Oral daily  atorvastatin 20 milliGRAM(s) Oral at bedtime  dextrose 5% + sodium chloride 0.9%. 1000 milliLiter(s) (75 mL/Hr) IV Continuous <Continuous>  dextrose 5%. 1000 milliLiter(s) (50 mL/Hr) IV Continuous <Continuous>  dextrose 50% Injectable 12.5 Gram(s) IV Push once  dextrose 50% Injectable 25 Gram(s) IV Push once  dextrose 50% Injectable 25 Gram(s) IV Push once  heparin  Injectable 5000 Unit(s) SubCutaneous every 8 hours  insulin lispro (HumaLOG) corrective regimen sliding scale   SubCutaneous every 4 hours  metoprolol succinate ER 25 milliGRAM(s) Oral daily  pantoprazole    Tablet 40 milliGRAM(s) Oral before breakfast    MEDICATIONS  (PRN):  acetaminophen   Tablet. 650 milliGRAM(s) Oral every 6 hours PRN Mild Pain (1 - 3)  dextrose 40% Gel 15 Gram(s) Oral once PRN Blood Glucose LESS THAN 70 milliGRAM(s)/deciliter  glucagon  Injectable 1 milliGRAM(s) IntraMuscular once PRN Glucose LESS THAN 70 milligrams/deciliter  HYDROmorphone  Injectable 0.5 milliGRAM(s) IV Push every 4 hours PRN Severe Pain (7 - 10)  oxyCODONE    IR 5 milliGRAM(s) Oral every 6 hours PRN Moderate Pain (4 - 6)    Allergies: NKDA    LABS:    08-01    134<L>  |  102  |  27<H>  ----------------------------<  147<H>  3.9   |  19  |  1.9<H>    Ca    8.2<L>      01 Aug 2018 00:49  Phos  3.0     08-01  Mg     1.8     08-01      CAPILLARY BLOOD GLUCOSE  169 (01 Aug 2018 06:47) HPI:  84M with PMH of sigmoid diverticulitis with colo-vesicular fistula S/P sigmoid colon resection and resection of colo vesicular fistula with primary anastomosis on 1/18, complicated by perforated viscus, was subsequently re-operated on 1/13/18 with creation of diverting loop ileostomy and was discharged on 1/25, re-admitted 2/9/18 2/2 dehydration, had PICC line in place from receiving Ertapenem IV, was DC with daily NS infusions via PICC line at home. He presents this admission for elective reversal of his ileostomy. He is currently POD5 S/p reversal of loop ileostomy, small bowel resection, and primary anastomosis. pt stated he is NPO  now and also did move his bowel twice today     DIET : NPO    PAST MEDICAL & SURGICAL HISTORY:  Stented coronary artery: Proximal LAD 08/31/2016  Eye problem: with diabetes  Anemia: slight  GERD without esophagitis  SOB (shortness of breath) on exertion  Asthma: Mild only last attack yrs ago  HLD (hyperlipidemia)  HTN (hypertension)  Fistula of large intestine: colo-vesicula fistula  H/O diverticulitis of colon  Recurrent UTI (urinary tract infection)  Diabetes  CAD (coronary artery disease)  Elective surgery: PCI with 2 stents  Basal cell carcinoma: removal 07/18/2018  History of eye surgery: laser  Artificial cardiac pacemaker  History of partial colectomy  H/O ileostomy      FAMILY HISTORY:  CVA (cerebral vascular accident): hemorrhage  Family history of colon cancer in mother (Mother, Grandparent)  ICU Vital Signs Last 24 Hrs  T(C): 36.8 (01 Aug 2018 08:00), Max: 37 (01 Aug 2018 00:00)  T(F): 98.2 (01 Aug 2018 08:00), Max: 98.6 (01 Aug 2018 00:00)  HR: 70 (01 Aug 2018 08:00) (69 - 75)  BP: 107/56 (01 Aug 2018 08:00) (106/58 - 118/69)  RR: 18 (01 Aug 2018 08:00) (17 - 18)  Height (cm): 165.1 (07-29-18 @ 17:46), 165.1 (07-13-18 @ 08:51)  Weight (kg): 79.9 (07-29-18 @ 17:46), 62 (07-13-18 @ 08:51)  BMI (kg/m2): 29.3 (07-29-18 @ 17:46), 22.7 (07-13-18 @ 08:51)  BSA (m2): 1.87 (07-29-18 @ 17:46), 1.68 (07-13-18 @ 08:51)    I&O's Detail    31 Jul 2018 07:01  -  01 Aug 2018 07:00  --------------------------------------------------------  IN:    Oral Fluid: 940 mL  Total IN: 940 mL    OUT:    Voided: 800 mL  Total OUT: 800 mL    Total NET: 140 mL            PHYSICAL EXAM:  GENERAL: NAD, well-groomed, well-developed, Alert & Oriented X3  HEENT: + Moist mucous membranes,  ABDOMEN:  + SCAR NO LESIONS, DISTENDED N/T  EXTREMITIES:  no edema  SKIN: No rashes or lesions  IV ACCESS: RIGHT PICC single lumen  FEEDING ACCESS: NPO    MEDICATIONS  (STANDING):  ALBUTerol/ipratropium for Nebulization 3 milliLiter(s) Nebulizer every 6 hours  aspirin enteric coated 81 milliGRAM(s) Oral daily  atorvastatin 20 milliGRAM(s) Oral at bedtime  dextrose 5% + sodium chloride 0.9%. 1000 milliLiter(s) (75 mL/Hr) IV Continuous <Continuous>  dextrose 5%. 1000 milliLiter(s) (50 mL/Hr) IV Continuous <Continuous>  dextrose 50% Injectable 12.5 Gram(s) IV Push once  dextrose 50% Injectable 25 Gram(s) IV Push once  dextrose 50% Injectable 25 Gram(s) IV Push once  heparin  Injectable 5000 Unit(s) SubCutaneous every 8 hours  insulin lispro (HumaLOG) corrective regimen sliding scale   SubCutaneous every 4 hours  metoprolol succinate ER 25 milliGRAM(s) Oral daily  pantoprazole    Tablet 40 milliGRAM(s) Oral before breakfast    MEDICATIONS  (PRN):  acetaminophen   Tablet. 650 milliGRAM(s) Oral every 6 hours PRN Mild Pain (1 - 3)  dextrose 40% Gel 15 Gram(s) Oral once PRN Blood Glucose LESS THAN 70 milliGRAM(s)/deciliter  glucagon  Injectable 1 milliGRAM(s) IntraMuscular once PRN Glucose LESS THAN 70 milligrams/deciliter  HYDROmorphone  Injectable 0.5 milliGRAM(s) IV Push every 4 hours PRN Severe Pain (7 - 10)  oxyCODONE    IR 5 milliGRAM(s) Oral every 6 hours PRN Moderate Pain (4 - 6)    Allergies: NKDA    LABS:    08-01    134<L>  |  102  |  27<H>  ----------------------------<  147<H>  3.9   |  19  |  1.9<H>    Ca    8.2<L>      01 Aug 2018 00:49  Phos  3.0     08-01  Mg     1.8     08-01  Complete Blood Count + Automated Diff (07.28.18 @ 01:24)    WBC Count: 11.51 K/uL    RBC Count: 4.34 M/uL    Hemoglobin: 12.2 g/dL    Hematocrit: 37.7 %    Mean Cell Volume: 86.9 fL    Mean Cell Hemoglobin: 28.1 pg    Mean Cell Hemoglobin Conc: 32.4 g/dL    Red Cell Distrib Width: 14.5 %    Platelet Count - Automated: 163 K/uL    Auto Neutrophil #: 10.54 K/uL    Auto Lymphocyte #: 0.62 K/uL    Auto Monocyte #: 0.25 K/uL    Auto Eosinophil #: 0.00 K/uL    Auto Basophil #: 0.01 K/uL    Auto Neutrophil %: 91.5: Differential percentages must be correlated with absolute numbers for  clinical significance. %    Auto Lymphocyte %: 5.4 %    Auto Monocyte %: 2.2 %    Auto Eosinophil %: 0.0 %    Auto Basophil %: 0.1 %    Auto Immature Granulocyte %: 0.8 %  Comprehensive Metabolic Panel (07.13.18 @ 09:48)    Sodium, Serum: 139 mmol/L    Potassium, Serum: 6.1: TYPE:(C=Critical, N=Notification, A=Abnormal) c    Chloride, Serum: 105 mmol/L    Carbon Dioxide, Serum: 23 mmol/L    Anion Gap, Serum: 11 mmol/L    Blood Urea Nitrogen, Serum: 41 mg/dL    Creatinine, Serum: 1.9 mg/dL    Glucose, Serum: 112 mg/dL    Calcium, Total Serum: 10.0 mg/dL    Protein Total, Serum: 7.2 g/dL    Albumin, Serum: 4.4 g/dL    Bilirubin Total, Serum: 0.4 mg/dL    Alkaline Phosphatase, Serum: 69 U/L    Aspartate Aminotransferase (AST/SGOT): 29 U/L    Alanine Aminotransferase (ALT/SGPT): 47 U/L     CAPILLARY BLOOD GLUCOSE  169 (01 Aug 2018 06:47)

## 2018-08-01 NOTE — PROGRESS NOTE ADULT - SUBJECTIVE AND OBJECTIVE BOX
Progress Note: General Surgery  Patient: ZEESHAN MCCLENDON , 84y (26-Oct-1933)Male   MRN: [id number]  Location: [address]  Visit: 07-27-18 Inpatient  Date: 08-01-18 @ 05:37  Hospital Day: 6  Post-op Day: 5    Procedure/Diagnosis: s/p reversal of ileostomy, SBR, primary anastomosis    Events/ 24h: No acute events overnight. Pain controlled. BM yesterday and this AM    Vitals: T(F): 98.6 (08-01-18 @ 00:00), Max: 99.4 (07-31-18 @ 08:00)  HR: 69 (08-01-18 @ 00:00)  BP: 106/58 (08-01-18 @ 00:00) (106/58 - 129/60)  RR: 17 (08-01-18 @ 00:00)  SpO2: --    In:   07-30-18 @ 07:01  -  07-31-18 @ 07:00  --------------------------------------------------------  IN: 1140 mL    07-31-18 @ 07:01  -  08-01-18 @ 05:37  --------------------------------------------------------  IN: 940 mL      Out:   07-30-18 @ 07:01  -  07-31-18 @ 07:00  --------------------------------------------------------  OUT:    Voided: 1390 mL  Total OUT: 1390 mL      07-31-18 @ 07:01  -  08-01-18 @ 05:37  --------------------------------------------------------  OUT:    Voided: 800 mL  Total OUT: 800 mL        Net:   07-30-18 @ 07:01  -  07-31-18 @ 07:00  --------------------------------------------------------  NET: -250 mL    07-31-18 @ 07:01  -  08-01-18 @ 05:37  --------------------------------------------------------  NET: 140 mL        Diet:   IV Fluids: dextrose 5% + sodium chloride 0.9%. 1000 milliLiter(s) (75 mL/Hr) IV Continuous <Continuous>  dextrose 5%. 1000 milliLiter(s) (50 mL/Hr) IV Continuous <Continuous>      Physical Examination:  General Appearance: NAD  HEENT: EOMI, sclera non-icteric.  Heart: RRR   Lungs: CTABL.   Abdomen:  Soft, mildly tender in the RLQ, some distention, unchanged from yesterday. No rigidity, guarding, or rebound tenderness.   MSK/Extremities: Warm & well-perfused. No significant deformity or joint abnormality. Peripheral pulses intact.  Skin: Warm, dry. No jaundice.       Medications: [Standing]  ALBUTerol/ipratropium for Nebulization 3 milliLiter(s) Nebulizer every 6 hours  aspirin enteric coated 81 milliGRAM(s) Oral daily  atorvastatin 20 milliGRAM(s) Oral at bedtime  dextrose 5% + sodium chloride 0.9%. 1000 milliLiter(s) (75 mL/Hr) IV Continuous <Continuous>  dextrose 5%. 1000 milliLiter(s) (50 mL/Hr) IV Continuous <Continuous>  dextrose 50% Injectable 12.5 Gram(s) IV Push once  dextrose 50% Injectable 25 Gram(s) IV Push once  dextrose 50% Injectable 25 Gram(s) IV Push once  heparin  Injectable 5000 Unit(s) SubCutaneous every 8 hours  insulin lispro (HumaLOG) corrective regimen sliding scale   SubCutaneous every 4 hours  metoprolol succinate ER 25 milliGRAM(s) Oral daily  pantoprazole    Tablet 40 milliGRAM(s) Oral before breakfast    DVT Prophylaxis: heparin  Injectable 5000 Unit(s) SubCutaneous every 8 hours    GI Prophylaxis: pantoprazole    Tablet 40 milliGRAM(s) Oral before breakfast    Antibiotics:   Anticoagulation:   Medications:[PRN]  acetaminophen   Tablet. 650 milliGRAM(s) Oral every 6 hours PRN  dextrose 40% Gel 15 Gram(s) Oral once PRN  glucagon  Injectable 1 milliGRAM(s) IntraMuscular once PRN  HYDROmorphone  Injectable 0.5 milliGRAM(s) IV Push every 4 hours PRN  oxyCODONE    IR 5 milliGRAM(s) Oral every 6 hours PRN      Labs:                        8.8    8.32  )-----------( 166      ( 01 Aug 2018 00:49 )             27.2     08-01    134<L>  |  102  |  27<H>  ----------------------------<  147<H>  3.9   |  19  |  1.9<H>    Ca    8.2<L>      01 Aug 2018 00:49  Phos  3.0     08-01  Mg     1.8     08-01        Imaging:     Assessment:  84y Male patient admitted s/p reversal of ileostomy, SBR, primary anastomosis    Doing well    Plan:  OOB   IS  NPO  F/u Nutrition for TPN/PPN  F/u cards,      Date/Time: 08-01-18 @ 05:37

## 2018-08-01 NOTE — CONSULT NOTE ADULT - ASSESSMENT
ASSESSMENT  s/p reversal of ileostomy  ? ileus  BUBBA, on CKD 3 -4  HTN      PLAN  Check triglyceride level  tpn order in chart   check bmp/phos/mg and correct lytes ASSESSMENT  -s/p reversal of ileostomy  -? ileus  -BUBBA, on CKD 3 -4  -HTN  -CAD (coronary artery disease)  -Diabetes        PLAN  Check triglyceride level  tpn order in chart   check bmp/phos/mg and correct lytes

## 2018-08-01 NOTE — PROGRESS NOTE ADULT - ASSESSMENT
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  Patient presented back to the hospital for elective reversal of ileostomy on 7/27    POD #5  - now NPO  - distension decreased  - Dr Alatorre consulted for PPN  - patient in good spirits  - attempt OOB to chair and ambulation  - rx as per surgery    BUBBA, on CKD 3 -4  - creat stable  - avoid contrast/renal toxins   - monitor UOP     Hyponatremia  - receiving fluids  - continue to monitor    Anemia  - Hgb stale    HTN  - BP stable    Will continue to follow with you

## 2018-08-01 NOTE — PROGRESS NOTE ADULT - SUBJECTIVE AND OBJECTIVE BOX
Afebrile VS stable Alert oriented x 3. OOB ambulating. No nausea. no vomiting. lungsclears. abdomen still distended tympanic. wounds clean.good uo. 3 bm.labs noted.  Plan: Ambulate.OOB. check labs. Incentive spirometer.PPN. comtinue present tt.

## 2018-08-01 NOTE — CONSULT NOTE ADULT - ATTENDING COMMENTS
pt seen and examined. Chart reviewed.  pt known to me from prior hospitalization. A&O, afebrile, skin turgor good, anicteric. Abd very distended, nontender but uncomfortable, fairly soft (per pt softer than yesterday), +BM today without much change in distension. No LE edema. +RUE s/l PICC - c/d/i.  TPN to start tonight.  d/w team, and d/w surgical team.  Check fingerstick glc q6h once TPN started; will follow and reassess.

## 2018-08-02 LAB
ANION GAP SERPL CALC-SCNC: 14 MMOL/L — SIGNIFICANT CHANGE UP (ref 7–14)
BASOPHILS # BLD AUTO: 0.02 K/UL — SIGNIFICANT CHANGE UP (ref 0–0.2)
BASOPHILS NFR BLD AUTO: 0.3 % — SIGNIFICANT CHANGE UP (ref 0–1)
BUN SERPL-MCNC: 27 MG/DL — HIGH (ref 10–20)
CALCIUM SERPL-MCNC: 8.5 MG/DL — SIGNIFICANT CHANGE UP (ref 8.5–10.1)
CHLORIDE SERPL-SCNC: 103 MMOL/L — SIGNIFICANT CHANGE UP (ref 98–110)
CO2 SERPL-SCNC: 18 MMOL/L — SIGNIFICANT CHANGE UP (ref 17–32)
CREAT SERPL-MCNC: 1.9 MG/DL — HIGH (ref 0.7–1.5)
EOSINOPHIL # BLD AUTO: 0.23 K/UL — SIGNIFICANT CHANGE UP (ref 0–0.7)
EOSINOPHIL NFR BLD AUTO: 3 % — SIGNIFICANT CHANGE UP (ref 0–8)
GLUCOSE SERPL-MCNC: 126 MG/DL — HIGH (ref 70–99)
HCT VFR BLD CALC: 27.9 % — LOW (ref 42–52)
HGB BLD-MCNC: 8.9 G/DL — LOW (ref 14–18)
IMM GRANULOCYTES NFR BLD AUTO: 0.4 % — HIGH (ref 0.1–0.3)
LYMPHOCYTES # BLD AUTO: 0.95 K/UL — LOW (ref 1.2–3.4)
LYMPHOCYTES # BLD AUTO: 12.2 % — LOW (ref 20.5–51.1)
MAGNESIUM SERPL-MCNC: 2 MG/DL — SIGNIFICANT CHANGE UP (ref 1.8–2.4)
MCHC RBC-ENTMCNC: 27.9 PG — SIGNIFICANT CHANGE UP (ref 27–31)
MCHC RBC-ENTMCNC: 31.9 G/DL — LOW (ref 32–37)
MCV RBC AUTO: 87.5 FL — SIGNIFICANT CHANGE UP (ref 80–94)
MONOCYTES # BLD AUTO: 0.74 K/UL — HIGH (ref 0.1–0.6)
MONOCYTES NFR BLD AUTO: 9.5 % — HIGH (ref 1.7–9.3)
NEUTROPHILS # BLD AUTO: 5.82 K/UL — SIGNIFICANT CHANGE UP (ref 1.4–6.5)
NEUTROPHILS NFR BLD AUTO: 74.6 % — SIGNIFICANT CHANGE UP (ref 42.2–75.2)
NRBC # BLD: 0 /100 WBCS — SIGNIFICANT CHANGE UP (ref 0–0)
PHOSPHATE SERPL-MCNC: 2.5 MG/DL — SIGNIFICANT CHANGE UP (ref 2.1–4.9)
PLATELET # BLD AUTO: 174 K/UL — SIGNIFICANT CHANGE UP (ref 130–400)
POTASSIUM SERPL-MCNC: 3.9 MMOL/L — SIGNIFICANT CHANGE UP (ref 3.5–5)
POTASSIUM SERPL-SCNC: 3.9 MMOL/L — SIGNIFICANT CHANGE UP (ref 3.5–5)
RBC # BLD: 3.19 M/UL — LOW (ref 4.7–6.1)
RBC # FLD: 15 % — HIGH (ref 11.5–14.5)
SODIUM SERPL-SCNC: 135 MMOL/L — SIGNIFICANT CHANGE UP (ref 135–146)
TRIGL SERPL-MCNC: 91 MG/DL — SIGNIFICANT CHANGE UP (ref 10–149)
WBC # BLD: 7.79 K/UL — SIGNIFICANT CHANGE UP (ref 4.8–10.8)
WBC # FLD AUTO: 7.79 K/UL — SIGNIFICANT CHANGE UP (ref 4.8–10.8)

## 2018-08-02 RX ORDER — ELECTROLYTE SOLUTION,INJ
1 VIAL (ML) INTRAVENOUS
Qty: 0 | Refills: 0 | Status: DISCONTINUED | OUTPATIENT
Start: 2018-08-02 | End: 2018-08-02

## 2018-08-02 RX ORDER — I.V. FAT EMULSION 20 G/100ML
1.25 EMULSION INTRAVENOUS
Qty: 99.88 | Refills: 0 | Status: DISCONTINUED | OUTPATIENT
Start: 2018-08-02 | End: 2018-08-02

## 2018-08-02 RX ADMIN — Medication 100 MILLIGRAM(S): at 21:57

## 2018-08-02 RX ADMIN — HEPARIN SODIUM 5000 UNIT(S): 5000 INJECTION INTRAVENOUS; SUBCUTANEOUS at 13:29

## 2018-08-02 RX ADMIN — Medication 650 MILLIGRAM(S): at 19:27

## 2018-08-02 RX ADMIN — Medication 1: at 05:25

## 2018-08-02 RX ADMIN — Medication 25 MILLIGRAM(S): at 21:57

## 2018-08-02 RX ADMIN — Medication 2: at 00:11

## 2018-08-02 RX ADMIN — Medication 3 MILLILITER(S): at 13:42

## 2018-08-02 RX ADMIN — HEPARIN SODIUM 5000 UNIT(S): 5000 INJECTION INTRAVENOUS; SUBCUTANEOUS at 21:57

## 2018-08-02 RX ADMIN — Medication 1: at 13:31

## 2018-08-02 RX ADMIN — Medication 81 MILLIGRAM(S): at 11:29

## 2018-08-02 RX ADMIN — Medication 1 EACH: at 21:57

## 2018-08-02 RX ADMIN — Medication 100 MILLIGRAM(S): at 13:29

## 2018-08-02 RX ADMIN — HEPARIN SODIUM 5000 UNIT(S): 5000 INJECTION INTRAVENOUS; SUBCUTANEOUS at 05:21

## 2018-08-02 RX ADMIN — PANTOPRAZOLE SODIUM 40 MILLIGRAM(S): 20 TABLET, DELAYED RELEASE ORAL at 05:22

## 2018-08-02 RX ADMIN — ATORVASTATIN CALCIUM 20 MILLIGRAM(S): 80 TABLET, FILM COATED ORAL at 21:57

## 2018-08-02 RX ADMIN — Medication 100 MILLIGRAM(S): at 05:22

## 2018-08-02 RX ADMIN — I.V. FAT EMULSION 31.21 GM/KG/DAY: 20 EMULSION INTRAVENOUS at 21:56

## 2018-08-02 RX ADMIN — Medication 3 MILLILITER(S): at 20:03

## 2018-08-02 NOTE — PROGRESS NOTE ADULT - ASSESSMENT
ASSESSMENT  -s/p reversal of ileostomy  -? ileus  -BUBBA, on CKD 3 -4  -HTN  -CAD (coronary artery disease)  -Diabetes  tpn ordered for tonight   check bmp/phos/mg

## 2018-08-02 NOTE — PROGRESS NOTE ADULT - SUBJECTIVE AND OBJECTIVE BOX
pt seen and evaluated   on tpn   right picc line site c/d/i  s/p reversal of ileostomy, SBR, primary anastomosis    LABS  08-02    135  |  103  |  27<H>  ----------------------------<  126<H>  3.9   |  18  |  1.9<H>    Ca    8.5      02 Aug 2018 05:08  Phos  2.5     08-02  Mg     2.0     08-02                            8.9    7.79  )-----------( 174      ( 02 Aug 2018 05:08 )             27.9       Drug Dosing Weight  Height (cm): 165.1 (29 Jul 2018 17:46)  Weight (kg): 79.9 (29 Jul 2018 17:46)  BMI (kg/m2): 29.3 (29 Jul 2018 17:46)  BSA (m2): 1.87 (29 Jul 2018 17:46)  T(C): 36.7 (08-02-18 @ 08:00), Max: 36.7 (08-01-18 @ 23:55)  HR: 70 (08-02-18 @ 08:00) (70 - 70)  BP: 133/66 (08-02-18 @ 08:00) (127/59 - 135/62)  RR: 18 (08-02-18 @ 08:00) (18 - 18)  SpO2: --      08-01-18 @ 07:01  -  08-02-18 @ 07:00  --------------------------------------------------------  IN: 675 mL / OUT: 750 mL / NET: -75 mL        acetaminophen   Tablet. 650 milliGRAM(s) Oral every 6 hours PRN  ALBUTerol/ipratropium for Nebulization 3 milliLiter(s) Nebulizer every 6 hours  aspirin enteric coated 81 milliGRAM(s) Oral daily  atorvastatin 20 milliGRAM(s) Oral at bedtime  dextrose 40% Gel 15 Gram(s) Oral once PRN  dextrose 5%. 1000 milliLiter(s) IV Continuous <Continuous>  dextrose 50% Injectable 12.5 Gram(s) IV Push once  dextrose 50% Injectable 25 Gram(s) IV Push once  dextrose 50% Injectable 25 Gram(s) IV Push once  docusate sodium 100 milliGRAM(s) Oral three times a day  fat emulsion (Plant Based) 20% Infusion 1.25 Gm/kG/Day IV Continuous <Continuous>  glucagon  Injectable 1 milliGRAM(s) IntraMuscular once PRN  heparin  Injectable 5000 Unit(s) SubCutaneous every 8 hours  insulin lispro (HumaLOG) corrective regimen sliding scale   SubCutaneous every 6 hours  metoprolol succinate ER 25 milliGRAM(s) Oral daily  oxyCODONE    IR 5 milliGRAM(s) Oral every 6 hours PRN  pantoprazole    Tablet 40 milliGRAM(s) Oral before breakfast  Parenteral Nutrition - Adult 1 Each TPN Continuous <Continuous>  Parenteral Nutrition - Adult 1 Each TPN Continuous <Continuous>

## 2018-08-02 NOTE — PROGRESS NOTE ADULT - ASSESSMENT
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  Patient presented back to the hospital for elective reversal of ileostomy on 7/27    POD #6  - OOB in chair, uncomfortable?  - PPN in place  - encouraged to use incentive spirometer  - attempt ambulation  - rx as per surgery    SANTAMARIA?  - pulse ox stable  - if continues obtain CXR    BUBBA resolved,  CKD 3 -4  - creat stable  - avoid contrast/renal toxins   - monitor UOP     Hyponatremia  - resolved  - continue to monitor    Anemia  - Hgb stale    HTN  - BP stable    Will continue to follow with you

## 2018-08-02 NOTE — PROGRESS NOTE ADULT - SUBJECTIVE AND OBJECTIVE BOX
ZEESHAN MCCLENDON  84y  Male  HPI:    MEDICATIONS  (STANDING):  ALBUTerol/ipratropium for Nebulization 3 milliLiter(s) Nebulizer every 6 hours  aspirin enteric coated 81 milliGRAM(s) Oral daily  atorvastatin 20 milliGRAM(s) Oral at bedtime  dextrose 5% + sodium chloride 0.9%. 1000 milliLiter(s) (75 mL/Hr) IV Continuous <Continuous>  dextrose 5%. 1000 milliLiter(s) (50 mL/Hr) IV Continuous <Continuous>  dextrose 50% Injectable 12.5 Gram(s) IV Push once  dextrose 50% Injectable 25 Gram(s) IV Push once  dextrose 50% Injectable 25 Gram(s) IV Push once  docusate sodium 100 milliGRAM(s) Oral three times a day  heparin  Injectable 5000 Unit(s) SubCutaneous every 8 hours  insulin lispro (HumaLOG) corrective regimen sliding scale   SubCutaneous every 6 hours  metoprolol succinate ER 25 milliGRAM(s) Oral daily  pantoprazole    Tablet 40 milliGRAM(s) Oral before breakfast  Parenteral Nutrition - Adult 1 Each (75 mL/Hr) TPN Continuous <Continuous>    MEDICATIONS  (PRN):  acetaminophen   Tablet. 650 milliGRAM(s) Oral every 6 hours PRN Mild Pain (1 - 3)  dextrose 40% Gel 15 Gram(s) Oral once PRN Blood Glucose LESS THAN 70 milliGRAM(s)/deciliter  glucagon  Injectable 1 milliGRAM(s) IntraMuscular once PRN Glucose LESS THAN 70 milligrams/deciliter  oxyCODONE    IR 5 milliGRAM(s) Oral every 6 hours PRN Moderate Pain (4 - 6)    INTERVAL EVENTS: Patient seen today OOB in chair, appears to be dyspneic with movement?    T(C): 36.7 (08-02-18 @ 08:00), Max: 36.7 (08-01-18 @ 23:55)  HR: 70 (08-02-18 @ 08:00) (70 - 70)  BP: 133/66 (08-02-18 @ 08:00) (127/59 - 135/62)  RR: 18 (08-02-18 @ 08:00) (18 - 18)  SpO2: --  Wt(kg): --Vital Signs Last 24 Hrs  T(C): 36.7 (02 Aug 2018 08:00), Max: 36.7 (01 Aug 2018 23:55)  T(F): 98 (02 Aug 2018 08:00), Max: 98 (01 Aug 2018 23:55)  HR: 70 (02 Aug 2018 08:00) (70 - 70)  BP: 133/66 (02 Aug 2018 08:00) (127/59 - 135/62)  BP(mean): --  RR: 18 (02 Aug 2018 08:00) (18 - 18)  SpO2: -- 97% on RA with HR 70 obtained at bedside    PHYSICAL EXAM:  GENERAL: OOB in chair, preoccupied?  NECK: Supple, No JVD  CHEST/LUNG: Decreased at left base  HEART: S1, S2, Regular rate and rhythm;   ABDOMEN: Soft, Nontender, distended; Bowel sounds present  EXTREMITIES: No clubbing, cyanosis, or edema  SKIN: No rashes or lesions    LABS:  Labs:                        8.9    7.79  )-----------( 174      ( 02 Aug 2018 05:08 )             27.9             08-02    135  |  103  |  27<H>  ----------------------------<  126<H>  3.9   |  18  |  1.9<H>    Ca    8.5      02 Aug 2018 05:08  Phos  2.5     08-02  Mg     2.0     08-02             RADIOLOGY & ADDITIONAL TESTS:

## 2018-08-02 NOTE — PROGRESS NOTE ADULT - SUBJECTIVE AND OBJECTIVE BOX
Progress Note: General Surgery  Patient: ZEESHAN MCCLENDON , 84y (26-Oct-1933)Male   MRN: [id number]  Location: [place] [address] A  Visit: 07-27-18 Inpatient  Date: 08-02-18 @ 05:21  Hospital Day: 7  Post-op Day: 6    Procedure/Diagnosis: s/p reversal of ileostomy, SBR, primary anastomosis    Events/ 24h: Pt started on parental nutrition. 2 BMs and some gas yesterday. No acute events overnight. Pain improving. No cp/sob.     Vitals: T(F): 98 (08-01-18 @ 23:55), Max: 98.2 (08-01-18 @ 08:00)  HR: 70 (08-01-18 @ 23:55)  BP: 127/59 (08-01-18 @ 23:55) (107/56 - 135/62)  RR: 18 (08-01-18 @ 23:55)  SpO2: --    In:   07-31-18 @ 07:01  -  08-01-18 @ 07:00  --------------------------------------------------------  IN: 940 mL    08-01-18 @ 07:01  -  08-02-18 @ 05:21  --------------------------------------------------------  IN: 450 mL      Out:   07-31-18 @ 07:01  -  08-01-18 @ 07:00  --------------------------------------------------------  OUT:    Voided: 800 mL  Total OUT: 800 mL      08-01-18 @ 07:01  -  08-02-18 @ 05:21  --------------------------------------------------------  OUT:    Voided: 550 mL  Total OUT: 550 mL        Net:   07-31-18 @ 07:01  -  08-01-18 @ 07:00  --------------------------------------------------------  NET: 140 mL    08-01-18 @ 07:01  -  08-02-18 @ 05:21  --------------------------------------------------------  NET: -100 mL        Diet: Diet, NPO:   Except Medications  With Ice Chips/Sips of Water (07-27-18 @ 16:06)  Diet, NPO:   Except Medications  With Ice Chips/Sips of Water (08-01-18 @ 13:03)    IV Fluids: dextrose 5% + sodium chloride 0.9%. 1000 milliLiter(s) (75 mL/Hr) IV Continuous <Continuous>  dextrose 5%. 1000 milliLiter(s) (50 mL/Hr) IV Continuous <Continuous>  Parenteral Nutrition - Adult 1 Each (75 mL/Hr) TPN Continuous <Continuous>      Physical Examination:  General Appearance: NAD  HEENT: EOMI, sclera non-icteric.  Heart: RRR   Lungs: CTABL.   Abdomen: former ostomy site c/d/i. Soft, nontender, improving distention. Obese. No rigidity, guarding, or rebound tenderness.   MSK/Extremities: Warm & well-perfused. No significant deformity or joint abnormality. Peripheral pulses intact.  Skin: Warm, dry. No jaundice.       Medications: [Standing]  ALBUTerol/ipratropium for Nebulization 3 milliLiter(s) Nebulizer every 6 hours  aspirin enteric coated 81 milliGRAM(s) Oral daily  atorvastatin 20 milliGRAM(s) Oral at bedtime  dextrose 5% + sodium chloride 0.9%. 1000 milliLiter(s) (75 mL/Hr) IV Continuous <Continuous>  dextrose 5%. 1000 milliLiter(s) (50 mL/Hr) IV Continuous <Continuous>  dextrose 50% Injectable 12.5 Gram(s) IV Push once  dextrose 50% Injectable 25 Gram(s) IV Push once  dextrose 50% Injectable 25 Gram(s) IV Push once  docusate sodium 100 milliGRAM(s) Oral three times a day  heparin  Injectable 5000 Unit(s) SubCutaneous every 8 hours  insulin lispro (HumaLOG) corrective regimen sliding scale   SubCutaneous every 6 hours  metoprolol succinate ER 25 milliGRAM(s) Oral daily  pantoprazole    Tablet 40 milliGRAM(s) Oral before breakfast  Parenteral Nutrition - Adult 1 Each (75 mL/Hr) TPN Continuous <Continuous>    DVT Prophylaxis: heparin  Injectable 5000 Unit(s) SubCutaneous every 8 hours    GI Prophylaxis: pantoprazole    Tablet 40 milliGRAM(s) Oral before breakfast    Antibiotics:   Anticoagulation:   Medications:[PRN]  acetaminophen   Tablet. 650 milliGRAM(s) Oral every 6 hours PRN  dextrose 40% Gel 15 Gram(s) Oral once PRN  glucagon  Injectable 1 milliGRAM(s) IntraMuscular once PRN  oxyCODONE    IR 5 milliGRAM(s) Oral every 6 hours PRN      Labs:                        8.8    8.32  )-----------( 166      ( 01 Aug 2018 00:49 )             27.2     08-01    134<L>  |  102  |  27<H>  ----------------------------<  147<H>  3.9   |  19  |  1.9<H>    Ca    8.2<L>      01 Aug 2018 00:49  Phos  3.0     08-01  Mg     1.8     08-01        Assessment:  84y Male patient admitted S/P reversal of ileostomy, SBR, primary anastomosis    Doing well    Plan:  Parental nutrition started. NPO  OOBAT  IS  Pain control  WBCs 8 from 10  Cr stable at 1.9    Date/Time: 08-02-18 @ 05:21

## 2018-08-03 LAB
ALBUMIN SERPL ELPH-MCNC: 3.1 G/DL — LOW (ref 3.5–5.2)
ANION GAP SERPL CALC-SCNC: 14 MMOL/L — SIGNIFICANT CHANGE UP (ref 7–14)
ANION GAP SERPL CALC-SCNC: 15 MMOL/L — HIGH (ref 7–14)
BASOPHILS # BLD AUTO: 0.02 K/UL — SIGNIFICANT CHANGE UP (ref 0–0.2)
BASOPHILS NFR BLD AUTO: 0.3 % — SIGNIFICANT CHANGE UP (ref 0–1)
BUN SERPL-MCNC: 31 MG/DL — HIGH (ref 10–20)
BUN SERPL-MCNC: 33 MG/DL — HIGH (ref 10–20)
CALCIUM SERPL-MCNC: 8.2 MG/DL — LOW (ref 8.5–10.1)
CALCIUM SERPL-MCNC: 8.3 MG/DL — LOW (ref 8.5–10.1)
CHLORIDE SERPL-SCNC: 102 MMOL/L — SIGNIFICANT CHANGE UP (ref 98–110)
CHLORIDE SERPL-SCNC: 102 MMOL/L — SIGNIFICANT CHANGE UP (ref 98–110)
CO2 SERPL-SCNC: 18 MMOL/L — SIGNIFICANT CHANGE UP (ref 17–32)
CO2 SERPL-SCNC: 21 MMOL/L — SIGNIFICANT CHANGE UP (ref 17–32)
CREAT SERPL-MCNC: 1.5 MG/DL — SIGNIFICANT CHANGE UP (ref 0.7–1.5)
CREAT SERPL-MCNC: 1.8 MG/DL — HIGH (ref 0.7–1.5)
EOSINOPHIL # BLD AUTO: 0.23 K/UL — SIGNIFICANT CHANGE UP (ref 0–0.7)
EOSINOPHIL NFR BLD AUTO: 3.8 % — SIGNIFICANT CHANGE UP (ref 0–8)
GLUCOSE SERPL-MCNC: 181 MG/DL — HIGH (ref 70–99)
GLUCOSE SERPL-MCNC: 204 MG/DL — HIGH (ref 70–99)
HCT VFR BLD CALC: 25.3 % — LOW (ref 42–52)
HGB BLD-MCNC: 8.2 G/DL — LOW (ref 14–18)
IMM GRANULOCYTES NFR BLD AUTO: 0.5 % — HIGH (ref 0.1–0.3)
LYMPHOCYTES # BLD AUTO: 0.77 K/UL — LOW (ref 1.2–3.4)
LYMPHOCYTES # BLD AUTO: 12.6 % — LOW (ref 20.5–51.1)
MAGNESIUM SERPL-MCNC: 1.7 MG/DL — LOW (ref 1.8–2.4)
MCHC RBC-ENTMCNC: 27.8 PG — SIGNIFICANT CHANGE UP (ref 27–31)
MCHC RBC-ENTMCNC: 32.4 G/DL — SIGNIFICANT CHANGE UP (ref 32–37)
MCV RBC AUTO: 85.8 FL — SIGNIFICANT CHANGE UP (ref 80–94)
MONOCYTES # BLD AUTO: 0.36 K/UL — SIGNIFICANT CHANGE UP (ref 0.1–0.6)
MONOCYTES NFR BLD AUTO: 5.9 % — SIGNIFICANT CHANGE UP (ref 1.7–9.3)
NEUTROPHILS # BLD AUTO: 4.69 K/UL — SIGNIFICANT CHANGE UP (ref 1.4–6.5)
NEUTROPHILS NFR BLD AUTO: 76.9 % — HIGH (ref 42.2–75.2)
NRBC # BLD: 0 /100 WBCS — SIGNIFICANT CHANGE UP (ref 0–0)
PHOSPHATE SERPL-MCNC: 2.3 MG/DL — SIGNIFICANT CHANGE UP (ref 2.1–4.9)
PLATELET # BLD AUTO: 185 K/UL — SIGNIFICANT CHANGE UP (ref 130–400)
POTASSIUM SERPL-MCNC: 3.1 MMOL/L — LOW (ref 3.5–5)
POTASSIUM SERPL-MCNC: 3.9 MMOL/L — SIGNIFICANT CHANGE UP (ref 3.5–5)
POTASSIUM SERPL-SCNC: 3.1 MMOL/L — LOW (ref 3.5–5)
POTASSIUM SERPL-SCNC: 3.9 MMOL/L — SIGNIFICANT CHANGE UP (ref 3.5–5)
RBC # BLD: 2.95 M/UL — LOW (ref 4.7–6.1)
RBC # FLD: 14.9 % — HIGH (ref 11.5–14.5)
SODIUM SERPL-SCNC: 135 MMOL/L — SIGNIFICANT CHANGE UP (ref 135–146)
SODIUM SERPL-SCNC: 137 MMOL/L — SIGNIFICANT CHANGE UP (ref 135–146)
WBC # BLD: 6.1 K/UL — SIGNIFICANT CHANGE UP (ref 4.8–10.8)
WBC # FLD AUTO: 6.1 K/UL — SIGNIFICANT CHANGE UP (ref 4.8–10.8)

## 2018-08-03 RX ORDER — ELECTROLYTE SOLUTION,INJ
1 VIAL (ML) INTRAVENOUS
Qty: 0 | Refills: 0 | Status: DISCONTINUED | OUTPATIENT
Start: 2018-08-03 | End: 2018-08-03

## 2018-08-03 RX ORDER — ELECTROLYTE SOLUTION,INJ
1 VIAL (ML) INTRAVENOUS
Qty: 0 | Refills: 0 | Status: DISCONTINUED | OUTPATIENT
Start: 2018-08-05 | End: 2018-08-04

## 2018-08-03 RX ORDER — POTASSIUM CHLORIDE 20 MEQ
20 PACKET (EA) ORAL ONCE
Qty: 0 | Refills: 0 | Status: COMPLETED | OUTPATIENT
Start: 2018-08-03 | End: 2018-08-03

## 2018-08-03 RX ORDER — MAGNESIUM SULFATE 500 MG/ML
2 VIAL (ML) INJECTION ONCE
Qty: 0 | Refills: 0 | Status: COMPLETED | OUTPATIENT
Start: 2018-08-03 | End: 2018-08-03

## 2018-08-03 RX ORDER — ELECTROLYTE SOLUTION,INJ
1 VIAL (ML) INTRAVENOUS
Qty: 0 | Refills: 0 | Status: DISCONTINUED | OUTPATIENT
Start: 2018-08-04 | End: 2018-08-04

## 2018-08-03 RX ADMIN — Medication 3 MILLILITER(S): at 08:55

## 2018-08-03 RX ADMIN — Medication 25 MILLIGRAM(S): at 21:07

## 2018-08-03 RX ADMIN — HEPARIN SODIUM 5000 UNIT(S): 5000 INJECTION INTRAVENOUS; SUBCUTANEOUS at 05:22

## 2018-08-03 RX ADMIN — Medication 50 GRAM(S): at 03:21

## 2018-08-03 RX ADMIN — Medication 650 MILLIGRAM(S): at 03:19

## 2018-08-03 RX ADMIN — Medication 3 MILLILITER(S): at 20:07

## 2018-08-03 RX ADMIN — Medication 100 MILLIGRAM(S): at 13:12

## 2018-08-03 RX ADMIN — Medication 2: at 05:40

## 2018-08-03 RX ADMIN — Medication 100 MILLIGRAM(S): at 05:22

## 2018-08-03 RX ADMIN — Medication 650 MILLIGRAM(S): at 14:30

## 2018-08-03 RX ADMIN — Medication 3 MILLILITER(S): at 13:58

## 2018-08-03 RX ADMIN — Medication 650 MILLIGRAM(S): at 15:59

## 2018-08-03 RX ADMIN — ATORVASTATIN CALCIUM 20 MILLIGRAM(S): 80 TABLET, FILM COATED ORAL at 21:07

## 2018-08-03 RX ADMIN — Medication 100 MILLIGRAM(S): at 21:07

## 2018-08-03 RX ADMIN — Medication 2: at 00:23

## 2018-08-03 RX ADMIN — Medication 2: at 11:19

## 2018-08-03 RX ADMIN — HEPARIN SODIUM 5000 UNIT(S): 5000 INJECTION INTRAVENOUS; SUBCUTANEOUS at 13:12

## 2018-08-03 RX ADMIN — Medication 50 MILLIEQUIVALENT(S): at 03:19

## 2018-08-03 RX ADMIN — Medication 81 MILLIGRAM(S): at 11:11

## 2018-08-03 RX ADMIN — PANTOPRAZOLE SODIUM 40 MILLIGRAM(S): 20 TABLET, DELAYED RELEASE ORAL at 08:21

## 2018-08-03 RX ADMIN — Medication 1 EACH: at 22:20

## 2018-08-03 RX ADMIN — HEPARIN SODIUM 5000 UNIT(S): 5000 INJECTION INTRAVENOUS; SUBCUTANEOUS at 21:07

## 2018-08-03 NOTE — PROGRESS NOTE ADULT - ASSESSMENT
ASSESSMENT/PLAN  -s/p reversal of ileostomy  -? ileus  -BUBBA, on CKD 3 -4  -HTN  -CAD (coronary artery disease)  -Diabetes  tpn ordered for tonight /Saturday/Sunday  check bmp/phos/mg ASSESSMENT/PLAN  -s/p reversal of ileostomy  -? ileus  -BUBBA, on CKD 3 -4  --  stabilized  -HTN  -CAD (coronary artery disease)  -Diabetes    tpn ordered for tonight /Saturday/Sunday  check bmp/phos/mg   follow glucose - fs q 6h while on TPN

## 2018-08-03 NOTE — PROGRESS NOTE ADULT - ASSESSMENT
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  Patient presented back to the hospital for elective reversal of ileostomy on 7/27    POD #7  - receiving respiratory treatments  - TPN in place, started on liquids  - encouraged to use incentive spirometer  - attempt ambulation  - rx as per surgery    SANTAMARIA?  - pulse ox stable  - condition improved?    DM  - on insulin  - addition coverage if needed    BUBBA resolved,  CKD 3 -4  - creat stable  - avoid contrast/renal toxins   - monitor UOP     Hyponatremia  - resolved  - continue to monitor    Anemia  - Hgb stale    HTN  - BP stable    Will continue to follow with you

## 2018-08-03 NOTE — PROGRESS NOTE ADULT - SUBJECTIVE AND OBJECTIVE BOX
ZEESHAN MCCLENDON  84y  Male  HPI:    MEDICATIONS  (STANDING):  ALBUTerol/ipratropium for Nebulization 3 milliLiter(s) Nebulizer every 6 hours  aspirin enteric coated 81 milliGRAM(s) Oral daily  atorvastatin 20 milliGRAM(s) Oral at bedtime  dextrose 5%. 1000 milliLiter(s) (50 mL/Hr) IV Continuous <Continuous>  dextrose 50% Injectable 12.5 Gram(s) IV Push once  dextrose 50% Injectable 25 Gram(s) IV Push once  dextrose 50% Injectable 25 Gram(s) IV Push once  docusate sodium 100 milliGRAM(s) Oral three times a day  heparin  Injectable 5000 Unit(s) SubCutaneous every 8 hours  insulin lispro (HumaLOG) corrective regimen sliding scale   SubCutaneous every 6 hours  metoprolol succinate ER 25 milliGRAM(s) Oral daily  pantoprazole    Tablet 40 milliGRAM(s) Oral before breakfast  Parenteral Nutrition - Adult 1 Each (75 mL/Hr) TPN Continuous <Continuous>  Parenteral Nutrition - Adult 1 Each (75 mL/Hr) TPN Continuous <Continuous>    MEDICATIONS  (PRN):  acetaminophen   Tablet. 650 milliGRAM(s) Oral every 6 hours PRN Mild Pain (1 - 3)  dextrose 40% Gel 15 Gram(s) Oral once PRN Blood Glucose LESS THAN 70 milliGRAM(s)/deciliter  glucagon  Injectable 1 milliGRAM(s) IntraMuscular once PRN Glucose LESS THAN 70 milligrams/deciliter  oxyCODONE    IR 5 milliGRAM(s) Oral every 6 hours PRN Moderate Pain (4 - 6)    INTERVAL EVENTS: Patient seen today, receiving respiratory treatment. Patient now on liquid diet again? Patient states he may be going home in am?    T(C): 37.5 (08-03-18 @ 15:16), Max: 37.5 (08-03-18 @ 15:16)  HR: 70 (08-03-18 @ 15:16) (69 - 70)  BP: 131/65 (08-03-18 @ 15:16) (126/63 - 136/65)  RR: 18 (08-03-18 @ 15:16) (18 - 18)  SpO2: --  Wt(kg): --Vital Signs Last 24 Hrs  T(C): 37.5 (03 Aug 2018 15:16), Max: 37.5 (03 Aug 2018 15:16)  T(F): 99.5 (03 Aug 2018 15:16), Max: 99.5 (03 Aug 2018 15:16)  HR: 70 (03 Aug 2018 15:16) (69 - 70)  BP: 131/65 (03 Aug 2018 15:16) (126/63 - 136/65)  BP(mean): --  RR: 18 (03 Aug 2018 15:16) (18 - 18)  SpO2: --    PHYSICAL EXAM:  GENERAL: NAD  NECK: Supple, No JVD  CHEST/LUNG: Clear  HEART: S1, S2, Regular rate and rhythm;   ABDOMEN: Soft, Nontender, Mildly distended; Bowel sounds present  EXTREMITIES: No clubbing, cyanosis, or edema  SKIN: No rashes or lesions    LABS:  Labs:                        8.2    6.10  )-----------( 185      ( 03 Aug 2018 00:42 )             25.3             08-03    137  |  102  |  33<H>  ----------------------------<  204<H>  3.9   |  21  |  1.5    Ca    8.2<L>      03 Aug 2018 11:17  Phos  2.3     08-03  Mg     1.7     08-03    TPro  x   /  Alb  3.1<L>  /  TBili  x   /  DBili  x   /  AST  x   /  ALT  x   /  AlkPhos  x   08-03    LIVER FUNCTIONS - ( 03 Aug 2018 00:42 )  Alb: 3.1 g/dL / Pro: x     / ALK PHOS: x     / ALT: x     / AST: x     / GGT: x                     RADIOLOGY & ADDITIONAL TESTS:

## 2018-08-03 NOTE — PROGRESS NOTE ADULT - SUBJECTIVE AND OBJECTIVE BOX
GENERAL SURGERY PROGRESS NOTE     DANELLE ZEESHAN  84y  Male  Hospital day :7d  POD:  Procedure: Small bowel resection, exteriorized segment  Closure, revision, or reversal of colostomy or ileostomy    OVERNIGHT EVENTS:  No events overnight, patient feels well.  Endorses passing guess, small stool.  No vomiting, no nausea.  Voiding.     T(F): 98.7 (08-03-18 @ 00:00), Max: 99.4 (08-02-18 @ 15:28)  HR: 70 (08-03-18 @ 00:00) (70 - 70)  BP: 126/63 (08-03-18 @ 00:00) (126/63 - 140/63)  ABP: --  ABP(mean): --  RR: 18 (08-03-18 @ 00:00) (18 - 18)  SpO2: --    DIET/FLUIDS: dextrose 5%. 1000 milliLiter(s) IV Continuous <Continuous>  fat emulsion (Plant Based) 20% Infusion 1.25 Gm/kG/Day IV Continuous <Continuous>  Parenteral Nutrition - Adult 1 Each TPN Continuous <Continuous>      GI proph:  pantoprazole    Tablet 40 milliGRAM(s) Oral before breakfast    AC/ proph: aspirin enteric coated 81 milliGRAM(s) Oral daily  heparin  Injectable 5000 Unit(s) SubCutaneous every 8 hours    ABx:     PHYSICAL EXAM:  GENERAL: NAD, well-appearing  CHEST/LUNG: Clear to auscultation bilaterally  HEART: Regular rate and rhythm  ABDOMEN: Soft, Nontender.  Ostomy reversal sight closed, intact.  No drainage.   EXTREMITIES:  No clubbing, cyanosis, or edema      LABS  Labs:  CAPILLARY BLOOD GLUCOSE  205 (03 Aug 2018 00:00)  148 (02 Aug 2018 17:48)  192 (02 Aug 2018 12:37)  169 (02 Aug 2018 05:00)                              8.9    7.79  )-----------( 174      ( 02 Aug 2018 05:08 )             27.9       Auto Neutrophil %: 74.6 % (08-02-18 @ 05:08)  Auto Immature Granulocyte %: 0.4 % (08-02-18 @ 05:08)    08-02    135  |  103  |  27<H>  ----------------------------<  126<H>  3.9   |  18  |  1.9<H>      Calcium, Total Serum: 8.5 mg/dL (08-02-18 @ 05:08)  Magnesium, Serum: 2.0 mg/dL (08-02-18 @ 05:08)  Phosphorus Level, Serum: 2.5 mg/dL (08-02-18 @ 05:08)      RADIOLOGY & ADDITIONAL TESTS:      A/P  This is a 84 year old s/p reversal of loop ileostomy with primary anastasmosis, POD 7, with ileus.  Clinically improving.   -On TPN, tolerating ICE chips and water sips.  -advance diet today  -f/u AM Labs  -Creatinine stable at 1.9, review AM.  -Ambulating,  -Pain control adequate  -Monitor I/O's  -DVT ppx  -GI ppx

## 2018-08-03 NOTE — PROGRESS NOTE ADULT - SUBJECTIVE AND OBJECTIVE BOX
Patient is a 84y old  Male who presents with a chief complaint of   pt seen and evaluated on TPN   stated he is feeling better  LABS  08-03    137  |  102  |  33<H>  ----------------------------<  204<H>  3.9   |  21  |  1.5    Ca    8.2<L>      03 Aug 2018 11:17  Phos  2.3     08-03  Mg     1.7     08-03    TPro  x   /  Alb  3.1<L>  /  TBili  x   /  DBili  x   /  AST  x   /  ALT  x   /  AlkPhos  x   08-03                          8.2    6.10  )-----------( 185      ( 03 Aug 2018 00:42 )             25.3     Drug Dosing Weight  Height (cm): 165.1 (29 Jul 2018 17:46)  Weight (kg): 79.9 (29 Jul 2018 17:46)  BMI (kg/m2): 29.3 (29 Jul 2018 17:46)  BSA (m2): 1.87 (29 Jul 2018 17:46)  T(C): 36.7 (08-03-18 @ 08:00), Max: 37.4 (08-02-18 @ 15:28)  HR: 69 (08-03-18 @ 08:00) (69 - 70)  BP: 136/65 (08-03-18 @ 08:00) (126/63 - 140/63)  RR: 18 (08-03-18 @ 08:00) (18 - 18)      08-02-18 @ 07:01  -  08-03-18 @ 07:00  --------------------------------------------------------  IN: 1690 mL / OUT: 1375 mL / NET: 315 mL        acetaminophen   Tablet. 650 milliGRAM(s) Oral every 6 hours PRN  ALBUTerol/ipratropium for Nebulization 3 milliLiter(s) Nebulizer every 6 hours  aspirin enteric coated 81 milliGRAM(s) Oral daily  atorvastatin 20 milliGRAM(s) Oral at bedtime  dextrose 40% Gel 15 Gram(s) Oral once PRN  dextrose 5%. 1000 milliLiter(s) IV Continuous <Continuous>  dextrose 50% Injectable 12.5 Gram(s) IV Push once  dextrose 50% Injectable 25 Gram(s) IV Push once  dextrose 50% Injectable 25 Gram(s) IV Push once  docusate sodium 100 milliGRAM(s) Oral three times a day  fat emulsion (Plant Based) 20% Infusion 1.25 Gm/kG/Day IV Continuous <Continuous>  glucagon  Injectable 1 milliGRAM(s) IntraMuscular once PRN  heparin  Injectable 5000 Unit(s) SubCutaneous every 8 hours  insulin lispro (HumaLOG) corrective regimen sliding scale   SubCutaneous every 6 hours  metoprolol succinate ER 25 milliGRAM(s) Oral daily  oxyCODONE    IR 5 milliGRAM(s) Oral every 6 hours PRN  pantoprazole    Tablet 40 milliGRAM(s) Oral before breakfast  Parenteral Nutrition - Adult 1 Each TPN Continuous <Continuous>  Parenteral Nutrition - Adult 1 Each TPN Continuous <Continuous      PHYSICAL EXAM    Constitutional: A+Ox3 nad    Gastrointestinal: abdomen is soft, n/t ,   Extremities no edema  Skin: nl skin turgor Patient is a 84y old  Male who presents for reversal of ileostomy, and developed postop ileus.  pt seen and evaluated on TPN   stated he is feeling better  LABS  08-03    137  |  102  |  33<H>  ----------------------------<  204<H>  3.9   |  21  |  1.5    Ca    8.2<L>      03 Aug 2018 11:17  Phos  2.3     08-03  Mg     1.7     08-03    TPro  x   /  Alb  3.1<L>  /  TBili  x   /  DBili  x   /  AST  x   /  ALT  x   /  AlkPhos  x   08-03                          8.2    6.10  )-----------( 185      ( 03 Aug 2018 00:42 )             25.3     Drug Dosing Weight  Height (cm): 165.1 (29 Jul 2018 17:46)  Weight (kg): 79.9 (29 Jul 2018 17:46)  BMI (kg/m2): 29.3 (29 Jul 2018 17:46)  BSA (m2): 1.87 (29 Jul 2018 17:46)  T(C): 36.7 (08-03-18 @ 08:00), Max: 37.4 (08-02-18 @ 15:28)  HR: 69 (08-03-18 @ 08:00) (69 - 70)  BP: 136/65 (08-03-18 @ 08:00) (126/63 - 140/63)  RR: 18 (08-03-18 @ 08:00) (18 - 18)      08-02-18 @ 07:01  -  08-03-18 @ 07:00  --------------------------------------------------------  IN: 1690 mL / OUT: 1375 mL / NET: 315 mL        acetaminophen   Tablet. 650 milliGRAM(s) Oral every 6 hours PRN  ALBUTerol/ipratropium for Nebulization 3 milliLiter(s) Nebulizer every 6 hours  aspirin enteric coated 81 milliGRAM(s) Oral daily  atorvastatin 20 milliGRAM(s) Oral at bedtime  dextrose 40% Gel 15 Gram(s) Oral once PRN  dextrose 5%. 1000 milliLiter(s) IV Continuous <Continuous>  dextrose 50% Injectable 12.5 Gram(s) IV Push once  dextrose 50% Injectable 25 Gram(s) IV Push once  dextrose 50% Injectable 25 Gram(s) IV Push once  docusate sodium 100 milliGRAM(s) Oral three times a day  fat emulsion (Plant Based) 20% Infusion 1.25 Gm/kG/Day IV Continuous <Continuous>  glucagon  Injectable 1 milliGRAM(s) IntraMuscular once PRN  heparin  Injectable 5000 Unit(s) SubCutaneous every 8 hours  insulin lispro (HumaLOG) corrective regimen sliding scale   SubCutaneous every 6 hours  metoprolol succinate ER 25 milliGRAM(s) Oral daily  oxyCODONE    IR 5 milliGRAM(s) Oral every 6 hours PRN  pantoprazole    Tablet 40 milliGRAM(s) Oral before breakfast  Parenteral Nutrition - Adult 1 Each TPN Continuous <Continuous>  Parenteral Nutrition - Adult 1 Each TPN Continuous <Continuous      PHYSICAL EXAM    Constitutional: A+Ox3 nad    Gastrointestinal: abdomen is soft, n/t ,   Extremities no edema  Skin: nl skin turgor

## 2018-08-04 VITALS
HEART RATE: 80 BPM | TEMPERATURE: 98 F | RESPIRATION RATE: 18 BRPM | DIASTOLIC BLOOD PRESSURE: 70 MMHG | SYSTOLIC BLOOD PRESSURE: 158 MMHG

## 2018-08-04 LAB
ANION GAP SERPL CALC-SCNC: 17 MMOL/L — HIGH (ref 7–14)
ANION GAP SERPL CALC-SCNC: 17 MMOL/L — HIGH (ref 7–14)
BASOPHILS # BLD AUTO: 0.02 K/UL — SIGNIFICANT CHANGE UP (ref 0–0.2)
BASOPHILS # BLD AUTO: 0.02 K/UL — SIGNIFICANT CHANGE UP (ref 0–0.2)
BASOPHILS NFR BLD AUTO: 0.3 % — SIGNIFICANT CHANGE UP (ref 0–1)
BASOPHILS NFR BLD AUTO: 0.3 % — SIGNIFICANT CHANGE UP (ref 0–1)
BUN SERPL-MCNC: 32 MG/DL — HIGH (ref 10–20)
BUN SERPL-MCNC: 32 MG/DL — HIGH (ref 10–20)
CALCIUM SERPL-MCNC: 8.3 MG/DL — LOW (ref 8.5–10.1)
CALCIUM SERPL-MCNC: 8.7 MG/DL — SIGNIFICANT CHANGE UP (ref 8.5–10.1)
CHLORIDE SERPL-SCNC: 98 MMOL/L — SIGNIFICANT CHANGE UP (ref 98–110)
CHLORIDE SERPL-SCNC: 98 MMOL/L — SIGNIFICANT CHANGE UP (ref 98–110)
CO2 SERPL-SCNC: 21 MMOL/L — SIGNIFICANT CHANGE UP (ref 17–32)
CO2 SERPL-SCNC: 21 MMOL/L — SIGNIFICANT CHANGE UP (ref 17–32)
CREAT SERPL-MCNC: 1.5 MG/DL — SIGNIFICANT CHANGE UP (ref 0.7–1.5)
CREAT SERPL-MCNC: 1.6 MG/DL — HIGH (ref 0.7–1.5)
EOSINOPHIL # BLD AUTO: 0.19 K/UL — SIGNIFICANT CHANGE UP (ref 0–0.7)
EOSINOPHIL # BLD AUTO: 0.24 K/UL — SIGNIFICANT CHANGE UP (ref 0–0.7)
EOSINOPHIL NFR BLD AUTO: 2.8 % — SIGNIFICANT CHANGE UP (ref 0–8)
EOSINOPHIL NFR BLD AUTO: 3.7 % — SIGNIFICANT CHANGE UP (ref 0–8)
GLUCOSE SERPL-MCNC: 172 MG/DL — HIGH (ref 70–99)
GLUCOSE SERPL-MCNC: 177 MG/DL — HIGH (ref 70–99)
HCT VFR BLD CALC: 24.9 % — LOW (ref 42–52)
HCT VFR BLD CALC: 26.4 % — LOW (ref 42–52)
HGB BLD-MCNC: 8.3 G/DL — LOW (ref 14–18)
HGB BLD-MCNC: 9 G/DL — LOW (ref 14–18)
IMM GRANULOCYTES NFR BLD AUTO: 0.6 % — HIGH (ref 0.1–0.3)
IMM GRANULOCYTES NFR BLD AUTO: 0.7 % — HIGH (ref 0.1–0.3)
LYMPHOCYTES # BLD AUTO: 1.04 K/UL — LOW (ref 1.2–3.4)
LYMPHOCYTES # BLD AUTO: 1.13 K/UL — LOW (ref 1.2–3.4)
LYMPHOCYTES # BLD AUTO: 15.1 % — LOW (ref 20.5–51.1)
LYMPHOCYTES # BLD AUTO: 17.5 % — LOW (ref 20.5–51.1)
MAGNESIUM SERPL-MCNC: 1.5 MG/DL — LOW (ref 1.8–2.4)
MAGNESIUM SERPL-MCNC: 1.9 MG/DL — SIGNIFICANT CHANGE UP (ref 1.8–2.4)
MCHC RBC-ENTMCNC: 28 PG — SIGNIFICANT CHANGE UP (ref 27–31)
MCHC RBC-ENTMCNC: 28.6 PG — SIGNIFICANT CHANGE UP (ref 27–31)
MCHC RBC-ENTMCNC: 33.3 G/DL — SIGNIFICANT CHANGE UP (ref 32–37)
MCHC RBC-ENTMCNC: 34.1 G/DL — SIGNIFICANT CHANGE UP (ref 32–37)
MCV RBC AUTO: 83.8 FL — SIGNIFICANT CHANGE UP (ref 80–94)
MCV RBC AUTO: 84.1 FL — SIGNIFICANT CHANGE UP (ref 80–94)
MONOCYTES # BLD AUTO: 0.49 K/UL — SIGNIFICANT CHANGE UP (ref 0.1–0.6)
MONOCYTES # BLD AUTO: 0.59 K/UL — SIGNIFICANT CHANGE UP (ref 0.1–0.6)
MONOCYTES NFR BLD AUTO: 7.6 % — SIGNIFICANT CHANGE UP (ref 1.7–9.3)
MONOCYTES NFR BLD AUTO: 8.6 % — SIGNIFICANT CHANGE UP (ref 1.7–9.3)
NEUTROPHILS # BLD AUTO: 4.55 K/UL — SIGNIFICANT CHANGE UP (ref 1.4–6.5)
NEUTROPHILS # BLD AUTO: 5 K/UL — SIGNIFICANT CHANGE UP (ref 1.4–6.5)
NEUTROPHILS NFR BLD AUTO: 70.3 % — SIGNIFICANT CHANGE UP (ref 42.2–75.2)
NEUTROPHILS NFR BLD AUTO: 72.5 % — SIGNIFICANT CHANGE UP (ref 42.2–75.2)
NRBC # BLD: 0 /100 WBCS — SIGNIFICANT CHANGE UP (ref 0–0)
NRBC # BLD: 0 /100 WBCS — SIGNIFICANT CHANGE UP (ref 0–0)
PHOSPHATE SERPL-MCNC: 2.7 MG/DL — SIGNIFICANT CHANGE UP (ref 2.1–4.9)
PHOSPHATE SERPL-MCNC: 3 MG/DL — SIGNIFICANT CHANGE UP (ref 2.1–4.9)
PLATELET # BLD AUTO: 189 K/UL — SIGNIFICANT CHANGE UP (ref 130–400)
PLATELET # BLD AUTO: 216 K/UL — SIGNIFICANT CHANGE UP (ref 130–400)
POTASSIUM SERPL-MCNC: 3.3 MMOL/L — LOW (ref 3.5–5)
POTASSIUM SERPL-MCNC: 3.4 MMOL/L — LOW (ref 3.5–5)
POTASSIUM SERPL-SCNC: 3.3 MMOL/L — LOW (ref 3.5–5)
POTASSIUM SERPL-SCNC: 3.4 MMOL/L — LOW (ref 3.5–5)
RBC # BLD: 2.96 M/UL — LOW (ref 4.7–6.1)
RBC # BLD: 3.15 M/UL — LOW (ref 4.7–6.1)
RBC # FLD: 14.9 % — HIGH (ref 11.5–14.5)
RBC # FLD: 15.1 % — HIGH (ref 11.5–14.5)
SODIUM SERPL-SCNC: 136 MMOL/L — SIGNIFICANT CHANGE UP (ref 135–146)
SODIUM SERPL-SCNC: 136 MMOL/L — SIGNIFICANT CHANGE UP (ref 135–146)
WBC # BLD: 6.47 K/UL — SIGNIFICANT CHANGE UP (ref 4.8–10.8)
WBC # BLD: 6.89 K/UL — SIGNIFICANT CHANGE UP (ref 4.8–10.8)
WBC # FLD AUTO: 6.47 K/UL — SIGNIFICANT CHANGE UP (ref 4.8–10.8)
WBC # FLD AUTO: 6.89 K/UL — SIGNIFICANT CHANGE UP (ref 4.8–10.8)

## 2018-08-04 RX ORDER — METFORMIN HYDROCHLORIDE 850 MG/1
1 TABLET ORAL
Qty: 0 | Refills: 0 | COMMUNITY

## 2018-08-04 RX ORDER — POTASSIUM CHLORIDE 20 MEQ
20 PACKET (EA) ORAL ONCE
Qty: 0 | Refills: 0 | Status: COMPLETED | OUTPATIENT
Start: 2018-08-04 | End: 2018-08-04

## 2018-08-04 RX ORDER — MAGNESIUM SULFATE 500 MG/ML
2 VIAL (ML) INJECTION ONCE
Qty: 0 | Refills: 0 | Status: COMPLETED | OUTPATIENT
Start: 2018-08-04 | End: 2018-08-04

## 2018-08-04 RX ORDER — IPRATROPIUM/ALBUTEROL SULFATE 18-103MCG
3 AEROSOL WITH ADAPTER (GRAM) INHALATION
Qty: 0 | Refills: 0 | COMMUNITY
Start: 2018-08-04

## 2018-08-04 RX ORDER — DOCUSATE SODIUM 100 MG
1 CAPSULE ORAL
Qty: 0 | Refills: 0 | COMMUNITY
Start: 2018-08-04

## 2018-08-04 RX ORDER — SODIUM CHLORIDE 9 MG/ML
1000 INJECTION, SOLUTION INTRAVENOUS
Qty: 0 | Refills: 0 | Status: DISCONTINUED | OUTPATIENT
Start: 2018-08-04 | End: 2018-08-04

## 2018-08-04 RX ORDER — POTASSIUM CHLORIDE 20 MEQ
40 PACKET (EA) ORAL ONCE
Qty: 0 | Refills: 0 | Status: COMPLETED | OUTPATIENT
Start: 2018-08-04 | End: 2018-08-04

## 2018-08-04 RX ORDER — ACETAMINOPHEN 500 MG
2 TABLET ORAL
Qty: 0 | Refills: 0 | COMMUNITY
Start: 2018-08-04

## 2018-08-04 RX ORDER — LINAGLIPTIN 5 MG/1
1 TABLET, FILM COATED ORAL
Qty: 30 | Refills: 0 | OUTPATIENT
Start: 2018-08-04 | End: 2018-09-02

## 2018-08-04 RX ADMIN — Medication 81 MILLIGRAM(S): at 11:04

## 2018-08-04 RX ADMIN — Medication 50 GRAM(S): at 10:58

## 2018-08-04 RX ADMIN — Medication 2: at 00:27

## 2018-08-04 RX ADMIN — PANTOPRAZOLE SODIUM 40 MILLIGRAM(S): 20 TABLET, DELAYED RELEASE ORAL at 11:04

## 2018-08-04 RX ADMIN — Medication 3 MILLILITER(S): at 14:13

## 2018-08-04 RX ADMIN — Medication 650 MILLIGRAM(S): at 00:34

## 2018-08-04 RX ADMIN — Medication 40 MILLIEQUIVALENT(S): at 15:40

## 2018-08-04 RX ADMIN — Medication 1: at 05:38

## 2018-08-04 RX ADMIN — Medication 100 MILLIGRAM(S): at 05:37

## 2018-08-04 RX ADMIN — Medication 20 MILLIEQUIVALENT(S): at 11:11

## 2018-08-04 RX ADMIN — Medication 3: at 11:05

## 2018-08-04 RX ADMIN — HEPARIN SODIUM 5000 UNIT(S): 5000 INJECTION INTRAVENOUS; SUBCUTANEOUS at 05:38

## 2018-08-04 NOTE — CHART NOTE - NSCHARTNOTEFT_GEN_A_CORE
pt without complaints, tolerated well diet and ambulated, + flatus and + BM. . vital signs stable and afebrile. labs reviewed . K repleted. Per Dr Martinez. pt to be discharged home today. resume home medications except metformin and start tradjenta ( per PMD) pt advised to continue full liquid diet. and follow up with Dr Martinez in 1 week for wound check and with his PMD and renal MD ( call offices for appointments). precaution provided. Risks, benefits explained and all questions answered @ this time. pt without complaints, tolerated well diet and ambulated, + flatus and + BM. . vital signs stable and afebrile. labs reviewed . K repleted. Per Dr Martinez. pt to be discharged home today. resume home medications except metformin and start tradjenta ( per PMD) pt advised to continue full liquid diet. and follow up with Dr Martinez in 1 week for wound check and with his PMD next week and renal MD ( call offices for appointments). precaution provided. Risks, benefits explained and all questions answered @ this time.    addendum  piccline removed per Dr Martinez and pt tolerated it well

## 2018-08-04 NOTE — PROGRESS NOTE ADULT - SUBJECTIVE AND OBJECTIVE BOX
Progress Note: General Surgery  Patient: ZEESHAN MCCLENDON , 84y (26-Oct-1933)Male   MRN: [id number]  Location: [address]  Visit: 07-27-18 Inpatient  Date: 08-04-18 @ 04:06  Hospital Day: 9  Post-op Day: 8    Procedure/Diagnosis: S/P reversal of loop ileostomy, SBR, primary anastomosis      Events/ 24h: No acute events overnight. Pain controlled.    Vitals: T(F): 98.5 (08-04-18 @ 00:00), Max: 99.5 (08-03-18 @ 15:16)  HR: 70 (08-04-18 @ 00:00)  BP: 139/66 (08-04-18 @ 00:00) (131/65 - 139/66)  RR: 15 (08-04-18 @ 00:00)  SpO2: 97% (08-04-18 @ 00:00)    In:   08-02-18 @ 07:01  -  08-03-18 @ 07:00  --------------------------------------------------------  IN: 1690 mL    08-03-18 @ 07:01  -  08-04-18 @ 04:06  --------------------------------------------------------  IN: 280 mL      Out:   08-02-18 @ 07:01  -  08-03-18 @ 07:00  --------------------------------------------------------  OUT:    Voided: 1375 mL  Total OUT: 1375 mL      08-03-18 @ 07:01  -  08-04-18 @ 04:06  --------------------------------------------------------  OUT:    Voided: 500 mL  Total OUT: 500 mL        Net:   08-02-18 @ 07:01  -  08-03-18 @ 07:00  --------------------------------------------------------  NET: 315 mL    08-03-18 @ 07:01  -  08-04-18 @ 04:06  --------------------------------------------------------  NET: -220 mL        Diet: Diet, Full Liquid (08-03-18 @ 17:16)    IV Fluids: dextrose 5%. 1000 milliLiter(s) (50 mL/Hr) IV Continuous <Continuous>  Parenteral Nutrition - Adult 1 Each (75 mL/Hr) TPN Continuous <Continuous>  Parenteral Nutrition - Adult 1 Each (75 mL/Hr) TPN Continuous <Continuous>      Physical Examination:  General Appearance: NAD  HEENT: EOMI, sclera non-icteric.  Heart: RRR   Lungs: CTABL.   Abdomen:  Soft, nontender, nondistended. Ostomy site c/d/i. Obese. No rigidity, guarding, or rebound tenderness.   MSK/Extremities: Warm & well-perfused. No significant deformity or joint abnormality. Peripheral pulses intact.  Skin: Warm, dry. No jaundice.       Medications: [Standing]  ALBUTerol/ipratropium for Nebulization 3 milliLiter(s) Nebulizer every 6 hours  aspirin enteric coated 81 milliGRAM(s) Oral daily  atorvastatin 20 milliGRAM(s) Oral at bedtime  dextrose 5%. 1000 milliLiter(s) (50 mL/Hr) IV Continuous <Continuous>  dextrose 50% Injectable 12.5 Gram(s) IV Push once  dextrose 50% Injectable 25 Gram(s) IV Push once  dextrose 50% Injectable 25 Gram(s) IV Push once  docusate sodium 100 milliGRAM(s) Oral three times a day  heparin  Injectable 5000 Unit(s) SubCutaneous every 8 hours  insulin lispro (HumaLOG) corrective regimen sliding scale   SubCutaneous every 6 hours  metoprolol succinate ER 25 milliGRAM(s) Oral daily  pantoprazole    Tablet 40 milliGRAM(s) Oral before breakfast  Parenteral Nutrition - Adult 1 Each (75 mL/Hr) TPN Continuous <Continuous>  Parenteral Nutrition - Adult 1 Each (75 mL/Hr) TPN Continuous <Continuous>    DVT Prophylaxis: heparin  Injectable 5000 Unit(s) SubCutaneous every 8 hours    GI Prophylaxis: pantoprazole    Tablet 40 milliGRAM(s) Oral before breakfast    Antibiotics:   Anticoagulation:   Medications:[PRN]  acetaminophen   Tablet. 650 milliGRAM(s) Oral every 6 hours PRN  dextrose 40% Gel 15 Gram(s) Oral once PRN  glucagon  Injectable 1 milliGRAM(s) IntraMuscular once PRN  oxyCODONE    IR 5 milliGRAM(s) Oral every 6 hours PRN      Labs:                        8.3    6.47  )-----------( 189      ( 04 Aug 2018 00:45 )             24.9     08-04    136  |  98  |  32<H>  ----------------------------<  172<H>  3.3<L>   |  21  |  1.6<H>    Ca    8.3<L>      04 Aug 2018 00:45  Phos  3.0     08-04  Mg     1.5     08-04    TPro  x   /  Alb  3.1<L>  /  TBili  x   /  DBili  x   /  AST  x   /  ALT  x   /  AlkPhos  x   08-03    LIVER FUNCTIONS - ( 03 Aug 2018 00:42 )  Alb: 3.1 g/dL / Pro: x     / ALK PHOS: x     / ALT: x     / AST: x     / GGT: x               Assessment:  84y Male patient admitted S/P reversal of loop ileostomy, SBR, primary anastomosis      Plan:  taper TPN this AM w/ Q1 hr fingersticks  fat emulsion d/c'ed  Possible d/c today    Date/Time: 08-04-18 @ 04:06 Progress Note: General Surgery  Patient: ZEESHAN MCCLENDON , 84y (26-Oct-1933)Male   MRN: [id number]  Location: [address]  Visit: 07-27-18 Inpatient  Date: 08-04-18 @ 04:06  Hospital Day: 9  Post-op Day: 8    Procedure/Diagnosis: S/P reversal of loop ileostomy, SBR, primary anastomosis      Events/ 24h: No acute events overnight. Pain controlled. BM this AM at 03:00. Pt reports abdominal pain improving.     Vitals: T(F): 98.5 (08-04-18 @ 00:00), Max: 99.5 (08-03-18 @ 15:16)  HR: 70 (08-04-18 @ 00:00)  BP: 139/66 (08-04-18 @ 00:00) (131/65 - 139/66)  RR: 15 (08-04-18 @ 00:00)  SpO2: 97% (08-04-18 @ 00:00)    In:   08-02-18 @ 07:01  -  08-03-18 @ 07:00  --------------------------------------------------------  IN: 1690 mL    08-03-18 @ 07:01  -  08-04-18 @ 04:06  --------------------------------------------------------  IN: 280 mL      Out:   08-02-18 @ 07:01  -  08-03-18 @ 07:00  --------------------------------------------------------  OUT:    Voided: 1375 mL  Total OUT: 1375 mL      08-03-18 @ 07:01  -  08-04-18 @ 04:06  --------------------------------------------------------  OUT:    Voided: 500 mL  Total OUT: 500 mL        Net:   08-02-18 @ 07:01  -  08-03-18 @ 07:00  --------------------------------------------------------  NET: 315 mL    08-03-18 @ 07:01  -  08-04-18 @ 04:06  --------------------------------------------------------  NET: -220 mL        Diet: Diet, Full Liquid (08-03-18 @ 17:16)    IV Fluids: dextrose 5%. 1000 milliLiter(s) (50 mL/Hr) IV Continuous <Continuous>  Parenteral Nutrition - Adult 1 Each (75 mL/Hr) TPN Continuous <Continuous>  Parenteral Nutrition - Adult 1 Each (75 mL/Hr) TPN Continuous <Continuous>      Physical Examination:  General Appearance: NAD  HEENT: EOMI, sclera non-icteric.  Heart: RRR   Lungs: CTABL.   Abdomen:  Soft, nontender, nondistended. Ostomy site c/d/i. Obese. No rigidity, guarding, or rebound tenderness.   MSK/Extremities: Warm & well-perfused. No significant deformity or joint abnormality. Peripheral pulses intact.  Skin: Warm, dry. No jaundice.       Medications: [Standing]  ALBUTerol/ipratropium for Nebulization 3 milliLiter(s) Nebulizer every 6 hours  aspirin enteric coated 81 milliGRAM(s) Oral daily  atorvastatin 20 milliGRAM(s) Oral at bedtime  dextrose 5%. 1000 milliLiter(s) (50 mL/Hr) IV Continuous <Continuous>  dextrose 50% Injectable 12.5 Gram(s) IV Push once  dextrose 50% Injectable 25 Gram(s) IV Push once  dextrose 50% Injectable 25 Gram(s) IV Push once  docusate sodium 100 milliGRAM(s) Oral three times a day  heparin  Injectable 5000 Unit(s) SubCutaneous every 8 hours  insulin lispro (HumaLOG) corrective regimen sliding scale   SubCutaneous every 6 hours  metoprolol succinate ER 25 milliGRAM(s) Oral daily  pantoprazole    Tablet 40 milliGRAM(s) Oral before breakfast  Parenteral Nutrition - Adult 1 Each (75 mL/Hr) TPN Continuous <Continuous>  Parenteral Nutrition - Adult 1 Each (75 mL/Hr) TPN Continuous <Continuous>    DVT Prophylaxis: heparin  Injectable 5000 Unit(s) SubCutaneous every 8 hours    GI Prophylaxis: pantoprazole    Tablet 40 milliGRAM(s) Oral before breakfast    Antibiotics:   Anticoagulation:   Medications:[PRN]  acetaminophen   Tablet. 650 milliGRAM(s) Oral every 6 hours PRN  dextrose 40% Gel 15 Gram(s) Oral once PRN  glucagon  Injectable 1 milliGRAM(s) IntraMuscular once PRN  oxyCODONE    IR 5 milliGRAM(s) Oral every 6 hours PRN      Labs:                        8.3    6.47  )-----------( 189      ( 04 Aug 2018 00:45 )             24.9     08-04    136  |  98  |  32<H>  ----------------------------<  172<H>  3.3<L>   |  21  |  1.6<H>    Ca    8.3<L>      04 Aug 2018 00:45  Phos  3.0     08-04  Mg     1.5     08-04    TPro  x   /  Alb  3.1<L>  /  TBili  x   /  DBili  x   /  AST  x   /  ALT  x   /  AlkPhos  x   08-03    LIVER FUNCTIONS - ( 03 Aug 2018 00:42 )  Alb: 3.1 g/dL / Pro: x     / ALK PHOS: x     / ALT: x     / AST: x     / GGT: x               Assessment:  84y Male patient admitted S/P reversal of loop ileostomy, SBR, primary anastomosis      Plan:  taper TPN this AM w/ Q1 hr fingersticks  fat emulsion d/c'ed  Possible d/c today    Date/Time: 08-04-18 @ 04:06

## 2018-08-04 NOTE — PROGRESS NOTE ADULT - SUBJECTIVE AND OBJECTIVE BOX
ZEESHAN MCCLENDON  84y  Male      Patient is a 84y old  Male who presents with a chief complaint of     for reversal of colostomy  REVIEW OF SYSTEMS:  CONSTITUTIONAL: No fever, weight loss, or fatigue  EYES: No eye pain, visual disturbances, or discharge  ENMT:  No difficulty hearing, tinnitus, vertigo; No sinus or throat pain  NECK: No pain or stiffness  BREASTS: No pain, masses, or nipple discharge  RESPIRATORY: No cough, wheezing, chills or hemoptysis; No shortness of breath  CARDIOVASCULAR: No chest pain, palpitations, dizziness, or leg swelling  GASTROINTESTINAL: No abdominal or epigastric pain. No nausea, vomiting, or hematemesis; No diarrhea or constipation. No melena or hematochezia.  GENITOURINARY: No dysuria, frequency, hematuria, or incontinence  NEUROLOGICAL: No headaches, memory loss, loss of strength, numbness, or tremors  SKIN: No itching, burning, rashes, or lesions   LYMPH NODES: No enlarged glands  ENDOCRINE: No heat or cold intolerance; No hair loss  MUSCULOSKELETAL: No joint pain or swelling; No muscle, back, or extremity pain  PSYCHIATRIC: No depression, anxiety, mood swings, or difficulty sleeping  HEME/LYMPH: No easy bruising, or bleeding gums  ALLERY AND IMMUNOLOGIC: No hives or eczema  FAMILY HISTORY:  CVA (cerebral vascular accident): hemorrhage  Family history of colon cancer in mother (Mother, Grandparent)    T(C): 35.9 (08-04-18 @ 08:09), Max: 37.5 (08-03-18 @ 15:16)  HR: 69 (08-04-18 @ 08:09) (69 - 70)  BP: 144/69 (08-04-18 @ 08:09) (131/65 - 144/69)  RR: 16 (08-04-18 @ 08:09) (15 - 18)  SpO2: 97% (08-04-18 @ 00:00) (97% - 97%)  Wt(kg): --Vital Signs Last 24 Hrs  T(C): 35.9 (04 Aug 2018 08:09), Max: 37.5 (03 Aug 2018 15:16)  T(F): 96.7 (04 Aug 2018 08:09), Max: 99.5 (03 Aug 2018 15:16)  HR: 69 (04 Aug 2018 08:09) (69 - 70)  BP: 144/69 (04 Aug 2018 08:09) (131/65 - 144/69)  BP(mean): --  RR: 16 (04 Aug 2018 08:09) (15 - 18)  SpO2: 97% (04 Aug 2018 00:00) (97% - 97%)  No Known Allergies      PHYSICAL EXAM:  GENERAL: NAD, well-groomed, well-developed  HEAD:  Atraumatic, Normocephalic  EYES: EOMI, PERRLA, conjunctiva and sclera clear  ENMT: No tonsillar erythema, exudates, or enlargement; Moist mucous membranes, Good dentition, No lesions  NECK: Supple, No JVD, Normal thyroid  NERVOUS SYSTEM:  Alert & Oriented X3, Good concentration; Motor Strength 5/5 B/L upper and lower extremities; DTRs 2+ intact and symmetric  CHEST/LUNG: Clear to percussion bilaterally; No rales, rhonchi, wheezing, or rubs  HEART: Regular rate and rhythm; No murmurs, rubs, or gallops  ABDOMEN: Soft, Nontender, Nondistended; Bowel sounds present,rt mid staples in place  EXTREMITIES:  2+ Peripheral Pulses, No clubbing, cyanosis, or edema  LYMPH: No lymphadenopathy noted  SKIN: No rashes or lesions      LABS:  08-04    136  |  98  |  32<H>  ----------------------------<  172<H>  3.3<L>   |  21  |  1.6<H>    Ca    8.3<L>      04 Aug 2018 00:45  Phos  3.0     08-04  Mg     1.5     08-04    TPro  x   /  Alb  3.1<L>  /  TBili  x   /  DBili  x   /  AST  x   /  ALT  x   /  AlkPhos  x   08-03                          8.3    6.47  )-----------( 189      ( 04 Aug 2018 00:45 )             24.9         RADIOLOGY & ADDITIONAL TESTS:    MEDICATION:  acetaminophen   Tablet. 650 milliGRAM(s) Oral every 6 hours PRN  ALBUTerol/ipratropium for Nebulization 3 milliLiter(s) Nebulizer every 6 hours  aspirin enteric coated 81 milliGRAM(s) Oral daily  atorvastatin 20 milliGRAM(s) Oral at bedtime  dextrose 40% Gel 15 Gram(s) Oral once PRN  dextrose 5%. 1000 milliLiter(s) IV Continuous <Continuous>  dextrose 50% Injectable 12.5 Gram(s) IV Push once  dextrose 50% Injectable 25 Gram(s) IV Push once  dextrose 50% Injectable 25 Gram(s) IV Push once  docusate sodium 100 milliGRAM(s) Oral three times a day  glucagon  Injectable 1 milliGRAM(s) IntraMuscular once PRN  heparin  Injectable 5000 Unit(s) SubCutaneous every 8 hours  insulin lispro (HumaLOG) corrective regimen sliding scale   SubCutaneous every 6 hours  magnesium sulfate  IVPB 2 Gram(s) IV Intermittent once  metoprolol succinate ER 25 milliGRAM(s) Oral daily  oxyCODONE    IR 5 milliGRAM(s) Oral every 6 hours PRN  pantoprazole    Tablet 40 milliGRAM(s) Oral before breakfast  Parenteral Nutrition - Adult 1 Each TPN Continuous <Continuous>  Parenteral Nutrition - Adult 1 Each TPN Continuous <Continuous>  potassium chloride    Tablet ER 20 milliEquivalent(s) Oral once      HEALTH ISSUES - PROBLEM Dx:  s/p reversal of colostomy  NIDDM was on metformin,bc creatinine will change to tradjenta 5mg dailyto d/c  anemia multivitamin,folic acid ,may d/c home fu as out pt.

## 2018-08-04 NOTE — PROGRESS NOTE ADULT - PROVIDER SPECIALTY LIST ADULT
Internal Medicine
Nephrology
Nutrition Support
Nutrition Support
Surgery
SICU

## 2018-08-09 DIAGNOSIS — E11.22 TYPE 2 DIABETES MELLITUS WITH DIABETIC CHRONIC KIDNEY DISEASE: ICD-10-CM

## 2018-08-09 DIAGNOSIS — I25.10 ATHEROSCLEROTIC HEART DISEASE OF NATIVE CORONARY ARTERY WITHOUT ANGINA PECTORIS: ICD-10-CM

## 2018-08-09 DIAGNOSIS — Z43.2 ENCOUNTER FOR ATTENTION TO ILEOSTOMY: ICD-10-CM

## 2018-08-09 DIAGNOSIS — E87.1 HYPO-OSMOLALITY AND HYPONATREMIA: ICD-10-CM

## 2018-08-09 DIAGNOSIS — N18.3 CHRONIC KIDNEY DISEASE, STAGE 3 (MODERATE): ICD-10-CM

## 2018-08-09 DIAGNOSIS — Z95.0 PRESENCE OF CARDIAC PACEMAKER: ICD-10-CM

## 2018-08-09 DIAGNOSIS — E78.5 HYPERLIPIDEMIA, UNSPECIFIED: ICD-10-CM

## 2018-08-09 DIAGNOSIS — Z98.890 OTHER SPECIFIED POSTPROCEDURAL STATES: ICD-10-CM

## 2018-08-09 DIAGNOSIS — Z79.84 LONG TERM (CURRENT) USE OF ORAL HYPOGLYCEMIC DRUGS: ICD-10-CM

## 2018-08-09 DIAGNOSIS — R26.9 UNSPECIFIED ABNORMALITIES OF GAIT AND MOBILITY: ICD-10-CM

## 2018-08-09 DIAGNOSIS — D64.9 ANEMIA, UNSPECIFIED: ICD-10-CM

## 2018-08-09 DIAGNOSIS — Z85.820 PERSONAL HISTORY OF MALIGNANT MELANOMA OF SKIN: ICD-10-CM

## 2018-08-09 DIAGNOSIS — K21.9 GASTRO-ESOPHAGEAL REFLUX DISEASE WITHOUT ESOPHAGITIS: ICD-10-CM

## 2018-08-09 DIAGNOSIS — N17.9 ACUTE KIDNEY FAILURE, UNSPECIFIED: ICD-10-CM

## 2018-08-09 DIAGNOSIS — R06.09 OTHER FORMS OF DYSPNEA: ICD-10-CM

## 2018-08-09 DIAGNOSIS — E87.5 HYPERKALEMIA: ICD-10-CM

## 2018-08-09 DIAGNOSIS — Z87.440 PERSONAL HISTORY OF URINARY (TRACT) INFECTIONS: ICD-10-CM

## 2018-08-09 DIAGNOSIS — Z90.49 ACQUIRED ABSENCE OF OTHER SPECIFIED PARTS OF DIGESTIVE TRACT: ICD-10-CM

## 2018-08-09 DIAGNOSIS — K56.7 ILEUS, UNSPECIFIED: ICD-10-CM

## 2018-08-09 DIAGNOSIS — Z95.5 PRESENCE OF CORONARY ANGIOPLASTY IMPLANT AND GRAFT: ICD-10-CM

## 2018-08-09 DIAGNOSIS — J45.20 MILD INTERMITTENT ASTHMA, UNCOMPLICATED: ICD-10-CM

## 2018-08-09 DIAGNOSIS — I12.9 HYPERTENSIVE CHRONIC KIDNEY DISEASE WITH STAGE 1 THROUGH STAGE 4 CHRONIC KIDNEY DISEASE, OR UNSPECIFIED CHRONIC KIDNEY DISEASE: ICD-10-CM

## 2018-10-05 ENCOUNTER — INPATIENT (INPATIENT)
Facility: HOSPITAL | Age: 83
LOS: 3 days | Discharge: HOME | End: 2018-10-09
Attending: INTERNAL MEDICINE | Admitting: INTERNAL MEDICINE

## 2018-10-05 VITALS
OXYGEN SATURATION: 98 % | HEART RATE: 79 BPM | RESPIRATION RATE: 18 BRPM | SYSTOLIC BLOOD PRESSURE: 134 MMHG | DIASTOLIC BLOOD PRESSURE: 63 MMHG | TEMPERATURE: 98 F

## 2018-10-05 DIAGNOSIS — Z41.9 ENCOUNTER FOR PROCEDURE FOR PURPOSES OTHER THAN REMEDYING HEALTH STATE, UNSPECIFIED: Chronic | ICD-10-CM

## 2018-10-05 DIAGNOSIS — C44.91 BASAL CELL CARCINOMA OF SKIN, UNSPECIFIED: Chronic | ICD-10-CM

## 2018-10-05 DIAGNOSIS — Z95.5 PRESENCE OF CORONARY ANGIOPLASTY IMPLANT AND GRAFT: Chronic | ICD-10-CM

## 2018-10-05 DIAGNOSIS — Z95.0 PRESENCE OF CARDIAC PACEMAKER: Chronic | ICD-10-CM

## 2018-10-05 DIAGNOSIS — Z98.890 OTHER SPECIFIED POSTPROCEDURAL STATES: Chronic | ICD-10-CM

## 2018-10-05 LAB
ALBUMIN SERPL ELPH-MCNC: 4.5 G/DL — SIGNIFICANT CHANGE UP (ref 3.5–5.2)
ALP SERPL-CCNC: 66 U/L — SIGNIFICANT CHANGE UP (ref 30–115)
ALT FLD-CCNC: 29 U/L — SIGNIFICANT CHANGE UP (ref 0–41)
ANION GAP SERPL CALC-SCNC: 15 MMOL/L — HIGH (ref 7–14)
AST SERPL-CCNC: 21 U/L — SIGNIFICANT CHANGE UP (ref 0–41)
BASE EXCESS BLDV CALC-SCNC: 2.8 MMOL/L — HIGH (ref -2–2)
BILIRUB SERPL-MCNC: 0.4 MG/DL — SIGNIFICANT CHANGE UP (ref 0.2–1.2)
BUN SERPL-MCNC: 40 MG/DL — HIGH (ref 10–20)
CALCIUM SERPL-MCNC: 10.6 MG/DL — HIGH (ref 8.5–10.1)
CHLORIDE SERPL-SCNC: 98 MMOL/L — SIGNIFICANT CHANGE UP (ref 98–110)
CK MB CFR SERPL CALC: 2.6 NG/ML — SIGNIFICANT CHANGE UP (ref 0.6–6.3)
CO2 SERPL-SCNC: 26 MMOL/L — SIGNIFICANT CHANGE UP (ref 17–32)
CREAT SERPL-MCNC: 1.7 MG/DL — HIGH (ref 0.7–1.5)
GLUCOSE SERPL-MCNC: 118 MG/DL — HIGH (ref 70–99)
HCO3 BLDV-SCNC: 30 MMOL/L — HIGH (ref 22–29)
HCT VFR BLD CALC: 37.9 % — LOW (ref 42–52)
HGB BLD-MCNC: 12 G/DL — LOW (ref 14–18)
INR BLD: 1.1 RATIO — SIGNIFICANT CHANGE UP (ref 0.65–1.3)
LACTATE BLDV-MCNC: 0.8 MMOL/L — SIGNIFICANT CHANGE UP (ref 0.5–1.6)
MCHC RBC-ENTMCNC: 27.6 PG — SIGNIFICANT CHANGE UP (ref 27–31)
MCHC RBC-ENTMCNC: 31.7 G/DL — LOW (ref 32–37)
MCV RBC AUTO: 87.1 FL — SIGNIFICANT CHANGE UP (ref 80–94)
NRBC # BLD: 0 /100 WBCS — SIGNIFICANT CHANGE UP (ref 0–0)
NT-PROBNP SERPL-SCNC: 4228 PG/ML — HIGH (ref 0–300)
PCO2 BLDV: 56 MMHG — HIGH (ref 41–51)
PH BLDV: 7.34 — SIGNIFICANT CHANGE UP (ref 7.26–7.43)
PLATELET # BLD AUTO: 163 K/UL — SIGNIFICANT CHANGE UP (ref 130–400)
PO2 BLDV: 16 MMHG — LOW (ref 20–40)
POTASSIUM SERPL-MCNC: 4.9 MMOL/L — SIGNIFICANT CHANGE UP (ref 3.5–5)
POTASSIUM SERPL-SCNC: 4.9 MMOL/L — SIGNIFICANT CHANGE UP (ref 3.5–5)
PROT SERPL-MCNC: 7 G/DL — SIGNIFICANT CHANGE UP (ref 6–8)
PROTHROM AB SERPL-ACNC: 11.9 SEC — SIGNIFICANT CHANGE UP (ref 9.95–12.87)
RBC # BLD: 4.35 M/UL — LOW (ref 4.7–6.1)
RBC # FLD: 14.8 % — HIGH (ref 11.5–14.5)
SAO2 % BLDV: 18 % — SIGNIFICANT CHANGE UP
SODIUM SERPL-SCNC: 139 MMOL/L — SIGNIFICANT CHANGE UP (ref 135–146)
TROPONIN T SERPL-MCNC: 0.03 NG/ML — CRITICAL HIGH
TROPONIN T SERPL-MCNC: 0.04 NG/ML — CRITICAL HIGH
WBC # BLD: 8.92 K/UL — SIGNIFICANT CHANGE UP (ref 4.8–10.8)
WBC # FLD AUTO: 8.92 K/UL — SIGNIFICANT CHANGE UP (ref 4.8–10.8)

## 2018-10-05 RX ORDER — ASPIRIN/CALCIUM CARB/MAGNESIUM 324 MG
81 TABLET ORAL DAILY
Qty: 0 | Refills: 0 | Status: DISCONTINUED | OUTPATIENT
Start: 2018-10-05 | End: 2018-10-06

## 2018-10-05 RX ORDER — METOPROLOL TARTRATE 50 MG
25 TABLET ORAL ONCE
Qty: 0 | Refills: 0 | Status: COMPLETED | OUTPATIENT
Start: 2018-10-05 | End: 2018-10-05

## 2018-10-05 RX ORDER — LISINOPRIL 2.5 MG/1
5 TABLET ORAL DAILY
Qty: 0 | Refills: 0 | Status: DISCONTINUED | OUTPATIENT
Start: 2018-10-05 | End: 2018-10-08

## 2018-10-05 RX ORDER — METFORMIN HYDROCHLORIDE 850 MG/1
500 TABLET ORAL DAILY
Qty: 0 | Refills: 0 | Status: DISCONTINUED | OUTPATIENT
Start: 2018-10-05 | End: 2018-10-06

## 2018-10-05 RX ORDER — METOPROLOL TARTRATE 50 MG
12.5 TABLET ORAL DAILY
Qty: 0 | Refills: 0 | Status: DISCONTINUED | OUTPATIENT
Start: 2018-10-05 | End: 2018-10-05

## 2018-10-05 RX ORDER — CHOLECALCIFEROL (VITAMIN D3) 125 MCG
2000 CAPSULE ORAL DAILY
Qty: 0 | Refills: 0 | Status: DISCONTINUED | OUTPATIENT
Start: 2018-10-05 | End: 2018-10-09

## 2018-10-05 RX ORDER — ATORVASTATIN CALCIUM 80 MG/1
20 TABLET, FILM COATED ORAL DAILY
Qty: 0 | Refills: 0 | Status: DISCONTINUED | OUTPATIENT
Start: 2018-10-05 | End: 2018-10-06

## 2018-10-05 RX ADMIN — Medication 25 MILLIGRAM(S): at 23:50

## 2018-10-05 NOTE — ED CDU PROVIDER INITIAL DAY NOTE - PROGRESS NOTE DETAILS
pt. endorsed to me by day team, pt. on cardiac monitor, in nad, 2nd trop is trending down. is poke with cards fellow he will come to eval.

## 2018-10-05 NOTE — ED PROVIDER NOTE - ATTENDING CONTRIBUTION TO CARE
83 yo male with PMH CAD s/p stents, CHF with hx PPM, asthma presents c/o intermittent midsternal chest pain x 3 days. Pt states pain worse after eating. Denies any N/V/D or abdominal pain.  No SOB, palpitations, fevers, chills, cough, dizziness or weakness. Denies any jaw pain or throat pain. No trauma.  Cardiologist is Dr. Fowler.     VITAL SIGNS: noted  CONSTITUTIONAL: Well-developed; well-nourished; in no acute distress  HEAD: Normocephalic; atraumatic  EYES: PERRL, EOM intact; conjunctiva and sclera clear  ENT: No nasal discharge; airway clear. MMM  NECK: Supple; non tender. No anterior cervical lymphadenopathy noted, no JVD  CARD: S1, S2 normal; no murmurs, gallops, or rubs. Regular rate and rhythm  RESP: CTAB/L, no wheezes, rales or rhonchi  ABD: Normal bowel sounds; soft; non-distended; non-tender; no hepatosplenomegaly. No CVA tenderness  EXT: Normal ROM. No calf tenderness or edema. Distal pulses intact  NEURO: Alert, oriented. Grossly unremarkable. No focal deficits

## 2018-10-05 NOTE — CONSULT NOTE ADULT - SUBJECTIVE AND OBJECTIVE BOX
HPI:    84 yrs old with Hx of CAD s/p PCI ( 2yrs ago)  ,PM , CHF ? 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 no palpitation , nausea , vomiting , diaphoresis.  pt denied having chest pain or SOB on exertion . he had a stress test a year ago.  he is compliant with medication.    PAST MEDICAL & SURGICAL HISTORY  Stented coronary artery: Proximal LAD 08/31/2016  Eye problem: with diabetes  Anemia: slight  GERD without esophagitis  SOB (shortness of breath) on exertion  Asthma: Mild only last attack yrs ago  HLD (hyperlipidemia)  HTN (hypertension)  Fistula of large intestine: colo-vesicula fistula  H/O diverticulitis of colon  Recurrent UTI (urinary tract infection)  Diabetes  CAD (coronary artery disease)  History of coronary artery stent placement  Elective surgery: PCI with 2 stents  Basal cell carcinoma: removal 07/18/2018  History of eye surgery: laser  Artificial cardiac pacemaker  History of partial colectomy  H/O ileostomy      FAMILY HISTORY:  FAMILY HISTORY:  CVA (cerebral vascular accident): hemorrhage  Family history of colon cancer in mother (Mother, Grandparent)      SOCIAL HISTORY:  []smoker  []Alcohol  []Drug    ALLERGIES:  No Known Allergies      MEDICATIONS:  MEDICATIONS  (STANDING):  aspirin  chewable 81 milliGRAM(s) Oral daily  atorvastatin 20 milliGRAM(s) Oral daily  cholecalciferol 2000 Unit(s) Oral daily  lisinopril 5 milliGRAM(s) Oral daily  metFORMIN 500 milliGRAM(s) Oral daily  metoprolol succinate ER 12.5 milliGRAM(s) Oral daily    MEDICATIONS  (PRN):      HOME MEDICATIONS:  Home Medications:  acetaminophen 325 mg oral tablet: 2 tab(s) orally every 6 hours, As needed, Mild Pain (1 - 3) (04 Aug 2018 14:39)  Aspirin Low Dose 81 mg oral delayed release tablet: 1 tab(s) orally once a day (27 Jul 2018 13:14)  CoQ10: 100 milligram(s) orally once a day (27 Jul 2018 13:14)  Crestor 20 mg oral tablet: 1 tab(s) orally once a day (at bedtime) (27 Jul 2018 13:14)  docusate sodium 100 mg oral capsule: 1 cap(s) orally 3 times a day, As Needed (04 Aug 2018 14:39)  ipratropium-albuterol 0.5 mg-2.5 mg/3 mLinhalation solution: 3 milliliter(s) inhaled every 6 hours, As Needed (04 Aug 2018 14:50)  lisinopril 2.5 mg oral tablet: 1 tab(s) orally once a day (27 Jul 2018 13:14)  Metoprolol Succinate ER 25 mg oral tablet, extended release: 1 tab(s) orally once a day (27 Jul 2018 13:14)  Multiple Vitamins with Minerals oral tablet: 1 tab(s) orally once a day    OneADay Women&#x27;s or equivalent (27 Jul 2018 13:14)  Vitamin D3 2000 intl units oral tablet: 1 tab(s) orally once a day (27 Jul 2018 13:14)      VITALS:   T(F): 98 (10-05 @ 14:18), Max: 98 (10-05 @ 14:18)  HR: 81 (10-05 @ 19:30) (79 - 81)  BP: 178/83 (10-05 @ 19:30) (134/63 - 178/83)  BP(mean): --  RR: 18 (10-05 @ 19:30) (18 - 18)  SpO2: 98% (10-05 @ 19:30) (98% - 98%)    I&O's Summary      REVIEW OF SYSTEMS:  CONSTITUTIONAL: No weakness, fevers or chills  EYES/ENT: No visual changes;  No vertigo or throat pain   NECK: No pain or stiffness  RESPIRATORY: see HPI  CARDIOVASCULAR: see HPI  GASTROINTESTINAL: No abdominal or epigastric pain. No nausea, vomiting, or hematemesis; No diarrhea or constipation. No melena or hematochezia.  GENITOURINARY: No dysuria, frequency or hematuria  NEUROLOGICAL: No numbness or weakness      PHYSICAL EXAM:  NEURO: patient is awake , alert and oriented  GEN: Not in acute distress  NECK: no thyroid enlargement, no JVD  LUNGS: Clear to auscultation bilaterally   CARDIOVASCULAR: S1/S2 present, RRR , no murmurs or rubs  ABD: Soft, non-tender, non-distended, +BS  EXT: No ALFREDITO  SKIN: Intact    LABS:                        12.0   8.92  )-----------( 163      ( 05 Oct 2018 15:30 )             37.9     10-05    139  |  98  |  40<H>  ----------------------------<  118<H>  4.9   |  26  |  1.7<H>    Ca    10.6<H>      05 Oct 2018 15:30    TPro  7.0  /  Alb  4.5  /  TBili  0.4  /  DBili  x   /  AST  21  /  ALT  29  /  AlkPhos  66  10-05    PT/INR - ( 05 Oct 2018 15:30 )   PT: 11.90 sec;   INR: 1.10 ratio           Troponin T, Serum: 0.03 ng/mL <HH> (10-05-18 @ 17:14)  Troponin T, Serum: 0.04 ng/mL <HH> (10-05-18 @ 15:30)    CARDIAC MARKERS ( 05 Oct 2018 17:14 )  x     / 0.03 ng/mL / x     / x     / 2.6 ng/mL  CARDIAC MARKERS ( 05 Oct 2018 15:30 )  x     / 0.04 ng/mL / x     / x     / x          Serum Pro-Brain Natriuretic Peptide: 4228 pg/mL (10-05-18 @ 15:30)      RADIOLOGY:  -CXR: no ACPD , dual chamber PM      ECG: HPI:    84 yrs old with Hx of CAD s/p PCI ( 2yrs ago)  ,PM , CHF ? 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 no palpitation , nausea , vomiting , diaphoresis.  pt denied having chest pain or SOB on exertion . he had a stress test a year ago.  he is compliant with medication.    PAST MEDICAL & SURGICAL HISTORY  Stented coronary artery: Proximal LAD 08/31/2016  Eye problem: with diabetes  Anemia: slight  GERD without esophagitis  SOB (shortness of breath) on exertion  Asthma: Mild only last attack yrs ago  HLD (hyperlipidemia)  HTN (hypertension)  Fistula of large intestine: colo-vesicula fistula  H/O diverticulitis of colon  Recurrent UTI (urinary tract infection)  Diabetes  CAD (coronary artery disease)  History of coronary artery stent placement  Elective surgery: PCI with 2 stents  Basal cell carcinoma: removal 07/18/2018  History of eye surgery: laser  Artificial cardiac pacemaker  History of partial colectomy  H/O ileostomy      FAMILY HISTORY:  FAMILY HISTORY:  CVA (cerebral vascular accident): hemorrhage  Family history of colon cancer in mother (Mother, Grandparent)      SOCIAL HISTORY:  []smoker  []Alcohol  []Drug    ALLERGIES:  No Known Allergies      MEDICATIONS:  MEDICATIONS  (STANDING):  aspirin  chewable 81 milliGRAM(s) Oral daily  atorvastatin 20 milliGRAM(s) Oral daily  cholecalciferol 2000 Unit(s) Oral daily  lisinopril 5 milliGRAM(s) Oral daily  metFORMIN 500 milliGRAM(s) Oral daily  metoprolol succinate ER 12.5 milliGRAM(s) Oral daily    MEDICATIONS  (PRN):      HOME MEDICATIONS:  Home Medications:  acetaminophen 325 mg oral tablet: 2 tab(s) orally every 6 hours, As needed, Mild Pain (1 - 3) (04 Aug 2018 14:39)  Aspirin Low Dose 81 mg oral delayed release tablet: 1 tab(s) orally once a day (27 Jul 2018 13:14)  CoQ10: 100 milligram(s) orally once a day (27 Jul 2018 13:14)  Crestor 20 mg oral tablet: 1 tab(s) orally once a day (at bedtime) (27 Jul 2018 13:14)  docusate sodium 100 mg oral capsule: 1 cap(s) orally 3 times a day, As Needed (04 Aug 2018 14:39)  ipratropium-albuterol 0.5 mg-2.5 mg/3 mLinhalation solution: 3 milliliter(s) inhaled every 6 hours, As Needed (04 Aug 2018 14:50)  lisinopril 2.5 mg oral tablet: 1 tab(s) orally once a day (27 Jul 2018 13:14)  Metoprolol Succinate ER 25 mg oral tablet, extended release: 1 tab(s) orally once a day (27 Jul 2018 13:14)  Multiple Vitamins with Minerals oral tablet: 1 tab(s) orally once a day    OneADay Women&#x27;s or equivalent (27 Jul 2018 13:14)  Vitamin D3 2000 intl units oral tablet: 1 tab(s) orally once a day (27 Jul 2018 13:14)      VITALS:   T(F): 98 (10-05 @ 14:18), Max: 98 (10-05 @ 14:18)  HR: 81 (10-05 @ 19:30) (79 - 81)  BP: 178/83 (10-05 @ 19:30) (134/63 - 178/83)  BP(mean): --  RR: 18 (10-05 @ 19:30) (18 - 18)  SpO2: 98% (10-05 @ 19:30) (98% - 98%)    I&O's Summary      REVIEW OF SYSTEMS:  CONSTITUTIONAL: No weakness, fevers or chills  EYES/ENT: No visual changes;  No vertigo or throat pain   NECK: No pain or stiffness  RESPIRATORY: see HPI  CARDIOVASCULAR: see HPI  GASTROINTESTINAL: No abdominal or epigastric pain. No nausea, vomiting, or hematemesis; No diarrhea or constipation. No melena or hematochezia.  GENITOURINARY: No dysuria, frequency or hematuria  NEUROLOGICAL: No numbness or weakness      PHYSICAL EXAM:  NEURO: patient is awake , alert and oriented  GEN: Not in acute distress  NECK: no thyroid enlargement, no JVD  LUNGS: Clear to auscultation bilaterally   CARDIOVASCULAR: S1/S2 present, RRR , no murmurs or rubs  ABD: Soft, non-tender, non-distended, +BS  EXT: No ALFREDITO  SKIN: Intact    LABS:                        12.0   8.92  )-----------( 163      ( 05 Oct 2018 15:30 )             37.9     10-05    139  |  98  |  40<H>  ----------------------------<  118<H>  4.9   |  26  |  1.7<H>    Ca    10.6<H>      05 Oct 2018 15:30    TPro  7.0  /  Alb  4.5  /  TBili  0.4  /  DBili  x   /  AST  21  /  ALT  29  /  AlkPhos  66  10-05    PT/INR - ( 05 Oct 2018 15:30 )   PT: 11.90 sec;   INR: 1.10 ratio           Troponin T, Serum: 0.03 ng/mL <HH> (10-05-18 @ 17:14)  Troponin T, Serum: 0.04 ng/mL <HH> (10-05-18 @ 15:30)    CARDIAC MARKERS ( 05 Oct 2018 17:14 )  x     / 0.03 ng/mL / x     / x     / 2.6 ng/mL  CARDIAC MARKERS ( 05 Oct 2018 15:30 )  x     / 0.04 ng/mL / x     / x     / x          Serum Pro-Brain Natriuretic Peptide: 4228 pg/mL (10-05-18 @ 15:30)      RADIOLOGY:  -CXR: no ACPD , dual chamber PM      ECG:  sinus with V paced rythm HPI:    84 yrs old with Hx of CAD s/p PCI ( 2yrs ago)  ,PM , CHF ? 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 no palpitation , nausea , vomiting , diaphoresis.  pt denied having chest pain or SOB on exertion . he had a stress test a year ago - low risk.  He is compliant with medication.    PAST MEDICAL & SURGICAL HISTORY  Stented coronary artery: Proximal LAD 08/31/2016  Eye problem: with diabetes  Anemia: slight  GERD without esophagitis  SOB (shortness of breath) on exertion  Asthma: Mild only last attack yrs ago  HLD (hyperlipidemia)  HTN (hypertension)  Fistula of large intestine: colo-vesicula fistula  H/O diverticulitis of colon  Recurrent UTI (urinary tract infection)  Diabetes  CAD (coronary artery disease)  History of coronary artery stent placement  Elective surgery: PCI with 2 stents  Basal cell carcinoma: removal 07/18/2018  History of eye surgery: laser  Artificial cardiac pacemaker  History of partial colectomy  H/O ileostomy      FAMILY HISTORY:  FAMILY HISTORY:  CVA (cerebral vascular accident): hemorrhage  Family history of colon cancer in mother (Mother, Grandparent)      SOCIAL HISTORY:  [no]smoker  [no]Alcohol  []Drug    ALLERGIES:  No Known Allergies      MEDICATIONS:  MEDICATIONS  (STANDING):  aspirin  chewable 81 milliGRAM(s) Oral daily  atorvastatin 20 milliGRAM(s) Oral daily  cholecalciferol 2000 Unit(s) Oral daily  lisinopril 5 milliGRAM(s) Oral daily  metFORMIN 500 milliGRAM(s) Oral daily  metoprolol succinate ER 12.5 milliGRAM(s) Oral daily    MEDICATIONS  (PRN):      HOME MEDICATIONS:  Home Medications:  acetaminophen 325 mg oral tablet: 2 tab(s) orally every 6 hours, As needed, Mild Pain (1 - 3) (04 Aug 2018 14:39)  Aspirin Low Dose 81 mg oral delayed release tablet: 1 tab(s) orally once a day (27 Jul 2018 13:14)  CoQ10: 100 milligram(s) orally once a day (27 Jul 2018 13:14)  Crestor 20 mg oral tablet: 1 tab(s) orally once a day (at bedtime) (27 Jul 2018 13:14)  docusate sodium 100 mg oral capsule: 1 cap(s) orally 3 times a day, As Needed (04 Aug 2018 14:39)  ipratropium-albuterol 0.5 mg-2.5 mg/3 mLinhalation solution: 3 milliliter(s) inhaled every 6 hours, As Needed (04 Aug 2018 14:50)  lisinopril 2.5 mg oral tablet: 1 tab(s) orally once a day (27 Jul 2018 13:14)  Metoprolol Succinate ER 25 mg oral tablet, extended release: 1 tab(s) orally once a day (27 Jul 2018 13:14)  Multiple Vitamins with Minerals oral tablet: 1 tab(s) orally once a day    OneADay Women&#x27;s or equivalent (27 Jul 2018 13:14)  Vitamin D3 2000 intl units oral tablet: 1 tab(s) orally once a day (27 Jul 2018 13:14)      VITALS:   T(F): 98 (10-05 @ 14:18), Max: 98 (10-05 @ 14:18)  HR: 81 (10-05 @ 19:30) (79 - 81)  BP: 178/83 (10-05 @ 19:30) (134/63 - 178/83)  BP(mean): --  RR: 18 (10-05 @ 19:30) (18 - 18)  SpO2: 98% (10-05 @ 19:30) (98% - 98%)    I&O's Summary      REVIEW OF SYSTEMS:  CONSTITUTIONAL: No weakness, fevers or chills  EYES/ENT: No visual changes;  No vertigo or throat pain   NECK: No pain or stiffness  RESPIRATORY: see HPI  CARDIOVASCULAR: see HPI  GASTROINTESTINAL: No abdominal or epigastric pain. No nausea, vomiting, or hematemesis; No diarrhea or constipation. No melena or hematochezia.  GENITOURINARY: No dysuria, frequency or hematuria  NEUROLOGICAL: No numbness or weakness      PHYSICAL EXAM:  NEURO: patient is awake , alert and oriented  GEN: Not in acute distress  HEENT: normal mucosa  NECK: no thyroid enlargement, no JVD  LUNGS: Clear to auscultation bilaterally   CARDIOVASCULAR: S1/S2 present, RRR , no murmurs or rubs  ABD: Soft, non-tender, non-distended, +BS  EXT/MS: No ALFREDITO  SKIN: Intact    LABS:                        12.0   8.92  )-----------( 163      ( 05 Oct 2018 15:30 )             37.9     10-05    139  |  98  |  40<H>  ----------------------------<  118<H>  4.9   |  26  |  1.7<H>    Ca    10.6<H>      05 Oct 2018 15:30    TPro  7.0  /  Alb  4.5  /  TBili  0.4  /  DBili  x   /  AST  21  /  ALT  29  /  AlkPhos  66  10-05    PT/INR - ( 05 Oct 2018 15:30 )   PT: 11.90 sec;   INR: 1.10 ratio           Troponin T, Serum: 0.03 ng/mL <HH> (10-05-18 @ 17:14)  Troponin T, Serum: 0.04 ng/mL <HH> (10-05-18 @ 15:30)    CARDIAC MARKERS ( 05 Oct 2018 17:14 )  x     / 0.03 ng/mL / x     / x     / 2.6 ng/mL  CARDIAC MARKERS ( 05 Oct 2018 15:30 )  x     / 0.04 ng/mL / x     / x     / x          Serum Pro-Brain Natriuretic Peptide: 4228 pg/mL (10-05-18 @ 15:30)      RADIOLOGY:  -CXR: no ACPD , dual chamber PM      ECG:  sinus with V paced rythm

## 2018-10-05 NOTE — ED ADULT NURSE NOTE - PMH
Anemia  slight  Asthma  Mild only last attack yrs ago  CAD (coronary artery disease)    Diabetes    Eye problem  with diabetes  Fistula of large intestine  colo-vesicula fistula  GERD without esophagitis    H/O diverticulitis of colon    HLD (hyperlipidemia)    HTN (hypertension)    Recurrent UTI (urinary tract infection)    SOB (shortness of breath) on exertion    Stented coronary artery  Proximal LAD 08/31/2016

## 2018-10-05 NOTE — ED ADULT NURSE NOTE - OBJECTIVE STATEMENT
Pt 85 y/o male c/o chest pain pt states he was eating pizza and began to have chest pain 5/10, pt denies chest pain at this time. pt states he often gets chest pain at night time.

## 2018-10-05 NOTE — ED CDU PROVIDER INITIAL DAY NOTE - PHYSICAL EXAMINATION
GENERAL: Well-nourished, Well-developed. NAD.  CVS: No reproducible chest wall tenderness. Normal S1,S2. No murmurs appreciated on auscultation   RESP: Chest rise symmetrical with good expansion. Lungs clear to auscultation B/L. No wheezing, rales, or rhonchi auscultated.  GI: Normal auscultation of bowel sounds in all 4 quadrants. Soft, Nontender, Nondistended. No guarding or rebound tenderness.   Skin: Warm, Dry. No vesicular rashes or lesions   EXT: Radial and pedal pulses present B/L. No calf tenderness or swelling B/L. No pedal edema B/L.  Neuro: AA&O x 3. Sensation grossly intact. Strength 5/5 B/L.   Psych: Appropriate mood and affect. Cooperative.

## 2018-10-05 NOTE — ED CDU PROVIDER INITIAL DAY NOTE - OBJECTIVE STATEMENT
83 yo male with 2 cardiac stents, pacemaker, asthma, DM, ileostomy reversed 1 month ago (patient had fistula in the past between colon and bladder) presents to the ED c/o intermittent, chest tightness and pain radiating to left arm x 3 days. Patient states the pain is worse when he eats dinner.  Patient denies similar episode in the past.  Patient admits to SOB with pain.  Patient admits to stress test one year ago and echo few months ago. Cardiologist is Dr. Pierre (Dr. Chen covers hospital patients). Patient denies fever, chills, N/V/D, dizziness, headache, SANTAMARIA, vision changes, back pain, neck pain, changes in bowel movements, bloating, or increased flatulence.

## 2018-10-05 NOTE — ED PROVIDER NOTE - OBJECTIVE STATEMENT
84 year old male with a pmh of cad s/p stent on plavix, anemia, pacemaker, dm, asthma, gerd, h/o diverticulitis s/p resection and repair, htn, htld presents for chest pain. Patient has been having chest pain for the last 3 days occuring with meals in which he describes chest pressure w/ pain to the bilateral arms. Symptoms resolve with one hour of taking his metoprolol. Patient states last episode was earlier this afternoon. Patient's cardiologist is Dr. Fowler. As per patient his last echo was done 3-4 months ago and had an ef of 30-40%. Patient denies fever chills sob n/v abdominal pain

## 2018-10-05 NOTE — ED CDU PROVIDER INITIAL DAY NOTE - PSH
Artificial cardiac pacemaker    Basal cell carcinoma  removal 07/18/2018  Elective surgery  PCI with 2 stents  H/O ileostomy    History of coronary artery stent placement    History of eye surgery  laser  History of partial colectomy

## 2018-10-05 NOTE — ED ADULT NURSE NOTE - NSIMPLEMENTINTERV_GEN_ALL_ED
Implemented All Fall with Harm Risk Interventions:  [place] to call system. Call bell, personal items and telephone within reach. Instruct patient to call for assistance. Room bathroom lighting operational. Non-slip footwear when patient is off stretcher. Physically safe environment: no spills, clutter or unnecessary equipment. Stretcher in lowest position, wheels locked, appropriate side rails in place. Provide visual cue, wrist band, yellow gown, etc. Monitor gait and stability. Monitor for mental status changes and reorient to person, place, and time. Review medications for side effects contributing to fall risk. Reinforce activity limits and safety measures with patient and family. Provide visual clues: red socks.

## 2018-10-05 NOTE — ED CDU PROVIDER INITIAL DAY NOTE - ATTENDING CONTRIBUTION TO CARE
85yo man h/o HTN, DM, asthma, CAD s/p stent x 2, PPM for low EF was placed in CDU for workup of chest pain intermittent over the last 4 days. It usually occurs while he eating--begins with aching in his upper arms, then radiates into his chest, associated with some shortness of breath an elevated bp. He does not have sx otherwise. Last echo was about 3 months ago, last stress about a year ago, with Dr Fowler. He went to the office today and was sent to the ED. VS, exam as noted, pt comfortable on my eval, lungs CTA, CVS1S2 RRR abd soft, NT, no peripheral edema. Will do serial EKG and enzymes, consult cardiology re next step.

## 2018-10-05 NOTE — ED CDU PROVIDER INITIAL DAY NOTE - MEDICAL DECISION MAKING DETAILS
83yo man h/o HTN, DM, asthma, CAD s/p stent x 2, PPM for low EF was placed in CDU for workup of chest pain intermittent over the last 4 days. It usually occurs while he eating--begins with aching in his upper arms, then radiates into his chest, associated with some shortness of breath an elevated bp. He does not have sx otherwise. Last echo was about 3 months ago, last stress about a year ago, with Dr Fowler. He went to the office today and was sent to the ED. VS, exam as noted, pt comfortable on my eval, lungs CTA, CVS1S2 RRR abd soft, NT, no peripheral edema. Will do serial EKG and enzymes, consult cardiology re next step.

## 2018-10-05 NOTE — ED PROVIDER NOTE - PHYSICAL EXAMINATION
CONSTITUTIONAL: WA / WN / NAD  HEAD: NCAT  EYES: PERRL; EOMI;   ENT: Normal pharynx; mucous membranes pink/moist, no erythema.  NECK: Supple; no meningeal signs  CARD: RRR; nl S1/S2; no M/R/G.   RESP: Respiratory rate and effort are normal; breath sounds clear and equal bilaterally.  ABD: Soft, NT ND   MSK/EXT: No gross deformities; full range of motion.  SKIN: Warm and dry;   NEURO: AAOx3,  PSYCH: Memory Intact, Normal Affect

## 2018-10-05 NOTE — ED PROVIDER NOTE - NS ED ROS FT
Constitutional: See HPI.  Eyes: No visual changes,  ENMT:  No neck pain or stiffness.   Cardiac: see hpi  Respiratory: No cough   GI: No nausea, vomiting, diarrhea or abdominal pain.  : No dysuria, frequency or burning.   MS: No myalgia, muscle weakness, joint pain or back pain.  Neuro: No headache  Skin: No skin rash.

## 2018-10-05 NOTE — CONSULT NOTE ADULT - ASSESSMENT
84 yrs old with Hx of CAD s/p PCI ( 2yrs ago)  ,PM , CHF ? 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 pain is post prandial , pt denied exertional symptoms. he is functional , able to walk 5 block without limitations.    non anginal chest pain  CAD s/p PCI 2 yrs ago   s/p PM , ? HFrEF  HTN , DL , CKD    plan:  no evidence of ACS , pt symptoms are non anginal( post prandial , and he denied symptoms on exertion)  however given his risk Factor , he might need stress test as outpt   check 2d  echo for further evaluation of EF  c/w asa/statin/BB/ACEI  GI eval for dysphagia  will follow 84 yrs old with Hx of CAD s/p PCI ( 2yrs ago)  ,PM , CHF ? 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 pain is post prandial , pt denied exertional symptoms. he is functional , able to walk 5 block without limitations.    non anginal chest pain  CAD s/p PCI 2 yrs ago   s/p PM , ? HFrEF  HTN , DL , CKD    plan:  no evidence of ACS , pt symptoms are non anginal( post prandial , and he denied symptoms on exertion)  ECG showed V paced rythm , trop stable ( elevated secondary to CKD)  however given his risk Factor , he might need stress test as outpt   check 2d  echo for further evaluation of EF  c/w asa/statin/BB/ACEI  GI eval for dysphagia  will follow 84 yrs old with Hx of CAD s/p PCI ( 2yrs ago)  ,PM , CHF ? 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 pain is post prandial , pt denied exertional symptoms. he is functional , able to walk 5 block without limitations.     anginal chest pain with minimal trop elevation  CAD s/p PCI of LAD 2 yrs ago   s/p PM , ? HFrEF  HTN , DL , CKD    plan:  Admit to tele  uptitrate BB, add low dose nitrates - isordil 10 tid, cont acei  Cath advised, pt wants to d/w  Dr. Fowler  check 2d  echo for further evaluation of EF  will follow

## 2018-10-05 NOTE — ED CDU PROVIDER INITIAL DAY NOTE - NS ED ROS FT
Constitutional: (-) fever (-) malaise (-) diaphoresis (-) chills (-) wt. loss   Eyes/ENT: (-) vision changes   Cardiovascular: (+) chest pain, (-) syncope (-) palpitations (-) hx of clots  Respiratory:  (+) shortness of breath with pain (-) dry/productive cough,  Gastrointestinal: (-) N/V/D  (-) abdominal pain (-) changes in bowel movements   : (-) incontinence (-) dysuria  Musculoskeletal: (-) neck pain, (-) back pain  Integumentary: (-) rash  Neurological: (-) headache (-) altered mental status (-) LOC  (-) dizziness

## 2018-10-06 LAB
CK MB CFR SERPL CALC: 2.3 NG/ML — SIGNIFICANT CHANGE UP (ref 0.6–6.3)
TROPONIN T SERPL-MCNC: 0.04 NG/ML — CRITICAL HIGH

## 2018-10-06 RX ORDER — ASPIRIN/CALCIUM CARB/MAGNESIUM 324 MG
81 TABLET ORAL DAILY
Qty: 0 | Refills: 0 | Status: DISCONTINUED | OUTPATIENT
Start: 2018-10-06 | End: 2018-10-09

## 2018-10-06 RX ORDER — ATORVASTATIN CALCIUM 80 MG/1
40 TABLET, FILM COATED ORAL AT BEDTIME
Qty: 0 | Refills: 0 | Status: DISCONTINUED | OUTPATIENT
Start: 2018-10-06 | End: 2018-10-09

## 2018-10-06 RX ORDER — METOPROLOL TARTRATE 50 MG
50 TABLET ORAL DAILY
Qty: 0 | Refills: 0 | Status: DISCONTINUED | OUTPATIENT
Start: 2018-10-06 | End: 2018-10-09

## 2018-10-06 RX ORDER — METOPROLOL TARTRATE 50 MG
25 TABLET ORAL DAILY
Qty: 0 | Refills: 0 | Status: DISCONTINUED | OUTPATIENT
Start: 2018-10-06 | End: 2018-10-06

## 2018-10-06 RX ORDER — ISOSORBIDE DINITRATE 5 MG/1
10 TABLET ORAL THREE TIMES A DAY
Qty: 0 | Refills: 0 | Status: DISCONTINUED | OUTPATIENT
Start: 2018-10-06 | End: 2018-10-08

## 2018-10-06 RX ADMIN — LISINOPRIL 5 MILLIGRAM(S): 2.5 TABLET ORAL at 06:30

## 2018-10-06 RX ADMIN — ATORVASTATIN CALCIUM 20 MILLIGRAM(S): 80 TABLET, FILM COATED ORAL at 14:09

## 2018-10-06 RX ADMIN — Medication 2000 UNIT(S): at 15:59

## 2018-10-06 RX ADMIN — Medication 81 MILLIGRAM(S): at 11:06

## 2018-10-06 RX ADMIN — Medication 50 MILLIGRAM(S): at 23:16

## 2018-10-06 NOTE — H&P ADULT - NSHPPHYSICALEXAM_GEN_ALL_CORE
T(C): 35.9 (10-06-18 @ 08:00), Max: 35.9 (10-06-18 @ 08:00)  HR: 74 (10-06-18 @ 08:00) (74 - 81)  BP: 156/70 (10-06-18 @ 08:00) (156/70 - 178/83)  RR: 18 (10-06-18 @ 08:00) (18 - 18)  SpO2: 99% (10-06-18 @ 08:00) (98% - 99%)  PHYSICAL EXAM:    Constitutional: pleasant elderly male, no acute distress    Eyes: bilateral symmetrical, reactive to light    ENMT: no facial asymmetry    Neck: supple, no JVD    Respiratory: no crackles    Cardiovascular: Regular rate and rhythm, no murmur    Gastrointestinal: soft non tender    Extremities: no swelling    Neurological: AO x 3, non focal

## 2018-10-06 NOTE — DOWNTIME INTERRUPTION NOTE - WHICH MANUAL FORMS INITIATED?
computer system was on downtime and staff prior to this shift had difficulties documenting on their assigned patients charts.

## 2018-10-06 NOTE — ED ADULT NURSE REASSESSMENT NOTE - NS ED NURSE REASSESS COMMENT FT1
Pt states feels better from when he first came in,  Pt on RA, CB in reach, cardiac monitor on. denies any pain at this time will cont to monitor. went for echo of the heart, came back with no issues. VSS will cont to monitor

## 2018-10-06 NOTE — ED CDU PROVIDER DISPOSITION NOTE - CLINICAL COURSE
Pt placed into observation for post prandial chest pain lasting 1\2 hour. While in observation pt was on continuous cardiac monitoring. serial cardiac markers neg. Troponins borderline. Pts case was discussed with Dr. Choi. Symptoms concerning for angina. Cardiology advised admission for cath. Pt in agreement.

## 2018-10-06 NOTE — H&P ADULT - ASSESSMENT
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 Describes the chest pain as heaviness across the chest occuring during dinner every day for last 4 days, radiating to left arm a/w mild shortness of breath, last for around 20 min, around 8/10 in intensity. Being admitted for possible unstable angina.      # Chest pain likely d/t unstable angina:    EKG no ST wave changes, paced rhythm, Troponin 1st set: 0.04--0.03, Monitor 3rd set  Echo: EF 47%, G1DD  Cardiology following: Possible cath on Monday, now wants to go for cath  c/w aspirin, metoprolol ( will increase to 50 q24), statin      # CHF s/p pacemaker:  stable  c/w home meds    # Diabetes:  monitor fingerstick and insulin if >180    # HTN:   c/w lisinopril and metoprolol    # DLD:  c/w statin    # Diet:  DASH diet/ Carb consistent    # Activity:  ambulate as tolerated    # DVT:  heparin s/q    # Full code 84 y/m with PMH of CAD s/p PCI (14 yrs and 2 yrs back), HFrEF s/p pacemaker, HTN, CKD, DLD, DM, diverticultits ( status post ileostomy s/p reversal of ileostomy), 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 Describes the chest pain as heaviness across the chest occuring during dinner every day for last 4 days, radiating to left arm a/w mild shortness of breath, last for around 20 min, around 8/10 in intensity. Being admitted for possible unstable angina.      # Chest pain likely d/t unstable angina:    EKG no ST wave changes, paced rhythm, Troponin 1st set: 0.04--0.03, Monitor 3rd set  Echo: EF 47%, G1DD  Cardiology following: Possible cath on Monday, now wants to go for cath  c/w aspirin, metoprolol ( will increase to 50 q24), statin  will add nitrates ( isordil 10 tid) as per  cardio      # CHF s/p pacemaker:  stable  c/w home meds    # Diabetes:  monitor fingerstick and insulin if >180    # HTN:   c/w lisinopril and metoprolol    # DLD:  c/w statin    # Diet:  DASH diet/ Carb consistent    # Activity:  ambulate as tolerated    # DVT:  heparin s/q    # Full code

## 2018-10-06 NOTE — H&P ADULT - HISTORY OF PRESENT ILLNESS
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 Describes the chest pain as heaviness across the chest occuring during dinner every day for last 4 days, radiating to left arm a/w mild shortness of breath, last for around 20 min, around 8/10 in intensity. Denies palpitaion, nausea, vomiting. Denies cough, headache, belly pain, change in bowel or bladder habit.

## 2018-10-06 NOTE — ED CDU PROVIDER SUBSEQUENT DAY NOTE - PROGRESS NOTE DETAILS
Spoke to Dr. Fowler> all labs and findings discussed. Pt also speaking to Dr. Fowler. Previously stents were done at [facility]. Troponins discussed. Discussed with Dr. Bucio who advised admission. Pt agreeable to stay. Dr. De La Torre is pcp. spoke to Dr. De La Torre- not on call>. called Dr. Stahl-alirio accepts.

## 2018-10-06 NOTE — H&P ADULT - NSHPLABSRESULTS_GEN_ALL_CORE
12.0   8.92  )-----------( 163      ( 05 Oct 2018 15:30 )             37.9       10-05    139  |  98  |  40<H>  ----------------------------<  118<H>  4.9   |  26  |  1.7<H>    Ca    10.6<H>      05 Oct 2018 15:30    TPro  7.0  /  Alb  4.5  /  TBili  0.4  /  DBili  x   /  AST  21  /  ALT  29  /  AlkPhos  66  10-05

## 2018-10-06 NOTE — ED CDU PROVIDER SUBSEQUENT DAY NOTE - ATTENDING CONTRIBUTION TO CARE
Pt reports for the last 4 nights he experienced chest pressure when he eats dinner . Hx of reflux. Hx of CAD and stents. At this time pt it pain Free. He climbs stairs at home without issue.   Echo early this year>> with EF 35 %.   PE S1S2 rrr, lungs clear, abdomen is soft nontender, ext neg for swelling    IMP; Pain of chest only while eating dinner. Seen by cardiology>> echo>>consider outpt  stress  rec: Echo results pending.

## 2018-10-07 LAB
ANION GAP SERPL CALC-SCNC: 12 MMOL/L — SIGNIFICANT CHANGE UP (ref 7–14)
BUN SERPL-MCNC: 35 MG/DL — HIGH (ref 10–20)
CALCIUM SERPL-MCNC: 9.5 MG/DL — SIGNIFICANT CHANGE UP (ref 8.5–10.1)
CHLORIDE SERPL-SCNC: 100 MMOL/L — SIGNIFICANT CHANGE UP (ref 98–110)
CO2 SERPL-SCNC: 26 MMOL/L — SIGNIFICANT CHANGE UP (ref 17–32)
CREAT SERPL-MCNC: 1.8 MG/DL — HIGH (ref 0.7–1.5)
GLUCOSE SERPL-MCNC: 130 MG/DL — HIGH (ref 70–99)
HCT VFR BLD CALC: 38 % — LOW (ref 42–52)
HGB BLD-MCNC: 12 G/DL — LOW (ref 14–18)
MCHC RBC-ENTMCNC: 27.3 PG — SIGNIFICANT CHANGE UP (ref 27–31)
MCHC RBC-ENTMCNC: 31.6 G/DL — LOW (ref 32–37)
MCV RBC AUTO: 86.6 FL — SIGNIFICANT CHANGE UP (ref 80–94)
NRBC # BLD: 0 /100 WBCS — SIGNIFICANT CHANGE UP (ref 0–0)
PLATELET # BLD AUTO: 149 K/UL — SIGNIFICANT CHANGE UP (ref 130–400)
POTASSIUM SERPL-MCNC: 5.2 MMOL/L — HIGH (ref 3.5–5)
POTASSIUM SERPL-SCNC: 5.2 MMOL/L — HIGH (ref 3.5–5)
RBC # BLD: 4.39 M/UL — LOW (ref 4.7–6.1)
RBC # FLD: 14.8 % — HIGH (ref 11.5–14.5)
SODIUM SERPL-SCNC: 138 MMOL/L — SIGNIFICANT CHANGE UP (ref 135–146)
WBC # BLD: 7.46 K/UL — SIGNIFICANT CHANGE UP (ref 4.8–10.8)
WBC # FLD AUTO: 7.46 K/UL — SIGNIFICANT CHANGE UP (ref 4.8–10.8)

## 2018-10-07 RX ADMIN — ATORVASTATIN CALCIUM 40 MILLIGRAM(S): 80 TABLET, FILM COATED ORAL at 19:39

## 2018-10-07 RX ADMIN — Medication 81 MILLIGRAM(S): at 12:10

## 2018-10-07 RX ADMIN — LISINOPRIL 5 MILLIGRAM(S): 2.5 TABLET ORAL at 05:56

## 2018-10-07 RX ADMIN — Medication 2000 UNIT(S): at 12:11

## 2018-10-07 RX ADMIN — Medication 50 MILLIGRAM(S): at 19:39

## 2018-10-07 NOTE — PROGRESS NOTE ADULT - SUBJECTIVE AND OBJECTIVE BOX
ZEESHAN MCCLENDON  84y  Male      Patient is a 84y old  Male who presents with a chief complaint of Chest pain (06 Oct 2018 15:50)    cahd s/p stent,hx reverse ileostomy    REVIEW OF SYSTEMS:  CONSTITUTIONAL: No fever, weight loss, or fatigue  EYES: No eye pain, visual disturbances, or discharge  ENMT:  No difficulty hearing, tinnitus, vertigo; No sinus or throat pain  NECK: No pain or stiffness  BREASTS: No pain, masses, or nipple discharge  RESPIRATORY: No cough, wheezing, chills or hemoptysis; No shortness of breath  CARDIOVASCULAR: chest pain+  GASTROINTESTINAL: No abdominal or epigastric pain. No nausea, vomiting, or hematemesis; No diarrhea or constipation. No melena or hematochezia.  GENITOURINARY: No dysuria, frequency, hematuria, or incontinence  NEUROLOGICAL: No headaches, memory loss, loss of strength, numbness, or tremors  SKIN: No itching, burning, rashes, or lesions   LYMPH NODES: No enlarged glands  ENDOCRINE: No heat or cold intolerance; No hair loss  MUSCULOSKELETAL: No joint pain or swelling; No muscle, back, or extremity pain  PSYCHIATRIC: No depression, anxiety, mood swings, or difficulty sleeping  HEME/LYMPH: No easy bruising, or bleeding gums  ALLERY AND IMMUNOLOGIC: No hives or eczema  FAMILY HISTORY:  CVA (cerebral vascular accident): hemorrhage  Family history of colon cancer in mother (Mother, Grandparent)    T(C): 36.4 (10-07-18 @ 00:22), Max: 36.7 (10-06-18 @ 19:23)  HR: 72 (10-07-18 @ 05:55) (72 - 81)  BP: 134/70 (10-07-18 @ 05:55) (119/62 - 138/76)  RR: 18 (10-07-18 @ 00:22) (18 - 18)  SpO2: 98% (10-07-18 @ 00:22) (98% - 98%)  Wt(kg): --Vital Signs Last 24 Hrs  T(C): 36.4 (07 Oct 2018 00:22), Max: 36.7 (06 Oct 2018 19:23)  T(F): 97.6 (07 Oct 2018 00:22), Max: 98 (06 Oct 2018 19:23)  HR: 72 (07 Oct 2018 05:55) (72 - 81)  BP: 134/70 (07 Oct 2018 05:55) (119/62 - 138/76)  BP(mean): --  RR: 18 (07 Oct 2018 00:22) (18 - 18)  SpO2: 98% (07 Oct 2018 00:22) (98% - 98%)  No Known Allergies      PHYSICAL EXAM:  GENERAL: NAD, well-groomed, well-developed  HEAD:  Atraumatic, Normocephalic  EYES: EOMI, PERRLA, conjunctiva and sclera clear  ENMT: No tonsillar erythema, exudates, or enlargement; Moist mucous membranes, Good dentition, No lesions  NECK: Supple, No JVD, Normal thyroid  NERVOUS SYSTEM:  Alert & Oriented X3, Good concentration; Motor Strength 5/5 B/L upper and lower extremities; DTRs 2+ intact and symmetric  CHEST/LUNG: Clear to percussion bilaterally; No rales, rhonchi, wheezing, or rubs  HEART: Regular rate and rhythm; No murmurs, rubs, or gallops  ABDOMEN: Soft, Nontender, Nondistended; Bowel sounds present  EXTREMITIES:  2+ Peripheral Pulses, No clubbing, cyanosis, or edema  LYMPH: No lymphadenopathy noted  SKIN: No rashes or lesions      LABS:  10-07    138  |  100  |  35<H>  ----------------------------<  130<H>  5.2<H>   |  26  |  1.8<H>    Ca    9.5      07 Oct 2018 07:03    TPro  7.0  /  Alb  4.5  /  TBili  0.4  /  DBili  x   /  AST  21  /  ALT  29  /  AlkPhos  66  10-05                          12.0   7.46  )-----------( 149      ( 07 Oct 2018 07:03 )             38.0         RADIOLOGY & ADDITIONAL TESTS:    MEDICATION:  aspirin enteric coated 81 milliGRAM(s) Oral daily  atorvastatin 40 milliGRAM(s) Oral at bedtime  cholecalciferol 2000 Unit(s) Oral daily  isosorbide   dinitrate Tablet (ISORDIL) 10 milliGRAM(s) Oral three times a day  lisinopril 5 milliGRAM(s) Oral daily  metoprolol succinate ER 50 milliGRAM(s) Oral daily      HEALTH ISSUES - PROBLEM Dx:  chest pain r/o Mi cardiology consult ,will do cardiac cath in am,hx stentx2,asa  hx PPM on atorvastatin  Htn on lisinopril

## 2018-10-08 LAB
GLUCOSE BLDC GLUCOMTR-MCNC: 112 MG/DL — HIGH (ref 70–99)
GLUCOSE BLDC GLUCOMTR-MCNC: 248 MG/DL — HIGH (ref 70–99)

## 2018-10-08 RX ORDER — SODIUM CHLORIDE 9 MG/ML
1000 INJECTION INTRAMUSCULAR; INTRAVENOUS; SUBCUTANEOUS
Qty: 0 | Refills: 0 | Status: DISCONTINUED | OUTPATIENT
Start: 2018-10-08 | End: 2018-10-08

## 2018-10-08 RX ORDER — SODIUM CHLORIDE 9 MG/ML
600 INJECTION INTRAMUSCULAR; INTRAVENOUS; SUBCUTANEOUS
Qty: 0 | Refills: 0 | Status: DISCONTINUED | OUTPATIENT
Start: 2018-10-08 | End: 2018-10-09

## 2018-10-08 RX ORDER — PANTOPRAZOLE SODIUM 20 MG/1
40 TABLET, DELAYED RELEASE ORAL
Qty: 0 | Refills: 0 | Status: DISCONTINUED | OUTPATIENT
Start: 2018-10-08 | End: 2018-10-09

## 2018-10-08 RX ORDER — TICAGRELOR 90 MG/1
90 TABLET ORAL
Qty: 0 | Refills: 0 | Status: DISCONTINUED | OUTPATIENT
Start: 2018-10-08 | End: 2018-10-09

## 2018-10-08 RX ORDER — INFLUENZA VIRUS VACCINE 15; 15; 15; 15 UG/.5ML; UG/.5ML; UG/.5ML; UG/.5ML
0.5 SUSPENSION INTRAMUSCULAR ONCE
Qty: 0 | Refills: 0 | Status: DISCONTINUED | OUTPATIENT
Start: 2018-10-08 | End: 2018-10-09

## 2018-10-08 RX ORDER — ISOSORBIDE MONONITRATE 60 MG/1
30 TABLET, EXTENDED RELEASE ORAL DAILY
Qty: 0 | Refills: 0 | Status: DISCONTINUED | OUTPATIENT
Start: 2018-10-08 | End: 2018-10-09

## 2018-10-08 RX ADMIN — TICAGRELOR 90 MILLIGRAM(S): 90 TABLET ORAL at 18:09

## 2018-10-08 RX ADMIN — ISOSORBIDE MONONITRATE 30 MILLIGRAM(S): 60 TABLET, EXTENDED RELEASE ORAL at 13:04

## 2018-10-08 RX ADMIN — SODIUM CHLORIDE 150 MILLILITER(S): 9 INJECTION INTRAMUSCULAR; INTRAVENOUS; SUBCUTANEOUS at 13:18

## 2018-10-08 RX ADMIN — Medication 81 MILLIGRAM(S): at 07:44

## 2018-10-08 RX ADMIN — ISOSORBIDE DINITRATE 10 MILLIGRAM(S): 5 TABLET ORAL at 05:10

## 2018-10-08 RX ADMIN — SODIUM CHLORIDE 100 MILLILITER(S): 9 INJECTION INTRAMUSCULAR; INTRAVENOUS; SUBCUTANEOUS at 16:40

## 2018-10-08 RX ADMIN — TICAGRELOR 90 MILLIGRAM(S): 90 TABLET ORAL at 12:05

## 2018-10-08 RX ADMIN — Medication 50 MILLIGRAM(S): at 05:10

## 2018-10-08 RX ADMIN — Medication 2000 UNIT(S): at 13:04

## 2018-10-08 RX ADMIN — PANTOPRAZOLE SODIUM 40 MILLIGRAM(S): 20 TABLET, DELAYED RELEASE ORAL at 13:05

## 2018-10-08 RX ADMIN — ATORVASTATIN CALCIUM 40 MILLIGRAM(S): 80 TABLET, FILM COATED ORAL at 21:57

## 2018-10-08 NOTE — PROGRESS NOTE ADULT - SUBJECTIVE AND OBJECTIVE BOX
Chart reviewed, patient examined. Pertinent results reviewed.  Case discussed with HO  specialist f/u reviewed-including brief cath note  HD#4-came to ED on 10/4    ZEESHAN MCCLENDON  84y  Male      Patient is a 84y old  Male who presented with a chief complaint of Chest pain (06 Oct 2018 15:50)-Which had occurred for a number of evenings prior to the night of admission.  The patient describes a chest heaviness especially after eating dinner which was radiating to the left upper extremity and associated with nausea and shortness of breath; He came to the emergency room and was placed in the ACS protocol.  Cardiology felt cath was indicated and he performed today. -This is the patient's fifth admission to [facility] in the last year    cahd s/p stent,hx reverse ileostomy    REVIEW OF SYSTEMS:  CONSTITUTIONAL: No fever, weight loss, or fatigue  EYES: No eye pain, visual disturbances, or discharge  ENMT:  No difficulty hearing, tinnitus, vertigo; No sinus or throat pain  NECK: No pain or stiffness  BREASTS: No pain, masses, or nipple discharge  RESPIRATORY: No cough, wheezing, chills or hemoptysis; + shortness of breath With the episodes of chest pain  CARDIOVASCULAR: chest pain+-See HPI  GASTROINTESTINAL: No abdominal or epigastric pain. No nausea, vomiting, or hematemesis; No diarrhea or constipation. No melena or hematochezia.Abdomen doing much better since ileostomy closed; patient states he was cleared by Dr. Martinez  GENITOURINARY: No dysuria, frequency, hematuria, or incontinence  NEUROLOGICAL: No headaches, memory loss, loss of strength, numbness, or tremors  SKIN: No itching, burning, rashes, or lesions   LYMPH NODES: No enlarged glands  ENDOCRINE: No heat or cold intolerance; No hair loss  MUSCULOSKELETAL: No joint pain or swelling; No muscle, back, or extremity pain  PSYCHIATRIC: No depression, anxiety, mood swings, or difficulty sleeping  HEME/LYMPH: No easy bruising, or bleeding gums  ALLERY AND IMMUNOLOGIC: No hives or eczema  FAMILY HISTORY:  CVA (cerebral vascular accident): hemorrhage  Family history of colon cancer in mother (Mother, Grandparent)    T(C): 36.4 (10-07-18 @ 00:22), Max: 36.7 (10-06-18 @ 19:23)  HR: 72 (10-07-18 @ 05:55) (72 - 81)  BP: 134/70 (10-07-18 @ 05:55) (119/62 - 138/76)  RR: 18 (10-07-18 @ 00:22) (18 - 18)  SpO2: 98% (10-07-18 @ 00:22) (98% - 98%)  Wt(kg): --Vital Signs Last 24 HrsVital Signs Last 24 Hrs  T(C): 35.7 (08 Oct 2018 17:17), Max: 36.3 (08 Oct 2018 02:20)  T(F): 96.3 (08 Oct 2018 17:17), Max: 97.4 (08 Oct 2018 05:10)  HR: 72 (08 Oct 2018 17:17) (72 - 75)  BP: 122/58 (08 Oct 2018 17:17) (122/58 - 137/71)  BP(mean): --  RR: 18 (08 Oct 2018 17:17) (18 - 18)  SpO2: 98% (08 Oct 2018 05:10) (96% - 98%)      PHYSICAL EXAM:  GENERAL: NAD, well-groomed, well-developed-Older male in good spirits; A, Ox4  HEAD:  Atraumatic, Normocephalic  EYES: EOMI, PERRLA, conjunctiva and sclera clear  ENMT: No tonsillar erythema, exudates, or enlargement; Moist mucous membranes, Good dentition, No lesions  NECK: Supple, No JVD, Normal thyroid  NERVOUS SYSTEM:  Alert & Oriented X3, Good concentration; Motor Strength 5/5 B/L upper and lower extremities; DTRs 2+ intact and symmetric  CHEST/LUNG: Clear to percussion bilaterally; No rales, rhonchi, wheezing, or rubs  HEART: Regular rate and rhythm; No murmurs, rubs, or gallops  ABDOMEN: Soft, Nontender, Nondistended; Bowel sounds present-Multiple scars from surgery and ostomy;                        EXTREMITIES:  2+ Peripheral Pulses, No clubbing, cyanosis, or edema; Right upper extremity wrist in a dorsal splint status post radial approach for cardiac cath  LYMPH: No lymphadenopathy noted  SKIN: No rashes or lesions      LABS:  10-07    138  |  100  |  35<H>  ----------------------------<  130<H>  5.2<H>   |  26  |  1.8<H>    Ca    9.5      07 Oct 2018 07:03    TPro  7.0  /  Alb  4.5  /  TBili  0.4  /  DBili  x   /  AST  21  /  ALT  29  /  AlkPhos  66  10-05                          12.0   7.46  )-----------( 149      ( 07 Oct 2018 07:03 )             38.0         RADIOLOGY & ADDITIONAL TESTS:    MEDICATION:  aspirin enteric coated 81 milliGRAM(s) Oral daily  atorvastatin 40 milliGRAM(s) Oral at bedtime  cholecalciferol 2000 Unit(s) Oral daily  isosorbide   dinitrate Tablet (ISORDIL) 10 milliGRAM(s) Oral three times a day  lisinopril 5 milliGRAM(s) Oral daily  metoprolol succinate ER 50 milliGRAM(s) Oral daily      HEALTH ISSUES - PROBLEM Dx:  chest pain r/o Mi cardiology consult ,will do cardiac cath in am,hx stentx2,asa      -See cath: Stent placed in LAD-a restenosis of a prior stent (P/S-this is his third stent)  hx PPM on atorvastatin  Htn on lisinopril  CKD  DM    Monitor all including perfusion of right upper extremity; fingersticks; if patient is stable and cleared by cardiology probable DC in AM

## 2018-10-08 NOTE — PROGRESS NOTE ADULT - SUBJECTIVE AND OBJECTIVE BOX
POST OPERATIVE PROCEDURAL DOCUMENTATION  PRE-OP DIAGNOSIS: CAD    POST-OP DIAGNOSIS: CAD    PROCEDURE:   LEFT HEART CATHERIZATION    Physician: Fortunato MARTNI  Assistant: Manish MARTIN    ANESTHESIA TYPE:  [ x] Sedation  [x ] Local/Regional  [  ]General Anesthesia    ESTIMATED BLOOD LOSS: <10 ml         CONDITION  [x ] Good  [   ] Fair  [   ] Serious  [   ] Critical      SPECIMENS REMOVED (IF APPLICABLE):  n/a      IMPLANTS (IF APPLICABLE): JOSHUA to proximal LAD      FINDINGS:    LEFT HEART CATHERIZATION                                    LVEF%: 47% by echo  LVEDP: WNL    Left main: NL    LAD:   80% in-stent stenosis in proximal LAD                         Left Circumflex:  Large dominant, no disease      Right Cornary Artery: small non-dominant    PERCUTANEOUS CORONARY INTERVENTIONS: JOSHUA      COMPLICATIONS: none      POST-OP DIAGNOSIS: CAD    [ ] Normal Coronary Arteries  [ ] Luminal Irregularities  [ ] Non-obstructive CAD        PLAN OF CARE      [ ] D/C Home today   [ ]  D/C in AM  [x ] Return to In-patient bed  [ ] Admit for observation  [ ] Return for staged procedure:  [ ] CT Surgery consult called  [x ]  Continue DAPT, B-blocker & Statin therapy  [x ]  Medical Therapy  [x ] Aggressive risk factor modification. The patient should follow a low fat and low calorie diet. POST OPERATIVE PROCEDURAL DOCUMENTATION  PRE-OP DIAGNOSIS: CAD    POST-OP DIAGNOSIS: CAD    PROCEDURE:   LEFT HEART CATHERIZATION    Physician: Fortunato MARTIN  Assistant: Manish MARTIN    ANESTHESIA TYPE:  [ x] Sedation  [x ] Local/Regional  [  ]General Anesthesia    ESTIMATED BLOOD LOSS: <10 ml         CONDITION  [x ] Good  [   ] Fair  [   ] Serious  [   ] Critical      SPECIMENS REMOVED (IF APPLICABLE):  n/a      IMPLANTS (IF APPLICABLE): JOSHUA to proximal LAD      FINDINGS:    LEFT HEART CATHERIZATION                                    LVEF%: 47% by echo  LVEDP: WNL    Left main: NL    LAD:   80% in-stent stenosis in proximal/mid LAD                         Left Circumflex:  Large dominant, no disease      Right Cornary Artery: small non-dominant    PERCUTANEOUS CORONARY INTERVENTIONS: JOSHUA      COMPLICATIONS: none      POST-OP DIAGNOSIS: CAD    [ ] Normal Coronary Arteries  [ ] Luminal Irregularities  [x ] 1v CAD        PLAN OF CARE      [ ] D/C Home today   [ ]  D/C in AM  [x ] Return to In-patient bed  [ ] Admit for observation  [ ] Return for staged procedure:  [ ] CT Surgery consult called  [x ]  Continue DAPT, B-blocker & Statin therapy  [x ]  Medical Therapy  [x ] Aggressive risk factor modification. The patient should follow a low fat and low calorie diet.

## 2018-10-08 NOTE — CHART NOTE - NSCHARTNOTEFT_GEN_A_CORE
PREOPERATIVE DAY OF PROCEDURE EVALUATION:  I have personally seen and examined the patient.  I agree with the history and physical which I have reviewed and noted any changes below.  (Signed electronically by __________)  10-08-18 @ 08:21

## 2018-10-08 NOTE — ED ADULT NURSE REASSESSMENT NOTE - NS ED NURSE REASSESS COMMENT FT1
Handoff received from KIMI Mckinnon- patient was in cardiac cath. per bed management will not be returning to ED.

## 2018-10-09 ENCOUNTER — TRANSCRIPTION ENCOUNTER (OUTPATIENT)
Age: 83
End: 2018-10-09

## 2018-10-09 VITALS
SYSTOLIC BLOOD PRESSURE: 154 MMHG | TEMPERATURE: 96 F | DIASTOLIC BLOOD PRESSURE: 70 MMHG | HEART RATE: 77 BPM | RESPIRATION RATE: 20 BRPM

## 2018-10-09 LAB
ANION GAP SERPL CALC-SCNC: 15 MMOL/L — HIGH (ref 7–14)
BUN SERPL-MCNC: 31 MG/DL — HIGH (ref 10–20)
CALCIUM SERPL-MCNC: 9.1 MG/DL — SIGNIFICANT CHANGE UP (ref 8.5–10.1)
CHLORIDE SERPL-SCNC: 102 MMOL/L — SIGNIFICANT CHANGE UP (ref 98–110)
CO2 SERPL-SCNC: 22 MMOL/L — SIGNIFICANT CHANGE UP (ref 17–32)
CREAT SERPL-MCNC: 1.7 MG/DL — HIGH (ref 0.7–1.5)
GLUCOSE BLDC GLUCOMTR-MCNC: 134 MG/DL — HIGH (ref 70–99)
GLUCOSE BLDC GLUCOMTR-MCNC: 169 MG/DL — HIGH (ref 70–99)
GLUCOSE SERPL-MCNC: 143 MG/DL — HIGH (ref 70–99)
HCT VFR BLD CALC: 33.6 % — LOW (ref 42–52)
HGB BLD-MCNC: 10.7 G/DL — LOW (ref 14–18)
MCHC RBC-ENTMCNC: 27.8 PG — SIGNIFICANT CHANGE UP (ref 27–31)
MCHC RBC-ENTMCNC: 31.8 G/DL — LOW (ref 32–37)
MCV RBC AUTO: 87.3 FL — SIGNIFICANT CHANGE UP (ref 80–94)
NRBC # BLD: 0 /100 WBCS — SIGNIFICANT CHANGE UP (ref 0–0)
PLATELET # BLD AUTO: 154 K/UL — SIGNIFICANT CHANGE UP (ref 130–400)
POTASSIUM SERPL-MCNC: 5.2 MMOL/L — HIGH (ref 3.5–5)
POTASSIUM SERPL-SCNC: 5.2 MMOL/L — HIGH (ref 3.5–5)
RBC # BLD: 3.85 M/UL — LOW (ref 4.7–6.1)
RBC # FLD: 15.3 % — HIGH (ref 11.5–14.5)
SODIUM SERPL-SCNC: 139 MMOL/L — SIGNIFICANT CHANGE UP (ref 135–146)
WBC # BLD: 7.87 K/UL — SIGNIFICANT CHANGE UP (ref 4.8–10.8)
WBC # FLD AUTO: 7.87 K/UL — SIGNIFICANT CHANGE UP (ref 4.8–10.8)

## 2018-10-09 RX ORDER — TICAGRELOR 90 MG/1
1 TABLET ORAL
Qty: 60 | Refills: 0 | OUTPATIENT
Start: 2018-10-09 | End: 2018-11-07

## 2018-10-09 RX ORDER — CLOPIDOGREL BISULFATE 75 MG/1
300 TABLET, FILM COATED ORAL ONCE
Qty: 0 | Refills: 0 | Status: COMPLETED | OUTPATIENT
Start: 2018-10-09 | End: 2018-10-09

## 2018-10-09 RX ORDER — ATORVASTATIN CALCIUM 80 MG/1
1 TABLET, FILM COATED ORAL
Qty: 30 | Refills: 0
Start: 2018-10-09 | End: 2018-11-07

## 2018-10-09 RX ORDER — ROSUVASTATIN CALCIUM 5 MG/1
1 TABLET ORAL
Qty: 0 | Refills: 0 | COMMUNITY

## 2018-10-09 RX ORDER — PANTOPRAZOLE SODIUM 20 MG/1
1 TABLET, DELAYED RELEASE ORAL
Qty: 30 | Refills: 0
Start: 2018-10-09 | End: 2018-11-07

## 2018-10-09 RX ORDER — CLOPIDOGREL BISULFATE 75 MG/1
1 TABLET, FILM COATED ORAL
Qty: 30 | Refills: 0
Start: 2018-10-09 | End: 2018-11-07

## 2018-10-09 RX ORDER — ISOSORBIDE MONONITRATE 60 MG/1
1 TABLET, EXTENDED RELEASE ORAL
Qty: 30 | Refills: 0
Start: 2018-10-09 | End: 2018-11-07

## 2018-10-09 RX ADMIN — ISOSORBIDE MONONITRATE 30 MILLIGRAM(S): 60 TABLET, EXTENDED RELEASE ORAL at 12:12

## 2018-10-09 RX ADMIN — Medication 81 MILLIGRAM(S): at 12:12

## 2018-10-09 RX ADMIN — Medication 50 MILLIGRAM(S): at 06:35

## 2018-10-09 RX ADMIN — Medication 2000 UNIT(S): at 12:12

## 2018-10-09 RX ADMIN — PANTOPRAZOLE SODIUM 40 MILLIGRAM(S): 20 TABLET, DELAYED RELEASE ORAL at 12:13

## 2018-10-09 RX ADMIN — CLOPIDOGREL BISULFATE 300 MILLIGRAM(S): 75 TABLET, FILM COATED ORAL at 12:13

## 2018-10-09 RX ADMIN — TICAGRELOR 90 MILLIGRAM(S): 90 TABLET ORAL at 06:34

## 2018-10-09 NOTE — DISCHARGE NOTE ADULT - MEDICATION SUMMARY - MEDICATIONS TO TAKE
I will START or STAY ON the medications listed below when I get home from the hospital:    Aspirin Low Dose 81 mg oral delayed release tablet  -- 1 tab(s) by mouth once a day  -- Indication: For CAD (coronary artery disease)    lisinopril 2.5 mg oral tablet  -- 1 tab(s) by mouth once a day  -- Indication: For HTN    isosorbide mononitrate 30 mg oral tablet, extended release  -- 1 tab(s) by mouth once a day  -- Indication: For ANGINA     metFORMIN 500 mg oral tablet  -- 1 tab(s) by mouth 2 times a day  -- Indication: For DM    acarbose 25 mg oral tablet  -- 1 tab(s) by mouth 3 times a day  -- Indication: For DM    atorvastatin 40 mg oral tablet  -- 1 tab(s) by mouth once a day (at bedtime)  -- Indication: For CAD (coronary artery disease)    Plavix 75 mg oral tablet  -- 1 tab(s) by mouth once a day   -- Do not take aspirin or aspirin containing products without knowledge and consent of your physician.    -- Indication: For STENT PROTECTION     Metoprolol Succinate ER 25 mg oral tablet, extended release  -- 1 tab(s) by mouth once a day  -- Indication: For HTN     CoQ10  -- 100 milligram(s) by mouth once a day  -- Indication: For Vitamin     Vitamin D3 2000 intl units oral tablet  -- 1 tab(s) by mouth once a day  -- Indication: For Vitamin I will START or STAY ON the medications listed below when I get home from the hospital:    Aspirin Low Dose 81 mg oral delayed release tablet  -- 1 tab(s) by mouth once a day  -- Indication: For CAD (coronary artery disease)    lisinopril 2.5 mg oral tablet  -- 1 tab(s) by mouth once a day  -- Indication: For HTN    isosorbide mononitrate 30 mg oral tablet, extended release  -- 1 tab(s) by mouth once a day  -- Indication: For ANGINA     metFORMIN 500 mg oral tablet  -- 1 tab(s) by mouth 2 times a day  -- Indication: For DM    acarbose 25 mg oral tablet  -- 1 tab(s) by mouth 3 times a day  -- Indication: For DM    atorvastatin 40 mg oral tablet  -- 1 tab(s) by mouth once a day (at bedtime)  -- Indication: For CAD (coronary artery disease)    Plavix 75 mg oral tablet  -- 1 tab(s) by mouth once a day   -- Do not take aspirin or aspirin containing products without knowledge and consent of your physician.    -- Indication: For STENT PROTECTION     Metoprolol Succinate ER 25 mg oral tablet, extended release  -- 1 tab(s) by mouth once a day  -- Indication: For HTN     CoQ10  -- 100 milligram(s) by mouth once a day  -- Indication: For Vitamin     pantoprazole 40 mg oral delayed release tablet  -- 1 tab(s) by mouth once a day (before a meal)  -- Indication: For GERD    Vitamin D3 2000 intl units oral tablet  -- 1 tab(s) by mouth once a day  -- Indication: For Vitamin

## 2018-10-09 NOTE — DISCHARGE NOTE ADULT - HOSPITAL COURSE
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 Describes the chest pain as heaviness across the chest occuring during dinner every day for last 4 days, radiating to left arm a/w mild shortness of breath, last for around 20 min, around 8/10 in intensity. Found to have  occluded LAD on catherization now s/p Stent hemodynamically with out signs of systemic infection. He will be discharge with follow up appointments.

## 2018-10-09 NOTE — DISCHARGE NOTE ADULT - PROVIDER TOKENS
TOKURSULA:'[id number]' TOKEN:'[id number]',FREE:[LAST:[Farid],FIRST:[Miguel DAVIES) Cardiologist],PHONE:[phone]],FAX:[(   )    -],ADDRESS:[[address] [place] [address]]]

## 2018-10-09 NOTE — DISCHARGE NOTE ADULT - PLAN OF CARE
Evaluation and Therapy You were found to have significant coronary artery  disease that  and  underwent cauterization with Stent placement.  Please tale Plavix daily 75mg  for stent protection to maintain vascular patency. Please follow up with Dr. Fowler your cardiologist within two weeks of discharge. Medication instruction on discharge Please resume metformin on Wednesday 10/10/18 in light of  recent contrast injections. Continue all other Diabetes medications.

## 2018-10-09 NOTE — DISCHARGE NOTE ADULT - CARE PROVIDERS DIRECT ADDRESSES
[email or web address]

## 2018-10-09 NOTE — DISCHARGE NOTE ADULT - WEIGHT IN KG
Sedation Pre-Procedure Note    Patient Name: Art [place]   YOB: 1949  Room/Bed: Room/bed info not found  Medical Record Number: [id number]  Date: 8/21/2018   Time: 10:09 AM       Indication:  Pelvic nodule bx    Consent: I have discussed with the patient and/or the patient representative the indication, alternatives, and the possible risks and/or complications of the planned procedure and the anesthesia methods. The patient and/or patient representative appear to understand and agree to proceed. Vital Signs:   Vitals:    08/21/18 0831   BP: 137/74   Pulse:    Resp:    Temp:    SpO2:        Past Medical History:   has a past medical history of Diabetes ([facility] [address] Difficult intubation; Endometrial adenocarcinoma ([facility] [address] Endometrial adenocarcinoma ([facility] [address] History of blood transfusion; Hyperlipidemia; Hypertension; Osteoarthritis; Sleep apnea; and Thyroid disease. Past Surgical History:   has a past surgical history that includes Appendectomy (2003); Ovary removal (Right, 2003); Dilation and curettage of uterus (08/10/2017); Hysterectomy (08/28/2017); other surgical history; Colonoscopy; and Tunneled venous port placement (Right, 10/18/2017). Medications:   Scheduled Meds:   Continuous Infusions:   PRN Meds:   Home Meds:   Prior to Admission medications    Medication Sig Start Date End Date Taking?  Authorizing Provider   propranolol (INDERAL) 10 MG tablet Take 10 mg by mouth 2 times daily   Yes Historical Provider, MD   Multiple Vitamins-Minerals (THERAPEUTIC MULTIVITAMIN-MINERALS) tablet Take 1 tablet by mouth daily   Yes Historical Provider, MD   ferrous sulfate 325 (65 Fe) MG tablet Take 325 mg by mouth 2 times daily   Yes Historical Provider, MD   metFORMIN (GLUCOPHAGE) 500 MG tablet Take 500 mg by mouth 2 times daily (with meals) Indications: 1 Tablet in the Morning 1/2 tablet at night   Yes Historical Provider, MD   losartan (COZAAR) 100 MG tablet Take 100 mg by mouth daily   Yes Historical
63.4

## 2018-10-09 NOTE — PROGRESS NOTE ADULT - ASSESSMENT
HEALTH ISSUES - PROBLEM Dx:  Patient with hx of CAD admitted with chest pain r/o MI  - cardiology consult  - post cath, PCI with JOSHUA to restenosed LAD  - cardio f/u noted  - started on Brillinta  - maintain rx as per cardiology  - cleared for D/C    hx PPM    Hyperlipidemia  - on Atorvastatin    HtN  - on Lisinopril    CKD  - stable    DM  - Metformin on hold for 2 days post stent    D/C planning for today, outpatient f/u with Dr De La Torre

## 2018-10-09 NOTE — DISCHARGE NOTE ADULT - CARE PROVIDER_API CALL
Alvino De La Torre), Hematology; Internal Medicine; Medical Oncology  [address] [place] [address]  Phone: [phone]  Fax: [phone] Alvino De La Torre), Hematology; Internal Medicine; Medical Oncology  [address] [place] [address]  Phone: [phone]  Fax: [phone]    Miguel Fowler) Cardiologist  [address],  [place] [address]  Phone: [phone]  Fax: (   )    -

## 2018-10-09 NOTE — PROGRESS NOTE ADULT - SUBJECTIVE AND OBJECTIVE BOX
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 Describes the chest pain as heaviness across the chest occuring during dinner every day for last 4 days, radiating to left arm a/w mild shortness of breath, last for around 20 min, around 8/10 in intensity. Denies palpitaion, nausea, vomiting. Denies cough, headache, belly pain, change in bowel or bladder habit. (06 Oct 2018 15:50)    MEDICATIONS  (STANDING):  aspirin enteric coated 81 milliGRAM(s) Oral daily  atorvastatin 40 milliGRAM(s) Oral at bedtime  cholecalciferol 2000 Unit(s) Oral daily  influenza   Vaccine 0.5 milliLiter(s) IntraMuscular once  isosorbide   mononitrate ER Tablet (IMDUR) 30 milliGRAM(s) Oral daily  metoprolol succinate ER 50 milliGRAM(s) Oral daily  pantoprazole    Tablet 40 milliGRAM(s) Oral before breakfast  sodium chloride 0.9%. 600 milliLiter(s) (150 mL/Hr) IV Continuous <Continuous>  sodium chloride 0.9%. 600 milliLiter(s) (100 mL/Hr) IV Continuous <Continuous>  ticagrelor 90 milliGRAM(s) Oral two times a day    MEDICATIONS  (PRN):    INTERVAL EVENTS: Patient seen today feels well, denies CP and SOB. Patient without complaints, had a BM this am.    T(C): 36.2 (10-09-18 @ 05:55), Max: 36.2 (10-09-18 @ 05:55)  HR: 75 (10-09-18 @ 05:55) (72 - 75)  BP: 107/56 (10-09-18 @ 05:55) (107/56 - 122/58)  RR: 18 (10-09-18 @ 05:55) (18 - 18)  SpO2: 99% (10-09-18 @ 08:00) (99% - 99%)  Wt(kg): --Vital Signs Last 24 Hrs  T(C): 36.2 (09 Oct 2018 05:55), Max: 36.2 (09 Oct 2018 05:55)  T(F): 97.1 (09 Oct 2018 05:55), Max: 97.1 (09 Oct 2018 05:55)  HR: 75 (09 Oct 2018 05:55) (72 - 75)  BP: 107/56 (09 Oct 2018 05:55) (107/56 - 122/58)  BP(mean): --  RR: 18 (09 Oct 2018 05:55) (18 - 18)  SpO2: 99% (09 Oct 2018 08:00) (99% - 99%)    PHYSICAL EXAM:  GENERAL: NAD  NECK: Supple, No JVD  CHEST/LUNG: Clear; No wheezing  HEART: S1, S2, Regular rate and rhythm;   ABDOMEN: Soft, Nontender, Nondistended; Bowel sounds present  EXTREMITIES: No clubbing, cyanosis, or edema    LABS:          RADIOLOGY & ADDITIONAL TESTS:  < from: Transthoracic Echocardiogram (10.06.18 @ 06:43) >  Summary:   1. Mildly decreased global left ventricular systolic function.   2. LV Ejection Fraction by Warren's Method with a biplane EF of 47 %.   3. Spectral Doppler shows impaired relaxation pattern of left   ventricular myocardial filling (Grade I diastolic dysfunction).   4. Thickening and calcification of the anterior and posterior mitral   valve leaflets.   5. Mild-moderate tricuspid regurgitation.   6. Pulmonic valve regurgitation.   7. Estimated pulmonary arterysystolic pressure is 41.5 mmHg assuming a   right atrial pressure of 8 mmHg, which is consistent with mild pulmonary   hypertension.    < end of copied text >

## 2018-10-09 NOTE — PROGRESS NOTE ADULT - SUBJECTIVE AND OBJECTIVE BOX
Cardiology Follow up    DANELLE ZEESHAN   84yMale  PAST MEDICAL & SURGICAL HISTORY:  Stented coronary artery: Proximal LAD 08/31/2016  Eye problem: with diabetes  Anemia: slight  GERD without esophagitis  SOB (shortness of breath) on exertion  Asthma: Mild only last attack yrs ago  HLD (hyperlipidemia)  HTN (hypertension)  Fistula of large intestine: colo-vesicula fistula  H/O diverticulitis of colon  Recurrent UTI (urinary tract infection)  Diabetes  CAD (coronary artery disease)  History of coronary artery stent placement  Elective surgery: PCI with 2 stents  Basal cell carcinoma: removal 07/18/2018  History of eye surgery: laser  Artificial cardiac pacemaker  History of partial colectomy  H/O ileostomy    Allergies    No Known Allergies    Intolerances        Patient without complaints. Pt ambulated without issues/symptoms  Denies CP, SOB, palpitations, or dizziness  No events on telemetry overnight    Vital Signs Last 24 Hrs  T(C): 36.2 (09 Oct 2018 05:55), Max: 36.2 (09 Oct 2018 05:55)  T(F): 97.1 (09 Oct 2018 05:55), Max: 97.1 (09 Oct 2018 05:55)  HR: 75 (09 Oct 2018 05:55) (72 - 75)  BP: 107/56 (09 Oct 2018 05:55) (107/56 - 122/58)  BP(mean): --  RR: 18 (09 Oct 2018 05:55) (18 - 18)  SpO2: --Allergies    REVIEW OF SYSTEMS:    CONSTITUTIONAL: No weakness, fevers or chills  EYES/ENT: No visual changes;  No vertigo or throat pain   NECK: No pain or stiffness  RESPIRATORY: No cough, wheezing, hemoptysis; No shortness of breath  CARDIOVASCULAR: No chest pain or palpitations  GASTROINTESTINAL: No abdominal or epigastric pain. No nausea, vomiting, or hematemesis; No diarrhea or constipation. No melena or hematochezia.  GENITOURINARY: No dysuria, frequency or hematuria  NEUROLOGICAL: No numbness or weakness  SKIN: No itching, rashes      NAD, appears well  S1S2, no murmurs, no JVD  CTA B/L, no wheeze, no rales  SNT +BS  Ext:    Right/Left             Groin:  NO hematoma,     NO bruit, dressing; C/D/I   	   Right Radial : NO   hematoma or  bleeding,  C/D/I, + ecchymosis, + sensation, mvmt,, refill, warmth, scant bleeding upon dressing removal pressure dsg reapplied to be removed later today       Pulses:  +Rad/ +PTs /+DPs/ same as baseline  A&Ox 3    EKG    P                                                                                                           LABS   P                A/P:  I discussed the case with Interventional Cardiologist Dr. Bucio & recommends the following:    S/P PCI pLAD JOSHUA x 1  	         Continue DAPT,(asa 81mg daily, brilinta 90mg q 12)  B-Blocker, Statin Therapy                   hold metformin x 48 hrs post pci                   oob-ch, ambulate                   f/u am labs/ekg                    confirm brilinta coverage prior to discharge                    Pt given instructions on importance of taking antiplatelet medication or risk acute stent thrombosis/death                   Post cath instructions, access site care and activity restrictions reviewed with patient                     Discussed with patient to return to hospital if experience chest pain, shortness breath, dizziness and site bleeding                   Aggressive risk factor modication, diet counseling, smoking cessation discussed with patient                       Can discharge patient from cardiac standpoint if am labs/ekg wnl and ambulating without symptoms as discussed with Dr. Bucio                    Follow up with Cardiology Dr. Fowler  in one week.  Instructed to call and make an appointment Cardiology Follow up    DANELLE ZEESHAN   84yMale  PAST MEDICAL & SURGICAL HISTORY:  Stented coronary artery: Proximal LAD 08/31/2016  Eye problem: with diabetes  Anemia: slight  GERD without esophagitis  SOB (shortness of breath) on exertion  Asthma: Mild only last attack yrs ago  HLD (hyperlipidemia)  HTN (hypertension)  Fistula of large intestine: colo-vesicula fistula  H/O diverticulitis of colon  Recurrent UTI (urinary tract infection)  Diabetes  CAD (coronary artery disease)  History of coronary artery stent placement  Elective surgery: PCI with 2 stents  Basal cell carcinoma: removal 07/18/2018  History of eye surgery: laser  Artificial cardiac pacemaker  History of partial colectomy  H/O ileostomy    Allergies    No Known Allergies    Intolerances        Patient without complaints. Pt ambulated without issues/symptoms  Denies CP, SOB, palpitations, or dizziness  No events on telemetry overnight    Vital Signs Last 24 Hrs  T(C): 36.2 (09 Oct 2018 05:55), Max: 36.2 (09 Oct 2018 05:55)  T(F): 97.1 (09 Oct 2018 05:55), Max: 97.1 (09 Oct 2018 05:55)  HR: 75 (09 Oct 2018 05:55) (72 - 75)  BP: 107/56 (09 Oct 2018 05:55) (107/56 - 122/58)  BP(mean): --  RR: 18 (09 Oct 2018 05:55) (18 - 18)  SpO2: --Allergies    REVIEW OF SYSTEMS:    CONSTITUTIONAL: No weakness, fevers or chills  EYES/ENT: No visual changes;  No vertigo or throat pain   NECK: No pain or stiffness  RESPIRATORY: No cough, wheezing, hemoptysis; No shortness of breath  CARDIOVASCULAR: No chest pain or palpitations  GASTROINTESTINAL: No abdominal or epigastric pain. No nausea, vomiting, or hematemesis; No diarrhea or constipation. No melena or hematochezia.  GENITOURINARY: No dysuria, frequency or hematuria  NEUROLOGICAL: No numbness or weakness  SKIN: No itching, rashes      NAD, appears well  S1S2, no murmurs, no JVD  CTA B/L, no wheeze, no rales  SNT +BS  Ext:    Right/Left             Groin:  NO hematoma,     NO bruit, dressing; C/D/I   	   Right Radial : NO   hematoma or  bleeding,  C/D/I, + ecchymosis, + sensation, mvmt,, refill, warmth, scant bleeding upon dressing removal pressure dsg reapplied to be removed later today       Pulses:  +Rad/ +PTs /+DPs/ same as baseline  A&Ox 3    EKG    P                                                                                                           LABS   P                A/P:  I discussed the case with Interventional Cardiologist Dr. Bucio & recommends the following:    S/P PCI pLAD JOSHUA x 1  	         Continue DAPT,(asa 81mg daily, brilinta 90mg q 12)  B-Blocker, Statin Therapy                   hold metformin x 48 hrs post pci                   oob-ch, ambulate                   f/u am labs/ekg                    brilinta $87/month, too costly per pt, plavix 300 mg po x 1 now and start plavix 75mg daily starting tomorrow per Dr. Bucio                    30 day supply of DAPT to be given by RN for home use only                   Pt given instructions on importance of taking antiplatelet medication or risk acute stent thrombosis/death                   Post cath instructions, access site care and activity restrictions reviewed with patient                     Discussed with patient to return to hospital if experience chest pain, shortness breath, dizziness and site bleeding                   Aggressive risk factor modication, diet counseling, smoking cessation discussed with patient                       Can discharge patient from cardiac standpoint if am labs/ekg wnl and ambulating without symptoms as discussed with Dr. Bucio                    Follow up with Cardiology Dr. Fowler  in one week.  Instructed to call and make an appointment

## 2018-10-09 NOTE — DISCHARGE NOTE ADULT - ADDITIONAL INSTRUCTIONS
Please follow up with Dr. Rico with in two weeks of discharge.  Please also follow up with Dr. Fowler with in two weeks of discharge.

## 2018-10-09 NOTE — DISCHARGE NOTE ADULT - PATIENT PORTAL LINK FT
You can access the [facility] Patient Portal, offered by [facility], by registering with the following website: [email or web address]

## 2018-10-09 NOTE — DISCHARGE NOTE ADULT - MEDICATION SUMMARY - MEDICATIONS TO STOP TAKING
I will STOP taking the medications listed below when I get home from the hospital:    Crestor 20 mg oral tablet  -- 1 tab(s) by mouth once a day (at bedtime)    Brilinta (ticagrelor) 90 mg oral tablet  -- 1 tab(s) by mouth 2 times a day MDD:2  -- It is very important that you take or use this exactly as directed.  Do not skip doses or discontinue unless directed by your doctor.  Obtain medical advice before taking any non-prescription drugs as some may affect the action of this medication.

## 2018-10-16 DIAGNOSIS — Z87.440 PERSONAL HISTORY OF URINARY (TRACT) INFECTIONS: ICD-10-CM

## 2018-10-16 DIAGNOSIS — I25.110 ATHEROSCLEROTIC HEART DISEASE OF NATIVE CORONARY ARTERY WITH UNSTABLE ANGINA PECTORIS: ICD-10-CM

## 2018-10-16 DIAGNOSIS — Z95.5 PRESENCE OF CORONARY ANGIOPLASTY IMPLANT AND GRAFT: ICD-10-CM

## 2018-10-16 DIAGNOSIS — Z82.3 FAMILY HISTORY OF STROKE: ICD-10-CM

## 2018-10-16 DIAGNOSIS — Z87.891 PERSONAL HISTORY OF NICOTINE DEPENDENCE: ICD-10-CM

## 2018-10-16 DIAGNOSIS — K21.9 GASTRO-ESOPHAGEAL REFLUX DISEASE WITHOUT ESOPHAGITIS: ICD-10-CM

## 2018-10-16 DIAGNOSIS — Z79.82 LONG TERM (CURRENT) USE OF ASPIRIN: ICD-10-CM

## 2018-10-16 DIAGNOSIS — Z79.01 LONG TERM (CURRENT) USE OF ANTICOAGULANTS: ICD-10-CM

## 2018-10-16 DIAGNOSIS — Z79.84 LONG TERM (CURRENT) USE OF ORAL HYPOGLYCEMIC DRUGS: ICD-10-CM

## 2018-10-16 DIAGNOSIS — R07.9 CHEST PAIN, UNSPECIFIED: ICD-10-CM

## 2018-10-16 DIAGNOSIS — Z90.49 ACQUIRED ABSENCE OF OTHER SPECIFIED PARTS OF DIGESTIVE TRACT: ICD-10-CM

## 2018-10-16 DIAGNOSIS — Z80.0 FAMILY HISTORY OF MALIGNANT NEOPLASM OF DIGESTIVE ORGANS: ICD-10-CM

## 2018-10-16 DIAGNOSIS — E78.5 HYPERLIPIDEMIA, UNSPECIFIED: ICD-10-CM

## 2018-10-16 DIAGNOSIS — I50.20 UNSPECIFIED SYSTOLIC (CONGESTIVE) HEART FAILURE: ICD-10-CM

## 2018-10-16 DIAGNOSIS — N18.9 CHRONIC KIDNEY DISEASE, UNSPECIFIED: ICD-10-CM

## 2018-10-16 DIAGNOSIS — Z95.0 PRESENCE OF CARDIAC PACEMAKER: ICD-10-CM

## 2018-10-16 DIAGNOSIS — I13.0 HYPERTENSIVE HEART AND CHRONIC KIDNEY DISEASE WITH HEART FAILURE AND STAGE 1 THROUGH STAGE 4 CHRONIC KIDNEY DISEASE, OR UNSPECIFIED CHRONIC KIDNEY DISEASE: ICD-10-CM

## 2018-10-16 DIAGNOSIS — Z85.828 PERSONAL HISTORY OF OTHER MALIGNANT NEOPLASM OF SKIN: ICD-10-CM

## 2018-10-16 DIAGNOSIS — E11.22 TYPE 2 DIABETES MELLITUS WITH DIABETIC CHRONIC KIDNEY DISEASE: ICD-10-CM

## 2018-11-19 ENCOUNTER — OUTPATIENT (OUTPATIENT)
Dept: OUTPATIENT SERVICES | Facility: HOSPITAL | Age: 83
LOS: 1 days | Discharge: HOME | End: 2018-11-19

## 2018-11-19 DIAGNOSIS — Z95.5 PRESENCE OF CORONARY ANGIOPLASTY IMPLANT AND GRAFT: Chronic | ICD-10-CM

## 2018-11-19 DIAGNOSIS — Z79.01 LONG TERM (CURRENT) USE OF ANTICOAGULANTS: ICD-10-CM

## 2018-11-19 DIAGNOSIS — C44.91 BASAL CELL CARCINOMA OF SKIN, UNSPECIFIED: Chronic | ICD-10-CM

## 2018-11-19 DIAGNOSIS — Z98.890 OTHER SPECIFIED POSTPROCEDURAL STATES: Chronic | ICD-10-CM

## 2018-11-19 DIAGNOSIS — Z95.0 PRESENCE OF CARDIAC PACEMAKER: Chronic | ICD-10-CM

## 2018-11-19 DIAGNOSIS — I48.91 UNSPECIFIED ATRIAL FIBRILLATION: ICD-10-CM

## 2018-11-19 DIAGNOSIS — Z41.9 ENCOUNTER FOR PROCEDURE FOR PURPOSES OTHER THAN REMEDYING HEALTH STATE, UNSPECIFIED: Chronic | ICD-10-CM

## 2018-11-23 ENCOUNTER — OUTPATIENT (OUTPATIENT)
Dept: OUTPATIENT SERVICES | Facility: HOSPITAL | Age: 83
LOS: 1 days | Discharge: HOME | End: 2018-11-23

## 2018-11-23 DIAGNOSIS — Z95.0 PRESENCE OF CARDIAC PACEMAKER: Chronic | ICD-10-CM

## 2018-11-23 DIAGNOSIS — Z98.890 OTHER SPECIFIED POSTPROCEDURAL STATES: Chronic | ICD-10-CM

## 2018-11-23 DIAGNOSIS — Z41.9 ENCOUNTER FOR PROCEDURE FOR PURPOSES OTHER THAN REMEDYING HEALTH STATE, UNSPECIFIED: Chronic | ICD-10-CM

## 2018-11-23 DIAGNOSIS — Z79.01 LONG TERM (CURRENT) USE OF ANTICOAGULANTS: ICD-10-CM

## 2018-11-23 DIAGNOSIS — I48.91 UNSPECIFIED ATRIAL FIBRILLATION: ICD-10-CM

## 2018-11-23 DIAGNOSIS — Z95.5 PRESENCE OF CORONARY ANGIOPLASTY IMPLANT AND GRAFT: Chronic | ICD-10-CM

## 2018-11-23 DIAGNOSIS — C44.91 BASAL CELL CARCINOMA OF SKIN, UNSPECIFIED: Chronic | ICD-10-CM

## 2018-11-28 ENCOUNTER — OUTPATIENT (OUTPATIENT)
Dept: OUTPATIENT SERVICES | Facility: HOSPITAL | Age: 83
LOS: 1 days | Discharge: HOME | End: 2018-11-28

## 2018-11-28 DIAGNOSIS — Z95.5 PRESENCE OF CORONARY ANGIOPLASTY IMPLANT AND GRAFT: Chronic | ICD-10-CM

## 2018-11-28 DIAGNOSIS — Z98.890 OTHER SPECIFIED POSTPROCEDURAL STATES: Chronic | ICD-10-CM

## 2018-11-28 DIAGNOSIS — Z41.9 ENCOUNTER FOR PROCEDURE FOR PURPOSES OTHER THAN REMEDYING HEALTH STATE, UNSPECIFIED: Chronic | ICD-10-CM

## 2018-11-28 DIAGNOSIS — Z79.01 LONG TERM (CURRENT) USE OF ANTICOAGULANTS: ICD-10-CM

## 2018-11-28 DIAGNOSIS — C44.91 BASAL CELL CARCINOMA OF SKIN, UNSPECIFIED: Chronic | ICD-10-CM

## 2018-11-28 DIAGNOSIS — Z95.0 PRESENCE OF CARDIAC PACEMAKER: Chronic | ICD-10-CM

## 2018-11-28 DIAGNOSIS — I48.91 UNSPECIFIED ATRIAL FIBRILLATION: ICD-10-CM

## 2018-11-28 LAB
POCT INR: 2.4 RATIO — HIGH (ref 0.9–1.2)
POCT PT: 29 SEC — HIGH (ref 10–13.4)

## 2018-12-04 ENCOUNTER — OUTPATIENT (OUTPATIENT)
Dept: OUTPATIENT SERVICES | Facility: HOSPITAL | Age: 83
LOS: 1 days | Discharge: HOME | End: 2018-12-04

## 2018-12-04 DIAGNOSIS — Z98.890 OTHER SPECIFIED POSTPROCEDURAL STATES: Chronic | ICD-10-CM

## 2018-12-04 DIAGNOSIS — Z95.5 PRESENCE OF CORONARY ANGIOPLASTY IMPLANT AND GRAFT: Chronic | ICD-10-CM

## 2018-12-04 DIAGNOSIS — Z41.9 ENCOUNTER FOR PROCEDURE FOR PURPOSES OTHER THAN REMEDYING HEALTH STATE, UNSPECIFIED: Chronic | ICD-10-CM

## 2018-12-04 DIAGNOSIS — I48.91 UNSPECIFIED ATRIAL FIBRILLATION: ICD-10-CM

## 2018-12-04 DIAGNOSIS — C44.91 BASAL CELL CARCINOMA OF SKIN, UNSPECIFIED: Chronic | ICD-10-CM

## 2018-12-04 DIAGNOSIS — Z95.0 PRESENCE OF CARDIAC PACEMAKER: Chronic | ICD-10-CM

## 2018-12-04 DIAGNOSIS — Z79.01 LONG TERM (CURRENT) USE OF ANTICOAGULANTS: ICD-10-CM

## 2018-12-04 LAB
POCT INR: 1.8 RATIO — HIGH (ref 0.9–1.2)
POCT PT: 22 SEC — HIGH (ref 10–13.4)

## 2018-12-11 ENCOUNTER — OUTPATIENT (OUTPATIENT)
Dept: OUTPATIENT SERVICES | Facility: HOSPITAL | Age: 83
LOS: 1 days | Discharge: HOME | End: 2018-12-11

## 2018-12-11 DIAGNOSIS — I48.91 UNSPECIFIED ATRIAL FIBRILLATION: ICD-10-CM

## 2018-12-11 DIAGNOSIS — Z79.01 LONG TERM (CURRENT) USE OF ANTICOAGULANTS: ICD-10-CM

## 2018-12-11 DIAGNOSIS — Z95.5 PRESENCE OF CORONARY ANGIOPLASTY IMPLANT AND GRAFT: Chronic | ICD-10-CM

## 2018-12-11 DIAGNOSIS — C44.91 BASAL CELL CARCINOMA OF SKIN, UNSPECIFIED: Chronic | ICD-10-CM

## 2018-12-11 DIAGNOSIS — Z98.890 OTHER SPECIFIED POSTPROCEDURAL STATES: Chronic | ICD-10-CM

## 2018-12-11 DIAGNOSIS — Z41.9 ENCOUNTER FOR PROCEDURE FOR PURPOSES OTHER THAN REMEDYING HEALTH STATE, UNSPECIFIED: Chronic | ICD-10-CM

## 2018-12-11 DIAGNOSIS — Z95.0 PRESENCE OF CARDIAC PACEMAKER: Chronic | ICD-10-CM

## 2018-12-18 ENCOUNTER — OUTPATIENT (OUTPATIENT)
Dept: OUTPATIENT SERVICES | Facility: HOSPITAL | Age: 83
LOS: 1 days | Discharge: HOME | End: 2018-12-18

## 2018-12-18 DIAGNOSIS — Z98.890 OTHER SPECIFIED POSTPROCEDURAL STATES: Chronic | ICD-10-CM

## 2018-12-18 DIAGNOSIS — C44.91 BASAL CELL CARCINOMA OF SKIN, UNSPECIFIED: Chronic | ICD-10-CM

## 2018-12-18 DIAGNOSIS — Z41.9 ENCOUNTER FOR PROCEDURE FOR PURPOSES OTHER THAN REMEDYING HEALTH STATE, UNSPECIFIED: Chronic | ICD-10-CM

## 2018-12-18 DIAGNOSIS — I48.91 UNSPECIFIED ATRIAL FIBRILLATION: ICD-10-CM

## 2018-12-18 DIAGNOSIS — Z95.0 PRESENCE OF CARDIAC PACEMAKER: Chronic | ICD-10-CM

## 2018-12-18 DIAGNOSIS — Z79.01 LONG TERM (CURRENT) USE OF ANTICOAGULANTS: ICD-10-CM

## 2018-12-18 DIAGNOSIS — Z95.5 PRESENCE OF CORONARY ANGIOPLASTY IMPLANT AND GRAFT: Chronic | ICD-10-CM

## 2018-12-18 LAB
POCT INR: 2.4 RATIO — HIGH (ref 0.9–1.2)
POCT PT: 29.1 SEC — HIGH (ref 10–13.4)

## 2018-12-28 ENCOUNTER — OUTPATIENT (OUTPATIENT)
Dept: OUTPATIENT SERVICES | Facility: HOSPITAL | Age: 83
LOS: 1 days | Discharge: HOME | End: 2018-12-28

## 2018-12-28 DIAGNOSIS — Z41.9 ENCOUNTER FOR PROCEDURE FOR PURPOSES OTHER THAN REMEDYING HEALTH STATE, UNSPECIFIED: Chronic | ICD-10-CM

## 2018-12-28 DIAGNOSIS — Z95.5 PRESENCE OF CORONARY ANGIOPLASTY IMPLANT AND GRAFT: Chronic | ICD-10-CM

## 2018-12-28 DIAGNOSIS — Z98.890 OTHER SPECIFIED POSTPROCEDURAL STATES: Chronic | ICD-10-CM

## 2018-12-28 DIAGNOSIS — Z79.01 LONG TERM (CURRENT) USE OF ANTICOAGULANTS: ICD-10-CM

## 2018-12-28 DIAGNOSIS — I48.91 UNSPECIFIED ATRIAL FIBRILLATION: ICD-10-CM

## 2018-12-28 DIAGNOSIS — Z95.0 PRESENCE OF CARDIAC PACEMAKER: Chronic | ICD-10-CM

## 2018-12-28 DIAGNOSIS — C44.91 BASAL CELL CARCINOMA OF SKIN, UNSPECIFIED: Chronic | ICD-10-CM

## 2018-12-28 LAB
POCT INR: 2 RATIO — HIGH (ref 0.9–1.2)
POCT PT: 24.1 SEC — HIGH (ref 10–13.4)

## 2019-01-04 ENCOUNTER — OUTPATIENT (OUTPATIENT)
Dept: OUTPATIENT SERVICES | Facility: HOSPITAL | Age: 84
LOS: 1 days | Discharge: HOME | End: 2019-01-04

## 2019-01-04 DIAGNOSIS — Z98.890 OTHER SPECIFIED POSTPROCEDURAL STATES: Chronic | ICD-10-CM

## 2019-01-04 DIAGNOSIS — C44.91 BASAL CELL CARCINOMA OF SKIN, UNSPECIFIED: Chronic | ICD-10-CM

## 2019-01-04 DIAGNOSIS — Z95.5 PRESENCE OF CORONARY ANGIOPLASTY IMPLANT AND GRAFT: Chronic | ICD-10-CM

## 2019-01-04 DIAGNOSIS — I48.91 UNSPECIFIED ATRIAL FIBRILLATION: ICD-10-CM

## 2019-01-04 DIAGNOSIS — Z79.01 LONG TERM (CURRENT) USE OF ANTICOAGULANTS: ICD-10-CM

## 2019-01-04 DIAGNOSIS — Z95.0 PRESENCE OF CARDIAC PACEMAKER: Chronic | ICD-10-CM

## 2019-01-04 DIAGNOSIS — Z41.9 ENCOUNTER FOR PROCEDURE FOR PURPOSES OTHER THAN REMEDYING HEALTH STATE, UNSPECIFIED: Chronic | ICD-10-CM

## 2019-01-18 ENCOUNTER — OUTPATIENT (OUTPATIENT)
Dept: OUTPATIENT SERVICES | Facility: HOSPITAL | Age: 84
LOS: 1 days | Discharge: HOME | End: 2019-01-18

## 2019-01-18 DIAGNOSIS — Z98.890 OTHER SPECIFIED POSTPROCEDURAL STATES: Chronic | ICD-10-CM

## 2019-01-18 DIAGNOSIS — C44.91 BASAL CELL CARCINOMA OF SKIN, UNSPECIFIED: Chronic | ICD-10-CM

## 2019-01-18 DIAGNOSIS — I48.91 UNSPECIFIED ATRIAL FIBRILLATION: ICD-10-CM

## 2019-01-18 DIAGNOSIS — Z41.9 ENCOUNTER FOR PROCEDURE FOR PURPOSES OTHER THAN REMEDYING HEALTH STATE, UNSPECIFIED: Chronic | ICD-10-CM

## 2019-01-18 DIAGNOSIS — Z95.0 PRESENCE OF CARDIAC PACEMAKER: Chronic | ICD-10-CM

## 2019-01-18 DIAGNOSIS — Z79.01 LONG TERM (CURRENT) USE OF ANTICOAGULANTS: ICD-10-CM

## 2019-01-18 DIAGNOSIS — Z95.5 PRESENCE OF CORONARY ANGIOPLASTY IMPLANT AND GRAFT: Chronic | ICD-10-CM

## 2019-01-18 LAB
POCT INR: 2.9 RATIO — HIGH (ref 0.9–1.2)
POCT PT: 34.7 SEC — HIGH (ref 10–13.4)

## 2019-01-28 ENCOUNTER — OUTPATIENT (OUTPATIENT)
Dept: OUTPATIENT SERVICES | Facility: HOSPITAL | Age: 84
LOS: 1 days | Discharge: HOME | End: 2019-01-28

## 2019-01-28 DIAGNOSIS — C44.91 BASAL CELL CARCINOMA OF SKIN, UNSPECIFIED: Chronic | ICD-10-CM

## 2019-01-28 DIAGNOSIS — Z98.890 OTHER SPECIFIED POSTPROCEDURAL STATES: Chronic | ICD-10-CM

## 2019-01-28 DIAGNOSIS — I48.91 UNSPECIFIED ATRIAL FIBRILLATION: ICD-10-CM

## 2019-01-28 DIAGNOSIS — Z79.01 LONG TERM (CURRENT) USE OF ANTICOAGULANTS: ICD-10-CM

## 2019-01-28 DIAGNOSIS — Z95.5 PRESENCE OF CORONARY ANGIOPLASTY IMPLANT AND GRAFT: Chronic | ICD-10-CM

## 2019-01-28 DIAGNOSIS — Z95.0 PRESENCE OF CARDIAC PACEMAKER: Chronic | ICD-10-CM

## 2019-01-28 DIAGNOSIS — Z41.9 ENCOUNTER FOR PROCEDURE FOR PURPOSES OTHER THAN REMEDYING HEALTH STATE, UNSPECIFIED: Chronic | ICD-10-CM

## 2019-01-28 LAB
POCT INR: 2.6 RATIO — HIGH (ref 0.9–1.2)
POCT PT: 30.9 SEC — HIGH (ref 10–13.4)

## 2019-02-11 ENCOUNTER — OUTPATIENT (OUTPATIENT)
Dept: OUTPATIENT SERVICES | Facility: HOSPITAL | Age: 84
LOS: 1 days | Discharge: HOME | End: 2019-02-11

## 2019-02-11 DIAGNOSIS — Z98.890 OTHER SPECIFIED POSTPROCEDURAL STATES: Chronic | ICD-10-CM

## 2019-02-11 DIAGNOSIS — Z95.5 PRESENCE OF CORONARY ANGIOPLASTY IMPLANT AND GRAFT: Chronic | ICD-10-CM

## 2019-02-11 DIAGNOSIS — Z79.01 LONG TERM (CURRENT) USE OF ANTICOAGULANTS: ICD-10-CM

## 2019-02-11 DIAGNOSIS — C44.91 BASAL CELL CARCINOMA OF SKIN, UNSPECIFIED: Chronic | ICD-10-CM

## 2019-02-11 DIAGNOSIS — I48.91 UNSPECIFIED ATRIAL FIBRILLATION: ICD-10-CM

## 2019-02-11 DIAGNOSIS — Z95.0 PRESENCE OF CARDIAC PACEMAKER: Chronic | ICD-10-CM

## 2019-02-11 DIAGNOSIS — Z41.9 ENCOUNTER FOR PROCEDURE FOR PURPOSES OTHER THAN REMEDYING HEALTH STATE, UNSPECIFIED: Chronic | ICD-10-CM

## 2019-02-11 LAB
POCT INR: 2.3 RATIO — HIGH (ref 0.9–1.2)
POCT PT: 27.5 SEC — HIGH (ref 10–13.4)

## 2019-03-05 ENCOUNTER — OUTPATIENT (OUTPATIENT)
Dept: OUTPATIENT SERVICES | Facility: HOSPITAL | Age: 84
LOS: 1 days | Discharge: HOME | End: 2019-03-05

## 2019-03-05 DIAGNOSIS — Z95.0 PRESENCE OF CARDIAC PACEMAKER: Chronic | ICD-10-CM

## 2019-03-05 DIAGNOSIS — Z98.890 OTHER SPECIFIED POSTPROCEDURAL STATES: Chronic | ICD-10-CM

## 2019-03-05 DIAGNOSIS — I48.91 UNSPECIFIED ATRIAL FIBRILLATION: ICD-10-CM

## 2019-03-05 DIAGNOSIS — C44.91 BASAL CELL CARCINOMA OF SKIN, UNSPECIFIED: Chronic | ICD-10-CM

## 2019-03-05 DIAGNOSIS — Z95.5 PRESENCE OF CORONARY ANGIOPLASTY IMPLANT AND GRAFT: Chronic | ICD-10-CM

## 2019-03-05 DIAGNOSIS — Z41.9 ENCOUNTER FOR PROCEDURE FOR PURPOSES OTHER THAN REMEDYING HEALTH STATE, UNSPECIFIED: Chronic | ICD-10-CM

## 2019-03-05 DIAGNOSIS — Z79.01 LONG TERM (CURRENT) USE OF ANTICOAGULANTS: ICD-10-CM

## 2019-03-05 LAB
POCT INR: 1.9 RATIO — HIGH (ref 0.9–1.2)
POCT PT: 22.4 SEC — HIGH (ref 10–13.4)

## 2019-03-19 ENCOUNTER — OUTPATIENT (OUTPATIENT)
Dept: OUTPATIENT SERVICES | Facility: HOSPITAL | Age: 84
LOS: 1 days | Discharge: HOME | End: 2019-03-19

## 2019-03-19 DIAGNOSIS — C44.91 BASAL CELL CARCINOMA OF SKIN, UNSPECIFIED: Chronic | ICD-10-CM

## 2019-03-19 DIAGNOSIS — Z98.890 OTHER SPECIFIED POSTPROCEDURAL STATES: Chronic | ICD-10-CM

## 2019-03-19 DIAGNOSIS — I48.91 UNSPECIFIED ATRIAL FIBRILLATION: ICD-10-CM

## 2019-03-19 DIAGNOSIS — Z95.0 PRESENCE OF CARDIAC PACEMAKER: Chronic | ICD-10-CM

## 2019-03-19 DIAGNOSIS — Z41.9 ENCOUNTER FOR PROCEDURE FOR PURPOSES OTHER THAN REMEDYING HEALTH STATE, UNSPECIFIED: Chronic | ICD-10-CM

## 2019-03-19 DIAGNOSIS — Z95.5 PRESENCE OF CORONARY ANGIOPLASTY IMPLANT AND GRAFT: Chronic | ICD-10-CM

## 2019-03-19 DIAGNOSIS — Z79.01 LONG TERM (CURRENT) USE OF ANTICOAGULANTS: ICD-10-CM

## 2019-03-19 LAB
POCT INR: 1.8 RATIO — HIGH (ref 0.9–1.2)
POCT PT: 21.8 SEC — HIGH (ref 10–13.4)

## 2019-03-28 ENCOUNTER — OUTPATIENT (OUTPATIENT)
Dept: OUTPATIENT SERVICES | Facility: HOSPITAL | Age: 84
LOS: 1 days | Discharge: HOME | End: 2019-03-28

## 2019-03-28 DIAGNOSIS — Z41.9 ENCOUNTER FOR PROCEDURE FOR PURPOSES OTHER THAN REMEDYING HEALTH STATE, UNSPECIFIED: Chronic | ICD-10-CM

## 2019-03-28 DIAGNOSIS — Z95.5 PRESENCE OF CORONARY ANGIOPLASTY IMPLANT AND GRAFT: Chronic | ICD-10-CM

## 2019-03-28 DIAGNOSIS — Z95.0 PRESENCE OF CARDIAC PACEMAKER: Chronic | ICD-10-CM

## 2019-03-28 DIAGNOSIS — Z98.890 OTHER SPECIFIED POSTPROCEDURAL STATES: Chronic | ICD-10-CM

## 2019-03-28 DIAGNOSIS — Z79.01 LONG TERM (CURRENT) USE OF ANTICOAGULANTS: ICD-10-CM

## 2019-03-28 DIAGNOSIS — C44.91 BASAL CELL CARCINOMA OF SKIN, UNSPECIFIED: Chronic | ICD-10-CM

## 2019-03-28 DIAGNOSIS — I48.91 UNSPECIFIED ATRIAL FIBRILLATION: ICD-10-CM

## 2019-03-28 LAB
POCT INR: 1.4 RATIO — HIGH (ref 0.9–1.2)
POCT PT: 17.3 SEC — HIGH (ref 10–13.4)

## 2019-04-04 ENCOUNTER — OUTPATIENT (OUTPATIENT)
Dept: OUTPATIENT SERVICES | Facility: HOSPITAL | Age: 84
LOS: 1 days | Discharge: HOME | End: 2019-04-04

## 2019-04-04 DIAGNOSIS — Z41.9 ENCOUNTER FOR PROCEDURE FOR PURPOSES OTHER THAN REMEDYING HEALTH STATE, UNSPECIFIED: Chronic | ICD-10-CM

## 2019-04-04 DIAGNOSIS — Z95.0 PRESENCE OF CARDIAC PACEMAKER: Chronic | ICD-10-CM

## 2019-04-04 DIAGNOSIS — C44.91 BASAL CELL CARCINOMA OF SKIN, UNSPECIFIED: Chronic | ICD-10-CM

## 2019-04-04 DIAGNOSIS — Z98.890 OTHER SPECIFIED POSTPROCEDURAL STATES: Chronic | ICD-10-CM

## 2019-04-04 DIAGNOSIS — I48.91 UNSPECIFIED ATRIAL FIBRILLATION: ICD-10-CM

## 2019-04-04 DIAGNOSIS — Z79.01 LONG TERM (CURRENT) USE OF ANTICOAGULANTS: ICD-10-CM

## 2019-04-04 DIAGNOSIS — Z95.5 PRESENCE OF CORONARY ANGIOPLASTY IMPLANT AND GRAFT: Chronic | ICD-10-CM

## 2019-04-04 LAB
POCT INR: 3.1 RATIO — HIGH (ref 0.9–1.2)
POCT PT: 37.2 SEC — HIGH (ref 10–13.4)

## 2019-04-09 ENCOUNTER — OUTPATIENT (OUTPATIENT)
Dept: OUTPATIENT SERVICES | Facility: HOSPITAL | Age: 84
LOS: 1 days | Discharge: HOME | End: 2019-04-09

## 2019-04-09 DIAGNOSIS — Z79.01 LONG TERM (CURRENT) USE OF ANTICOAGULANTS: ICD-10-CM

## 2019-04-09 DIAGNOSIS — I48.91 UNSPECIFIED ATRIAL FIBRILLATION: ICD-10-CM

## 2019-04-09 DIAGNOSIS — Z95.0 PRESENCE OF CARDIAC PACEMAKER: Chronic | ICD-10-CM

## 2019-04-09 DIAGNOSIS — Z95.5 PRESENCE OF CORONARY ANGIOPLASTY IMPLANT AND GRAFT: Chronic | ICD-10-CM

## 2019-04-09 DIAGNOSIS — Z98.890 OTHER SPECIFIED POSTPROCEDURAL STATES: Chronic | ICD-10-CM

## 2019-04-09 DIAGNOSIS — C44.91 BASAL CELL CARCINOMA OF SKIN, UNSPECIFIED: Chronic | ICD-10-CM

## 2019-04-09 DIAGNOSIS — Z41.9 ENCOUNTER FOR PROCEDURE FOR PURPOSES OTHER THAN REMEDYING HEALTH STATE, UNSPECIFIED: Chronic | ICD-10-CM

## 2019-04-09 LAB
POCT INR: 2.8 RATIO — HIGH (ref 0.9–1.2)
POCT PT: 33.4 SEC — HIGH (ref 10–13.4)

## 2019-04-23 ENCOUNTER — OUTPATIENT (OUTPATIENT)
Dept: OUTPATIENT SERVICES | Facility: HOSPITAL | Age: 84
LOS: 1 days | Discharge: HOME | End: 2019-04-23

## 2019-04-23 DIAGNOSIS — Z95.5 PRESENCE OF CORONARY ANGIOPLASTY IMPLANT AND GRAFT: Chronic | ICD-10-CM

## 2019-04-23 DIAGNOSIS — Z79.01 LONG TERM (CURRENT) USE OF ANTICOAGULANTS: ICD-10-CM

## 2019-04-23 DIAGNOSIS — Z41.9 ENCOUNTER FOR PROCEDURE FOR PURPOSES OTHER THAN REMEDYING HEALTH STATE, UNSPECIFIED: Chronic | ICD-10-CM

## 2019-04-23 DIAGNOSIS — Z98.890 OTHER SPECIFIED POSTPROCEDURAL STATES: Chronic | ICD-10-CM

## 2019-04-23 DIAGNOSIS — C44.91 BASAL CELL CARCINOMA OF SKIN, UNSPECIFIED: Chronic | ICD-10-CM

## 2019-04-23 DIAGNOSIS — Z95.0 PRESENCE OF CARDIAC PACEMAKER: Chronic | ICD-10-CM

## 2019-04-23 DIAGNOSIS — I48.91 UNSPECIFIED ATRIAL FIBRILLATION: ICD-10-CM

## 2019-04-23 LAB
POCT INR: 1.5 RATIO — HIGH (ref 0.9–1.2)
POCT PT: 17.9 SEC — HIGH (ref 10–13.4)

## 2019-04-26 ENCOUNTER — OUTPATIENT (OUTPATIENT)
Dept: OUTPATIENT SERVICES | Facility: HOSPITAL | Age: 84
LOS: 1 days | Discharge: HOME | End: 2019-04-26

## 2019-04-26 DIAGNOSIS — C44.91 BASAL CELL CARCINOMA OF SKIN, UNSPECIFIED: Chronic | ICD-10-CM

## 2019-04-26 DIAGNOSIS — Z95.0 PRESENCE OF CARDIAC PACEMAKER: Chronic | ICD-10-CM

## 2019-04-26 DIAGNOSIS — Z79.01 LONG TERM (CURRENT) USE OF ANTICOAGULANTS: ICD-10-CM

## 2019-04-26 DIAGNOSIS — Z98.890 OTHER SPECIFIED POSTPROCEDURAL STATES: Chronic | ICD-10-CM

## 2019-04-26 DIAGNOSIS — Z41.9 ENCOUNTER FOR PROCEDURE FOR PURPOSES OTHER THAN REMEDYING HEALTH STATE, UNSPECIFIED: Chronic | ICD-10-CM

## 2019-04-26 DIAGNOSIS — Z95.5 PRESENCE OF CORONARY ANGIOPLASTY IMPLANT AND GRAFT: Chronic | ICD-10-CM

## 2019-04-26 DIAGNOSIS — I48.91 UNSPECIFIED ATRIAL FIBRILLATION: ICD-10-CM

## 2019-04-26 LAB
POCT INR: 2.5 RATIO — HIGH (ref 0.9–1.2)
POCT PT: 30 SEC — HIGH (ref 10–13.4)

## 2019-05-01 ENCOUNTER — OUTPATIENT (OUTPATIENT)
Dept: OUTPATIENT SERVICES | Facility: HOSPITAL | Age: 84
LOS: 1 days | Discharge: HOME | End: 2019-05-01

## 2019-05-01 DIAGNOSIS — E03.9 HYPOTHYROIDISM, UNSPECIFIED: ICD-10-CM

## 2019-05-01 DIAGNOSIS — D51.8 OTHER VITAMIN B12 DEFICIENCY ANEMIAS: ICD-10-CM

## 2019-05-01 DIAGNOSIS — Z98.890 OTHER SPECIFIED POSTPROCEDURAL STATES: Chronic | ICD-10-CM

## 2019-05-01 DIAGNOSIS — Z41.9 ENCOUNTER FOR PROCEDURE FOR PURPOSES OTHER THAN REMEDYING HEALTH STATE, UNSPECIFIED: Chronic | ICD-10-CM

## 2019-05-01 DIAGNOSIS — D53.9 NUTRITIONAL ANEMIA, UNSPECIFIED: ICD-10-CM

## 2019-05-01 DIAGNOSIS — I48.91 UNSPECIFIED ATRIAL FIBRILLATION: ICD-10-CM

## 2019-05-01 DIAGNOSIS — E78.5 HYPERLIPIDEMIA, UNSPECIFIED: ICD-10-CM

## 2019-05-01 DIAGNOSIS — Z13.1 ENCOUNTER FOR SCREENING FOR DIABETES MELLITUS: ICD-10-CM

## 2019-05-01 DIAGNOSIS — Z00.00 ENCOUNTER FOR GENERAL ADULT MEDICAL EXAMINATION WITHOUT ABNORMAL FINDINGS: ICD-10-CM

## 2019-05-01 DIAGNOSIS — Z95.0 PRESENCE OF CARDIAC PACEMAKER: Chronic | ICD-10-CM

## 2019-05-01 DIAGNOSIS — C44.91 BASAL CELL CARCINOMA OF SKIN, UNSPECIFIED: Chronic | ICD-10-CM

## 2019-05-01 DIAGNOSIS — E55.9 VITAMIN D DEFICIENCY, UNSPECIFIED: ICD-10-CM

## 2019-05-01 DIAGNOSIS — Z95.5 PRESENCE OF CORONARY ANGIOPLASTY IMPLANT AND GRAFT: Chronic | ICD-10-CM

## 2019-05-01 DIAGNOSIS — Z79.01 LONG TERM (CURRENT) USE OF ANTICOAGULANTS: ICD-10-CM

## 2019-05-01 LAB
POCT INR: 2.9 RATIO — HIGH (ref 0.9–1.2)
POCT PT: 35 SEC — HIGH (ref 10–13.4)

## 2019-05-03 DIAGNOSIS — E78.5 HYPERLIPIDEMIA, UNSPECIFIED: ICD-10-CM

## 2019-05-03 DIAGNOSIS — D53.9 NUTRITIONAL ANEMIA, UNSPECIFIED: ICD-10-CM

## 2019-05-03 DIAGNOSIS — E55.9 VITAMIN D DEFICIENCY, UNSPECIFIED: ICD-10-CM

## 2019-05-03 DIAGNOSIS — E03.9 HYPOTHYROIDISM, UNSPECIFIED: ICD-10-CM

## 2019-05-03 DIAGNOSIS — D51.8 OTHER VITAMIN B12 DEFICIENCY ANEMIAS: ICD-10-CM

## 2019-05-03 DIAGNOSIS — Z02.9 ENCOUNTER FOR ADMINISTRATIVE EXAMINATIONS, UNSPECIFIED: ICD-10-CM

## 2019-05-03 DIAGNOSIS — Z13.1 ENCOUNTER FOR SCREENING FOR DIABETES MELLITUS: ICD-10-CM

## 2019-05-03 DIAGNOSIS — Z00.00 ENCOUNTER FOR GENERAL ADULT MEDICAL EXAMINATION WITHOUT ABNORMAL FINDINGS: ICD-10-CM

## 2019-05-08 ENCOUNTER — OUTPATIENT (OUTPATIENT)
Dept: OUTPATIENT SERVICES | Facility: HOSPITAL | Age: 84
LOS: 1 days | Discharge: HOME | End: 2019-05-08

## 2019-05-08 DIAGNOSIS — Z98.890 OTHER SPECIFIED POSTPROCEDURAL STATES: Chronic | ICD-10-CM

## 2019-05-08 DIAGNOSIS — Z95.5 PRESENCE OF CORONARY ANGIOPLASTY IMPLANT AND GRAFT: Chronic | ICD-10-CM

## 2019-05-08 DIAGNOSIS — Z41.9 ENCOUNTER FOR PROCEDURE FOR PURPOSES OTHER THAN REMEDYING HEALTH STATE, UNSPECIFIED: Chronic | ICD-10-CM

## 2019-05-08 DIAGNOSIS — I48.91 UNSPECIFIED ATRIAL FIBRILLATION: ICD-10-CM

## 2019-05-08 DIAGNOSIS — C44.91 BASAL CELL CARCINOMA OF SKIN, UNSPECIFIED: Chronic | ICD-10-CM

## 2019-05-08 DIAGNOSIS — Z79.01 LONG TERM (CURRENT) USE OF ANTICOAGULANTS: ICD-10-CM

## 2019-05-08 DIAGNOSIS — Z95.0 PRESENCE OF CARDIAC PACEMAKER: Chronic | ICD-10-CM

## 2019-05-08 LAB
POCT INR: 2.6 RATIO — HIGH (ref 0.9–1.2)
POCT PT: 31.6 SEC — HIGH (ref 10–13.4)

## 2019-05-22 ENCOUNTER — OUTPATIENT (OUTPATIENT)
Dept: OUTPATIENT SERVICES | Facility: HOSPITAL | Age: 84
LOS: 1 days | Discharge: HOME | End: 2019-05-22

## 2019-05-22 DIAGNOSIS — Z95.0 PRESENCE OF CARDIAC PACEMAKER: Chronic | ICD-10-CM

## 2019-05-22 DIAGNOSIS — Z41.9 ENCOUNTER FOR PROCEDURE FOR PURPOSES OTHER THAN REMEDYING HEALTH STATE, UNSPECIFIED: Chronic | ICD-10-CM

## 2019-05-22 DIAGNOSIS — Z98.890 OTHER SPECIFIED POSTPROCEDURAL STATES: Chronic | ICD-10-CM

## 2019-05-22 DIAGNOSIS — Z79.01 LONG TERM (CURRENT) USE OF ANTICOAGULANTS: ICD-10-CM

## 2019-05-22 DIAGNOSIS — I48.91 UNSPECIFIED ATRIAL FIBRILLATION: ICD-10-CM

## 2019-05-22 DIAGNOSIS — Z95.5 PRESENCE OF CORONARY ANGIOPLASTY IMPLANT AND GRAFT: Chronic | ICD-10-CM

## 2019-05-22 DIAGNOSIS — C44.91 BASAL CELL CARCINOMA OF SKIN, UNSPECIFIED: Chronic | ICD-10-CM

## 2019-05-22 LAB
POCT INR: 1.1 RATIO — SIGNIFICANT CHANGE UP (ref 0.9–1.2)
POCT PT: 12.7 SEC — SIGNIFICANT CHANGE UP (ref 10–13.4)

## 2019-05-29 ENCOUNTER — OUTPATIENT (OUTPATIENT)
Dept: OUTPATIENT SERVICES | Facility: HOSPITAL | Age: 84
LOS: 1 days | Discharge: HOME | End: 2019-05-29

## 2019-05-29 DIAGNOSIS — Z98.890 OTHER SPECIFIED POSTPROCEDURAL STATES: Chronic | ICD-10-CM

## 2019-05-29 DIAGNOSIS — Z41.9 ENCOUNTER FOR PROCEDURE FOR PURPOSES OTHER THAN REMEDYING HEALTH STATE, UNSPECIFIED: Chronic | ICD-10-CM

## 2019-05-29 DIAGNOSIS — C44.91 BASAL CELL CARCINOMA OF SKIN, UNSPECIFIED: Chronic | ICD-10-CM

## 2019-05-29 DIAGNOSIS — I48.91 UNSPECIFIED ATRIAL FIBRILLATION: ICD-10-CM

## 2019-05-29 DIAGNOSIS — Z95.0 PRESENCE OF CARDIAC PACEMAKER: Chronic | ICD-10-CM

## 2019-05-29 DIAGNOSIS — Z95.5 PRESENCE OF CORONARY ANGIOPLASTY IMPLANT AND GRAFT: Chronic | ICD-10-CM

## 2019-05-29 DIAGNOSIS — Z79.01 LONG TERM (CURRENT) USE OF ANTICOAGULANTS: ICD-10-CM

## 2019-05-29 LAB
POCT INR: 2 RATIO — HIGH (ref 0.9–1.2)
POCT PT: 24.2 SEC — HIGH (ref 10–13.4)

## 2019-06-04 ENCOUNTER — OUTPATIENT (OUTPATIENT)
Dept: OUTPATIENT SERVICES | Facility: HOSPITAL | Age: 84
LOS: 1 days | Discharge: HOME | End: 2019-06-04

## 2019-06-04 DIAGNOSIS — Z98.890 OTHER SPECIFIED POSTPROCEDURAL STATES: Chronic | ICD-10-CM

## 2019-06-04 DIAGNOSIS — Z41.9 ENCOUNTER FOR PROCEDURE FOR PURPOSES OTHER THAN REMEDYING HEALTH STATE, UNSPECIFIED: Chronic | ICD-10-CM

## 2019-06-04 DIAGNOSIS — Z95.5 PRESENCE OF CORONARY ANGIOPLASTY IMPLANT AND GRAFT: Chronic | ICD-10-CM

## 2019-06-04 DIAGNOSIS — Z95.0 PRESENCE OF CARDIAC PACEMAKER: Chronic | ICD-10-CM

## 2019-06-04 DIAGNOSIS — I48.91 UNSPECIFIED ATRIAL FIBRILLATION: ICD-10-CM

## 2019-06-04 DIAGNOSIS — Z79.01 LONG TERM (CURRENT) USE OF ANTICOAGULANTS: ICD-10-CM

## 2019-06-04 DIAGNOSIS — C44.91 BASAL CELL CARCINOMA OF SKIN, UNSPECIFIED: Chronic | ICD-10-CM

## 2019-06-11 ENCOUNTER — OUTPATIENT (OUTPATIENT)
Dept: OUTPATIENT SERVICES | Facility: HOSPITAL | Age: 84
LOS: 1 days | Discharge: HOME | End: 2019-06-11

## 2019-06-11 DIAGNOSIS — Z98.890 OTHER SPECIFIED POSTPROCEDURAL STATES: Chronic | ICD-10-CM

## 2019-06-11 DIAGNOSIS — Z95.5 PRESENCE OF CORONARY ANGIOPLASTY IMPLANT AND GRAFT: Chronic | ICD-10-CM

## 2019-06-11 DIAGNOSIS — I48.91 UNSPECIFIED ATRIAL FIBRILLATION: ICD-10-CM

## 2019-06-11 DIAGNOSIS — Z41.9 ENCOUNTER FOR PROCEDURE FOR PURPOSES OTHER THAN REMEDYING HEALTH STATE, UNSPECIFIED: Chronic | ICD-10-CM

## 2019-06-11 DIAGNOSIS — C44.91 BASAL CELL CARCINOMA OF SKIN, UNSPECIFIED: Chronic | ICD-10-CM

## 2019-06-11 DIAGNOSIS — Z79.01 LONG TERM (CURRENT) USE OF ANTICOAGULANTS: ICD-10-CM

## 2019-06-11 DIAGNOSIS — Z95.0 PRESENCE OF CARDIAC PACEMAKER: Chronic | ICD-10-CM

## 2019-06-11 LAB
POCT INR: 1.7 RATIO — HIGH (ref 0.9–1.2)
POCT PT: 20.3 SEC — HIGH (ref 10–13.4)

## 2019-06-18 ENCOUNTER — OUTPATIENT (OUTPATIENT)
Dept: OUTPATIENT SERVICES | Facility: HOSPITAL | Age: 84
LOS: 1 days | Discharge: HOME | End: 2019-06-18

## 2019-06-18 DIAGNOSIS — Z98.890 OTHER SPECIFIED POSTPROCEDURAL STATES: Chronic | ICD-10-CM

## 2019-06-18 DIAGNOSIS — I48.91 UNSPECIFIED ATRIAL FIBRILLATION: ICD-10-CM

## 2019-06-18 DIAGNOSIS — C44.91 BASAL CELL CARCINOMA OF SKIN, UNSPECIFIED: Chronic | ICD-10-CM

## 2019-06-18 DIAGNOSIS — Z79.01 LONG TERM (CURRENT) USE OF ANTICOAGULANTS: ICD-10-CM

## 2019-06-18 DIAGNOSIS — Z95.5 PRESENCE OF CORONARY ANGIOPLASTY IMPLANT AND GRAFT: Chronic | ICD-10-CM

## 2019-06-18 DIAGNOSIS — Z95.0 PRESENCE OF CARDIAC PACEMAKER: Chronic | ICD-10-CM

## 2019-06-18 DIAGNOSIS — Z41.9 ENCOUNTER FOR PROCEDURE FOR PURPOSES OTHER THAN REMEDYING HEALTH STATE, UNSPECIFIED: Chronic | ICD-10-CM

## 2019-06-18 LAB
POCT INR: 2.7 RATIO — HIGH (ref 0.9–1.2)
POCT PT: 32.7 SEC — HIGH (ref 10–13.4)

## 2019-07-09 ENCOUNTER — OUTPATIENT (OUTPATIENT)
Dept: OUTPATIENT SERVICES | Facility: HOSPITAL | Age: 84
LOS: 1 days | Discharge: HOME | End: 2019-07-09

## 2019-07-09 DIAGNOSIS — Z98.890 OTHER SPECIFIED POSTPROCEDURAL STATES: Chronic | ICD-10-CM

## 2019-07-09 DIAGNOSIS — Z95.5 PRESENCE OF CORONARY ANGIOPLASTY IMPLANT AND GRAFT: Chronic | ICD-10-CM

## 2019-07-09 DIAGNOSIS — Z79.01 LONG TERM (CURRENT) USE OF ANTICOAGULANTS: ICD-10-CM

## 2019-07-09 DIAGNOSIS — I48.91 UNSPECIFIED ATRIAL FIBRILLATION: ICD-10-CM

## 2019-07-09 DIAGNOSIS — Z41.9 ENCOUNTER FOR PROCEDURE FOR PURPOSES OTHER THAN REMEDYING HEALTH STATE, UNSPECIFIED: Chronic | ICD-10-CM

## 2019-07-09 DIAGNOSIS — Z95.0 PRESENCE OF CARDIAC PACEMAKER: Chronic | ICD-10-CM

## 2019-07-09 DIAGNOSIS — C44.91 BASAL CELL CARCINOMA OF SKIN, UNSPECIFIED: Chronic | ICD-10-CM

## 2019-07-09 LAB
POCT INR: 3.5 RATIO — HIGH (ref 0.9–1.2)
POCT PT: 42.6 SEC — HIGH (ref 10–13.4)

## 2019-07-12 ENCOUNTER — OUTPATIENT (OUTPATIENT)
Dept: OUTPATIENT SERVICES | Facility: HOSPITAL | Age: 84
LOS: 1 days | Discharge: HOME | End: 2019-07-12

## 2019-07-12 DIAGNOSIS — C44.91 BASAL CELL CARCINOMA OF SKIN, UNSPECIFIED: Chronic | ICD-10-CM

## 2019-07-12 DIAGNOSIS — Z98.890 OTHER SPECIFIED POSTPROCEDURAL STATES: Chronic | ICD-10-CM

## 2019-07-12 DIAGNOSIS — Z95.0 PRESENCE OF CARDIAC PACEMAKER: Chronic | ICD-10-CM

## 2019-07-12 DIAGNOSIS — Z41.9 ENCOUNTER FOR PROCEDURE FOR PURPOSES OTHER THAN REMEDYING HEALTH STATE, UNSPECIFIED: Chronic | ICD-10-CM

## 2019-07-12 DIAGNOSIS — Z95.5 PRESENCE OF CORONARY ANGIOPLASTY IMPLANT AND GRAFT: Chronic | ICD-10-CM

## 2019-07-12 DIAGNOSIS — Z79.01 LONG TERM (CURRENT) USE OF ANTICOAGULANTS: ICD-10-CM

## 2019-07-12 DIAGNOSIS — I48.91 UNSPECIFIED ATRIAL FIBRILLATION: ICD-10-CM

## 2019-07-25 ENCOUNTER — OUTPATIENT (OUTPATIENT)
Dept: OUTPATIENT SERVICES | Facility: HOSPITAL | Age: 84
LOS: 1 days | Discharge: HOME | End: 2019-07-25

## 2019-07-25 DIAGNOSIS — I48.91 UNSPECIFIED ATRIAL FIBRILLATION: ICD-10-CM

## 2019-07-25 DIAGNOSIS — Z79.01 LONG TERM (CURRENT) USE OF ANTICOAGULANTS: ICD-10-CM

## 2019-07-25 DIAGNOSIS — Z95.5 PRESENCE OF CORONARY ANGIOPLASTY IMPLANT AND GRAFT: Chronic | ICD-10-CM

## 2019-07-25 DIAGNOSIS — Z95.0 PRESENCE OF CARDIAC PACEMAKER: Chronic | ICD-10-CM

## 2019-07-25 DIAGNOSIS — Z98.890 OTHER SPECIFIED POSTPROCEDURAL STATES: Chronic | ICD-10-CM

## 2019-07-25 DIAGNOSIS — Z41.9 ENCOUNTER FOR PROCEDURE FOR PURPOSES OTHER THAN REMEDYING HEALTH STATE, UNSPECIFIED: Chronic | ICD-10-CM

## 2019-07-25 DIAGNOSIS — C44.91 BASAL CELL CARCINOMA OF SKIN, UNSPECIFIED: Chronic | ICD-10-CM

## 2019-08-01 ENCOUNTER — OUTPATIENT (OUTPATIENT)
Dept: OUTPATIENT SERVICES | Facility: HOSPITAL | Age: 84
LOS: 1 days | Discharge: HOME | End: 2019-08-01

## 2019-08-01 DIAGNOSIS — I48.91 UNSPECIFIED ATRIAL FIBRILLATION: ICD-10-CM

## 2019-08-01 DIAGNOSIS — Z95.5 PRESENCE OF CORONARY ANGIOPLASTY IMPLANT AND GRAFT: Chronic | ICD-10-CM

## 2019-08-01 DIAGNOSIS — Z41.9 ENCOUNTER FOR PROCEDURE FOR PURPOSES OTHER THAN REMEDYING HEALTH STATE, UNSPECIFIED: Chronic | ICD-10-CM

## 2019-08-01 DIAGNOSIS — Z98.890 OTHER SPECIFIED POSTPROCEDURAL STATES: Chronic | ICD-10-CM

## 2019-08-01 DIAGNOSIS — Z95.0 PRESENCE OF CARDIAC PACEMAKER: Chronic | ICD-10-CM

## 2019-08-01 DIAGNOSIS — Z79.01 LONG TERM (CURRENT) USE OF ANTICOAGULANTS: ICD-10-CM

## 2019-08-01 DIAGNOSIS — C44.91 BASAL CELL CARCINOMA OF SKIN, UNSPECIFIED: Chronic | ICD-10-CM

## 2019-08-15 ENCOUNTER — OUTPATIENT (OUTPATIENT)
Dept: OUTPATIENT SERVICES | Facility: HOSPITAL | Age: 84
LOS: 1 days | Discharge: HOME | End: 2019-08-15

## 2019-08-15 DIAGNOSIS — Z95.0 PRESENCE OF CARDIAC PACEMAKER: Chronic | ICD-10-CM

## 2019-08-15 DIAGNOSIS — I48.91 UNSPECIFIED ATRIAL FIBRILLATION: ICD-10-CM

## 2019-08-15 DIAGNOSIS — Z79.01 LONG TERM (CURRENT) USE OF ANTICOAGULANTS: ICD-10-CM

## 2019-08-15 DIAGNOSIS — Z98.890 OTHER SPECIFIED POSTPROCEDURAL STATES: Chronic | ICD-10-CM

## 2019-08-15 DIAGNOSIS — Z95.5 PRESENCE OF CORONARY ANGIOPLASTY IMPLANT AND GRAFT: Chronic | ICD-10-CM

## 2019-08-15 DIAGNOSIS — Z41.9 ENCOUNTER FOR PROCEDURE FOR PURPOSES OTHER THAN REMEDYING HEALTH STATE, UNSPECIFIED: Chronic | ICD-10-CM

## 2019-08-15 DIAGNOSIS — C44.91 BASAL CELL CARCINOMA OF SKIN, UNSPECIFIED: Chronic | ICD-10-CM

## 2019-08-29 ENCOUNTER — OUTPATIENT (OUTPATIENT)
Dept: OUTPATIENT SERVICES | Facility: HOSPITAL | Age: 84
LOS: 1 days | Discharge: HOME | End: 2019-08-29

## 2019-08-29 DIAGNOSIS — Z95.5 PRESENCE OF CORONARY ANGIOPLASTY IMPLANT AND GRAFT: Chronic | ICD-10-CM

## 2019-08-29 DIAGNOSIS — I48.91 UNSPECIFIED ATRIAL FIBRILLATION: ICD-10-CM

## 2019-08-29 DIAGNOSIS — Z98.890 OTHER SPECIFIED POSTPROCEDURAL STATES: Chronic | ICD-10-CM

## 2019-08-29 DIAGNOSIS — Z95.0 PRESENCE OF CARDIAC PACEMAKER: Chronic | ICD-10-CM

## 2019-08-29 DIAGNOSIS — C44.91 BASAL CELL CARCINOMA OF SKIN, UNSPECIFIED: Chronic | ICD-10-CM

## 2019-08-29 DIAGNOSIS — Z41.9 ENCOUNTER FOR PROCEDURE FOR PURPOSES OTHER THAN REMEDYING HEALTH STATE, UNSPECIFIED: Chronic | ICD-10-CM

## 2019-08-29 DIAGNOSIS — Z79.01 LONG TERM (CURRENT) USE OF ANTICOAGULANTS: ICD-10-CM

## 2019-09-11 NOTE — ED CDU PROVIDER DISPOSITION NOTE - SECONDARY DIAGNOSIS.
[No falls in past year] : Patient reported no falls in the past year [] : Yes [Intercurrent hospitalizations] : was admitted to the hospital  [0-5] : 0-5 [Yes] : Yes [2 - 4 times a month (2 pts)] : 2-4 times a month (2 points) [1 or 2 (0 pts)] : 1 or 2 (0 points) [No] : In the past 12 months have you used drugs other than those required for medical reasons? No CAD (coronary artery disease) [de-identified] : mental health [de-identified] : no [de-identified] : also amadou [Audit-CScore] : 3 [de-identified] : bball lacrosse skateboard [de-identified] : no

## 2019-09-16 ENCOUNTER — OUTPATIENT (OUTPATIENT)
Dept: OUTPATIENT SERVICES | Facility: HOSPITAL | Age: 84
LOS: 1 days | Discharge: HOME | End: 2019-09-16

## 2019-09-16 DIAGNOSIS — Z98.890 OTHER SPECIFIED POSTPROCEDURAL STATES: Chronic | ICD-10-CM

## 2019-09-16 DIAGNOSIS — Z79.01 LONG TERM (CURRENT) USE OF ANTICOAGULANTS: ICD-10-CM

## 2019-09-16 DIAGNOSIS — I48.91 UNSPECIFIED ATRIAL FIBRILLATION: ICD-10-CM

## 2019-09-16 DIAGNOSIS — Z95.5 PRESENCE OF CORONARY ANGIOPLASTY IMPLANT AND GRAFT: Chronic | ICD-10-CM

## 2019-09-16 DIAGNOSIS — C44.91 BASAL CELL CARCINOMA OF SKIN, UNSPECIFIED: Chronic | ICD-10-CM

## 2019-09-16 DIAGNOSIS — Z95.0 PRESENCE OF CARDIAC PACEMAKER: Chronic | ICD-10-CM

## 2019-09-16 DIAGNOSIS — Z41.9 ENCOUNTER FOR PROCEDURE FOR PURPOSES OTHER THAN REMEDYING HEALTH STATE, UNSPECIFIED: Chronic | ICD-10-CM

## 2019-09-16 LAB
POCT INR: 3.3 RATIO — HIGH (ref 0.9–1.2)
POCT PT: 39.6 SEC — HIGH (ref 10–13.4)

## 2019-09-24 ENCOUNTER — OUTPATIENT (OUTPATIENT)
Dept: OUTPATIENT SERVICES | Facility: HOSPITAL | Age: 84
LOS: 1 days | Discharge: HOME | End: 2019-09-24

## 2019-09-24 DIAGNOSIS — Z98.890 OTHER SPECIFIED POSTPROCEDURAL STATES: Chronic | ICD-10-CM

## 2019-09-24 DIAGNOSIS — I48.91 UNSPECIFIED ATRIAL FIBRILLATION: ICD-10-CM

## 2019-09-24 DIAGNOSIS — Z95.0 PRESENCE OF CARDIAC PACEMAKER: Chronic | ICD-10-CM

## 2019-09-24 DIAGNOSIS — C44.91 BASAL CELL CARCINOMA OF SKIN, UNSPECIFIED: Chronic | ICD-10-CM

## 2019-09-24 DIAGNOSIS — Z79.01 LONG TERM (CURRENT) USE OF ANTICOAGULANTS: ICD-10-CM

## 2019-09-24 DIAGNOSIS — Z41.9 ENCOUNTER FOR PROCEDURE FOR PURPOSES OTHER THAN REMEDYING HEALTH STATE, UNSPECIFIED: Chronic | ICD-10-CM

## 2019-09-24 DIAGNOSIS — Z95.5 PRESENCE OF CORONARY ANGIOPLASTY IMPLANT AND GRAFT: Chronic | ICD-10-CM

## 2019-09-24 LAB
POCT INR: 2.4 RATIO — HIGH (ref 0.9–1.2)
POCT PT: 28.7 SEC — HIGH (ref 10–13.4)

## 2019-09-25 NOTE — PROGRESS NOTE ADULT - PROBLEM SELECTOR PLAN 3
s/p-was reason for original surgery; with post op complications, including re-op and multi weak antibiotics-just completed. I rev'd with surg. HO-large ileostomy output a primary cause of the patient's SX and presentation. They feel a SNF will provide better support, & hydration for the patient. Surgery plan as to ostomy and delayed closure.
resolved  If no improvement after fluids will need Urine studies
s/p resection  issue resolved
s/p-was reason for original surgery; with post op complications, including re-op and multi weak antibiotics-just completed.
s/p-was reason for original surgery; with post op complications, including re-op and multi weak antibiotics-just completed. I rev'd with surg. HO-large ileostomy output a primary cause of the patient's SX and presentation. They feel a SNF will provide better support, & hydration for the patient.
[id number]

## 2019-10-07 ENCOUNTER — OUTPATIENT (OUTPATIENT)
Dept: OUTPATIENT SERVICES | Facility: HOSPITAL | Age: 84
LOS: 1 days | Discharge: HOME | End: 2019-10-07

## 2019-10-07 DIAGNOSIS — I48.91 UNSPECIFIED ATRIAL FIBRILLATION: ICD-10-CM

## 2019-10-07 DIAGNOSIS — Z95.0 PRESENCE OF CARDIAC PACEMAKER: Chronic | ICD-10-CM

## 2019-10-07 DIAGNOSIS — Z98.890 OTHER SPECIFIED POSTPROCEDURAL STATES: Chronic | ICD-10-CM

## 2019-10-07 DIAGNOSIS — Z79.01 LONG TERM (CURRENT) USE OF ANTICOAGULANTS: ICD-10-CM

## 2019-10-07 DIAGNOSIS — Z41.9 ENCOUNTER FOR PROCEDURE FOR PURPOSES OTHER THAN REMEDYING HEALTH STATE, UNSPECIFIED: Chronic | ICD-10-CM

## 2019-10-07 DIAGNOSIS — Z95.5 PRESENCE OF CORONARY ANGIOPLASTY IMPLANT AND GRAFT: Chronic | ICD-10-CM

## 2019-10-07 DIAGNOSIS — C44.91 BASAL CELL CARCINOMA OF SKIN, UNSPECIFIED: Chronic | ICD-10-CM

## 2019-10-07 LAB
POCT INR: 3.1 RATIO — HIGH (ref 0.9–1.2)
POCT PT: 37.7 SEC — HIGH (ref 10–13.4)

## 2019-10-23 ENCOUNTER — OUTPATIENT (OUTPATIENT)
Dept: OUTPATIENT SERVICES | Facility: HOSPITAL | Age: 84
LOS: 1 days | Discharge: HOME | End: 2019-10-23

## 2019-10-23 DIAGNOSIS — Z41.9 ENCOUNTER FOR PROCEDURE FOR PURPOSES OTHER THAN REMEDYING HEALTH STATE, UNSPECIFIED: Chronic | ICD-10-CM

## 2019-10-23 DIAGNOSIS — Z95.0 PRESENCE OF CARDIAC PACEMAKER: Chronic | ICD-10-CM

## 2019-10-23 DIAGNOSIS — I48.91 UNSPECIFIED ATRIAL FIBRILLATION: ICD-10-CM

## 2019-10-23 DIAGNOSIS — Z98.890 OTHER SPECIFIED POSTPROCEDURAL STATES: Chronic | ICD-10-CM

## 2019-10-23 DIAGNOSIS — Z79.01 LONG TERM (CURRENT) USE OF ANTICOAGULANTS: ICD-10-CM

## 2019-10-23 DIAGNOSIS — C44.91 BASAL CELL CARCINOMA OF SKIN, UNSPECIFIED: Chronic | ICD-10-CM

## 2019-10-23 DIAGNOSIS — Z95.5 PRESENCE OF CORONARY ANGIOPLASTY IMPLANT AND GRAFT: Chronic | ICD-10-CM

## 2019-10-23 LAB
POCT INR: 2.9 RATIO — HIGH (ref 0.9–1.2)
POCT PT: 35.2 SEC — HIGH (ref 10–13.4)

## 2019-11-13 ENCOUNTER — OUTPATIENT (OUTPATIENT)
Dept: OUTPATIENT SERVICES | Facility: HOSPITAL | Age: 84
LOS: 1 days | Discharge: HOME | End: 2019-11-13

## 2019-11-13 DIAGNOSIS — Z98.890 OTHER SPECIFIED POSTPROCEDURAL STATES: Chronic | ICD-10-CM

## 2019-11-13 DIAGNOSIS — I48.91 UNSPECIFIED ATRIAL FIBRILLATION: ICD-10-CM

## 2019-11-13 DIAGNOSIS — Z95.5 PRESENCE OF CORONARY ANGIOPLASTY IMPLANT AND GRAFT: Chronic | ICD-10-CM

## 2019-11-13 DIAGNOSIS — Z79.01 LONG TERM (CURRENT) USE OF ANTICOAGULANTS: ICD-10-CM

## 2019-11-13 DIAGNOSIS — C44.91 BASAL CELL CARCINOMA OF SKIN, UNSPECIFIED: Chronic | ICD-10-CM

## 2019-11-13 DIAGNOSIS — Z95.0 PRESENCE OF CARDIAC PACEMAKER: Chronic | ICD-10-CM

## 2019-11-13 DIAGNOSIS — Z41.9 ENCOUNTER FOR PROCEDURE FOR PURPOSES OTHER THAN REMEDYING HEALTH STATE, UNSPECIFIED: Chronic | ICD-10-CM

## 2019-11-13 LAB
POCT INR: 4 RATIO — HIGH (ref 0.9–1.2)
POCT PT: 48.2 SEC — HIGH (ref 10–13.4)

## 2019-11-20 ENCOUNTER — OUTPATIENT (OUTPATIENT)
Dept: OUTPATIENT SERVICES | Facility: HOSPITAL | Age: 84
LOS: 1 days | Discharge: HOME | End: 2019-11-20

## 2019-11-20 DIAGNOSIS — Z95.5 PRESENCE OF CORONARY ANGIOPLASTY IMPLANT AND GRAFT: Chronic | ICD-10-CM

## 2019-11-20 DIAGNOSIS — Z79.01 LONG TERM (CURRENT) USE OF ANTICOAGULANTS: ICD-10-CM

## 2019-11-20 DIAGNOSIS — Z95.0 PRESENCE OF CARDIAC PACEMAKER: Chronic | ICD-10-CM

## 2019-11-20 DIAGNOSIS — Z98.890 OTHER SPECIFIED POSTPROCEDURAL STATES: Chronic | ICD-10-CM

## 2019-11-20 DIAGNOSIS — C44.91 BASAL CELL CARCINOMA OF SKIN, UNSPECIFIED: Chronic | ICD-10-CM

## 2019-11-20 DIAGNOSIS — Z41.9 ENCOUNTER FOR PROCEDURE FOR PURPOSES OTHER THAN REMEDYING HEALTH STATE, UNSPECIFIED: Chronic | ICD-10-CM

## 2019-11-20 DIAGNOSIS — I48.91 UNSPECIFIED ATRIAL FIBRILLATION: ICD-10-CM

## 2019-11-20 LAB
POCT INR: 2.6 RATIO — HIGH (ref 0.9–1.2)
POCT PT: 30.7 SEC — HIGH (ref 10–13.4)

## 2019-12-04 ENCOUNTER — OUTPATIENT (OUTPATIENT)
Dept: OUTPATIENT SERVICES | Facility: HOSPITAL | Age: 84
LOS: 1 days | Discharge: HOME | End: 2019-12-04

## 2019-12-04 DIAGNOSIS — Z98.890 OTHER SPECIFIED POSTPROCEDURAL STATES: Chronic | ICD-10-CM

## 2019-12-04 DIAGNOSIS — Z95.0 PRESENCE OF CARDIAC PACEMAKER: Chronic | ICD-10-CM

## 2019-12-04 DIAGNOSIS — Z95.5 PRESENCE OF CORONARY ANGIOPLASTY IMPLANT AND GRAFT: Chronic | ICD-10-CM

## 2019-12-04 DIAGNOSIS — Z41.9 ENCOUNTER FOR PROCEDURE FOR PURPOSES OTHER THAN REMEDYING HEALTH STATE, UNSPECIFIED: Chronic | ICD-10-CM

## 2019-12-04 DIAGNOSIS — I48.91 UNSPECIFIED ATRIAL FIBRILLATION: ICD-10-CM

## 2019-12-04 DIAGNOSIS — Z79.01 LONG TERM (CURRENT) USE OF ANTICOAGULANTS: ICD-10-CM

## 2019-12-04 DIAGNOSIS — C44.91 BASAL CELL CARCINOMA OF SKIN, UNSPECIFIED: Chronic | ICD-10-CM

## 2019-12-04 LAB
POCT INR: 4.1 RATIO — HIGH (ref 0.9–1.2)
POCT PT: 49.2 SEC — HIGH (ref 10–13.4)

## 2019-12-10 ENCOUNTER — OUTPATIENT (OUTPATIENT)
Dept: OUTPATIENT SERVICES | Facility: HOSPITAL | Age: 84
LOS: 1 days | Discharge: HOME | End: 2019-12-10

## 2019-12-10 DIAGNOSIS — Z98.890 OTHER SPECIFIED POSTPROCEDURAL STATES: Chronic | ICD-10-CM

## 2019-12-10 DIAGNOSIS — Z95.5 PRESENCE OF CORONARY ANGIOPLASTY IMPLANT AND GRAFT: Chronic | ICD-10-CM

## 2019-12-10 DIAGNOSIS — Z95.0 PRESENCE OF CARDIAC PACEMAKER: Chronic | ICD-10-CM

## 2019-12-10 DIAGNOSIS — I48.91 UNSPECIFIED ATRIAL FIBRILLATION: ICD-10-CM

## 2019-12-10 DIAGNOSIS — C44.91 BASAL CELL CARCINOMA OF SKIN, UNSPECIFIED: Chronic | ICD-10-CM

## 2019-12-10 DIAGNOSIS — Z79.01 LONG TERM (CURRENT) USE OF ANTICOAGULANTS: ICD-10-CM

## 2019-12-10 DIAGNOSIS — Z41.9 ENCOUNTER FOR PROCEDURE FOR PURPOSES OTHER THAN REMEDYING HEALTH STATE, UNSPECIFIED: Chronic | ICD-10-CM

## 2019-12-10 LAB
POCT INR: 4 RATIO — HIGH (ref 0.9–1.2)
POCT PT: 48.2 SEC — HIGH (ref 10–13.4)

## 2019-12-17 ENCOUNTER — OUTPATIENT (OUTPATIENT)
Dept: OUTPATIENT SERVICES | Facility: HOSPITAL | Age: 84
LOS: 1 days | Discharge: HOME | End: 2019-12-17

## 2019-12-17 DIAGNOSIS — Z98.890 OTHER SPECIFIED POSTPROCEDURAL STATES: Chronic | ICD-10-CM

## 2019-12-17 DIAGNOSIS — Z95.0 PRESENCE OF CARDIAC PACEMAKER: Chronic | ICD-10-CM

## 2019-12-17 DIAGNOSIS — Z95.5 PRESENCE OF CORONARY ANGIOPLASTY IMPLANT AND GRAFT: Chronic | ICD-10-CM

## 2019-12-17 DIAGNOSIS — Z79.01 LONG TERM (CURRENT) USE OF ANTICOAGULANTS: ICD-10-CM

## 2019-12-17 DIAGNOSIS — C44.91 BASAL CELL CARCINOMA OF SKIN, UNSPECIFIED: Chronic | ICD-10-CM

## 2019-12-17 DIAGNOSIS — Z41.9 ENCOUNTER FOR PROCEDURE FOR PURPOSES OTHER THAN REMEDYING HEALTH STATE, UNSPECIFIED: Chronic | ICD-10-CM

## 2019-12-17 DIAGNOSIS — I48.91 UNSPECIFIED ATRIAL FIBRILLATION: ICD-10-CM

## 2019-12-17 LAB
POCT INR: 1.9 RATIO — HIGH (ref 0.9–1.2)
POCT PT: 22.7 SEC — HIGH (ref 10–13.4)

## 2019-12-18 ENCOUNTER — APPOINTMENT (OUTPATIENT)
Dept: UROLOGY | Facility: CLINIC | Age: 84
End: 2019-12-18
Payer: MEDICARE

## 2019-12-18 DIAGNOSIS — Z82.49 FAMILY HISTORY OF ISCHEMIC HEART DISEASE AND OTHER DISEASES OF THE CIRCULATORY SYSTEM: ICD-10-CM

## 2019-12-18 DIAGNOSIS — Z78.9 OTHER SPECIFIED HEALTH STATUS: ICD-10-CM

## 2019-12-18 DIAGNOSIS — L98.8 OTHER SPECIFIED DISORDERS OF THE SKIN AND SUBCUTANEOUS TISSUE: ICD-10-CM

## 2019-12-18 LAB
BILIRUB UR QL STRIP: NORMAL
CLARITY UR: CLEAR
COLLECTION METHOD: NORMAL
GLUCOSE UR-MCNC: 50
HCG UR QL: NORMAL EU/DL
HGB UR QL STRIP.AUTO: NORMAL
KETONES UR-MCNC: NORMAL
LEUKOCYTE ESTERASE UR QL STRIP: NORMAL
NITRITE UR QL STRIP: NORMAL
PH UR STRIP: 5
PROT UR STRIP-MCNC: 30
SP GR UR STRIP: 1.01

## 2019-12-18 PROCEDURE — 99204 OFFICE O/P NEW MOD 45 MIN: CPT

## 2019-12-18 PROCEDURE — 81003 URINALYSIS AUTO W/O SCOPE: CPT | Mod: QW

## 2019-12-18 RX ORDER — HYDRALAZINE HYDROCHLORIDE 50 MG/1
50 TABLET ORAL
Refills: 0 | Status: ACTIVE | COMMUNITY

## 2019-12-18 RX ORDER — SACUBITRIL AND VALSARTAN 49; 51 MG/1; MG/1
TABLET, FILM COATED ORAL
Refills: 0 | Status: ACTIVE | COMMUNITY

## 2019-12-18 RX ORDER — WARFARIN SODIUM 6 MG/1
TABLET ORAL
Refills: 0 | Status: ACTIVE | COMMUNITY

## 2019-12-18 RX ORDER — ATORVASTATIN CALCIUM 80 MG/1
TABLET, FILM COATED ORAL
Refills: 0 | Status: ACTIVE | COMMUNITY

## 2019-12-18 NOTE — ASSESSMENT
[FreeTextEntry1] : #1. BPH, clinically--small induration left apex\par #2. Elevated PSA\par \par Plan\par -New PSA\par -Discussed PSA testing and his age group. I explained his decision to move forward with PSA testing will include his wishes. We discussed the ramifications of prostate needle biopsy and lack thereof. We also discussed the risks for prostate carcinoma with abnormal PSA, however his recent values are not yet known.\par -Patient will decide if he wishes to continue PSA screening once the new PSA has been reviewed. Patient can call for results

## 2019-12-18 NOTE — HISTORY OF PRESENT ILLNESS
[Urinary Frequency] : urinary frequency [FreeTextEntry1] : 86-year-old male here for initial consultation regarding elevated PSA is accompanied by her son. He reports being followed by [facility] elevated PSA. Unfortunately he has no lab reports a review and cannot remember his last recent value. He has never had a prostate biopsy regarding this issue. His last PSA was tested approximately one year ago. \par ECOG = 0. 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 He denies any significant voiding symptoms or gross hematuria\par Urine analysis= negative  [Urinary Urgency] : no urinary urgency [Nocturia] : no nocturia [Straining] : no straining [Weak Stream] : no weak stream [Post-Void Dribbling] : no post-void dribbling [Dysuria] : no dysuria [Hematuria - Gross] : no gross hematuria [Fatigue] : no fatigue [Anorexia] : no anorexia

## 2019-12-18 NOTE — PHYSICAL EXAM
[General Appearance - Well Developed] : well developed [General Appearance - Well Nourished] : well nourished [Normal Appearance] : normal appearance [Well Groomed] : well groomed [General Appearance - In No Acute Distress] : no acute distress [Edema] : no peripheral edema [Exaggerated Use Of Accessory Muscles For Inspiration] : no accessory muscle use [Respiration, Rhythm And Depth] : normal respiratory rhythm and effort [Abdomen Soft] : soft [Abdomen Tenderness] : non-tender [Costovertebral Angle Tenderness] : no ~M costovertebral angle tenderness [Urethral Meatus] : meatus normal [Scrotum] : the scrotum was normal [Testes Mass (___cm)] : there were no testicular masses [No Prostate Nodules] : no prostate nodules [Prostate Tenderness] : the prostate was not tender [Prostate Size ___ gm] : prostate size [unfilled] gm [Normal Station and Gait] : the gait and station were normal for the patient's age [Skin Color & Pigmentation] : normal skin color and pigmentation [] : no rash [No Focal Deficits] : no focal deficits [Oriented To Time, Place, And Person] : oriented to person, place, and time [Affect] : the affect was normal [Mood] : the mood was normal [Not Anxious] : not anxious [No Palpable Adenopathy] : no palpable adenopathy [Penis Abnormality] : normal uncircumcised penis [FreeTextEntry1] : sulcus ++ //  induration left apex , small

## 2019-12-23 ENCOUNTER — OUTPATIENT (OUTPATIENT)
Dept: OUTPATIENT SERVICES | Facility: HOSPITAL | Age: 84
LOS: 1 days | Discharge: HOME | End: 2019-12-23

## 2019-12-23 DIAGNOSIS — I48.91 UNSPECIFIED ATRIAL FIBRILLATION: ICD-10-CM

## 2019-12-23 DIAGNOSIS — Z79.01 LONG TERM (CURRENT) USE OF ANTICOAGULANTS: ICD-10-CM

## 2019-12-23 DIAGNOSIS — Z95.5 PRESENCE OF CORONARY ANGIOPLASTY IMPLANT AND GRAFT: Chronic | ICD-10-CM

## 2019-12-23 DIAGNOSIS — Z98.890 OTHER SPECIFIED POSTPROCEDURAL STATES: Chronic | ICD-10-CM

## 2019-12-23 DIAGNOSIS — Z41.9 ENCOUNTER FOR PROCEDURE FOR PURPOSES OTHER THAN REMEDYING HEALTH STATE, UNSPECIFIED: Chronic | ICD-10-CM

## 2019-12-23 DIAGNOSIS — Z95.0 PRESENCE OF CARDIAC PACEMAKER: Chronic | ICD-10-CM

## 2019-12-23 DIAGNOSIS — C44.91 BASAL CELL CARCINOMA OF SKIN, UNSPECIFIED: Chronic | ICD-10-CM

## 2019-12-23 LAB
POCT INR: 2.9 RATIO — HIGH (ref 0.9–1.2)
POCT PT: 35.4 SEC — HIGH (ref 10–13.4)

## 2020-01-02 ENCOUNTER — OUTPATIENT (OUTPATIENT)
Dept: OUTPATIENT SERVICES | Facility: HOSPITAL | Age: 85
LOS: 1 days | Discharge: HOME | End: 2020-01-02

## 2020-01-02 DIAGNOSIS — C44.91 BASAL CELL CARCINOMA OF SKIN, UNSPECIFIED: Chronic | ICD-10-CM

## 2020-01-02 DIAGNOSIS — Z98.890 OTHER SPECIFIED POSTPROCEDURAL STATES: Chronic | ICD-10-CM

## 2020-01-02 DIAGNOSIS — Z95.5 PRESENCE OF CORONARY ANGIOPLASTY IMPLANT AND GRAFT: Chronic | ICD-10-CM

## 2020-01-02 DIAGNOSIS — I48.91 UNSPECIFIED ATRIAL FIBRILLATION: ICD-10-CM

## 2020-01-02 DIAGNOSIS — Z79.01 LONG TERM (CURRENT) USE OF ANTICOAGULANTS: ICD-10-CM

## 2020-01-02 DIAGNOSIS — Z95.0 PRESENCE OF CARDIAC PACEMAKER: Chronic | ICD-10-CM

## 2020-01-02 DIAGNOSIS — Z41.9 ENCOUNTER FOR PROCEDURE FOR PURPOSES OTHER THAN REMEDYING HEALTH STATE, UNSPECIFIED: Chronic | ICD-10-CM

## 2020-01-02 LAB
POCT INR: 3.2 RATIO — HIGH (ref 0.9–1.2)
POCT PT: 38.9 SEC — HIGH (ref 10–13.4)

## 2020-01-09 ENCOUNTER — OUTPATIENT (OUTPATIENT)
Dept: OUTPATIENT SERVICES | Facility: HOSPITAL | Age: 85
LOS: 1 days | Discharge: HOME | End: 2020-01-09

## 2020-01-09 DIAGNOSIS — Z79.01 LONG TERM (CURRENT) USE OF ANTICOAGULANTS: ICD-10-CM

## 2020-01-09 DIAGNOSIS — Z95.5 PRESENCE OF CORONARY ANGIOPLASTY IMPLANT AND GRAFT: Chronic | ICD-10-CM

## 2020-01-09 DIAGNOSIS — Z98.890 OTHER SPECIFIED POSTPROCEDURAL STATES: Chronic | ICD-10-CM

## 2020-01-09 DIAGNOSIS — I48.91 UNSPECIFIED ATRIAL FIBRILLATION: ICD-10-CM

## 2020-01-09 DIAGNOSIS — C44.91 BASAL CELL CARCINOMA OF SKIN, UNSPECIFIED: Chronic | ICD-10-CM

## 2020-01-09 DIAGNOSIS — Z95.0 PRESENCE OF CARDIAC PACEMAKER: Chronic | ICD-10-CM

## 2020-01-09 DIAGNOSIS — Z41.9 ENCOUNTER FOR PROCEDURE FOR PURPOSES OTHER THAN REMEDYING HEALTH STATE, UNSPECIFIED: Chronic | ICD-10-CM

## 2020-01-09 LAB
POCT INR: 2.7 RATIO — HIGH (ref 0.9–1.2)
POCT PT: 32.8 SEC — HIGH (ref 10–13.4)

## 2020-03-12 ENCOUNTER — RECORD ABSTRACTING (OUTPATIENT)
Age: 85
End: 2020-03-12

## 2020-03-12 DIAGNOSIS — Z86.39 PERSONAL HISTORY OF OTHER ENDOCRINE, NUTRITIONAL AND METABOLIC DISEASE: ICD-10-CM

## 2020-03-12 DIAGNOSIS — Z95.0 PRESENCE OF CARDIAC PACEMAKER: ICD-10-CM

## 2020-03-12 RX ORDER — METOPROLOL TARTRATE 75 MG/1
TABLET, FILM COATED ORAL
Refills: 0 | Status: ACTIVE | COMMUNITY

## 2020-03-12 RX ORDER — ACARBOSE 25 MG/1
TABLET ORAL
Refills: 0 | Status: ACTIVE | COMMUNITY

## 2020-03-12 RX ORDER — ICOSAPENT ETHYL 500 MG/1
CAPSULE ORAL
Refills: 0 | Status: ACTIVE | COMMUNITY

## 2020-03-12 RX ORDER — CLOPIDOGREL BISULFATE 300 MG/1
TABLET, FILM COATED ORAL
Refills: 0 | Status: ACTIVE | COMMUNITY

## 2020-03-12 RX ORDER — METFORMIN HYDROCHLORIDE 625 MG/1
TABLET ORAL
Refills: 0 | Status: ACTIVE | COMMUNITY

## 2020-03-12 RX ORDER — ISOSORBIDE DINITRATE 30 MG/1
30 TABLET ORAL
Refills: 0 | Status: ACTIVE | COMMUNITY

## 2020-03-12 RX ORDER — PANTOPRAZOLE SODIUM 40 MG/10ML
INJECTION, POWDER, FOR SOLUTION INTRAVENOUS
Refills: 0 | Status: ACTIVE | COMMUNITY

## 2020-03-12 RX ORDER — MULTIVITAMIN
TABLET ORAL
Refills: 0 | Status: ACTIVE | COMMUNITY

## 2020-03-12 RX ORDER — ATORVASTATIN CALCIUM 40 MG/1
40 TABLET, FILM COATED ORAL
Refills: 0 | Status: ACTIVE | COMMUNITY

## 2020-03-12 RX ORDER — WARFARIN 5 MG/1
5 TABLET ORAL
Refills: 0 | Status: ACTIVE | COMMUNITY

## 2020-12-07 NOTE — H&P PST ADULT - BLOOD TRANSFUSION, PREVIOUS, PROFILE
Consult received for dispo  Chart reviewed  Noted per H&P, pt reports he still drinks 4 times a week 3-6 beers and uses marijuana. Met with pt this date was alert and oriented. Pt states he now drinks 2 beers every other day and smokes marijuana every now and then. Prior rehab was 5-6 years ago that was court ordered. Pt could not recall name of agency. Attended AA meetings at [facility] at that time also. No recent intervention. Rehab discussed and pt declined stating \"I will be alright\". Pt was receptive to receiving a list of resources for alcohol/drug treatment. Information given. Insurance is [employer]. no

## 2021-04-14 NOTE — PATIENT PROFILE ADULT. - PRO PAIN EXPRESSION
SUBJECTIVE:   Arline Link is a 80 year old female who presents for Preventive Visit.    This is a very anxious 80-year-old here for her annual wellness visit.  There was a prior letter from her daughter Ashleigh that I reviewed as noted in the chart.  It was actually a phone call.  I did have Ashleigh, the room as well but this created increased anxiety amongst the 2 ends of my joint so that I had her leave the room.    The patient lives independently in her house.  It does have steps but she does not have to go up them.  It sounds like she has somebody coming in once a week to help if she has somebody doing her grocery shopping.  She notes that she feels super other than her right leg which is quite painful.  This has been going on for several years.  As noted she was seen by pain medicine here prior.  The pain is fairly constant in the right thigh as well as the right buttock region but not radiating.  Nothing on the left side.  It is hard to move around or get up because of the pain but she is not falling.  She really does not get out of her house.  At home she uses a cane but also uses a beverage cart to move around.  With that she feels like she does fine and is able to get in and out of bed and get dressed and get up and off the toilet.    She is otherwise feeling fine.  She denies depression and denies all other complaints on review of systems.  She takes her medicines regularly.    Past Medical History:   Diagnosis Date     Benign essential hypertension 2016    started med by endocrine 11/16     Elevated ALT measurement 2015    fu nl 12/16     Hypercholesteremia      Hypothyroid 2012    Dr. Schmitt     Mucinosis of skin     Dr. Navas     Vitamin D deficiency      Past Surgical History:   Procedure Laterality Date     APPENDECTOMY  14     TONSILLECTOMY & ADENOIDECTOMY  5     Social History     Socioeconomic History     Marital status:      Spouse name: Not on file     Number of children: 4     Years  of education: Not on file     Highest education level: Not on file   Occupational History     Not on file   Social Needs     Financial resource strain: Not on file     Food insecurity     Worry: Not on file     Inability: Not on file     Transportation needs     Medical: Not on file     Non-medical: Not on file   Tobacco Use     Smoking status: Never Smoker     Smokeless tobacco: Never Used   Substance and Sexual Activity     Alcohol use: No     Alcohol/week: 0.0 standard drinks     Drug use: No     Sexual activity: Not Currently     Partners: Male   Lifestyle     Physical activity     Days per week: Not on file     Minutes per session: Not on file     Stress: Not on file   Relationships     Social connections     Talks on phone: Not on file     Gets together: Not on file     Attends Denominational service: Not on file     Active member of club or organization: Not on file     Attends meetings of clubs or organizations: Not on file     Relationship status: Not on file     Intimate partner violence     Fear of current or ex partner: Not on file     Emotionally abused: Not on file     Physically abused: Not on file     Forced sexual activity: Not on file   Other Topics Concern     Not on file   Social History Narrative     Not on file     Current Outpatient Medications   Medication Sig Dispense Refill     acetaminophen (TYLENOL) 325 MG tablet Take 325-650 mg by mouth every 6 hours as needed for mild pain       losartan (COZAAR) 50 MG tablet TAKE 1 TABLET(50 MG) BY MOUTH DAILY 90 tablet 3     SYNTHROID 50 MCG tablet Take 1 tablet (50 mcg) by mouth daily 90 tablet 3     Vitamin D, Cholecalciferol, 1000 units TABS Take 2,000 Units by mouth daily       Allergies   Allergen Reactions     Sulfa Drugs Unknown     Tetracycline Rash     FAMILY HISTORY NOTED AND REVIEWED    REVIEW OF SYSTEMS: above    PHYSICAL EXAM    BP (!) 154/86 (BP Location: Right arm, Patient Position: Chair, Cuff Size: Adult Large)   Pulse 90   Temp 97  F  "(36.1  C) (Oral)   Ht 1.588 m (5' 2.5\")   Wt 68 kg (150 lb)   SpO2 96%   Breastfeeding No   BMI 27.00 kg/m      Patient appears non toxic  Nothing eye exam normal.  Neck exam normal.  No supraclavicular, cervical or axillary lymphadenopathy   Lungs clear  Cardiovascular regular rate and rhythm no murmur rub or gallop no JVP.  Trace ankle edema.  Abdomen nontender  Lower legs her toes are a little bit dusky.  There is no open sores.  Her skin is extremely dry to the point that she has some yellow discoloration on the right anterior lower leg.  She needed assistance to get up out of the wheelchair.  She ambulates very slowly and has a distinct limp of her right leg.    Labs sent    ASSESSMENT:  1. Normal complete physical exam  2. 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  This is certainly very severe and greatly limiting her ability to do anything.  This needs further evaluation and I would like her to see orthopedics.  3. Anxiety, stable  4. Hypertension, control ok  5. Vit d defic, follow up labs  6. Hypothyroidism, follow up labs  7. Elevated alt, follow up labs  8. Health care maintenance    PLAN:  Did not want immunizations including covid  Home care  Ortho  Letter with labs        Patient has been advised of split billing requirements and indicates understanding: Yes   Are you in the first 12 months of your Medicare coverage?  No    Healthy Habits:    In general, how would you rate your overall health?  Very good    Frequency of exercise:  None    Duration of exercise:  Less than 15 minutes    Do you usually eat at least 4 servings of fruit and vegetables a day, include whole grains    & fiber and avoid regularly eating high fat or \"junk\" foods?  Yes    Taking medications regularly:  Yes    Barriers to taking medications:  None    Medication side effects:  None    Ability to successfully perform activities of daily living:  No assistance needed    Home Safety:  No safety concerns identified    Hearing " Impairment:  No hearing concerns    In the past 6 months, have you been bothered by leaking of urine?  No    In general, how would you rate your overall mental or emotional health?  Very good      PHQ-2 Total Score:    Additional concerns today:  No    Do you feel safe in your environment? Yes    Have you ever done Advance Care Planning? (For example, a Health Directive, POLST, or a discussion with a medical provider or your loved ones about your wishes): Yes, advance care planning is on file.       Fall risk       Cognitive Screening   1) Repeat 3 items (Leader, Season, Table)    2) Clock draw: NORMAL  3) 3 item recall: Recalls 3 objects  Results: 3 items recalled: COGNITIVE IMPAIRMENT LESS LIKELY    Mini-CogTM Copyright S Marino. Licensed by the author for use in [facility]; reprinted with permission (kay@[email or web address]). All rights reserved.      Do you have sleep apnea, excessive snoring or daytime drowsiness?: no    Reviewed and updated as needed this visit by clinical staff                 Reviewed and updated as needed this visit by Provider                Social History     Tobacco Use     Smoking status: Never Smoker     Smokeless tobacco: Never Used   Substance Use Topics     Alcohol use: No     Alcohol/week: 0.0 standard drinks     If you drink alcohol do you typically have >3 drinks per day or >7 drinks per week? No    Alcohol Use 2/14/2020   Prescreen: >3 drinks/day or >7 drinks/week? No               Current providers sharing in care for this patient include:   Patient Care Team:  Gonzales Ayoub MD as PCP - General (Internal Medicine)  Gonzales Ayoub MD as Assigned PCP    The following health maintenance items are reviewed in Epic and correct as of today:  Health Maintenance Due   Topic Date Due     DEXA  Never done     COVID-19 Vaccine (1) Never done     DTAP/TDAP/TD IMMUNIZATION (1 - Tdap) Never done     ZOSTER IMMUNIZATION (1 of 2) Never done     Pneumococcal Vaccine: 65+ Years  "(1 of 1 - PPSV23) Never done     FALL RISK ASSESSMENT  01/04/2020     INFLUENZA VACCINE (1) Never done     PHQ-2  01/01/2021     MEDICARE ANNUAL WELLNESS VISIT  02/14/2021             Pertinent mammograms are reviewed under the imaging tab.    Review of Systems      OBJECTIVE:   There were no vitals taken for this visit. Estimated body mass index is 26.78 kg/m  as calculated from the following:    Height as of 2/14/20: 1.594 m (5' 2.75\").    Weight as of 2/14/20: 68 kg (150 lb).  Physical Exam          ASSESSMENT / PLAN:       Patient has been advised of split billing requirements and indicates understanding: No  COUNSELING:  Reviewed preventive health counseling, as reflected in patient instructions       Regular exercise       Healthy diet/nutrition    Estimated body mass index is 26.78 kg/m  as calculated from the following:    Height as of 2/14/20: 1.594 m (5' 2.75\").    Weight as of 2/14/20: 68 kg (150 lb).        She reports that she has never smoked. She has never used smokeless tobacco.      Appropriate preventive services were discussed with this patient, including applicable screening as appropriate for cardiovascular disease, diabetes, osteopenia/osteoporosis, and glaucoma.  As appropriate for age/gender, discussed screening for colorectal cancer, prostate cancer, breast cancer, and cervical cancer. Checklist reviewing preventive services available has been given to the patient.    Reviewed patients plan of care and provided an AVS. The Basic Care Plan (routine screening as documented in Health Maintenance) for Arline meets the Care Plan requirement. This Care Plan has been established and reviewed with the Patient and daughter.    Counseling Resources:  ATP IV Guidelines  Pooled Cohorts Equation Calculator  Breast Cancer Risk Calculator  Breast Cancer: Medication to Reduce Risk  FRAX Risk Assessment  ICSI Preventive Guidelines  Dietary Guidelines for Americans, 2010  USDA's MyPlate  ASA Prophylaxis  Lung " CA Screening    Gonzales Ayoub MD  [facility]    Identified Health Risks:   verbalization

## 2021-05-06 ENCOUNTER — APPOINTMENT (OUTPATIENT)
Dept: UROLOGY | Facility: CLINIC | Age: 86
End: 2021-05-06
Payer: MEDICARE

## 2021-05-06 VITALS — BODY MASS INDEX: 23.54 KG/M2 | HEIGHT: 67 IN | WEIGHT: 150 LBS

## 2021-05-06 DIAGNOSIS — R97.20 ELEVATED PROSTATE, SPECIFIC ANTIGEN [PSA]: ICD-10-CM

## 2021-05-06 DIAGNOSIS — N40.1 BENIGN PROSTATIC HYPERPLASIA WITH LOWER URINARY TRACT SYMPMS: ICD-10-CM

## 2021-05-06 DIAGNOSIS — N13.8 BENIGN PROSTATIC HYPERPLASIA WITH LOWER URINARY TRACT SYMPMS: ICD-10-CM

## 2021-05-06 DIAGNOSIS — R39.9 UNSPECIFIED SYMPTOMS AND SIGNS INVOLVING THE GENITOURINARY SYSTEM: ICD-10-CM

## 2021-05-06 PROCEDURE — 99214 OFFICE O/P EST MOD 30 MIN: CPT

## 2021-05-06 NOTE — ASSESSMENT
[FreeTextEntry1] : #1. BPH, clinically--On Flomax\par #2. normal  PSA\par \par Plan\par -rtn prn \par -Discussed PSA testing and his age group. I explained his decision to move forward with PSA testing will include his wishes.\par -Continue Flomax

## 2021-05-06 NOTE — HISTORY OF PRESENT ILLNESS
[Urinary Frequency] : urinary frequency [FreeTextEntry1] : 89year-old male here  regarding elevated PSA is accompanied by her son. He reports being followed by [facility] elevated PSA. . He has never had a prostate biopsy regarding this issue. His last PSA was tested approximately one year ago. \par ECOG = 0. 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 He reports avoiding well without dysuria or complication.\par Urine analysis= negative \par \par 5/21 psa = 1.5 [Urinary Urgency] : no urinary urgency [Nocturia] : no nocturia [Straining] : no straining [Weak Stream] : no weak stream [Post-Void Dribbling] : no post-void dribbling [Dysuria] : no dysuria [Hematuria - Gross] : no gross hematuria [Fatigue] : no fatigue

## 2021-05-06 NOTE — PHYSICAL EXAM
[General Appearance - Well Developed] : well developed [General Appearance - Well Nourished] : well nourished [Normal Appearance] : normal appearance [Well Groomed] : well groomed [General Appearance - In No Acute Distress] : no acute distress [Abdomen Soft] : soft [Abdomen Tenderness] : non-tender [Costovertebral Angle Tenderness] : no ~M costovertebral angle tenderness [Urethral Meatus] : meatus normal [Penis Abnormality] : normal uncircumcised penis [Scrotum] : the scrotum was normal [Testes Mass (___cm)] : there were no testicular masses [Prostate Tenderness] : the prostate was not tender [No Prostate Nodules] : no prostate nodules [Prostate Size ___ gm] : prostate size [unfilled] gm [Skin Color & Pigmentation] : normal skin color and pigmentation [] : no respiratory distress [Edema] : no peripheral edema [Respiration, Rhythm And Depth] : normal respiratory rhythm and effort [Oriented To Time, Place, And Person] : oriented to person, place, and time [Exaggerated Use Of Accessory Muscles For Inspiration] : no accessory muscle use [Affect] : the affect was normal [Mood] : the mood was normal [Not Anxious] : not anxious [Normal Station and Gait] : the gait and station were normal for the patient's age [No Focal Deficits] : no focal deficits [No Palpable Adenopathy] : no palpable adenopathy [FreeTextEntry1] : sulcus ++ //  induration left apex , small  [last visit]

## 2021-06-10 ENCOUNTER — APPOINTMENT (OUTPATIENT)
Dept: PULMONOLOGY | Facility: CLINIC | Age: 86
End: 2021-06-10
Payer: MEDICARE

## 2021-06-10 VITALS
HEART RATE: 73 BPM | SYSTOLIC BLOOD PRESSURE: 126 MMHG | DIASTOLIC BLOOD PRESSURE: 72 MMHG | OXYGEN SATURATION: 95 % | HEIGHT: 67 IN | BODY MASS INDEX: 23.54 KG/M2 | WEIGHT: 150 LBS | RESPIRATION RATE: 14 BRPM

## 2021-06-10 PROCEDURE — 99213 OFFICE O/P EST LOW 20 MIN: CPT

## 2021-06-10 RX ORDER — ALBUTEROL SULFATE 90 UG/1
108 (90 BASE) INHALANT RESPIRATORY (INHALATION) EVERY 4 HOURS
Qty: 1 | Refills: 5 | Status: ACTIVE | COMMUNITY
Start: 2021-06-10 | End: 1900-01-01

## 2021-06-10 NOTE — HISTORY OF PRESENT ILLNESS
[TextBox_4] : SOB ON EXERTION\par INTERMITTENT INHALERS ( ASTHMATIC BRONCHITIS)\par FOLLOWED BY CARDIO

## 2021-06-10 NOTE — DISCUSSION/SUMMARY
[FreeTextEntry1] : ASTHMATIC BRONCHITIS\par SOB ON EXERTION\par PFT/ PROAIR AS NEEDED\par CARDIO F/UP

## 2021-10-12 NOTE — PHYSICAL THERAPY INITIAL EVALUATION ADULT - PHYSICAL ASSIST/NONPHYSICAL ASSIST: SUPINE/SIT, REHAB EVAL
Xenia Mann Patient Age: 40 year old  MESSAGE:   Jon from patients  office calling to verify if a work comp subpoena was received regarding this patient.     Jon states this subpoena was originally sent on 9/14/21 and then again on 10/6/21.    Please return call to advise. Jon can be reached at [phone].    Message confirmed with caller.  Routed to providers clinical pool.      WEIGHT AND HEIGHT:   Wt Readings from Last 1 Encounters:   08/20/21 93 kg (205 lb)     Ht Readings from Last 1 Encounters:   08/20/21 5' 2\" (1.575 m)     BMI Readings from Last 1 Encounters:   08/20/21 37.49 kg/m²       ALLERGIES:  Adhesive   (environmental), Azithromycin, Macrolides and ketolides, Amoxicillin, Montelukast sodium, Morphine, Oat   (food or med), Peanut oil   (food or med), Povidone iodine, [place]   (food), and Levofloxacin  Current Outpatient Medications   Medication   • pregabalin (LYRICA) 75 MG capsule   • naproxen sodium (ALEVE) 220 MG tablet   • fluconazole (DIFLUCAN) 150 MG tablet   • Nerve Stimulator (TENS WIRED PAIN MANAGEMENT) Device   • levalbuterol (XOPENEX HFA) 45 MCG/ACT inhaler   • diphenhydrAMINE (BENADRYL) 25 MG capsule   • acetaminophen (TYLENOL) 500 MG tablet     No current facility-administered medications for this visit.     PHARMACY to use:           Pharmacy preference(s) on file:   [facility] [address] [place] [id number]  Phone: [phone] Fax: [phone]      CALL BACK INFO: Ok to leave response (including medical information) on answering machine  ROUTING: Patient's physician/staff        PCP: Ivett Ibrahim MD         INS: Payor: [employer] / Plan: [id number] / Product Type: POS MISC   PATIENT ADDRESS:  [address] [place] [address]  
supervision

## 2021-12-08 ENCOUNTER — APPOINTMENT (OUTPATIENT)
Age: 86
End: 2021-12-08
Payer: MEDICARE

## 2021-12-08 VITALS
DIASTOLIC BLOOD PRESSURE: 70 MMHG | WEIGHT: 150 LBS | SYSTOLIC BLOOD PRESSURE: 124 MMHG | BODY MASS INDEX: 24.99 KG/M2 | OXYGEN SATURATION: 97 % | HEART RATE: 84 BPM | HEIGHT: 65 IN

## 2021-12-08 PROCEDURE — 99213 OFFICE O/P EST LOW 20 MIN: CPT

## 2021-12-08 NOTE — DISCUSSION/SUMMARY
[FreeTextEntry1] : ASTHMATIC BRONCHITIS AS NEEDED RESCUE INHALERS\par SOB ON EXERTION\par CARDIO REVIEWED\par PFT/ PROAIR AS NEEDED\par CARDIO F/UP

## 2021-12-08 NOTE — HISTORY OF PRESENT ILLNESS
[TextBox_4] : COUGH/ PND/ INTERMITTENTLY USING INHALERS, BETTER\par ?? CHF WENT CARDIO LASIX FOR 2 WEEKS

## 2021-12-08 NOTE — PHYSICAL EXAM
Upon Nutritional Assessment by the Registered Dietitian your patient was determined to meet criteria / has evidence of the following diagnosis/diagnoses:          [ ]  Mild Protein Calorie Malnutrition        [ ]  Moderate Protein Calorie Malnutrition        [ ] Severe Protein Calorie Malnutrition        [ ] Unspecified Protein Calorie Malnutrition        [ ] Underweight / BMI <19        [X ] Morbid Obesity / BMI > 40      Findings as based on:  [X ] Comprehensive nutrition assessment   [ ] Nutrition Focused Physical Exam  [X ] Other: BMI=40.9 kg/m2, 192% of ideal wt; hx of excessive energy intake, did not monitor portion sizes PTA      Nutrition Plan/Recommendations:  Continue DASH diet as tolerated. Nutrition education provided. RD to remain available for further nutritional interventions as indicated/requested by medical team/pt.       PROVIDER Section:     By signing this assessment you are acknowledging and agree with the diagnosis/diagnoses assigned by the Registered Dietitian    Comments: [No Acute Distress] : no acute distress [Normal Oropharynx] : normal oropharynx [Normal Appearance] : normal appearance [No Neck Mass] : no neck mass [Normal Rate/Rhythm] : normal rate/rhythm [Normal S1, S2] : normal s1, s2 [No Murmurs] : no murmurs [No Abnormalities] : no abnormalities [Benign] : benign [Normal Gait] : normal gait [No Clubbing] : no clubbing [No Cyanosis] : no cyanosis [No Edema] : no edema [FROM] : FROM [Normal Color/ Pigmentation] : normal color/ pigmentation [No Focal Deficits] : no focal deficits [Oriented x3] : oriented x3 [Normal Affect] : normal affect [TextBox_68] : NOMAN 2/6

## 2021-12-09 ENCOUNTER — APPOINTMENT (OUTPATIENT)
Age: 86
End: 2021-12-09

## 2022-04-01 NOTE — DISCHARGE NOTE ADULT - REASON FOR ADMISSION
Generalized weakness, decreased PO intake, nausea, intermittent vomittng [FreeTextEntry1] : had  covid twice  now has vaccinated.  needed  inhaler  when she had covid  but otherwise no need rescue.  her hoarse over the last 9 months  also with ongoing  right sciatica

## 2022-05-06 NOTE — H&P PST ADULT - MALLAMPATI CLASS
Blood pressure okay without medications. Checking at home as well. Will continue to monitor off medications as long as keeping up with diet / exercise / weight loss. Ideal BP < 130/80   Class III - visualization of the soft palate and the base of the uvula

## 2022-05-22 NOTE — ED ADULT NURSE NOTE - NS ED NURSE REPORT GIVEN DT
Problem: Occupational Therapy  Goal: Occupational Therapy Goal  Description: Goals to be met by: 6/2    Patient will increase functional independence with ADLs by performing:    UE Dressing with [place].  LE Dressing with Moderate Assistance using AE as needed.  Grooming while seated at sink with Reubens.  Toileting from bedside commode or toilet with Minimal Assistance for hygiene and clothing management.   Bathing from shower chair/chair level with Minimal Assistance.  Stand pivot transfers with Minimal Assistance using RW.  Toilet transfer to bedside commode with Minimal Assistance.  Upper extremity exercise program with supervision.  Caregiver will be educated on level of assist required to safely perform self care tasks and functional transfers.    Outcome: Ongoing, Progressing      10-Feb-2018 19:48

## 2022-06-09 ENCOUNTER — APPOINTMENT (OUTPATIENT)
Age: 87
End: 2022-06-09

## 2022-09-02 ENCOUNTER — APPOINTMENT (OUTPATIENT)
Age: 87
End: 2022-09-02

## 2022-09-02 VITALS
HEART RATE: 73 BPM | RESPIRATION RATE: 14 BRPM | WEIGHT: 155 LBS | DIASTOLIC BLOOD PRESSURE: 82 MMHG | HEIGHT: 64 IN | SYSTOLIC BLOOD PRESSURE: 124 MMHG | BODY MASS INDEX: 26.46 KG/M2 | OXYGEN SATURATION: 97 %

## 2022-09-02 DIAGNOSIS — J45.909 UNSPECIFIED ASTHMA, UNCOMPLICATED: ICD-10-CM

## 2022-09-02 PROCEDURE — 99213 OFFICE O/P EST LOW 20 MIN: CPT

## 2022-09-02 RX ORDER — ALBUTEROL SULFATE 90 UG/1
108 (90 BASE) INHALANT RESPIRATORY (INHALATION) EVERY 4 HOURS
Qty: 1 | Refills: 3 | Status: ACTIVE | COMMUNITY
Start: 2022-09-02 | End: 1900-01-01

## 2022-09-02 RX ORDER — FLUTICASONE FUROATE 100 UG/1
100 POWDER RESPIRATORY (INHALATION) DAILY
Qty: 3 | Refills: 0 | Status: ACTIVE | COMMUNITY
Start: 2022-09-02 | End: 1900-01-01

## 2022-09-02 RX ORDER — PREDNISONE 20 MG/1
20 TABLET ORAL
Qty: 15 | Refills: 0 | Status: ACTIVE | COMMUNITY
Start: 2022-09-02 | End: 1900-01-01

## 2022-09-02 NOTE — DISCUSSION/SUMMARY
[FreeTextEntry1] : ASTHMATIC BRONCHITIS AS NEEDED RESCUE INHALERS\par SOB ON EXERTION\par PFT/ PROAIR AS NEEDED\par CARDIO F/UP

## 2022-09-02 NOTE — PHYSICAL EXAM
[No Acute Distress] : no acute distress [Normal Oropharynx] : normal oropharynx [Normal Appearance] : normal appearance [No Neck Mass] : no neck mass [Normal Rate/Rhythm] : normal rate/rhythm [Normal S1, S2] : normal s1, s2 [No Murmurs] : no murmurs [No Abnormalities] : no abnormalities [Benign] : benign [Normal Gait] : normal gait [No Clubbing] : no clubbing [No Cyanosis] : no cyanosis [No Edema] : no edema [FROM] : FROM [Normal Color/ Pigmentation] : normal color/ pigmentation [No Focal Deficits] : no focal deficits [Oriented x3] : oriented x3 [Normal Affect] : normal affect [TextBox_68] : NOMAN 2/6

## 2023-03-03 ENCOUNTER — APPOINTMENT (OUTPATIENT)
Age: 88
End: 2023-03-03

## 2023-05-16 NOTE — PROGRESS NOTE ADULT - PROBLEM SELECTOR PROBLEM 7
Leukocytosis, unspecified type Intermediate Repair Preamble Text (Leave Blank If You Do Not Want): Undermining was performed with blunt dissection.

## 2023-07-07 ENCOUNTER — APPOINTMENT (OUTPATIENT)
Dept: MEDICATION MANAGEMENT | Facility: CLINIC | Age: 88
End: 2023-07-07

## 2023-07-07 ENCOUNTER — OUTPATIENT (OUTPATIENT)
Dept: OUTPATIENT SERVICES | Facility: HOSPITAL | Age: 88
LOS: 1 days | End: 2023-07-07
Payer: MEDICARE

## 2023-07-07 DIAGNOSIS — Z41.9 ENCOUNTER FOR PROCEDURE FOR PURPOSES OTHER THAN REMEDYING HEALTH STATE, UNSPECIFIED: Chronic | ICD-10-CM

## 2023-07-07 DIAGNOSIS — Z79.01 LONG TERM (CURRENT) USE OF ANTICOAGULANTS: ICD-10-CM

## 2023-07-07 DIAGNOSIS — Z98.890 OTHER SPECIFIED POSTPROCEDURAL STATES: Chronic | ICD-10-CM

## 2023-07-07 DIAGNOSIS — I48.91 UNSPECIFIED ATRIAL FIBRILLATION: ICD-10-CM

## 2023-07-07 DIAGNOSIS — Z95.5 PRESENCE OF CORONARY ANGIOPLASTY IMPLANT AND GRAFT: Chronic | ICD-10-CM

## 2023-07-07 LAB
INR PPP: 1 RATIO
POCT-PROTHROMBIN TIME: 12.6 SECS
QUALITY CONTROL: YES

## 2023-07-07 PROCEDURE — 36416 COLLJ CAPILLARY BLOOD SPEC: CPT

## 2023-07-07 PROCEDURE — 99211 OFF/OP EST MAY X REQ PHY/QHP: CPT

## 2023-07-07 PROCEDURE — 85610 PROTHROMBIN TIME: CPT

## 2023-07-07 RX ORDER — WARFARIN 5 MG/1
5 TABLET ORAL DAILY
Qty: 45 | Refills: 3 | Status: ACTIVE | COMMUNITY
Start: 2023-07-07 | End: 1900-01-01

## 2023-07-08 DIAGNOSIS — Z79.01 LONG TERM (CURRENT) USE OF ANTICOAGULANTS: ICD-10-CM

## 2023-07-08 DIAGNOSIS — I48.91 UNSPECIFIED ATRIAL FIBRILLATION: ICD-10-CM

## 2023-07-12 ENCOUNTER — OUTPATIENT (OUTPATIENT)
Dept: OUTPATIENT SERVICES | Facility: HOSPITAL | Age: 88
LOS: 1 days | End: 2023-07-12
Payer: MEDICARE

## 2023-07-12 ENCOUNTER — APPOINTMENT (OUTPATIENT)
Dept: MEDICATION MANAGEMENT | Facility: CLINIC | Age: 88
End: 2023-07-12

## 2023-07-12 DIAGNOSIS — Z95.0 PRESENCE OF CARDIAC PACEMAKER: Chronic | ICD-10-CM

## 2023-07-12 DIAGNOSIS — Z98.890 OTHER SPECIFIED POSTPROCEDURAL STATES: Chronic | ICD-10-CM

## 2023-07-12 DIAGNOSIS — C44.91 BASAL CELL CARCINOMA OF SKIN, UNSPECIFIED: Chronic | ICD-10-CM

## 2023-07-12 DIAGNOSIS — Z95.5 PRESENCE OF CORONARY ANGIOPLASTY IMPLANT AND GRAFT: Chronic | ICD-10-CM

## 2023-07-12 DIAGNOSIS — Z41.9 ENCOUNTER FOR PROCEDURE FOR PURPOSES OTHER THAN REMEDYING HEALTH STATE, UNSPECIFIED: Chronic | ICD-10-CM

## 2023-07-12 DIAGNOSIS — I48.91 UNSPECIFIED ATRIAL FIBRILLATION: ICD-10-CM

## 2023-07-12 DIAGNOSIS — Z79.01 LONG TERM (CURRENT) USE OF ANTICOAGULANTS: ICD-10-CM

## 2023-07-12 LAB
INR PPP: 2.41
PT BLD: 28.2

## 2023-07-12 PROCEDURE — G0463: CPT

## 2023-07-13 DIAGNOSIS — I48.91 UNSPECIFIED ATRIAL FIBRILLATION: ICD-10-CM

## 2023-07-13 DIAGNOSIS — Z79.01 LONG TERM (CURRENT) USE OF ANTICOAGULANTS: ICD-10-CM

## 2023-07-14 ENCOUNTER — OUTPATIENT (OUTPATIENT)
Dept: OUTPATIENT SERVICES | Facility: HOSPITAL | Age: 88
LOS: 1 days | End: 2023-07-14
Payer: MEDICARE

## 2023-07-14 ENCOUNTER — APPOINTMENT (OUTPATIENT)
Dept: MEDICATION MANAGEMENT | Facility: CLINIC | Age: 88
End: 2023-07-14

## 2023-07-14 DIAGNOSIS — Z95.0 PRESENCE OF CARDIAC PACEMAKER: Chronic | ICD-10-CM

## 2023-07-14 DIAGNOSIS — Z98.890 OTHER SPECIFIED POSTPROCEDURAL STATES: Chronic | ICD-10-CM

## 2023-07-14 DIAGNOSIS — Z79.01 LONG TERM (CURRENT) USE OF ANTICOAGULANTS: ICD-10-CM

## 2023-07-14 DIAGNOSIS — Z41.9 ENCOUNTER FOR PROCEDURE FOR PURPOSES OTHER THAN REMEDYING HEALTH STATE, UNSPECIFIED: Chronic | ICD-10-CM

## 2023-07-14 DIAGNOSIS — C44.91 BASAL CELL CARCINOMA OF SKIN, UNSPECIFIED: Chronic | ICD-10-CM

## 2023-07-14 DIAGNOSIS — I48.91 UNSPECIFIED ATRIAL FIBRILLATION: ICD-10-CM

## 2023-07-14 DIAGNOSIS — Z95.5 PRESENCE OF CORONARY ANGIOPLASTY IMPLANT AND GRAFT: Chronic | ICD-10-CM

## 2023-07-14 LAB
INR PPP: 1.8 RATIO
POCT-PROTHROMBIN TIME: 21.8 SECS
QUALITY CONTROL: YES

## 2023-07-14 PROCEDURE — 36416 COLLJ CAPILLARY BLOOD SPEC: CPT

## 2023-07-14 PROCEDURE — 85610 PROTHROMBIN TIME: CPT

## 2023-07-14 PROCEDURE — 99211 OFF/OP EST MAY X REQ PHY/QHP: CPT

## 2023-07-15 DIAGNOSIS — I48.91 UNSPECIFIED ATRIAL FIBRILLATION: ICD-10-CM

## 2023-07-15 DIAGNOSIS — Z79.01 LONG TERM (CURRENT) USE OF ANTICOAGULANTS: ICD-10-CM

## 2023-07-19 ENCOUNTER — OUTPATIENT (OUTPATIENT)
Dept: OUTPATIENT SERVICES | Facility: HOSPITAL | Age: 88
LOS: 1 days | End: 2023-07-19
Payer: MEDICARE

## 2023-07-19 ENCOUNTER — APPOINTMENT (OUTPATIENT)
Dept: MEDICATION MANAGEMENT | Facility: CLINIC | Age: 88
End: 2023-07-19

## 2023-07-19 DIAGNOSIS — C44.91 BASAL CELL CARCINOMA OF SKIN, UNSPECIFIED: Chronic | ICD-10-CM

## 2023-07-19 DIAGNOSIS — I48.91 UNSPECIFIED ATRIAL FIBRILLATION: ICD-10-CM

## 2023-07-19 DIAGNOSIS — Z95.5 PRESENCE OF CORONARY ANGIOPLASTY IMPLANT AND GRAFT: Chronic | ICD-10-CM

## 2023-07-19 DIAGNOSIS — Z98.890 OTHER SPECIFIED POSTPROCEDURAL STATES: Chronic | ICD-10-CM

## 2023-07-19 DIAGNOSIS — Z41.9 ENCOUNTER FOR PROCEDURE FOR PURPOSES OTHER THAN REMEDYING HEALTH STATE, UNSPECIFIED: Chronic | ICD-10-CM

## 2023-07-19 DIAGNOSIS — Z79.01 LONG TERM (CURRENT) USE OF ANTICOAGULANTS: ICD-10-CM

## 2023-07-19 LAB
INR PPP: 3
PT BLD: 35.5

## 2023-07-19 PROCEDURE — G0463: CPT

## 2023-07-20 DIAGNOSIS — I48.91 UNSPECIFIED ATRIAL FIBRILLATION: ICD-10-CM

## 2023-07-20 DIAGNOSIS — Z79.01 LONG TERM (CURRENT) USE OF ANTICOAGULANTS: ICD-10-CM

## 2023-07-26 ENCOUNTER — APPOINTMENT (OUTPATIENT)
Dept: MEDICATION MANAGEMENT | Facility: CLINIC | Age: 88
End: 2023-07-26

## 2023-07-26 ENCOUNTER — OUTPATIENT (OUTPATIENT)
Dept: OUTPATIENT SERVICES | Facility: HOSPITAL | Age: 88
LOS: 1 days | End: 2023-07-26
Payer: MEDICARE

## 2023-07-26 DIAGNOSIS — Z79.01 LONG TERM (CURRENT) USE OF ANTICOAGULANTS: ICD-10-CM

## 2023-07-26 DIAGNOSIS — Z41.9 ENCOUNTER FOR PROCEDURE FOR PURPOSES OTHER THAN REMEDYING HEALTH STATE, UNSPECIFIED: Chronic | ICD-10-CM

## 2023-07-26 DIAGNOSIS — Z95.5 PRESENCE OF CORONARY ANGIOPLASTY IMPLANT AND GRAFT: Chronic | ICD-10-CM

## 2023-07-26 DIAGNOSIS — Z98.890 OTHER SPECIFIED POSTPROCEDURAL STATES: Chronic | ICD-10-CM

## 2023-07-26 DIAGNOSIS — I48.91 UNSPECIFIED ATRIAL FIBRILLATION: ICD-10-CM

## 2023-07-26 DIAGNOSIS — Z95.0 PRESENCE OF CARDIAC PACEMAKER: Chronic | ICD-10-CM

## 2023-07-26 DIAGNOSIS — C44.91 BASAL CELL CARCINOMA OF SKIN, UNSPECIFIED: Chronic | ICD-10-CM

## 2023-07-26 LAB
INR PPP: 2.91
PT BLD: 31.9

## 2023-07-26 PROCEDURE — G0463: CPT

## 2023-07-27 DIAGNOSIS — Z79.01 LONG TERM (CURRENT) USE OF ANTICOAGULANTS: ICD-10-CM

## 2023-07-27 DIAGNOSIS — I48.91 UNSPECIFIED ATRIAL FIBRILLATION: ICD-10-CM

## 2023-08-02 ENCOUNTER — OUTPATIENT (OUTPATIENT)
Dept: OUTPATIENT SERVICES | Facility: HOSPITAL | Age: 88
LOS: 1 days | End: 2023-08-02
Payer: MEDICARE

## 2023-08-02 ENCOUNTER — APPOINTMENT (OUTPATIENT)
Dept: MEDICATION MANAGEMENT | Facility: CLINIC | Age: 88
End: 2023-08-02

## 2023-08-02 DIAGNOSIS — Z95.5 PRESENCE OF CORONARY ANGIOPLASTY IMPLANT AND GRAFT: Chronic | ICD-10-CM

## 2023-08-02 DIAGNOSIS — Z98.890 OTHER SPECIFIED POSTPROCEDURAL STATES: Chronic | ICD-10-CM

## 2023-08-02 DIAGNOSIS — Z95.0 PRESENCE OF CARDIAC PACEMAKER: Chronic | ICD-10-CM

## 2023-08-02 DIAGNOSIS — Z79.01 LONG TERM (CURRENT) USE OF ANTICOAGULANTS: ICD-10-CM

## 2023-08-02 DIAGNOSIS — C44.91 BASAL CELL CARCINOMA OF SKIN, UNSPECIFIED: Chronic | ICD-10-CM

## 2023-08-02 DIAGNOSIS — I48.91 UNSPECIFIED ATRIAL FIBRILLATION: ICD-10-CM

## 2023-08-02 DIAGNOSIS — Z41.9 ENCOUNTER FOR PROCEDURE FOR PURPOSES OTHER THAN REMEDYING HEALTH STATE, UNSPECIFIED: Chronic | ICD-10-CM

## 2023-08-02 LAB
INR PPP: 1.66
PT BLD: 18.7

## 2023-08-02 PROCEDURE — G0463: CPT

## 2023-08-03 DIAGNOSIS — I48.91 UNSPECIFIED ATRIAL FIBRILLATION: ICD-10-CM

## 2023-08-03 DIAGNOSIS — Z79.01 LONG TERM (CURRENT) USE OF ANTICOAGULANTS: ICD-10-CM

## 2023-08-04 ENCOUNTER — APPOINTMENT (OUTPATIENT)
Dept: MEDICATION MANAGEMENT | Facility: CLINIC | Age: 88
End: 2023-08-04

## 2023-08-04 ENCOUNTER — OUTPATIENT (OUTPATIENT)
Dept: OUTPATIENT SERVICES | Facility: HOSPITAL | Age: 88
LOS: 1 days | End: 2023-08-04
Payer: MEDICARE

## 2023-08-04 DIAGNOSIS — Z79.01 LONG TERM (CURRENT) USE OF ANTICOAGULANTS: ICD-10-CM

## 2023-08-04 DIAGNOSIS — Z98.890 OTHER SPECIFIED POSTPROCEDURAL STATES: Chronic | ICD-10-CM

## 2023-08-04 DIAGNOSIS — I48.91 UNSPECIFIED ATRIAL FIBRILLATION: ICD-10-CM

## 2023-08-04 DIAGNOSIS — Z41.9 ENCOUNTER FOR PROCEDURE FOR PURPOSES OTHER THAN REMEDYING HEALTH STATE, UNSPECIFIED: Chronic | ICD-10-CM

## 2023-08-04 DIAGNOSIS — C44.91 BASAL CELL CARCINOMA OF SKIN, UNSPECIFIED: Chronic | ICD-10-CM

## 2023-08-04 DIAGNOSIS — Z95.0 PRESENCE OF CARDIAC PACEMAKER: Chronic | ICD-10-CM

## 2023-08-04 DIAGNOSIS — Z95.5 PRESENCE OF CORONARY ANGIOPLASTY IMPLANT AND GRAFT: Chronic | ICD-10-CM

## 2023-08-04 LAB
INR PPP: 2.7 RATIO
POCT-PROTHROMBIN TIME: 32.9 SECS
QUALITY CONTROL: YES

## 2023-08-04 PROCEDURE — 99211 OFF/OP EST MAY X REQ PHY/QHP: CPT

## 2023-08-04 PROCEDURE — 85610 PROTHROMBIN TIME: CPT

## 2023-08-04 PROCEDURE — 36416 COLLJ CAPILLARY BLOOD SPEC: CPT

## 2023-08-05 DIAGNOSIS — I48.91 UNSPECIFIED ATRIAL FIBRILLATION: ICD-10-CM

## 2023-08-05 DIAGNOSIS — Z79.01 LONG TERM (CURRENT) USE OF ANTICOAGULANTS: ICD-10-CM

## 2023-08-09 ENCOUNTER — APPOINTMENT (OUTPATIENT)
Dept: MEDICATION MANAGEMENT | Facility: CLINIC | Age: 88
End: 2023-08-09

## 2023-08-10 ENCOUNTER — OUTPATIENT (OUTPATIENT)
Dept: OUTPATIENT SERVICES | Facility: HOSPITAL | Age: 88
LOS: 1 days | End: 2023-08-10
Payer: MEDICARE

## 2023-08-10 ENCOUNTER — APPOINTMENT (OUTPATIENT)
Dept: MEDICATION MANAGEMENT | Facility: CLINIC | Age: 88
End: 2023-08-10

## 2023-08-10 DIAGNOSIS — Z79.01 LONG TERM (CURRENT) USE OF ANTICOAGULANTS: ICD-10-CM

## 2023-08-10 DIAGNOSIS — Z98.890 OTHER SPECIFIED POSTPROCEDURAL STATES: Chronic | ICD-10-CM

## 2023-08-10 DIAGNOSIS — Z95.0 PRESENCE OF CARDIAC PACEMAKER: Chronic | ICD-10-CM

## 2023-08-10 DIAGNOSIS — I48.91 UNSPECIFIED ATRIAL FIBRILLATION: ICD-10-CM

## 2023-08-10 DIAGNOSIS — Z95.5 PRESENCE OF CORONARY ANGIOPLASTY IMPLANT AND GRAFT: Chronic | ICD-10-CM

## 2023-08-10 DIAGNOSIS — Z41.9 ENCOUNTER FOR PROCEDURE FOR PURPOSES OTHER THAN REMEDYING HEALTH STATE, UNSPECIFIED: Chronic | ICD-10-CM

## 2023-08-10 DIAGNOSIS — C44.91 BASAL CELL CARCINOMA OF SKIN, UNSPECIFIED: Chronic | ICD-10-CM

## 2023-08-10 LAB
INR PPP: 1.64
PT BLD: 18.3

## 2023-08-10 PROCEDURE — G0463: CPT

## 2023-08-11 DIAGNOSIS — I48.91 UNSPECIFIED ATRIAL FIBRILLATION: ICD-10-CM

## 2023-08-11 DIAGNOSIS — Z79.01 LONG TERM (CURRENT) USE OF ANTICOAGULANTS: ICD-10-CM

## 2023-08-16 ENCOUNTER — OUTPATIENT (OUTPATIENT)
Dept: OUTPATIENT SERVICES | Facility: HOSPITAL | Age: 88
LOS: 1 days | End: 2023-08-16
Payer: MEDICARE

## 2023-08-16 ENCOUNTER — APPOINTMENT (OUTPATIENT)
Dept: MEDICATION MANAGEMENT | Facility: CLINIC | Age: 88
End: 2023-08-16

## 2023-08-16 DIAGNOSIS — Z98.890 OTHER SPECIFIED POSTPROCEDURAL STATES: Chronic | ICD-10-CM

## 2023-08-16 DIAGNOSIS — Z41.9 ENCOUNTER FOR PROCEDURE FOR PURPOSES OTHER THAN REMEDYING HEALTH STATE, UNSPECIFIED: Chronic | ICD-10-CM

## 2023-08-16 DIAGNOSIS — C44.91 BASAL CELL CARCINOMA OF SKIN, UNSPECIFIED: Chronic | ICD-10-CM

## 2023-08-16 DIAGNOSIS — Z95.0 PRESENCE OF CARDIAC PACEMAKER: Chronic | ICD-10-CM

## 2023-08-16 DIAGNOSIS — I48.91 UNSPECIFIED ATRIAL FIBRILLATION: ICD-10-CM

## 2023-08-16 DIAGNOSIS — Z79.01 LONG TERM (CURRENT) USE OF ANTICOAGULANTS: ICD-10-CM

## 2023-08-16 DIAGNOSIS — Z95.5 PRESENCE OF CORONARY ANGIOPLASTY IMPLANT AND GRAFT: Chronic | ICD-10-CM

## 2023-08-16 LAB
INR PPP: 3.43
PT BLD: 37.4

## 2023-08-16 PROCEDURE — G0463: CPT

## 2023-08-17 DIAGNOSIS — Z79.01 LONG TERM (CURRENT) USE OF ANTICOAGULANTS: ICD-10-CM

## 2023-08-17 DIAGNOSIS — I48.91 UNSPECIFIED ATRIAL FIBRILLATION: ICD-10-CM

## 2023-08-23 ENCOUNTER — OUTPATIENT (OUTPATIENT)
Dept: OUTPATIENT SERVICES | Facility: HOSPITAL | Age: 88
LOS: 1 days | End: 2023-08-23
Payer: MEDICARE

## 2023-08-23 ENCOUNTER — APPOINTMENT (OUTPATIENT)
Dept: MEDICATION MANAGEMENT | Facility: CLINIC | Age: 88
End: 2023-08-23

## 2023-08-23 DIAGNOSIS — Z98.890 OTHER SPECIFIED POSTPROCEDURAL STATES: Chronic | ICD-10-CM

## 2023-08-23 DIAGNOSIS — I48.91 UNSPECIFIED ATRIAL FIBRILLATION: ICD-10-CM

## 2023-08-23 DIAGNOSIS — Z79.01 LONG TERM (CURRENT) USE OF ANTICOAGULANTS: ICD-10-CM

## 2023-08-23 DIAGNOSIS — Z95.0 PRESENCE OF CARDIAC PACEMAKER: Chronic | ICD-10-CM

## 2023-08-23 DIAGNOSIS — Z95.5 PRESENCE OF CORONARY ANGIOPLASTY IMPLANT AND GRAFT: Chronic | ICD-10-CM

## 2023-08-23 DIAGNOSIS — Z41.9 ENCOUNTER FOR PROCEDURE FOR PURPOSES OTHER THAN REMEDYING HEALTH STATE, UNSPECIFIED: Chronic | ICD-10-CM

## 2023-08-23 DIAGNOSIS — C44.91 BASAL CELL CARCINOMA OF SKIN, UNSPECIFIED: Chronic | ICD-10-CM

## 2023-08-23 LAB
INR PPP: 1.35
PT BLD: 15.1

## 2023-08-23 PROCEDURE — G0463: CPT

## 2023-08-24 DIAGNOSIS — I48.91 UNSPECIFIED ATRIAL FIBRILLATION: ICD-10-CM

## 2023-08-24 DIAGNOSIS — Z79.01 LONG TERM (CURRENT) USE OF ANTICOAGULANTS: ICD-10-CM

## 2023-08-30 ENCOUNTER — OUTPATIENT (OUTPATIENT)
Dept: OUTPATIENT SERVICES | Facility: HOSPITAL | Age: 88
LOS: 1 days | End: 2023-08-30
Payer: MEDICARE

## 2023-08-30 ENCOUNTER — APPOINTMENT (OUTPATIENT)
Dept: MEDICATION MANAGEMENT | Facility: CLINIC | Age: 88
End: 2023-08-30

## 2023-08-30 DIAGNOSIS — I48.91 UNSPECIFIED ATRIAL FIBRILLATION: ICD-10-CM

## 2023-08-30 DIAGNOSIS — Z95.5 PRESENCE OF CORONARY ANGIOPLASTY IMPLANT AND GRAFT: Chronic | ICD-10-CM

## 2023-08-30 DIAGNOSIS — Z98.890 OTHER SPECIFIED POSTPROCEDURAL STATES: Chronic | ICD-10-CM

## 2023-08-30 DIAGNOSIS — C44.91 BASAL CELL CARCINOMA OF SKIN, UNSPECIFIED: Chronic | ICD-10-CM

## 2023-08-30 DIAGNOSIS — Z79.01 LONG TERM (CURRENT) USE OF ANTICOAGULANTS: ICD-10-CM

## 2023-08-30 LAB
INR PPP: 2.29
PT BLD: 25.3

## 2023-08-30 PROCEDURE — G0463: CPT

## 2023-08-31 DIAGNOSIS — I48.91 UNSPECIFIED ATRIAL FIBRILLATION: ICD-10-CM

## 2023-08-31 DIAGNOSIS — Z79.01 LONG TERM (CURRENT) USE OF ANTICOAGULANTS: ICD-10-CM

## 2023-09-06 ENCOUNTER — OUTPATIENT (OUTPATIENT)
Dept: OUTPATIENT SERVICES | Facility: HOSPITAL | Age: 88
LOS: 1 days | End: 2023-09-06
Payer: MEDICARE

## 2023-09-06 ENCOUNTER — APPOINTMENT (OUTPATIENT)
Dept: MEDICATION MANAGEMENT | Facility: CLINIC | Age: 88
End: 2023-09-06

## 2023-09-06 DIAGNOSIS — I48.91 UNSPECIFIED ATRIAL FIBRILLATION: ICD-10-CM

## 2023-09-06 DIAGNOSIS — Z98.890 OTHER SPECIFIED POSTPROCEDURAL STATES: Chronic | ICD-10-CM

## 2023-09-06 DIAGNOSIS — Z79.01 LONG TERM (CURRENT) USE OF ANTICOAGULANTS: ICD-10-CM

## 2023-09-06 DIAGNOSIS — Z95.5 PRESENCE OF CORONARY ANGIOPLASTY IMPLANT AND GRAFT: Chronic | ICD-10-CM

## 2023-09-06 DIAGNOSIS — Z95.0 PRESENCE OF CARDIAC PACEMAKER: Chronic | ICD-10-CM

## 2023-09-06 DIAGNOSIS — Z41.9 ENCOUNTER FOR PROCEDURE FOR PURPOSES OTHER THAN REMEDYING HEALTH STATE, UNSPECIFIED: Chronic | ICD-10-CM

## 2023-09-06 DIAGNOSIS — C44.91 BASAL CELL CARCINOMA OF SKIN, UNSPECIFIED: Chronic | ICD-10-CM

## 2023-09-06 LAB
INR PPP: 3.19
PT BLD: 35.2

## 2023-09-06 PROCEDURE — G0463: CPT

## 2023-09-07 DIAGNOSIS — Z79.01 LONG TERM (CURRENT) USE OF ANTICOAGULANTS: ICD-10-CM

## 2023-09-07 DIAGNOSIS — I48.91 UNSPECIFIED ATRIAL FIBRILLATION: ICD-10-CM

## 2023-09-13 ENCOUNTER — APPOINTMENT (OUTPATIENT)
Dept: MEDICATION MANAGEMENT | Facility: CLINIC | Age: 88
End: 2023-09-13

## 2023-09-13 ENCOUNTER — OUTPATIENT (OUTPATIENT)
Dept: OUTPATIENT SERVICES | Facility: HOSPITAL | Age: 88
LOS: 1 days | End: 2023-09-13
Payer: MEDICARE

## 2023-09-13 DIAGNOSIS — Z79.01 LONG TERM (CURRENT) USE OF ANTICOAGULANTS: ICD-10-CM

## 2023-09-13 DIAGNOSIS — Z98.890 OTHER SPECIFIED POSTPROCEDURAL STATES: Chronic | ICD-10-CM

## 2023-09-13 DIAGNOSIS — Z95.5 PRESENCE OF CORONARY ANGIOPLASTY IMPLANT AND GRAFT: Chronic | ICD-10-CM

## 2023-09-13 DIAGNOSIS — I48.91 UNSPECIFIED ATRIAL FIBRILLATION: ICD-10-CM

## 2023-09-13 DIAGNOSIS — Z41.9 ENCOUNTER FOR PROCEDURE FOR PURPOSES OTHER THAN REMEDYING HEALTH STATE, UNSPECIFIED: Chronic | ICD-10-CM

## 2023-09-13 DIAGNOSIS — Z95.0 PRESENCE OF CARDIAC PACEMAKER: Chronic | ICD-10-CM

## 2023-09-13 LAB
INR PPP: 1.7
PT BLD: 19.1

## 2023-09-13 PROCEDURE — G0463: CPT

## 2023-09-14 DIAGNOSIS — I48.91 UNSPECIFIED ATRIAL FIBRILLATION: ICD-10-CM

## 2023-09-14 DIAGNOSIS — Z79.01 LONG TERM (CURRENT) USE OF ANTICOAGULANTS: ICD-10-CM

## 2023-09-20 ENCOUNTER — APPOINTMENT (OUTPATIENT)
Dept: MEDICATION MANAGEMENT | Facility: CLINIC | Age: 88
End: 2023-09-20

## 2023-09-20 ENCOUNTER — OUTPATIENT (OUTPATIENT)
Dept: OUTPATIENT SERVICES | Facility: HOSPITAL | Age: 88
LOS: 1 days | End: 2023-09-20
Payer: MEDICARE

## 2023-09-20 DIAGNOSIS — I48.91 UNSPECIFIED ATRIAL FIBRILLATION: ICD-10-CM

## 2023-09-20 DIAGNOSIS — Z98.890 OTHER SPECIFIED POSTPROCEDURAL STATES: Chronic | ICD-10-CM

## 2023-09-20 DIAGNOSIS — Z41.9 ENCOUNTER FOR PROCEDURE FOR PURPOSES OTHER THAN REMEDYING HEALTH STATE, UNSPECIFIED: Chronic | ICD-10-CM

## 2023-09-20 DIAGNOSIS — Z95.0 PRESENCE OF CARDIAC PACEMAKER: Chronic | ICD-10-CM

## 2023-09-20 DIAGNOSIS — C44.91 BASAL CELL CARCINOMA OF SKIN, UNSPECIFIED: Chronic | ICD-10-CM

## 2023-09-20 DIAGNOSIS — Z79.01 LONG TERM (CURRENT) USE OF ANTICOAGULANTS: ICD-10-CM

## 2023-09-20 LAB
INR PPP: 2.12
PT BLD: 23.4

## 2023-09-20 PROCEDURE — G0463: CPT

## 2023-09-21 DIAGNOSIS — Z79.01 LONG TERM (CURRENT) USE OF ANTICOAGULANTS: ICD-10-CM

## 2023-09-21 DIAGNOSIS — I48.91 UNSPECIFIED ATRIAL FIBRILLATION: ICD-10-CM

## 2023-09-27 ENCOUNTER — OUTPATIENT (OUTPATIENT)
Dept: OUTPATIENT SERVICES | Facility: HOSPITAL | Age: 88
LOS: 1 days | End: 2023-09-27
Payer: MEDICARE

## 2023-09-27 ENCOUNTER — APPOINTMENT (OUTPATIENT)
Dept: MEDICATION MANAGEMENT | Facility: CLINIC | Age: 88
End: 2023-09-27

## 2023-09-27 DIAGNOSIS — I48.91 UNSPECIFIED ATRIAL FIBRILLATION: ICD-10-CM

## 2023-09-27 DIAGNOSIS — Z98.890 OTHER SPECIFIED POSTPROCEDURAL STATES: Chronic | ICD-10-CM

## 2023-09-27 DIAGNOSIS — Z95.5 PRESENCE OF CORONARY ANGIOPLASTY IMPLANT AND GRAFT: Chronic | ICD-10-CM

## 2023-09-27 DIAGNOSIS — Z79.01 LONG TERM (CURRENT) USE OF ANTICOAGULANTS: ICD-10-CM

## 2023-09-27 LAB
INR PPP: 1.68
PT BLD: 18.9

## 2023-09-27 PROCEDURE — G0463: CPT

## 2023-09-28 DIAGNOSIS — Z79.01 LONG TERM (CURRENT) USE OF ANTICOAGULANTS: ICD-10-CM

## 2023-09-28 DIAGNOSIS — I48.91 UNSPECIFIED ATRIAL FIBRILLATION: ICD-10-CM

## 2023-10-04 ENCOUNTER — APPOINTMENT (OUTPATIENT)
Dept: MEDICATION MANAGEMENT | Facility: CLINIC | Age: 88
End: 2023-10-04

## 2023-10-04 ENCOUNTER — OUTPATIENT (OUTPATIENT)
Dept: OUTPATIENT SERVICES | Facility: HOSPITAL | Age: 88
LOS: 1 days | End: 2023-10-04
Payer: MEDICARE

## 2023-10-04 DIAGNOSIS — Z98.890 OTHER SPECIFIED POSTPROCEDURAL STATES: Chronic | ICD-10-CM

## 2023-10-04 DIAGNOSIS — I48.91 UNSPECIFIED ATRIAL FIBRILLATION: ICD-10-CM

## 2023-10-04 DIAGNOSIS — Z41.9 ENCOUNTER FOR PROCEDURE FOR PURPOSES OTHER THAN REMEDYING HEALTH STATE, UNSPECIFIED: Chronic | ICD-10-CM

## 2023-10-04 DIAGNOSIS — Z79.01 LONG TERM (CURRENT) USE OF ANTICOAGULANTS: ICD-10-CM

## 2023-10-04 DIAGNOSIS — C44.91 BASAL CELL CARCINOMA OF SKIN, UNSPECIFIED: Chronic | ICD-10-CM

## 2023-10-04 LAB
INR PPP: 1.33
PT BLD: 14.9

## 2023-10-04 PROCEDURE — G0463: CPT

## 2023-10-05 DIAGNOSIS — I48.91 UNSPECIFIED ATRIAL FIBRILLATION: ICD-10-CM

## 2023-10-05 DIAGNOSIS — Z79.01 LONG TERM (CURRENT) USE OF ANTICOAGULANTS: ICD-10-CM

## 2023-10-11 ENCOUNTER — APPOINTMENT (OUTPATIENT)
Dept: MEDICATION MANAGEMENT | Facility: CLINIC | Age: 88
End: 2023-10-11

## 2023-10-11 ENCOUNTER — OUTPATIENT (OUTPATIENT)
Dept: OUTPATIENT SERVICES | Facility: HOSPITAL | Age: 88
LOS: 1 days | End: 2023-10-11
Payer: MEDICARE

## 2023-10-11 DIAGNOSIS — C44.91 BASAL CELL CARCINOMA OF SKIN, UNSPECIFIED: Chronic | ICD-10-CM

## 2023-10-11 DIAGNOSIS — Z98.890 OTHER SPECIFIED POSTPROCEDURAL STATES: Chronic | ICD-10-CM

## 2023-10-11 DIAGNOSIS — Z95.5 PRESENCE OF CORONARY ANGIOPLASTY IMPLANT AND GRAFT: Chronic | ICD-10-CM

## 2023-10-11 DIAGNOSIS — Z95.0 PRESENCE OF CARDIAC PACEMAKER: Chronic | ICD-10-CM

## 2023-10-11 DIAGNOSIS — I48.91 UNSPECIFIED ATRIAL FIBRILLATION: ICD-10-CM

## 2023-10-11 DIAGNOSIS — Z41.9 ENCOUNTER FOR PROCEDURE FOR PURPOSES OTHER THAN REMEDYING HEALTH STATE, UNSPECIFIED: Chronic | ICD-10-CM

## 2023-10-11 DIAGNOSIS — Z79.01 LONG TERM (CURRENT) USE OF ANTICOAGULANTS: ICD-10-CM

## 2023-10-11 LAB
INR PPP: 2.09
PT BLD: 23.1

## 2023-10-11 PROCEDURE — G0463: CPT

## 2023-10-12 DIAGNOSIS — I48.91 UNSPECIFIED ATRIAL FIBRILLATION: ICD-10-CM

## 2023-10-12 DIAGNOSIS — Z79.01 LONG TERM (CURRENT) USE OF ANTICOAGULANTS: ICD-10-CM

## 2023-10-18 ENCOUNTER — OUTPATIENT (OUTPATIENT)
Dept: OUTPATIENT SERVICES | Facility: HOSPITAL | Age: 88
LOS: 1 days | End: 2023-10-18
Payer: MEDICARE

## 2023-10-18 ENCOUNTER — APPOINTMENT (OUTPATIENT)
Dept: MEDICATION MANAGEMENT | Facility: CLINIC | Age: 88
End: 2023-10-18

## 2023-10-18 DIAGNOSIS — I48.91 UNSPECIFIED ATRIAL FIBRILLATION: ICD-10-CM

## 2023-10-18 DIAGNOSIS — C44.91 BASAL CELL CARCINOMA OF SKIN, UNSPECIFIED: Chronic | ICD-10-CM

## 2023-10-18 DIAGNOSIS — Z98.890 OTHER SPECIFIED POSTPROCEDURAL STATES: Chronic | ICD-10-CM

## 2023-10-18 DIAGNOSIS — Z79.01 LONG TERM (CURRENT) USE OF ANTICOAGULANTS: ICD-10-CM

## 2023-10-18 DIAGNOSIS — Z95.5 PRESENCE OF CORONARY ANGIOPLASTY IMPLANT AND GRAFT: Chronic | ICD-10-CM

## 2023-10-18 DIAGNOSIS — Z95.0 PRESENCE OF CARDIAC PACEMAKER: Chronic | ICD-10-CM

## 2023-10-18 LAB
INR PPP: 2.31
PT BLD: 25.7

## 2023-10-18 PROCEDURE — G0463: CPT

## 2023-10-19 DIAGNOSIS — Z79.01 LONG TERM (CURRENT) USE OF ANTICOAGULANTS: ICD-10-CM

## 2023-10-19 DIAGNOSIS — I48.91 UNSPECIFIED ATRIAL FIBRILLATION: ICD-10-CM

## 2023-10-25 ENCOUNTER — OUTPATIENT (OUTPATIENT)
Dept: OUTPATIENT SERVICES | Facility: HOSPITAL | Age: 88
LOS: 1 days | End: 2023-10-25
Payer: MEDICARE

## 2023-10-25 ENCOUNTER — APPOINTMENT (OUTPATIENT)
Dept: MEDICATION MANAGEMENT | Facility: CLINIC | Age: 88
End: 2023-10-25

## 2023-10-25 DIAGNOSIS — Z98.890 OTHER SPECIFIED POSTPROCEDURAL STATES: Chronic | ICD-10-CM

## 2023-10-25 DIAGNOSIS — I48.91 UNSPECIFIED ATRIAL FIBRILLATION: ICD-10-CM

## 2023-10-25 DIAGNOSIS — Z41.9 ENCOUNTER FOR PROCEDURE FOR PURPOSES OTHER THAN REMEDYING HEALTH STATE, UNSPECIFIED: Chronic | ICD-10-CM

## 2023-10-25 DIAGNOSIS — Z95.5 PRESENCE OF CORONARY ANGIOPLASTY IMPLANT AND GRAFT: Chronic | ICD-10-CM

## 2023-10-25 DIAGNOSIS — C44.91 BASAL CELL CARCINOMA OF SKIN, UNSPECIFIED: Chronic | ICD-10-CM

## 2023-10-25 DIAGNOSIS — Z79.01 LONG TERM (CURRENT) USE OF ANTICOAGULANTS: ICD-10-CM

## 2023-10-25 DIAGNOSIS — Z95.0 PRESENCE OF CARDIAC PACEMAKER: Chronic | ICD-10-CM

## 2023-10-25 LAB
INR PPP: 1.62
PT BLD: 18.1

## 2023-10-25 PROCEDURE — G0463: CPT

## 2023-10-26 DIAGNOSIS — I48.91 UNSPECIFIED ATRIAL FIBRILLATION: ICD-10-CM

## 2023-10-26 DIAGNOSIS — Z79.01 LONG TERM (CURRENT) USE OF ANTICOAGULANTS: ICD-10-CM

## 2023-11-01 ENCOUNTER — APPOINTMENT (OUTPATIENT)
Dept: MEDICATION MANAGEMENT | Facility: CLINIC | Age: 88
End: 2023-11-01

## 2023-11-01 ENCOUNTER — OUTPATIENT (OUTPATIENT)
Dept: OUTPATIENT SERVICES | Facility: HOSPITAL | Age: 88
LOS: 1 days | End: 2023-11-01
Payer: MEDICARE

## 2023-11-01 DIAGNOSIS — Z98.890 OTHER SPECIFIED POSTPROCEDURAL STATES: Chronic | ICD-10-CM

## 2023-11-01 DIAGNOSIS — Z41.9 ENCOUNTER FOR PROCEDURE FOR PURPOSES OTHER THAN REMEDYING HEALTH STATE, UNSPECIFIED: Chronic | ICD-10-CM

## 2023-11-01 DIAGNOSIS — I48.91 UNSPECIFIED ATRIAL FIBRILLATION: ICD-10-CM

## 2023-11-01 DIAGNOSIS — Z79.01 LONG TERM (CURRENT) USE OF ANTICOAGULANTS: ICD-10-CM

## 2023-11-01 DIAGNOSIS — Z95.0 PRESENCE OF CARDIAC PACEMAKER: Chronic | ICD-10-CM

## 2023-11-01 DIAGNOSIS — Z95.5 PRESENCE OF CORONARY ANGIOPLASTY IMPLANT AND GRAFT: Chronic | ICD-10-CM

## 2023-11-01 DIAGNOSIS — C44.91 BASAL CELL CARCINOMA OF SKIN, UNSPECIFIED: Chronic | ICD-10-CM

## 2023-11-01 LAB
INR PPP: 1.92
PT BLD: 21.3

## 2023-11-01 PROCEDURE — G0463: CPT

## 2023-11-02 DIAGNOSIS — Z79.01 LONG TERM (CURRENT) USE OF ANTICOAGULANTS: ICD-10-CM

## 2023-11-02 DIAGNOSIS — I48.91 UNSPECIFIED ATRIAL FIBRILLATION: ICD-10-CM

## 2023-11-08 ENCOUNTER — OUTPATIENT (OUTPATIENT)
Dept: OUTPATIENT SERVICES | Facility: HOSPITAL | Age: 88
LOS: 1 days | End: 2023-11-08
Payer: MEDICARE

## 2023-11-08 ENCOUNTER — APPOINTMENT (OUTPATIENT)
Dept: MEDICATION MANAGEMENT | Facility: CLINIC | Age: 88
End: 2023-11-08

## 2023-11-08 DIAGNOSIS — Z98.890 OTHER SPECIFIED POSTPROCEDURAL STATES: Chronic | ICD-10-CM

## 2023-11-08 DIAGNOSIS — Z95.0 PRESENCE OF CARDIAC PACEMAKER: Chronic | ICD-10-CM

## 2023-11-08 DIAGNOSIS — Z95.5 PRESENCE OF CORONARY ANGIOPLASTY IMPLANT AND GRAFT: Chronic | ICD-10-CM

## 2023-11-08 DIAGNOSIS — I48.91 UNSPECIFIED ATRIAL FIBRILLATION: ICD-10-CM

## 2023-11-08 DIAGNOSIS — Z41.9 ENCOUNTER FOR PROCEDURE FOR PURPOSES OTHER THAN REMEDYING HEALTH STATE, UNSPECIFIED: Chronic | ICD-10-CM

## 2023-11-08 DIAGNOSIS — C44.91 BASAL CELL CARCINOMA OF SKIN, UNSPECIFIED: Chronic | ICD-10-CM

## 2023-11-08 DIAGNOSIS — Z79.01 LONG TERM (CURRENT) USE OF ANTICOAGULANTS: ICD-10-CM

## 2023-11-08 LAB
INR PPP: 1.8
PT BLD: 20.2

## 2023-11-08 PROCEDURE — G0463: CPT

## 2023-11-09 DIAGNOSIS — I48.91 UNSPECIFIED ATRIAL FIBRILLATION: ICD-10-CM

## 2023-11-09 DIAGNOSIS — Z79.01 LONG TERM (CURRENT) USE OF ANTICOAGULANTS: ICD-10-CM

## 2023-11-15 ENCOUNTER — APPOINTMENT (OUTPATIENT)
Dept: MEDICATION MANAGEMENT | Facility: CLINIC | Age: 88
End: 2023-11-15

## 2024-07-01 ENCOUNTER — INPATIENT (INPATIENT)
Facility: HOSPITAL | Age: 89
LOS: 1 days | Discharge: NURSING, REHAB OR LONG-TERM CARE | DRG: 86 | End: 2024-07-03
Attending: SURGERY | Admitting: SURGERY
Payer: MEDICARE

## 2024-07-01 VITALS
TEMPERATURE: 98 F | DIASTOLIC BLOOD PRESSURE: 74 MMHG | SYSTOLIC BLOOD PRESSURE: 148 MMHG | RESPIRATION RATE: 18 BRPM | WEIGHT: 149.91 LBS | HEART RATE: 60 BPM | OXYGEN SATURATION: 96 %

## 2024-07-01 DIAGNOSIS — C44.91 BASAL CELL CARCINOMA OF SKIN, UNSPECIFIED: Chronic | ICD-10-CM
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
## 2024-07-01 DIAGNOSIS — Z98.890 OTHER SPECIFIED POSTPROCEDURAL STATES: Chronic | ICD-10-CM

## 2024-07-01 DIAGNOSIS — Z95.5 PRESENCE OF CORONARY ANGIOPLASTY IMPLANT AND GRAFT: Chronic | ICD-10-CM

## 2024-07-01 DIAGNOSIS — Z95.0 PRESENCE OF CARDIAC PACEMAKER: Chronic | ICD-10-CM

## 2024-07-01 DIAGNOSIS — Z41.9 ENCOUNTER FOR PROCEDURE FOR PURPOSES OTHER THAN REMEDYING HEALTH STATE, UNSPECIFIED: Chronic | ICD-10-CM

## 2024-07-01 LAB
ALBUMIN SERPL ELPH-MCNC: 3.6 G/DL — SIGNIFICANT CHANGE UP (ref 3.5–5.2)
ALP SERPL-CCNC: 110 U/L — SIGNIFICANT CHANGE UP (ref 30–115)
ALT FLD-CCNC: 23 U/L — SIGNIFICANT CHANGE UP (ref 0–41)
ANION GAP SERPL CALC-SCNC: 11 MMOL/L — SIGNIFICANT CHANGE UP (ref 7–14)
ANION GAP SERPL CALC-SCNC: 8 MMOL/L — SIGNIFICANT CHANGE UP (ref 7–14)
APPEARANCE UR: CLEAR — SIGNIFICANT CHANGE UP
APTT BLD: 27.9 SEC — SIGNIFICANT CHANGE UP (ref 27–39.2)
AST SERPL-CCNC: 29 U/L — SIGNIFICANT CHANGE UP (ref 0–41)
BASOPHILS # BLD AUTO: 0.04 K/UL — SIGNIFICANT CHANGE UP (ref 0–0.2)
BASOPHILS # BLD AUTO: 0.04 K/UL — SIGNIFICANT CHANGE UP (ref 0–0.2)
BASOPHILS NFR BLD AUTO: 0.5 % — SIGNIFICANT CHANGE UP (ref 0–1)
BASOPHILS NFR BLD AUTO: 0.5 % — SIGNIFICANT CHANGE UP (ref 0–1)
BILIRUB SERPL-MCNC: 0.8 MG/DL — SIGNIFICANT CHANGE UP (ref 0.2–1.2)
BILIRUB UR-MCNC: NEGATIVE — SIGNIFICANT CHANGE UP
BLD GP AB SCN SERPL QL: SIGNIFICANT CHANGE UP
BUN SERPL-MCNC: 37 MG/DL — HIGH (ref 10–20)
BUN SERPL-MCNC: 41 MG/DL — HIGH (ref 10–20)
CALCIUM SERPL-MCNC: 9.1 MG/DL — SIGNIFICANT CHANGE UP (ref 8.4–10.4)
CALCIUM SERPL-MCNC: 9.1 MG/DL — SIGNIFICANT CHANGE UP (ref 8.4–10.5)
CHLORIDE SERPL-SCNC: 97 MMOL/L — LOW (ref 98–110)
CHLORIDE SERPL-SCNC: 99 MMOL/L — SIGNIFICANT CHANGE UP (ref 98–110)
CO2 SERPL-SCNC: 27 MMOL/L — SIGNIFICANT CHANGE UP (ref 17–32)
CO2 SERPL-SCNC: 27 MMOL/L — SIGNIFICANT CHANGE UP (ref 17–32)
COLOR SPEC: YELLOW — SIGNIFICANT CHANGE UP
CREAT SERPL-MCNC: 1.9 MG/DL — HIGH (ref 0.7–1.5)
CREAT SERPL-MCNC: 1.9 MG/DL — HIGH (ref 0.7–1.5)
DIFF PNL FLD: ABNORMAL
EGFR: 33 ML/MIN/1.73M2 — LOW
EGFR: 33 ML/MIN/1.73M2 — LOW
EOSINOPHIL # BLD AUTO: 0.2 K/UL — SIGNIFICANT CHANGE UP (ref 0–0.7)
EOSINOPHIL # BLD AUTO: 0.29 K/UL — SIGNIFICANT CHANGE UP (ref 0–0.7)
EOSINOPHIL NFR BLD AUTO: 2.6 % — SIGNIFICANT CHANGE UP (ref 0–8)
EOSINOPHIL NFR BLD AUTO: 3.5 % — SIGNIFICANT CHANGE UP (ref 0–8)
GLUCOSE SERPL-MCNC: 125 MG/DL — HIGH (ref 70–99)
GLUCOSE SERPL-MCNC: 170 MG/DL — HIGH (ref 70–99)
GLUCOSE UR QL: NEGATIVE MG/DL — SIGNIFICANT CHANGE UP
HCT VFR BLD CALC: 33.7 % — LOW (ref 42–52)
HCT VFR BLD CALC: 35.1 % — LOW (ref 42–52)
HGB BLD-MCNC: 10.9 G/DL — LOW (ref 14–18)
HGB BLD-MCNC: 11.4 G/DL — LOW (ref 14–18)
IMM GRANULOCYTES NFR BLD AUTO: 0.3 % — SIGNIFICANT CHANGE UP (ref 0.1–0.3)
IMM GRANULOCYTES NFR BLD AUTO: 0.7 % — HIGH (ref 0.1–0.3)
INR BLD: 1.1 RATIO — SIGNIFICANT CHANGE UP (ref 0.65–1.3)
KETONES UR-MCNC: NEGATIVE MG/DL — SIGNIFICANT CHANGE UP
LACTATE SERPL-SCNC: 1.2 MMOL/L — SIGNIFICANT CHANGE UP (ref 0.7–2)
LEUKOCYTE ESTERASE UR-ACNC: NEGATIVE — SIGNIFICANT CHANGE UP
LIDOCAIN IGE QN: 16 U/L — SIGNIFICANT CHANGE UP (ref 7–60)
LYMPHOCYTES # BLD AUTO: 0.77 K/UL — LOW (ref 1.2–3.4)
LYMPHOCYTES # BLD AUTO: 0.9 K/UL — LOW (ref 1.2–3.4)
LYMPHOCYTES # BLD AUTO: 10.7 % — LOW (ref 20.5–51.1)
LYMPHOCYTES # BLD AUTO: 9.9 % — LOW (ref 20.5–51.1)
MAGNESIUM SERPL-MCNC: 1.8 MG/DL — SIGNIFICANT CHANGE UP (ref 1.8–2.4)
MCHC RBC-ENTMCNC: 30 PG — SIGNIFICANT CHANGE UP (ref 27–31)
MCHC RBC-ENTMCNC: 30.3 PG — SIGNIFICANT CHANGE UP (ref 27–31)
MCHC RBC-ENTMCNC: 32.3 G/DL — SIGNIFICANT CHANGE UP (ref 32–37)
MCHC RBC-ENTMCNC: 32.5 G/DL — SIGNIFICANT CHANGE UP (ref 32–37)
MCV RBC AUTO: 92.4 FL — SIGNIFICANT CHANGE UP (ref 80–94)
MCV RBC AUTO: 93.6 FL — SIGNIFICANT CHANGE UP (ref 80–94)
MONOCYTES # BLD AUTO: 0.56 K/UL — SIGNIFICANT CHANGE UP (ref 0.1–0.6)
MONOCYTES # BLD AUTO: 0.65 K/UL — HIGH (ref 0.1–0.6)
MONOCYTES NFR BLD AUTO: 7.2 % — SIGNIFICANT CHANGE UP (ref 1.7–9.3)
MONOCYTES NFR BLD AUTO: 7.7 % — SIGNIFICANT CHANGE UP (ref 1.7–9.3)
NEUTROPHILS # BLD AUTO: 6.22 K/UL — SIGNIFICANT CHANGE UP (ref 1.4–6.5)
NEUTROPHILS # BLD AUTO: 6.45 K/UL — SIGNIFICANT CHANGE UP (ref 1.4–6.5)
NEUTROPHILS NFR BLD AUTO: 76.9 % — HIGH (ref 42.2–75.2)
NEUTROPHILS NFR BLD AUTO: 79.5 % — HIGH (ref 42.2–75.2)
NITRITE UR-MCNC: NEGATIVE — SIGNIFICANT CHANGE UP
NRBC # BLD: 0 /100 WBCS — SIGNIFICANT CHANGE UP (ref 0–0)
NRBC # BLD: 0 /100 WBCS — SIGNIFICANT CHANGE UP (ref 0–0)
PH UR: 7.5 — SIGNIFICANT CHANGE UP (ref 5–8)
PHOSPHATE SERPL-MCNC: 3 MG/DL — SIGNIFICANT CHANGE UP (ref 2.1–4.9)
PLATELET # BLD AUTO: 151 K/UL — SIGNIFICANT CHANGE UP (ref 130–400)
PLATELET # BLD AUTO: 158 K/UL — SIGNIFICANT CHANGE UP (ref 130–400)
PMV BLD: 10 FL — SIGNIFICANT CHANGE UP (ref 7.4–10.4)
PMV BLD: 10.2 FL — SIGNIFICANT CHANGE UP (ref 7.4–10.4)
POTASSIUM SERPL-MCNC: 4.7 MMOL/L — SIGNIFICANT CHANGE UP (ref 3.5–5)
POTASSIUM SERPL-MCNC: 4.8 MMOL/L — SIGNIFICANT CHANGE UP (ref 3.5–5)
POTASSIUM SERPL-SCNC: 4.7 MMOL/L — SIGNIFICANT CHANGE UP (ref 3.5–5)
POTASSIUM SERPL-SCNC: 4.8 MMOL/L — SIGNIFICANT CHANGE UP (ref 3.5–5)
PROT SERPL-MCNC: 6.3 G/DL — SIGNIFICANT CHANGE UP (ref 6–8)
PROT UR-MCNC: 100 MG/DL
PROTHROM AB SERPL-ACNC: 12.6 SEC — SIGNIFICANT CHANGE UP (ref 9.95–12.87)
RBC # BLD: 3.6 M/UL — LOW (ref 4.7–6.1)
RBC # BLD: 3.8 M/UL — LOW (ref 4.7–6.1)
RBC # FLD: 14.6 % — HIGH (ref 11.5–14.5)
RBC # FLD: 14.8 % — HIGH (ref 11.5–14.5)
SODIUM SERPL-SCNC: 134 MMOL/L — LOW (ref 135–146)
SODIUM SERPL-SCNC: 135 MMOL/L — SIGNIFICANT CHANGE UP (ref 135–146)
SP GR SPEC: 1.03 — SIGNIFICANT CHANGE UP (ref 1–1.03)
UROBILINOGEN FLD QL: 1 MG/DL — SIGNIFICANT CHANGE UP (ref 0.2–1)
WBC # BLD: 7.81 K/UL — SIGNIFICANT CHANGE UP (ref 4.8–10.8)
WBC # BLD: 8.39 K/UL — SIGNIFICANT CHANGE UP (ref 4.8–10.8)
WBC # FLD AUTO: 7.81 K/UL — SIGNIFICANT CHANGE UP (ref 4.8–10.8)
WBC # FLD AUTO: 8.39 K/UL — SIGNIFICANT CHANGE UP (ref 4.8–10.8)

## 2024-07-01 PROCEDURE — 74177 CT ABD & PELVIS W/CONTRAST: CPT | Mod: 26,MC

## 2024-07-01 PROCEDURE — 70450 CT HEAD/BRAIN W/O DYE: CPT | Mod: MC

## 2024-07-01 PROCEDURE — 94010 BREATHING CAPACITY TEST: CPT

## 2024-07-01 PROCEDURE — 82962 GLUCOSE BLOOD TEST: CPT

## 2024-07-01 PROCEDURE — 80048 BASIC METABOLIC PNL TOTAL CA: CPT

## 2024-07-01 PROCEDURE — 71045 X-RAY EXAM CHEST 1 VIEW: CPT

## 2024-07-01 PROCEDURE — 70450 CT HEAD/BRAIN W/O DYE: CPT | Mod: 26,MC

## 2024-07-01 PROCEDURE — 92526 ORAL FUNCTION THERAPY: CPT | Mod: GN

## 2024-07-01 PROCEDURE — 85027 COMPLETE CBC AUTOMATED: CPT

## 2024-07-01 PROCEDURE — 71260 CT THORAX DX C+: CPT | Mod: 26,MC

## 2024-07-01 PROCEDURE — 97166 OT EVAL MOD COMPLEX 45 MIN: CPT | Mod: GO

## 2024-07-01 PROCEDURE — 84100 ASSAY OF PHOSPHORUS: CPT

## 2024-07-01 PROCEDURE — 81001 URINALYSIS AUTO W/SCOPE: CPT

## 2024-07-01 PROCEDURE — 72170 X-RAY EXAM OF PELVIS: CPT | Mod: 26

## 2024-07-01 PROCEDURE — 92610 EVALUATE SWALLOWING FUNCTION: CPT | Mod: GN

## 2024-07-01 PROCEDURE — 85025 COMPLETE CBC W/AUTO DIFF WBC: CPT

## 2024-07-01 PROCEDURE — 36415 COLL VENOUS BLD VENIPUNCTURE: CPT

## 2024-07-01 PROCEDURE — 99285 EMERGENCY DEPT VISIT HI MDM: CPT

## 2024-07-01 PROCEDURE — 83735 ASSAY OF MAGNESIUM: CPT

## 2024-07-01 PROCEDURE — 70450 CT HEAD/BRAIN W/O DYE: CPT | Mod: 26,77

## 2024-07-01 PROCEDURE — 71045 X-RAY EXAM CHEST 1 VIEW: CPT | Mod: 26

## 2024-07-01 PROCEDURE — 97162 PT EVAL MOD COMPLEX 30 MIN: CPT | Mod: GP

## 2024-07-01 PROCEDURE — 99223 1ST HOSP IP/OBS HIGH 75: CPT

## 2024-07-01 PROCEDURE — 72125 CT NECK SPINE W/O DYE: CPT | Mod: 26,MC

## 2024-07-01 RX ORDER — ACETAMINOPHEN 325 MG
975 TABLET ORAL EVERY 6 HOURS
Refills: 0 | Status: DISCONTINUED | OUTPATIENT
Start: 2024-07-01 | End: 2024-07-03

## 2024-07-01 RX ORDER — SODIUM CHLORIDE 0.9 % (FLUSH) 0.9 %
1000 SYRINGE (ML) INJECTION
Refills: 0 | Status: DISCONTINUED | OUTPATIENT
Start: 2024-07-01 | End: 2024-07-02

## 2024-07-01 RX ORDER — METOPROLOL TARTRATE 50 MG
12.5 TABLET ORAL EVERY 12 HOURS
Refills: 0 | Status: DISCONTINUED | OUTPATIENT
Start: 2024-07-01 | End: 2024-07-03

## 2024-07-01 RX ORDER — METOPROLOL TARTRATE 50 MG
1 TABLET ORAL
Qty: 0 | Refills: 0 | DISCHARGE

## 2024-07-01 RX ORDER — TRAMADOL HYDROCHLORIDE 50 MG/1
25 TABLET, FILM COATED ORAL EVERY 6 HOURS
Refills: 0 | Status: DISCONTINUED | OUTPATIENT
Start: 2024-07-01 | End: 2024-07-01

## 2024-07-01 RX ORDER — GABAPENTIN
100 POWDER (GRAM) MISCELLANEOUS EVERY 8 HOURS
Refills: 0 | Status: DISCONTINUED | OUTPATIENT
Start: 2024-07-01 | End: 2024-07-03

## 2024-07-01 RX ORDER — OXCARBAZEPINE 600 MG/1
150 TABLET, FILM COATED ORAL EVERY 12 HOURS
Refills: 0 | Status: DISCONTINUED | OUTPATIENT
Start: 2024-07-01 | End: 2024-07-01

## 2024-07-01 RX ORDER — SACUBITRIL AND VALSARTAN 97; 103 MG/1; MG/1
1 TABLET, FILM COATED ORAL
Refills: 0 | Status: DISCONTINUED | OUTPATIENT
Start: 2024-07-01 | End: 2024-07-01

## 2024-07-01 RX ORDER — OXYCODONE HYDROCHLORIDE 100 MG/5ML
5 SOLUTION ORAL EVERY 8 HOURS
Refills: 0 | Status: DISCONTINUED | OUTPATIENT
Start: 2024-07-01 | End: 2024-07-03

## 2024-07-01 RX ORDER — AMIODARONE HYDROCHLORIDE 50 MG/ML
200 INJECTION, SOLUTION INTRAVENOUS DAILY
Refills: 0 | Status: DISCONTINUED | OUTPATIENT
Start: 2024-07-01 | End: 2024-07-03

## 2024-07-01 RX ORDER — TAMSULOSIN HYDROCHLORIDE 0.4 MG/1
0.4 CAPSULE ORAL AT BEDTIME
Refills: 0 | Status: DISCONTINUED | OUTPATIENT
Start: 2024-07-01 | End: 2024-07-03

## 2024-07-01 RX ORDER — GABAPENTIN
300 POWDER (GRAM) MISCELLANEOUS DAILY
Refills: 0 | Status: DISCONTINUED | OUTPATIENT
Start: 2024-07-01 | End: 2024-07-01

## 2024-07-01 RX ORDER — SENNOSIDES 8.6 MG
2 TABLET ORAL AT BEDTIME
Refills: 0 | Status: DISCONTINUED | OUTPATIENT
Start: 2024-07-01 | End: 2024-07-03

## 2024-07-01 RX ORDER — METFORMIN HYDROCHLORIDE 850 MG/1
1 TABLET ORAL
Qty: 0 | Refills: 0 | DISCHARGE

## 2024-07-01 RX ORDER — METHOCARBAMOL 500 MG
500 TABLET ORAL EVERY 8 HOURS
Refills: 0 | Status: DISCONTINUED | OUTPATIENT
Start: 2024-07-01 | End: 2024-07-03

## 2024-07-01 RX ORDER — ISOSORBIDE MONONITRATE 30 MG/1
30 TABLET, EXTENDED RELEASE ORAL DAILY
Refills: 0 | Status: DISCONTINUED | OUTPATIENT
Start: 2024-07-01 | End: 2024-07-01

## 2024-07-01 RX ORDER — OXYCODONE HYDROCHLORIDE 100 MG/5ML
5 SOLUTION ORAL EVERY 4 HOURS
Refills: 0 | Status: DISCONTINUED | OUTPATIENT
Start: 2024-07-01 | End: 2024-07-01

## 2024-07-01 RX ORDER — UBIDECARENONE 100 MG
100 CAPSULE ORAL
Qty: 0 | Refills: 0 | DISCHARGE

## 2024-07-01 RX ORDER — ATORVASTATIN CALCIUM 20 MG/1
40 TABLET, FILM COATED ORAL AT BEDTIME
Refills: 0 | Status: DISCONTINUED | OUTPATIENT
Start: 2024-07-01 | End: 2024-07-03

## 2024-07-01 RX ORDER — OXCARBAZEPINE 600 MG/1
150 TABLET, FILM COATED ORAL EVERY 12 HOURS
Refills: 0 | Status: DISCONTINUED | OUTPATIENT
Start: 2024-07-01 | End: 2024-07-03

## 2024-07-01 RX ORDER — CHOLECALCIFEROL (VITAMIN D3) 125 MCG
1 CAPSULE ORAL
Qty: 0 | Refills: 0 | DISCHARGE

## 2024-07-01 RX ORDER — CRANBERRY FRUIT EXTRACT 650 MG
1 CAPSULE ORAL
Refills: 0 | DISCHARGE

## 2024-07-01 RX ORDER — BISACODYL 5 MG
5 TABLET, DELAYED RELEASE (ENTERIC COATED) ORAL DAILY
Refills: 0 | Status: DISCONTINUED | OUTPATIENT
Start: 2024-07-01 | End: 2024-07-01

## 2024-07-01 RX ORDER — RANOLAZINE 500 MG/1
500 TABLET, EXTENDED RELEASE ORAL
Refills: 0 | Status: DISCONTINUED | OUTPATIENT
Start: 2024-07-01 | End: 2024-07-01

## 2024-07-01 RX ORDER — LISINOPRIL 2.5 MG/1
1 TABLET ORAL
Qty: 0 | Refills: 0 | DISCHARGE

## 2024-07-01 RX ORDER — LIDOCAINE HCL 28 MG/G
1 GEL TOPICAL EVERY 24 HOURS
Refills: 0 | Status: DISCONTINUED | OUTPATIENT
Start: 2024-07-01 | End: 2024-07-03

## 2024-07-01 RX ORDER — LIDOCAINE HCL 28 MG/G
1 GEL TOPICAL DAILY
Refills: 0 | Status: DISCONTINUED | OUTPATIENT
Start: 2024-07-01 | End: 2024-07-01

## 2024-07-01 RX ORDER — ACETAMINOPHEN 325 MG
650 TABLET ORAL EVERY 6 HOURS
Refills: 0 | Status: DISCONTINUED | OUTPATIENT
Start: 2024-07-01 | End: 2024-07-01

## 2024-07-01 RX ORDER — ACARBOSE 25 MG/1
1 TABLET ORAL
Qty: 0 | Refills: 0 | DISCHARGE

## 2024-07-01 RX ORDER — SODIUM CHLORIDE 0.9 % (FLUSH) 0.9 %
1000 SYRINGE (ML) INJECTION
Refills: 0 | Status: DISCONTINUED | OUTPATIENT
Start: 2024-07-01 | End: 2024-07-01

## 2024-07-01 RX ORDER — SACUBITRIL AND VALSARTAN 97; 103 MG/1; MG/1
1 TABLET, FILM COATED ORAL EVERY 12 HOURS
Refills: 0 | Status: DISCONTINUED | OUTPATIENT
Start: 2024-07-01 | End: 2024-07-03

## 2024-07-01 RX ORDER — MAGNESIUM SULFATE 100 %
2 POWDER (GRAM) MISCELLANEOUS ONCE
Refills: 0 | Status: COMPLETED | OUTPATIENT
Start: 2024-07-01 | End: 2024-07-01

## 2024-07-01 RX ORDER — POLYETHYLENE GLYCOL 3350 1 G/G
17 POWDER ORAL DAILY
Refills: 0 | Status: DISCONTINUED | OUTPATIENT
Start: 2024-07-01 | End: 2024-07-03

## 2024-07-01 RX ORDER — ACETAMINOPHEN 325 MG
650 TABLET ORAL ONCE
Refills: 0 | Status: DISCONTINUED | OUTPATIENT
Start: 2024-07-01 | End: 2024-07-01

## 2024-07-01 RX ORDER — ASPIRIN/CALCIUM CARB/MAGNESIUM 324 MG
1 TABLET ORAL
Qty: 0 | Refills: 0 | DISCHARGE

## 2024-07-01 RX ORDER — METOPROLOL TARTRATE 50 MG
12.5 TABLET ORAL EVERY 12 HOURS
Refills: 0 | Status: DISCONTINUED | OUTPATIENT
Start: 2024-07-01 | End: 2024-07-01

## 2024-07-01 RX ADMIN — Medication 975 MILLIGRAM(S): at 23:07

## 2024-07-01 RX ADMIN — Medication 110 MILLILITER(S): at 16:36

## 2024-07-01 RX ADMIN — LIDOCAINE HCL 1 PATCH: 28 GEL TOPICAL at 21:09

## 2024-07-01 RX ADMIN — Medication 25 GRAM(S): at 21:09

## 2024-07-01 RX ADMIN — AMIODARONE HYDROCHLORIDE 200 MILLIGRAM(S): 50 INJECTION, SOLUTION INTRAVENOUS at 16:39

## 2024-07-01 RX ADMIN — TAMSULOSIN HYDROCHLORIDE 0.4 MILLIGRAM(S): 0.4 CAPSULE ORAL at 21:10

## 2024-07-01 RX ADMIN — Medication 100 MILLIGRAM(S): at 21:10

## 2024-07-01 RX ADMIN — Medication 500 MILLIGRAM(S): at 21:10

## 2024-07-01 RX ADMIN — Medication 2 TABLET(S): at 21:10

## 2024-07-01 RX ADMIN — Medication 975 MILLIGRAM(S): at 17:50

## 2024-07-01 RX ADMIN — SACUBITRIL AND VALSARTAN 1 TABLET(S): 97; 103 TABLET, FILM COATED ORAL at 17:50

## 2024-07-01 RX ADMIN — Medication 12.5 MILLIGRAM(S): at 17:50

## 2024-07-01 RX ADMIN — ATORVASTATIN CALCIUM 40 MILLIGRAM(S): 20 TABLET, FILM COATED ORAL at 16:40

## 2024-07-02 ENCOUNTER — TRANSCRIPTION ENCOUNTER (OUTPATIENT)
Age: 89
End: 2024-07-02

## 2024-07-02 LAB
ANION GAP SERPL CALC-SCNC: 12 MMOL/L — SIGNIFICANT CHANGE UP (ref 7–14)
BUN SERPL-MCNC: 32 MG/DL — HIGH (ref 10–20)
CALCIUM SERPL-MCNC: 8.7 MG/DL — SIGNIFICANT CHANGE UP (ref 8.4–10.5)
CHLORIDE SERPL-SCNC: 102 MMOL/L — SIGNIFICANT CHANGE UP (ref 98–110)
CO2 SERPL-SCNC: 22 MMOL/L — SIGNIFICANT CHANGE UP (ref 17–32)
CREAT SERPL-MCNC: 1.8 MG/DL — HIGH (ref 0.7–1.5)
EGFR: 35 ML/MIN/1.73M2 — LOW
GLUCOSE SERPL-MCNC: 109 MG/DL — HIGH (ref 70–99)
HCT VFR BLD CALC: 32.5 % — LOW (ref 42–52)
HGB BLD-MCNC: 10.7 G/DL — LOW (ref 14–18)
MAGNESIUM SERPL-MCNC: 2 MG/DL — SIGNIFICANT CHANGE UP (ref 1.8–2.4)
MCHC RBC-ENTMCNC: 30.5 PG — SIGNIFICANT CHANGE UP (ref 27–31)
MCHC RBC-ENTMCNC: 32.9 G/DL — SIGNIFICANT CHANGE UP (ref 32–37)
MCV RBC AUTO: 92.6 FL — SIGNIFICANT CHANGE UP (ref 80–94)
NRBC # BLD: 0 /100 WBCS — SIGNIFICANT CHANGE UP (ref 0–0)
PHOSPHATE SERPL-MCNC: 2.9 MG/DL — SIGNIFICANT CHANGE UP (ref 2.1–4.9)
PLATELET # BLD AUTO: 143 K/UL — SIGNIFICANT CHANGE UP (ref 130–400)
PMV BLD: 10.2 FL — SIGNIFICANT CHANGE UP (ref 7.4–10.4)
POTASSIUM SERPL-MCNC: 4.6 MMOL/L — SIGNIFICANT CHANGE UP (ref 3.5–5)
POTASSIUM SERPL-SCNC: 4.6 MMOL/L — SIGNIFICANT CHANGE UP (ref 3.5–5)
RBC # BLD: 3.51 M/UL — LOW (ref 4.7–6.1)
RBC # FLD: 14.9 % — HIGH (ref 11.5–14.5)
SODIUM SERPL-SCNC: 136 MMOL/L — SIGNIFICANT CHANGE UP (ref 135–146)
WBC # BLD: 7.11 K/UL — SIGNIFICANT CHANGE UP (ref 4.8–10.8)
WBC # FLD AUTO: 7.11 K/UL — SIGNIFICANT CHANGE UP (ref 4.8–10.8)

## 2024-07-02 PROCEDURE — 99222 1ST HOSP IP/OBS MODERATE 55: CPT

## 2024-07-02 PROCEDURE — 71045 X-RAY EXAM CHEST 1 VIEW: CPT | Mod: 26

## 2024-07-02 PROCEDURE — 70450 CT HEAD/BRAIN W/O DYE: CPT | Mod: 26

## 2024-07-02 PROCEDURE — 99231 SBSQ HOSP IP/OBS SF/LOW 25: CPT | Mod: 25

## 2024-07-02 RX ORDER — SODIUM CHLORIDE 0.9 % (FLUSH) 0.9 %
1000 SYRINGE (ML) INJECTION
Refills: 0 | Status: DISCONTINUED | OUTPATIENT
Start: 2024-07-02 | End: 2024-07-03

## 2024-07-02 RX ORDER — HEPARIN SODIUM 50 [USP'U]/ML
5000 INJECTION, SOLUTION INTRAVENOUS EVERY 8 HOURS
Refills: 0 | Status: DISCONTINUED | OUTPATIENT
Start: 2024-07-02 | End: 2024-07-03

## 2024-07-02 RX ORDER — INSULIN LISPRO 100 [IU]/ML
INJECTION, SOLUTION SUBCUTANEOUS
Refills: 0 | Status: DISCONTINUED | OUTPATIENT
Start: 2024-07-02 | End: 2024-07-03

## 2024-07-02 RX ADMIN — OXYCODONE HYDROCHLORIDE 5 MILLIGRAM(S): 100 SOLUTION ORAL at 06:00

## 2024-07-02 RX ADMIN — LIDOCAINE HCL 1 PATCH: 28 GEL TOPICAL at 22:29

## 2024-07-02 RX ADMIN — TAMSULOSIN HYDROCHLORIDE 0.4 MILLIGRAM(S): 0.4 CAPSULE ORAL at 21:07

## 2024-07-02 RX ADMIN — OXCARBAZEPINE 150 MILLIGRAM(S): 600 TABLET, FILM COATED ORAL at 18:09

## 2024-07-02 RX ADMIN — ATORVASTATIN CALCIUM 40 MILLIGRAM(S): 20 TABLET, FILM COATED ORAL at 21:08

## 2024-07-02 RX ADMIN — OXYCODONE HYDROCHLORIDE 5 MILLIGRAM(S): 100 SOLUTION ORAL at 05:30

## 2024-07-02 RX ADMIN — INSULIN LISPRO 2: 100 INJECTION, SOLUTION SUBCUTANEOUS at 18:19

## 2024-07-02 RX ADMIN — Medication 100 MILLIGRAM(S): at 21:07

## 2024-07-02 RX ADMIN — Medication 975 MILLIGRAM(S): at 11:20

## 2024-07-02 RX ADMIN — HEPARIN SODIUM 5000 UNIT(S): 50 INJECTION, SOLUTION INTRAVENOUS at 21:09

## 2024-07-02 RX ADMIN — Medication 2 TABLET(S): at 21:07

## 2024-07-02 RX ADMIN — OXCARBAZEPINE 150 MILLIGRAM(S): 600 TABLET, FILM COATED ORAL at 05:10

## 2024-07-02 RX ADMIN — Medication 12.5 MILLIGRAM(S): at 05:10

## 2024-07-02 RX ADMIN — SACUBITRIL AND VALSARTAN 1 TABLET(S): 97; 103 TABLET, FILM COATED ORAL at 18:09

## 2024-07-02 RX ADMIN — Medication 100 MILLIGRAM(S): at 05:30

## 2024-07-02 RX ADMIN — Medication 500 MILLIGRAM(S): at 16:42

## 2024-07-02 RX ADMIN — Medication 975 MILLIGRAM(S): at 05:10

## 2024-07-02 RX ADMIN — SACUBITRIL AND VALSARTAN 1 TABLET(S): 97; 103 TABLET, FILM COATED ORAL at 05:10

## 2024-07-02 RX ADMIN — Medication 975 MILLIGRAM(S): at 12:04

## 2024-07-02 RX ADMIN — LIDOCAINE HCL 1 PATCH: 28 GEL TOPICAL at 06:35

## 2024-07-02 RX ADMIN — Medication 1 APPLICATION(S): at 05:09

## 2024-07-02 RX ADMIN — AMIODARONE HYDROCHLORIDE 200 MILLIGRAM(S): 50 INJECTION, SOLUTION INTRAVENOUS at 05:10

## 2024-07-02 RX ADMIN — Medication 500 MILLIGRAM(S): at 05:10

## 2024-07-02 RX ADMIN — POLYETHYLENE GLYCOL 3350 17 GRAM(S): 1 POWDER ORAL at 11:21

## 2024-07-02 RX ADMIN — Medication 975 MILLIGRAM(S): at 00:00

## 2024-07-02 RX ADMIN — Medication 12.5 MILLIGRAM(S): at 18:09

## 2024-07-02 RX ADMIN — Medication 75 MILLILITER(S): at 21:09

## 2024-07-02 RX ADMIN — Medication 100 MILLIGRAM(S): at 16:43

## 2024-07-02 RX ADMIN — Medication 975 MILLIGRAM(S): at 18:09

## 2024-07-03 ENCOUNTER — INPATIENT (INPATIENT)
Facility: HOSPITAL | Age: 89
LOS: 8 days | Discharge: HOME HEALTH | DRG: 945 | End: 2024-07-12
Attending: PHYSICAL MEDICINE & REHABILITATION | Admitting: PHYSICAL MEDICINE & REHABILITATION
Payer: MEDICARE

## 2024-07-03 ENCOUNTER — TRANSCRIPTION ENCOUNTER (OUTPATIENT)
Age: 89
End: 2024-07-03

## 2024-07-03 VITALS
DIASTOLIC BLOOD PRESSURE: 65 MMHG | HEIGHT: 64 IN | HEART RATE: 60 BPM | WEIGHT: 148.81 LBS | RESPIRATION RATE: 20 BRPM | SYSTOLIC BLOOD PRESSURE: 125 MMHG | TEMPERATURE: 98 F

## 2024-07-03 VITALS
HEART RATE: 60 BPM | RESPIRATION RATE: 19 BRPM | DIASTOLIC BLOOD PRESSURE: 68 MMHG | SYSTOLIC BLOOD PRESSURE: 117 MMHG | OXYGEN SATURATION: 95 % | TEMPERATURE: 98 F

## 2024-07-03 DIAGNOSIS — Z98.890 OTHER SPECIFIED POSTPROCEDURAL STATES: Chronic | ICD-10-CM

## 2024-07-03 DIAGNOSIS — Z41.9 ENCOUNTER FOR PROCEDURE FOR PURPOSES OTHER THAN REMEDYING HEALTH STATE, UNSPECIFIED: Chronic | ICD-10-CM

## 2024-07-03 DIAGNOSIS — T07.XXXA UNSPECIFIED MULTIPLE INJURIES, INITIAL ENCOUNTER: ICD-10-CM

## 2024-07-03 DIAGNOSIS — Z95.0 PRESENCE OF CARDIAC PACEMAKER: Chronic | ICD-10-CM

## 2024-07-03 DIAGNOSIS — Z95.5 PRESENCE OF CORONARY ANGIOPLASTY IMPLANT AND GRAFT: Chronic | ICD-10-CM

## 2024-07-03 DIAGNOSIS — C44.91 BASAL CELL CARCINOMA OF SKIN, UNSPECIFIED: Chronic | ICD-10-CM

## 2024-07-03 LAB — GLUCOSE BLDC GLUCOMTR-MCNC: 150 MG/DL — HIGH (ref 70–99)

## 2024-07-03 PROCEDURE — 97537 COMMUNITY/WORK REINTEGRATION: CPT | Mod: GO

## 2024-07-03 PROCEDURE — 97112 NEUROMUSCULAR REEDUCATION: CPT | Mod: GP

## 2024-07-03 PROCEDURE — 84443 ASSAY THYROID STIM HORMONE: CPT

## 2024-07-03 PROCEDURE — 82306 VITAMIN D 25 HYDROXY: CPT

## 2024-07-03 PROCEDURE — 80053 COMPREHEN METABOLIC PANEL: CPT

## 2024-07-03 PROCEDURE — 92523 SPEECH SOUND LANG COMPREHEN: CPT | Mod: GN

## 2024-07-03 PROCEDURE — 85027 COMPLETE CBC AUTOMATED: CPT

## 2024-07-03 PROCEDURE — 94010 BREATHING CAPACITY TEST: CPT

## 2024-07-03 PROCEDURE — 83036 HEMOGLOBIN GLYCOSYLATED A1C: CPT

## 2024-07-03 PROCEDURE — 81001 URINALYSIS AUTO W/SCOPE: CPT

## 2024-07-03 PROCEDURE — 93005 ELECTROCARDIOGRAM TRACING: CPT

## 2024-07-03 PROCEDURE — 82607 VITAMIN B-12: CPT

## 2024-07-03 PROCEDURE — 99222 1ST HOSP IP/OBS MODERATE 55: CPT

## 2024-07-03 PROCEDURE — 83540 ASSAY OF IRON: CPT

## 2024-07-03 PROCEDURE — 82746 ASSAY OF FOLIC ACID SERUM: CPT

## 2024-07-03 PROCEDURE — 83550 IRON BINDING TEST: CPT

## 2024-07-03 PROCEDURE — 80048 BASIC METABOLIC PNL TOTAL CA: CPT

## 2024-07-03 PROCEDURE — 97110 THERAPEUTIC EXERCISES: CPT | Mod: GP

## 2024-07-03 PROCEDURE — 83735 ASSAY OF MAGNESIUM: CPT

## 2024-07-03 PROCEDURE — 85025 COMPLETE CBC W/AUTO DIFF WBC: CPT

## 2024-07-03 PROCEDURE — 90791 PSYCH DIAGNOSTIC EVALUATION: CPT

## 2024-07-03 PROCEDURE — 97167 OT EVAL HIGH COMPLEX 60 MIN: CPT | Mod: GO

## 2024-07-03 PROCEDURE — 99232 SBSQ HOSP IP/OBS MODERATE 35: CPT

## 2024-07-03 PROCEDURE — 71045 X-RAY EXAM CHEST 1 VIEW: CPT | Mod: 26

## 2024-07-03 PROCEDURE — 36415 COLL VENOUS BLD VENIPUNCTURE: CPT

## 2024-07-03 PROCEDURE — 82962 GLUCOSE BLOOD TEST: CPT

## 2024-07-03 PROCEDURE — 97116 GAIT TRAINING THERAPY: CPT | Mod: GP

## 2024-07-03 PROCEDURE — 93284 PRGRMG EVAL IMPLANTABLE DFB: CPT

## 2024-07-03 PROCEDURE — 97530 THERAPEUTIC ACTIVITIES: CPT | Mod: GO

## 2024-07-03 PROCEDURE — 97535 SELF CARE MNGMENT TRAINING: CPT | Mod: GO

## 2024-07-03 RX ORDER — SACUBITRIL AND VALSARTAN 97; 103 MG/1; MG/1
1 TABLET, FILM COATED ORAL
Refills: 0 | DISCHARGE

## 2024-07-03 RX ORDER — SACUBITRIL AND VALSARTAN 97; 103 MG/1; MG/1
1 TABLET, FILM COATED ORAL
Qty: 0 | Refills: 0 | DISCHARGE
Start: 2024-07-03

## 2024-07-03 RX ORDER — LIDOCAINE HCL 28 MG/G
1 GEL TOPICAL
Qty: 0 | Refills: 0 | DISCHARGE
Start: 2024-07-03

## 2024-07-03 RX ORDER — OXYCODONE HYDROCHLORIDE 100 MG/5ML
5 SOLUTION ORAL EVERY 8 HOURS
Refills: 0 | Status: DISCONTINUED | OUTPATIENT
Start: 2024-07-03 | End: 2024-07-04

## 2024-07-03 RX ORDER — AMIODARONE HYDROCHLORIDE 50 MG/ML
200 INJECTION, SOLUTION INTRAVENOUS DAILY
Refills: 0 | Status: DISCONTINUED | OUTPATIENT
Start: 2024-07-04 | End: 2024-07-12

## 2024-07-03 RX ORDER — INSULIN LISPRO 100 [IU]/ML
INJECTION, SOLUTION SUBCUTANEOUS
Refills: 0 | Status: DISCONTINUED | OUTPATIENT
Start: 2024-07-03 | End: 2024-07-04

## 2024-07-03 RX ORDER — ATORVASTATIN CALCIUM 20 MG/1
40 TABLET, FILM COATED ORAL AT BEDTIME
Refills: 0 | Status: DISCONTINUED | OUTPATIENT
Start: 2024-07-04 | End: 2024-07-12

## 2024-07-03 RX ORDER — METHOCARBAMOL 500 MG
500 TABLET ORAL EVERY 8 HOURS
Refills: 0 | Status: DISCONTINUED | OUTPATIENT
Start: 2024-07-03 | End: 2024-07-09

## 2024-07-03 RX ORDER — OXCARBAZEPINE 600 MG/1
150 TABLET, FILM COATED ORAL EVERY 12 HOURS
Refills: 0 | Status: DISCONTINUED | OUTPATIENT
Start: 2024-07-03 | End: 2024-07-12

## 2024-07-03 RX ORDER — ISOSORBIDE DINITRATE 5 MG/1
1 TABLET ORAL
Refills: 0 | DISCHARGE

## 2024-07-03 RX ORDER — METOPROLOL TARTRATE 50 MG
12.5 TABLET ORAL EVERY 12 HOURS
Refills: 0 | Status: DISCONTINUED | OUTPATIENT
Start: 2024-07-03 | End: 2024-07-08

## 2024-07-03 RX ORDER — SENNOSIDES 8.6 MG
2 TABLET ORAL AT BEDTIME
Refills: 0 | Status: DISCONTINUED | OUTPATIENT
Start: 2024-07-03 | End: 2024-07-12

## 2024-07-03 RX ORDER — HEPARIN SODIUM 50 [USP'U]/ML
5000 INJECTION, SOLUTION INTRAVENOUS EVERY 8 HOURS
Refills: 0 | Status: DISCONTINUED | OUTPATIENT
Start: 2024-07-03 | End: 2024-07-12

## 2024-07-03 RX ORDER — GABAPENTIN
100 POWDER (GRAM) MISCELLANEOUS EVERY 8 HOURS
Refills: 0 | Status: DISCONTINUED | OUTPATIENT
Start: 2024-07-03 | End: 2024-07-10

## 2024-07-03 RX ORDER — POLYETHYLENE GLYCOL 3350 1 G/G
17 POWDER ORAL DAILY
Refills: 0 | Status: DISCONTINUED | OUTPATIENT
Start: 2024-07-03 | End: 2024-07-12

## 2024-07-03 RX ORDER — TAMSULOSIN HYDROCHLORIDE 0.4 MG/1
0.4 CAPSULE ORAL AT BEDTIME
Refills: 0 | Status: DISCONTINUED | OUTPATIENT
Start: 2024-07-03 | End: 2024-07-12

## 2024-07-03 RX ORDER — SACUBITRIL AND VALSARTAN 97; 103 MG/1; MG/1
1 TABLET, FILM COATED ORAL EVERY 12 HOURS
Refills: 0 | Status: DISCONTINUED | OUTPATIENT
Start: 2024-07-03 | End: 2024-07-07

## 2024-07-03 RX ORDER — ACETAMINOPHEN 325 MG
975 TABLET ORAL EVERY 6 HOURS
Refills: 0 | Status: DISCONTINUED | OUTPATIENT
Start: 2024-07-03 | End: 2024-07-12

## 2024-07-03 RX ORDER — LIDOCAINE HCL 28 MG/G
1 GEL TOPICAL EVERY 24 HOURS
Refills: 0 | Status: DISCONTINUED | OUTPATIENT
Start: 2024-07-03 | End: 2024-07-12

## 2024-07-03 RX ADMIN — OXYCODONE HYDROCHLORIDE 5 MILLIGRAM(S): 100 SOLUTION ORAL at 04:16

## 2024-07-03 RX ADMIN — Medication 500 MILLIGRAM(S): at 13:30

## 2024-07-03 RX ADMIN — Medication 975 MILLIGRAM(S): at 17:45

## 2024-07-03 RX ADMIN — POLYETHYLENE GLYCOL 3350 17 GRAM(S): 1 POWDER ORAL at 11:17

## 2024-07-03 RX ADMIN — HEPARIN SODIUM 5000 UNIT(S): 50 INJECTION, SOLUTION INTRAVENOUS at 06:12

## 2024-07-03 RX ADMIN — INSULIN LISPRO 2: 100 INJECTION, SOLUTION SUBCUTANEOUS at 17:39

## 2024-07-03 RX ADMIN — TAMSULOSIN HYDROCHLORIDE 0.4 MILLIGRAM(S): 0.4 CAPSULE ORAL at 22:04

## 2024-07-03 RX ADMIN — Medication 75 MILLILITER(S): at 08:22

## 2024-07-03 RX ADMIN — LIDOCAINE HCL 1 PATCH: 28 GEL TOPICAL at 22:04

## 2024-07-03 RX ADMIN — AMIODARONE HYDROCHLORIDE 200 MILLIGRAM(S): 50 INJECTION, SOLUTION INTRAVENOUS at 06:13

## 2024-07-03 RX ADMIN — Medication 975 MILLIGRAM(S): at 11:17

## 2024-07-03 RX ADMIN — Medication 1 APPLICATION(S): at 06:13

## 2024-07-03 RX ADMIN — LIDOCAINE HCL 1 PATCH: 28 GEL TOPICAL at 11:12

## 2024-07-03 RX ADMIN — Medication 100 MILLIGRAM(S): at 06:13

## 2024-07-03 RX ADMIN — Medication 100 MILLIGRAM(S): at 22:04

## 2024-07-03 RX ADMIN — Medication 975 MILLIGRAM(S): at 17:38

## 2024-07-03 RX ADMIN — HEPARIN SODIUM 5000 UNIT(S): 50 INJECTION, SOLUTION INTRAVENOUS at 13:30

## 2024-07-03 RX ADMIN — Medication 975 MILLIGRAM(S): at 06:12

## 2024-07-03 RX ADMIN — Medication 100 MILLIGRAM(S): at 13:29

## 2024-07-03 RX ADMIN — OXCARBAZEPINE 150 MILLIGRAM(S): 600 TABLET, FILM COATED ORAL at 06:12

## 2024-07-03 RX ADMIN — Medication 12.5 MILLIGRAM(S): at 17:44

## 2024-07-03 RX ADMIN — Medication 12.5 MILLIGRAM(S): at 06:12

## 2024-07-03 RX ADMIN — HEPARIN SODIUM 5000 UNIT(S): 50 INJECTION, SOLUTION INTRAVENOUS at 22:04

## 2024-07-03 RX ADMIN — Medication 500 MILLIGRAM(S): at 22:04

## 2024-07-03 RX ADMIN — Medication 2 TABLET(S): at 22:04

## 2024-07-03 RX ADMIN — Medication 975 MILLIGRAM(S): at 11:23

## 2024-07-03 RX ADMIN — SACUBITRIL AND VALSARTAN 1 TABLET(S): 97; 103 TABLET, FILM COATED ORAL at 06:12

## 2024-07-03 RX ADMIN — OXCARBAZEPINE 150 MILLIGRAM(S): 600 TABLET, FILM COATED ORAL at 17:40

## 2024-07-03 NOTE — H&P ADULT - NSICDXPASTMEDICALHX_GEN_ALL_CORE_FT
PAST MEDICAL HISTORY:  Anemia slight    Asthma Mild only last attack yrs ago    CAD (coronary artery disease)     Diabetes     Eye problem with diabetes    Fistula of large intestine colo-vesicula fistula    GERD without esophagitis     H/O diverticulitis of colon     HLD (hyperlipidemia)     HTN (hypertension)     Recurrent UTI (urinary tract infection)     SOB (shortness of breath) on exertion     Stented coronary artery Proximal LAD 08/31/2016

## 2024-07-03 NOTE — H&P ADULT - NSHPREVIEWOFSYSTEMS_GEN_ALL_CORE
Constiutional:    [ x  ] WNL           [   ] poor appetite   [   ] insomnia   [   ] tired   Cardio:                [ x  ] WNL           [   ] CP   [   ] SANTAMARIA   [   ] palpitations               Resp:                   [x   ] WNL           [   ] SOB   [   ] cough   [   ] wheezing   GI:                        [  x ] WNL           [   ] constipation   [   ] diarrhea   [   ] abdominal pain   [   ] nausea   [   ] emesis                                :                      [   ] WNL           [   ] HARMAN  [ x  ] dysuria   [   ] difficulty voiding             Endo:                   [ x  ] WNL          [   ] polyuria   [   ] temperature intolerance                 Skin:                     [   ] WNL          [ x  ] pain: left ribs   [   ] wound   [   ] rash   MSK:                    [ x  ] WNL          [   ] muscle pain   [   ] joint pain/ stiffness   [   ] muscle tenderness   [   ] swelling   Neuro:                 [  x ] WNL          [   ] HA   [   ] change in vision   [   ] tremor   [   ] weakness   [   ]dysphagia              Cognitive:           [  x ] WNL           [   ]confusion      Psych:                  [ x  ] WNL           [   ] hallucinations   [   ]agitation   [   ] delusion   [   ]depression Constitutional:    [ x  ] WNL           [   ] poor appetite   [   ] insomnia   [   ] tired   Cardio:                [ x  ] WNL           [   ] CP   [   ] SANTAMARIA   [   ] palpitations               Resp:                   [x   ] WNL           [   ] SOB   [   ] cough   [   ] wheezing   GI:                        [  x ] WNL           [   ] constipation   [   ] diarrhea   [   ] abdominal pain   [   ] nausea   [   ] emesis                                :                      [   ] WNL           [   ] HARMAN  [ x  ] dysuria   [   ] difficulty voiding             Endo:                   [ x  ] WNL          [   ] polyuria   [   ] temperature intolerance                 Skin:                     [   ] WNL          [ x  ] pain: left ribs   [   ] wound   [   ] rash   MSK:                    [ x  ] WNL          [   ] muscle pain   [   ] joint pain/ stiffness   [   ] muscle tenderness   [   ] swelling   Neuro:                 [  x ] WNL          [   ] HA   [   ] change in vision   [   ] tremor   [   ] weakness   [   ]dysphagia              Cognitive:           [  x ] WNL           [   ]confusion      Psych:                  [ x  ] WNL           [   ] hallucinations   [   ]agitation   [   ] delusion   [   ]depression

## 2024-07-03 NOTE — H&P ADULT - NSICDXFAMILYHX_GEN_ALL_CORE_FT
FAMILY HISTORY:  CVA (cerebral vascular accident), hemorrhage    Mother  Still living? Unknown  Family history of colon cancer in mother, Age at diagnosis: Age Unknown    Grandparent  Still living? No  Family history of colon cancer in mother, Age at diagnosis: Age Unknown
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
## 2024-07-03 NOTE — H&P ADULT - NSHPPHYSICALEXAM_GEN_ALL_CORE
PHYSICAL EXAMINATION   VItals: T(C): 36.6 (07-03-24 @ 08:22), Max: 37 (07-02-24 @ 20:17)  HR: 60 (07-03-24 @ 08:22) (59 - 93)  BP: 109/51 (07-03-24 @ 08:22) (109/51 - 148/66)  RR: 18 (07-03-24 @ 08:22) (18 - 18)  SpO2: 95% (07-03-24 @ 08:22) (94% - 98%)    General:[  x ] NAD, Resting Comfortable,   [   ] other: Hard of hearing                               HEENT: [ x  ] NC/AT, EOMI, PERRL , Normal Conjunctivae,   [   ] other:  Cardio: [ x  ] RRR, no murmer,   [   ] other:                              Pulm: [  x ] No Respiratory Distress,  Lungs CTAB,   [   ] other:                       Abdomen: [  x ]ND/NT, Soft,   [   ] other:    : [ x  ] NO HARMAN CATHETER, [   ] HARMAN CATHETER- no meatal tear, no discharge, [   ] other:                                            MSK: [   ] No joint swelling, Full ROM,   [ x ] other: TTP L Rib cage                                         Ext: [ x  ]No C/C/E, No calf tenderness,   [   ]other:    Skin: [ x  ]intact,   [   ] other:                                                                   Neurological Examination:  Cognitive: [  x  ] AAO x 3,   [    ]  other:                                                                      Attention:  [ x   ] intact,   [    ]  other:                            Memory: [ x   ] intact,    [    ]  other:     Mood/Affect: [  x  ] wnl,    [    ]  other:                                                                             Communication: [  x  ]Fluent, no dysarthria, following commands:  [    ] other:   CN II - XII:  [  x  ] intact,  [    ] other:                                                                                        Motor:   RIGHT UE: [  x ] 5/5 throughout  LEFT    UE: SA 4/5, EE/EF 4+/5, FG 5/5  RIGHT LE: HF 4-/5, KE 4+/5, DF/PF 5/5  LEFT    LE: HF 4-/5, KE 4+/5, DF/PF 5/5    Tone: [  X  ] wnl,   [    ]  other:  DTRs: [  X ]symmetric, [   ] other:  Coordination:   [  X  ] intact,   [    ] other:                                                                           Sensory: [  X  ] Intact to light touch,   [    ] other: PHYSICAL EXAMINATION   VItals: T(C): 36.6 (07-03-24 @ 08:22), Max: 37 (07-02-24 @ 20:17)  HR: 60 (07-03-24 @ 08:22) (59 - 93)  BP: 109/51 (07-03-24 @ 08:22) (109/51 - 148/66)  RR: 18 (07-03-24 @ 08:22) (18 - 18)  SpO2: 95% (07-03-24 @ 08:22) (94% - 98%)    General:[  x ] NAD, Resting Comfortable,   [   ] other: Hard of hearing                               HEENT: [ x  ] NC/AT, EOMI, PERRL , Normal Conjunctivae,   [   ] other:  Cardio: [ x  ] RRR, no murmur,   [   ] other:                              Pulm: [  x ] No Respiratory Distress,  Lungs CTAB,   [   ] other:                       Abdomen: [  x ]ND/NT, Soft,   [   ] other:    : [ x  ] NO HARMAN CATHETER, [   ] HARMAN CATHETER- no meatal tear, no discharge, [   ] other:                                            MSK: [   ] No joint swelling, Full ROM,   [ x ] other: TTP Left Rib cage                                         Ext: [ x  ]No C/C/E, No calf tenderness,   [  X ]other:  bilateral hands with intrinsic muscle atrophy  Skin: [ x  ]intact,   [   ] other:                                                                   Neurological Examination:  Cognitive: [  x  ] A&O x 3,   [    ]  other:                                                                      Attention:  [ x   ] intact,   [    ]  other:                            Memory: [ x   ] intact,    [    ]  other:     Mood/Affect: [  x  ] wnl,    [    ]  other:                                                                             Communication: [  x  ]Fluent, no dysarthria, following commands:  [    ] other:   CN II - XII:  [  x  ] intact,  [    ] other:                                                                                        Motor:   RIGHT UE: [  x ] 5/5 throughout  LEFT    UE: SA 4/5, EE/EF 4+/5, FG 5/5  RIGHT LE: HF 4-/5, KE 4+/5, DF/PF 5/5  LEFT    LE: HF 4-/5, KE 4+/5, DF/PF 5/5    Tone: [  X  ] wnl,   [    ]  other:  DTRs: [  X ]symmetric, [   ] other:  Coordination:   [  X  ] intact,   [    ] other:                                                                           Sensory: [  X  ] Intact to light touch,   [    ] other:

## 2024-07-03 NOTE — H&P ADULT - ATTENDING COMMENTS
I saw and examined the patient with the rehab resident. We discussed the case. I read and edited the note. I agree with the findings.    Rehab for multitrauma with head trauma but no LOC, right Sylvian fissure SAH, L2 and L4 transverse process fractures,  left posterior 6, 9-11th rib fractures    #Multitrauma with head trauma but no LOC   #Right Sylvian Fissure SAH   #Left L2 and L4 Transverse Process Fx   #Left Posterior 6, 9-11th Rib Fx's  #Trace left pleural effusion   No neurosurgical intervention   Cleared for chemical DVT ppx   Repeat CTH in 4wks oupt   Outpt follow up with Neurosurgery Dr Hood in 4 weeks with CTH prior to appointment   Neurosurgery has signed off   Pain control  (Tylenol 975 mg q6 hours, gabapentin 100 mg q8 hours, Robaxin 500 mg q8 hours, oxycodone 5 mg q8 hours PRN, lidocaine patch)   Encourage IS      #HTN   #pacemaker/AICD in place (2017)   #h/o CAD s/p PCI with stents (x4 to LAD), CABG x1 vessel (4-5 years ago)   #HFrEF   - continue home metoprolol 12.5 mg q12 hours   - continue home Entresto 49 mg-51 mg (previously confirmed with his cardiologist Dr. Miguel Fowler)   - Patient does not take Lasix, metolazone, or ivabridine   - HOLDING home Imdur (per patient, takes 30 mg qd)   - HOLDING home ranolazine, restart as needed     #Atrial fibrillation   - continue home amiodarone 200 mg qd     #Trigeminal neuralgia   - restarted home oxcarbazepine (150 mg q12)     #h/o CKD   - baseline creatinine 1.7-1.9     #DM   ISS   FS AC/HS      # Infrequent episodes of food regurgitation , symptoms not consistent with typical esophageal dysphagia   -  Pt was screened for dysphagia by S&S team on 7/2/2024  as he had an episode of water regurgitation earlier during the day. Pt again had 1 min postprandial regurgitation. Pt reports a similar episode 2 months ago when he was having dinner and then he regurgitated food.   - If symptoms persist will obtain barium esophogram     -Multimodal pain control  -GI/Bowel Mgmt: Miralax/senna    - Diet: puree DASH diet    Precautions / PROPHYLAXIS:    - Falls  - Aspiration    - DVT prophylaxis: heparin    MEDICAL PROGNOSIS: GOOD            REHAB POTENTIAL: GOOD             ESTIMATED DISPOSITION: HOME WITH HOME CARE       [ x ]  The goals of the IRF admission were discussed with the patient who understood and agreed             ELOS:  [ x   ] 7-14    [    ]  14-21    [    ]  Other    THERAPY ORDERS and INITIAL INDIVIDUALIZED PLAN OF CARE:  This initial individualized interdisciplinary plan of care, which was established by me (the attending physiatrist), is based on elements from the post admission evaluation. The interdisciplinary therapy program is to be at least 3 hrs a day, at least 5 days per week from from physical, occupational and/ or speech therapies as ordered by me below.    [ x  ] P.T. 90 mins. /day at least 5 out of 7 days:  [  x ] superficial  modalities prn, [ x  ] A/AAROM, [ x  ] PREs, [ x  ] transfer training,         [ x  ] progressive ambulation, [x   ] stairs                                             [ x  ] O.T. 90 mins. /day at least 5 out of 7 days::  [ x  ] modalities prn,  [ x  ]A/AAROM, [ x  ] PREs, [  x ] functional transfer training, [ x  ] ADL training         [   ] cognitive/ perceptual eval and training, [   ] splint eval                                         [ x  ] S.L.P:  [x   ] speech eval, [x   ] swallow eval   [  x ] Neuropsychology eval  [ x  ] Individualized rec. therapy      RATIONALE FOR INPATIENT ADMISSION - Patient demonstrates the following: (check all that apply)  [X] Medically appropriate for acute rehabilitation admission. Requires interdisciplinary therapy consisting of at least PT and OT, at least 3 hrs. a day at least 5 days a week  [X] Has attainable rehab goals with an appropriate initial discharge plan  [X] Has rehabilitation potential (expected to make a significant improvement within a reasonable period of time)  [X] Requires close medical management by a rehab physician, rehab nursing care,  and comprehensive interdisciplinary team (including PT, OT)    [X] Requires evaluation by a physiatrist at least 3 days a week to evaluate and manage and coordinate rehab and medical problems

## 2024-07-03 NOTE — PATIENT PROFILE ADULT - FALL HARM RISK - HARM RISK INTERVENTIONS

## 2024-07-03 NOTE — H&P ADULT - NSHPSOCIALHISTORY_GEN_ALL_CORE
LS: Lives with his wife in a private house with 5 JEN   PLOF: Independent with ADLs, ambulated independently   CLOF: Transfers Jessi, ambulated 50’x3 CG, UBD/LBD Jessi LS: Lives with his wife in a private house with 5 JEN   PLOF: Independent with ADLs, ambulated independently   CLOF: Transfers with min A, ambulated 50’x3 with RW and CG, UBD/LBD Jessi LS: Lives with his wife in a private house with 5 JEN   PLOF: Independent with ADLs, ambulated independently   CLOF: Transfers with min A, ambulated 50’ x 3 with RW and CG, UBD/LBD with min A

## 2024-07-03 NOTE — H&P ADULT - NSHPLABSRESULTS_GEN_ALL_CORE
10.7   7.11  )-----------( 143      ( 02 Jul 2024 22:23 )             32.5     07-02    136  |  102  |  32<H>  ----------------------------<  109<H>  4.6   |  22  |  1.8<H>    Ca    8.7      02 Jul 2024 22:23  Phos  2.9     07-02  Mg     2.0     07-02        Urinalysis Basic - ( 02 Jul 2024 22:23 )    Color: x / Appearance: x / SG: x / pH: x  Gluc: 109 mg/dL / Ketone: x  / Bili: x / Urobili: x   Blood: x / Protein: x / Nitrite: x   Leuk Esterase: x / RBC: x / WBC x   Sq Epi: x / Non Sq Epi: x / Bacteria: x      POCT Blood Glucose.: 118 mg/dL (07-03-24 @ 11:32)  POCT Blood Glucose.: 108 mg/dL (07-03-24 @ 08:14)  POCT Blood Glucose.: 145 mg/dL (07-02-24 @ 20:51)  POCT Blood Glucose.: 199 mg/dL (07-02-24 @ 18:04)  POCT Blood Glucose.: 112 mg/dL (07-02-24 @ 11:11)  POCT Blood Glucose.: 121 mg/dL (07-02-24 @ 05:39)  POCT Blood Glucose.: 133 mg/dL (07-01-24 @ 21:42) < end of copied text >    < from: CT Chest w/ IV Cont (07.01.24 @ 11:27) >    ACC: [id number] EXAM:  CT CHEST IC   ORDERED BY: KATIE LIU     PROCEDURE DATE:  07/01/2024      < end of copied text >    < from: CT Chest w/ IV Cont (07.01.24 @ 11:27) >    IMPRESSION:    Minimally displaced left L2 and L4 transverse process fractures.    Displaced left posterior sixth rib fracture. Nondisplaced left posterior   ninth and 11th, and likely eighth rib fractures. No pneumothorax.    Increased density within the right renal pelvis may reflect hematuria   however an underlying lesion is not excluded. No hydronephrosis.    Trace left pleural effusion.    Additional findings are detailed in the body of the report.    Dr. Arzola spoke to Dr. Cm at 12:03 PM on 7/1/2024 regarding   findings with read back.      < end of copied text >    < from: CT Chest w/ IV Cont (07.01.24 @ 11:27) >    --- End of Report ---    < end of copied text >

## 2024-07-03 NOTE — H&P ADULT - ASSESSMENT
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 CT scan demonstrated SAH and multiple L rib Fx.     #Multitrauma with head trauma but no LOC   #R Sylvian Fissure SAH   #L L2-L4 Transverse Process Fx   #L Posterior 6, 9-11 Rib Fx   #Trace left pleural effusion   No neurosurgical intervention   Cleared for chemical DVT ppx   Repeat CTH in x4wks oupt   Outpt follow up with Neurosurgery Dr Hood x4 weeks with CTH prior to appointment   Nsgx has signed off   Pain control  (tylenol 975mg q6 hours, gabapentin 100mg q8 hours, robaxin 500mg q8 hours, oxycodone 5mg q8 hours PRN, lidocaine patch)   Encourage IS      #HTN   #pacemaker in place (2017)   #h/o CAD s/p PCI with stents (x4 to LAD), CABG x1 vessel (4-5 years ago)   #HFrEF   - continue home metoprolol 12.5mg q12 hours   - continue home entresto 49mg-51mg (previously confirmed with his cardiologist Dr. Miguel Fowler)   - Patient does not take lasix, metolazone, or ivabridine   - HOLDING home imdur (per patient, takes 30mg qd)   - HOLDING home ranolazine, restart as needed     #Atrial fibrillation   - continue home amiodarone 200mg qd     #Trigeminal neuralgia   - restarted home oxcarbazepine (150mg q12)     #h/o CKD   - baseline creatinine 1.7-1.9     #DM   ISS   FS AC/HS      # Infrequent episodes of food regurgitation , symptoms not consistent with typical esophageal dysphagia   -  Pt was screening for dysphagia by S&S team on 7/2  as he had an episode of water regurgitation earlier during the day. Pt again had 1 min postprandial regurgitation. Pt reports a similar episode 2 months ago when he was having dinner and then he regurgitated food.   - if symptoms persist will obtain barium esophogram     -Multimodal pain control  -GI/Bowel Mgmt: miralax/senna    - Diet: full liquids    Precautions / PROPHYLAXIS:    - Falls  - Aspiration    - DVT prophylaxis: heparin    MEDICAL PROGNOSIS: GOOD            REHAB POTENTIAL: GOOD             ESTIMATED DISPOSITION: HOME WITH HOME CARE       [ x ]  The goals of the IRF admission were discussed with the patient and or family member, who agreed             ELOS:  [ x    ] 7-14    [    ]  14-21    [    ]  Other    THERAPY ORDERS and INITIAL INDIVIDUALIZED PLAN OF CARE:  This initial individualized interdisciplinary plan of care, which was established by me (the attending physiatrist), is based on elements from the post admission evaluation. The interdisciplinary therapy program is to be at least 3 hrs a day, at least 5 days per week from from physical, occupational and/ or speech therapies as ordered by me below.    [ x  ] P.T. 90 mins. /day at least 5 out of 7 days:  [  x ] superficial  modalities prn, [ x  ] A/AAROM, [ x  ] PREs, [ x  ] transfer training,         [ x  ] progressive ambulation, [x   ] stairs                                             [ x  ] O.T. 90 mins. /day at least 5 out of 7 days::  [ x  ] modalities prn,  [ x  ]A/AAROM, [ x  ] PREs, [  x ] functional transfer training, [ x  ] ADL training         [   ] cognitive/ perceptual eval and training, [   ] splint eval                                         [ x  ] S.L.P:  [x   ] speech eval, [x   ] swallow eval   [   ] Neuropsychology eval  [ x  ] Individualized rec. therapy      RATIONALE FOR INPATIENT ADMISSION - Patient demonstrates the following: (check all that apply)  [X] Medically appropriate for acute rehabilitation admission. Requires interdisiplinary therapy consisting of at least PT and OT, at least 3 hrs. a day at least 5 days a week  [X] Has attainable rehab goals with an appropriate initial discharge plan  [X] Has rehabilitation potential (expected to make a significant improvement within a reasonable period of time)  [X] Requires close medical management by a rehab physician, rehab nursing care,  and comprehensive interdisciplinary team (including PT, OT)    [X] Requires evaluation by a physiatrist at least 3 days a week to evaluate and manage and coordinate rehab and medical problems   ASSESSMENT/PLAN    Rehab of Multitrauma with head trauma but no LOC    89M with PMH of CAD s/p PCI, HFrEF s/p pacemaker, HTN, CKD, HLD, DM, diverticulitis with colovesicular fistula s/p partial colectomy with ileostomy, now reversed (2018, Ferzli), facial BCC and SCC s/p excision, and recent microvascular strokes present s/p mechanical fall (+HT, -LOC, -AC). CT scan demonstrated SAH and multiple L rib Fx.     #Multitrauma with head trauma but no LOC   #R Sylvian Fissure SAH   #L L2-L4 Transverse Process Fx   #L Posterior 6, 9-11 Rib Fx   #Trace left pleural effusion   No neurosurgical intervention   Cleared for chemical DVT ppx   Repeat [facility]TH in x4wks oupt   Outpt follow up with Neurosurgery Dr Hood x4 weeks with CTH prior to appointment   Nsgx has signed off   Pain control  (tylenol 975mg q6 hours, gabapentin 100mg q8 hours, robaxin 500mg q8 hours, oxycodone 5mg q8 hours PRN, lidocaine patch)   Encourage IS      #HTN   #pacemaker in place (2017)   #h/o CAD s/p PCI with stents (x4 to LAD), CABG x1 vessel (4-5 years ago)   #HFrEF   - continue home metoprolol 12.5mg q12 hours   - continue home entresto 49mg-51mg (previously confirmed with his cardiologist Dr. Miguel Fowler)   - Patient does not take lasix, metolazone, or ivabridine   - HOLDING home imdur (per patient, takes 30mg qd)   - HOLDING home ranolazine, restart as needed     #Atrial fibrillation   - continue home amiodarone 200mg qd     #Trigeminal neuralgia   - restarted home oxcarbazepine (150mg q12)     #h/o CKD   - baseline creatinine 1.7-1.9     #DM   ISS   FS AC/HS      # Infrequent episodes of food regurgitation , symptoms not consistent with typical esophageal dysphagia   -  Pt was screening for dysphagia by S&S team on 7/2  as he had an episode of water regurgitation earlier during the day. Pt again had 1 min postprandial regurgitation. Pt reports a similar episode 2 months ago when he was having dinner and then he regurgitated food.   - if symptoms persist will obtain barium esophogram     -Multimodal pain control  -GI/Bowel Mgmt: miralax/senna    - Diet: puree DASH diet    Precautions / PROPHYLAXIS:    - Falls  - Aspiration    - DVT prophylaxis: heparin    MEDICAL PROGNOSIS: GOOD            REHAB POTENTIAL: GOOD             ESTIMATED DISPOSITION: HOME WITH HOME CARE       [ x ]  The goals of the IRF admission were discussed with the patient and or family member, who agreed             ELOS:  [ x    ] 7-14    [    ]  14-21    [    ]  Other    THERAPY ORDERS and INITIAL INDIVIDUALIZED PLAN OF CARE:  This initial individualized interdisciplinary plan of care, which was established by me (the attending physiatrist), is based on elements from the post admission evaluation. The interdisciplinary therapy program is to be at least 3 hrs a day, at least 5 days per week from from physical, occupational and/ or speech therapies as ordered by me below.    [ x  ] P.T. 90 mins. /day at least 5 out of 7 days:  [  x ] superficial  modalities prn, [ x  ] A/AAROM, [ x  ] PREs, [ x  ] transfer training,         [ x  ] progressive ambulation, [x   ] stairs                                             [ x  ] O.T. 90 mins. /day at least 5 out of 7 days::  [ x  ] modalities prn,  [ x  ]A/AAROM, [ x  ] PREs, [  x ] functional transfer training, [ x  ] ADL training         [   ] cognitive/ perceptual eval and training, [   ] splint eval                                         [ x  ] S.L.P:  [x   ] speech eval, [x   ] swallow eval   [   ] Neuropsychology eval  [ x  ] Individualized rec. therapy      RATIONALE FOR INPATIENT ADMISSION - Patient demonstrates the following: (check all that apply)  [X] Medically appropriate for acute rehabilitation admission. Requires interdisiplinary therapy consisting of at least PT and OT, at least 3 hrs. a day at least 5 days a week  [X] Has attainable rehab goals with an appropriate initial discharge plan  [X] Has rehabilitation potential (expected to make a significant improvement within a reasonable period of time)  [X] Requires close medical management by a rehab physician, rehab nursing care,  and comprehensive interdisciplinary team (including PT, OT)    [X] Requires evaluation by a physiatrist at least 3 days a week to evaluate and manage and coordinate rehab and medical problems   ASSESSMENT/PLAN    Rehab of Multitrauma with head trauma but no LOC    89M with PMH of CAD s/p PCI, HFrEF s/p pacemaker, HTN, CKD, HLD, DM, diverticulitis with colovesicular fistula s/p partial colectomy with ileostomy, now reversed (2018, Ferzli), facial BCC and SCC s/p excision, and recent microvascular strokes present s/p mechanical fall (+HT, -LOC, -AC). CT scan demonstrated SAH and multiple L rib Fx.     #Multitrauma with head trauma but no LOC   #R Sylvian Fissure SAH   #L L2-L4 Transverse Process Fx   #L Posterior 6, 9-11 Rib Fx   #Trace left pleural effusion   No neurosurgical intervention   Cleared for chemical DVT ppx   Repeat CTH in x4wks oupt   Outpt follow up with Neurosurgery Dr Hood x4 weeks with CTH prior to appointment   Nsgx has signed off   Pain control  (tylenol 975mg q6 hours, gabapentin 100mg q8 hours, robaxin 500mg q8 hours, oxycodone 5mg q8 hours PRN, lidocaine patch)   Encourage IS      #HTN   #pacemaker in place (2017)   #h/o CAD s/p PCI with stents (x4 to LAD), CABG x1 vessel (4-5 years ago)   #HFrEF   - continue home metoprolol 12.5mg q12 hours   - continue home entresto 49mg-51mg (previously confirmed with his cardiologist Dr. Miguel Fowler)   - Patient does not take lasix, metolazone, or ivabridine   - HOLDING home imdur (per patient, takes 30mg qd)   - HOLDING home ranolazine, restart as needed     #Atrial fibrillation   - continue home amiodarone 200mg qd     #Trigeminal neuralgia   - restarted home oxcarbazepine (150mg q12)     #h/o CKD   - baseline creatinine 1.7-1.9     #DM   ISS   FS AC/HS      # Infrequent episodes of food regurgitation , symptoms not consistent with typical esophageal dysphagia   -  Pt was screening for dysphagia by S&S team on 7/2  as he had an episode of water regurgitation earlier during the day. Pt again had 1 min postprandial regurgitation. Pt reports a similar episode 2 months ago when he was having dinner and then he regurgitated food.   - if symptoms persist will obtain barium esophogram     -Multimodal pain control  -GI/Bowel Mgmt: miralax/senna    - Diet: puree DASH diet    Precautions / PROPHYLAXIS:    - Falls  - Aspiration    - DVT prophylaxis: heparin    MEDICAL PROGNOSIS: GOOD            REHAB POTENTIAL: GOOD             ESTIMATED DISPOSITION: HOME WITH HOME CARE       [ x ]  The goals of the IRF admission were discussed with the patient and or family member, who agreed             ELOS:  [ x    ] 7-14    [    ]  14-21    [    ]  Other    THERAPY ORDERS and INITIAL INDIVIDUALIZED PLAN OF CARE:  This initial individualized interdisciplinary plan of care, which was established by me (the attending physiatrist), is based on elements from the post admission evaluation. The interdisciplinary therapy program is to be at least 3 hrs a day, at least 5 days per week from from physical, occupational and/ or speech therapies as ordered by me below.    [ x  ] P.T. 90 mins. /day at least 5 out of 7 days:  [  x ] superficial  modalities prn, [ x  ] A/AAROM, [ x  ] PREs, [ x  ] transfer training,         [ x  ] progressive ambulation, [x   ] stairs                                             [ x  ] O.T. 90 mins. /day at least 5 out of 7 days::  [ x  ] modalities prn,  [ x  ]A/AAROM, [ x  ] PREs, [  x ] functional transfer training, [ x  ] ADL training         [   ] cognitive/ perceptual eval and training, [   ] splint eval                                         [ x  ] S.L.P:  [x   ] speech eval, [x   ] swallow eval   [  x ] Neuropsychology eval  [ x  ] Individualized rec. therapy      RATIONALE FOR INPATIENT ADMISSION - Patient demonstrates the following: (check all that apply)  [X] Medically appropriate for acute rehabilitation admission. Requires interdisiplinary therapy consisting of at least PT and OT, at least 3 hrs. a day at least 5 days a week  [X] Has attainable rehab goals with an appropriate initial discharge plan  [X] Has rehabilitation potential (expected to make a significant improvement within a reasonable period of time)  [X] Requires close medical management by a rehab physician, rehab nursing care,  and comprehensive interdisciplinary team (including PT, OT)    [X] Requires evaluation by a physiatrist at least 3 days a week to evaluate and manage and coordinate rehab and medical problems   ASSESSMENT/PLAN    Rehab for Multitrauma with head trauma but no LOC, Right Sylvian Fissure SAH, L2-L4 transverse process fractures,  left posterior 6, 9-11 rib fractures    89 y.o. right-handed M with PMH of CAD s/p PCI, HFrEF s/p pacemaker/AICD, HTN, CKD, HLD, DM, diverticulitis with colovesicular fistula s/p partial colectomy with ileostomy, now reversed (2018, Michelle), facial BCC and SCC s/p excision, and recent microvascular strokes present s/p mechanical fall (+HT, -LOC, -AC). CT scan and imaging studies demonstrated right Sylvian fissure SAH, left L2-L4 transverse process fractures, and left posterior 6, 9-11 rib fractures    #Multitrauma with head trauma but no LOC   #Right Sylvian Fissure SAH   #Left L2-L4 Transverse Process Fx   #Left Posterior 6, 9-11 Rib Fx's  #Trace left pleural effusion   No neurosurgical intervention   Cleared for chemical DVT ppx   Repeat [facility]TH in 4wks oupt   Outpt follow up with Neurosurgery Dr Hood in 4 weeks with CTH prior to appointment   Nsgx has signed off   Pain control  (Tylenol 975 mg q6 hours, gabapentin 100 mg q8 hours, Robaxin 500 mg q8 hours, oxycodone 5 mg q8 hours PRN, lidocaine patch)   Encourage IS      #HTN   #pacemaker/AICD in place (2017)   #h/o CAD s/p PCI with stents (x4 to LAD), CABG x1 vessel (4-5 years ago)   #HFrEF   - continue home metoprolol 12.5mg q12 hours   - continue home Entresto 49 mg-51 mg (previously confirmed with his cardiologist Dr. Miguel Fowler)   - Patient does not take Lasix, metolazone, or ivabridine   - HOLDING home Imdur (per patient, takes 30 mg qd)   - HOLDING home ranolazine, restart as needed     #Atrial fibrillation   - continue home amiodarone 200 mg qd     #Trigeminal neuralgia   - restarted home oxcarbazepine (150 mg q12)     #h/o CKD   - baseline creatinine 1.7-1.9     #DM   ISS   FS AC/HS      # Infrequent episodes of food regurgitation , symptoms not consistent with typical esophageal dysphagia   -  Pt was screening for dysphagia by S&S team on 7/2/2024  as he had an episode of water regurgitation earlier during the day. Pt again had 1 min postprandial regurgitation. Pt reports a similar episode 2 months ago when he was having dinner and then he regurgitated food.   - if symptoms persist will obtain barium esophogram     -Multimodal pain control  -GI/Bowel Mgmt: Miralax/senna    - Diet: puree DASH diet    Precautions / PROPHYLAXIS:    - Falls  - Aspiration    - DVT prophylaxis: heparin    MEDICAL PROGNOSIS: GOOD            REHAB POTENTIAL: GOOD             ESTIMATED DISPOSITION: HOME WITH HOME CARE       [ x ]  The goals of the IRF admission were discussed with the patient and or family member, who agreed             ELOS:  [ x    ] 7-14    [    ]  14-21    [    ]  Other    THERAPY ORDERS and INITIAL INDIVIDUALIZED PLAN OF CARE:  This initial individualized interdisciplinary plan of care, which was established by me (the attending physiatrist), is based on elements from the post admission evaluation. The interdisciplinary therapy program is to be at least 3 hrs a day, at least 5 days per week from from physical, occupational and/ or speech therapies as ordered by me below.    [ x  ] P.T. 90 mins. /day at least 5 out of 7 days:  [  x ] superficial  modalities prn, [ x  ] A/AAROM, [ x  ] PREs, [ x  ] transfer training,         [ x  ] progressive ambulation, [x   ] stairs                                             [ x  ] O.T. 90 mins. /day at least 5 out of 7 days::  [ x  ] modalities prn,  [ x  ]A/AAROM, [ x  ] PREs, [  x ] functional transfer training, [ x  ] ADL training         [   ] cognitive/ perceptual eval and training, [   ] splint eval                                         [ x  ] S.L.P:  [x   ] speech eval, [x   ] swallow eval   [  x ] Neuropsychology eval  [ x  ] Individualized rec. therapy      RATIONALE FOR INPATIENT ADMISSION - Patient demonstrates the following: (check all that apply)  [X] Medically appropriate for acute rehabilitation admission. Requires interdisciplinary therapy consisting of at least PT and OT, at least 3 hrs. a day at least 5 days a week  [X] Has attainable rehab goals with an appropriate initial discharge plan  [X] Has rehabilitation potential (expected to make a significant improvement within a reasonable period of time)  [X] Requires close medical management by a rehab physician, rehab nursing care,  and comprehensive interdisciplinary team (including PT, OT)    [X] Requires evaluation by a physiatrist at least 3 days a week to evaluate and manage and coordinate rehab and medical problems   ASSESSMENT/PLAN    Rehab for multitrauma with head trauma but no LOC, right Sylvian fissure SAH, L2 and L4 transverse process fractures,  left posterior 6, 9-11 rib fractures    89 y.o. right-handed M with PMH of CAD s/p PCI, HFrEF s/p pacemaker/AICD, HTN, CKD, HLD, DM, diverticulitis with colovesicular fistula s/p partial colectomy with ileostomy, now reversed (2018, Michelle), facial BCC and SCC s/p excision, and recent microvascular strokes present s/p mechanical fall (+HT, -LOC, -AC). CT scan and imaging studies demonstrated right Sylvian fissure SAH, left L2 and L4 transverse process fractures, and left posterior 6, 9-11 rib fractures    #Multitrauma with head trauma but no LOC   #Right Sylvian Fissure SAH   #Left L2 and L4 Transverse Process Fx   #Left Posterior 6, 9-11 Rib Fx's  #Trace left pleural effusion   No neurosurgical intervention   Cleared for chemical DVT ppx   Repeat CTH in 4wks oupt   Outpt follow up with Neurosurgery Dr Hood in 4 weeks with CTH prior to appointment   Nsgx has signed off   Pain control  (Tylenol 975 mg q6 hours, gabapentin 100 mg q8 hours, Robaxin 500 mg q8 hours, oxycodone 5 mg q8 hours PRN, lidocaine patch)   Encourage IS      #HTN   #pacemaker/AICD in place (2017)   #h/o CAD s/p PCI with stents (x4 to LAD), CABG x1 vessel (4-5 years ago)   #HFrEF   - continue home metoprolol 12.5 mg q12 hours   - continue home Entresto 49 mg-51 mg (previously confirmed with his cardiologist Dr. Miguel Fowler)   - Patient does not take Lasix, metolazone, or ivabridine   - HOLDING home Imdur (per patient, takes 30 mg qd)   - HOLDING home ranolazine, restart as needed     #Atrial fibrillation   - continue home amiodarone 200 mg qd     #Trigeminal neuralgia   - restarted home oxcarbazepine (150 mg q12)     #h/o CKD   - baseline creatinine 1.7-1.9     #DM   ISS   FS AC/HS      # Infrequent episodes of food regurgitation , symptoms not consistent with typical esophageal dysphagia   -  Pt was screening for dysphagia by S&S team on 7/2/2024  as he had an episode of water regurgitation earlier during the day. Pt again had 1 min postprandial regurgitation. Pt reports a similar episode 2 months ago when he was having dinner and then he regurgitated food.   - if symptoms persist will obtain barium esophogram     -Multimodal pain control  -GI/Bowel Mgmt: Miralax/senna    - Diet: puree DASH diet    Precautions / PROPHYLAXIS:    - Falls  - Aspiration    - DVT prophylaxis: heparin    MEDICAL PROGNOSIS: GOOD            REHAB POTENTIAL: GOOD             ESTIMATED DISPOSITION: HOME WITH HOME CARE       [ x ]  The goals of the IRF admission were discussed with the patient and or family member, who agreed             ELOS:  [ x    ] 7-14    [    ]  14-21    [    ]  Other    THERAPY ORDERS and INITIAL INDIVIDUALIZED PLAN OF CARE:  This initial individualized interdisciplinary plan of care, which was established by me (the attending physiatrist), is based on elements from the post admission evaluation. The interdisciplinary therapy program is to be at least 3 hrs a day, at least 5 days per week from from physical, occupational and/ or speech therapies as ordered by me below.    [ x  ] P.T. 90 mins. /day at least 5 out of 7 days:  [  x ] superficial  modalities prn, [ x  ] A/AAROM, [ x  ] PREs, [ x  ] transfer training,         [ x  ] progressive ambulation, [x   ] stairs                                             [ x  ] O.T. 90 mins. /day at least 5 out of 7 days::  [ x  ] modalities prn,  [ x  ]A/AAROM, [ x  ] PREs, [  x ] functional transfer training, [ x  ] ADL training         [   ] cognitive/ perceptual eval and training, [   ] splint eval                                         [ x  ] S.L.P:  [x   ] speech eval, [x   ] swallow eval   [  x ] Neuropsychology eval  [ x  ] Individualized rec. therapy      RATIONALE FOR INPATIENT ADMISSION - Patient demonstrates the following: (check all that apply)  [X] Medically appropriate for acute rehabilitation admission. Requires interdisciplinary therapy consisting of at least PT and OT, at least 3 hrs. a day at least 5 days a week  [X] Has attainable rehab goals with an appropriate initial discharge plan  [X] Has rehabilitation potential (expected to make a significant improvement within a reasonable period of time)  [X] Requires close medical management by a rehab physician, rehab nursing care,  and comprehensive interdisciplinary team (including PT, OT)    [X] Requires evaluation by a physiatrist at least 3 days a week to evaluate and manage and coordinate rehab and medical problems   ASSESSMENT/PLAN    Rehab for multitrauma with head trauma but no LOC, right Sylvian fissure SAH, L2 and L4 transverse process fractures,  left posterior 6, 9-11th rib fractures    89 y.o. 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 CT scan and imaging studies demonstrated right Sylvian fissure SAH, left L2 and L4 transverse process fractures, and left posterior 6, 9-11 rib fractures    #Multitrauma with head trauma but no LOC   #Right Sylvian Fissure SAH   #Left L2 and L4 Transverse Process Fx   #Left Posterior 6, 9-11 Rib Fx's  #Trace left pleural effusion   No neurosurgical intervention   Cleared for chemical DVT ppx   Repeat CTH in 4wks oupt   Outpt follow up with Neurosurgery Dr Hood in 4 weeks with CTH prior to appointment   Neurosurgery has signed off   Pain control  (Tylenol 975 mg q6 hours, gabapentin 100 mg q8 hours, Robaxin 500 mg q8 hours, oxycodone 5 mg q8 hours PRN, lidocaine patch)   Encourage IS      #HTN   #pacemaker/AICD in place (2017)   #h/o CAD s/p PCI with stents (x4 to LAD), CABG x1 vessel (4-5 years ago)   #HFrEF   - continue home metoprolol 12.5 mg q12 hours   - continue home Entresto 49 mg-51 mg (previously confirmed with his cardiologist Dr. Miguel Fowler)   - Patient does not take Lasix, metolazone, or ivabridine   - HOLDING home Imdur (per patient, takes 30 mg qd)   - HOLDING home ranolazine, restart as needed     #Atrial fibrillation   - continue home amiodarone 200 mg qd     #Trigeminal neuralgia   - restarted home oxcarbazepine (150 mg q12)     #h/o CKD   - baseline creatinine 1.7-1.9     #DM   ISS   FS AC/HS      # Infrequent episodes of food regurgitation , symptoms not consistent with typical esophageal dysphagia   -  Pt was screening for dysphagia by S&S team on 7/2/2024  as he had an episode of water regurgitation earlier during the day. Pt again had 1 min postprandial regurgitation. Pt reports a similar episode 2 months ago when he was having dinner and then he regurgitated food.   - If symptoms persist will obtain barium esophogram     -Multimodal pain control  -GI/Bowel Mgmt: Miralax/senna    - Diet: puree DASH diet    Precautions / PROPHYLAXIS:    - Falls  - Aspiration    - DVT prophylaxis: heparin    MEDICAL PROGNOSIS: GOOD            REHAB POTENTIAL: GOOD             ESTIMATED DISPOSITION: HOME WITH HOME CARE       [ x ]  The goals of the IRF admission were discussed with the patient who understood and agreed             ELOS:  [ x   ] 7-14    [    ]  14-21    [    ]  Other    THERAPY ORDERS and INITIAL INDIVIDUALIZED PLAN OF CARE:  This initial individualized interdisciplinary plan of care, which was established by me (the attending physiatrist), is based on elements from the post admission evaluation. The interdisciplinary therapy program is to be at least 3 hrs a day, at least 5 days per week from from physical, occupational and/ or speech therapies as ordered by me below.    [ x  ] P.T. 90 mins. /day at least 5 out of 7 days:  [  x ] superficial  modalities prn, [ x  ] A/AAROM, [ x  ] PREs, [ x  ] transfer training,         [ x  ] progressive ambulation, [x   ] stairs                                             [ x  ] O.T. 90 mins. /day at least 5 out of 7 days::  [ x  ] modalities prn,  [ x  ]A/AAROM, [ x  ] PREs, [  x ] functional transfer training, [ x  ] ADL training         [   ] cognitive/ perceptual eval and training, [   ] splint eval                                         [ x  ] S.L.P:  [x   ] speech eval, [x   ] swallow eval   [  x ] Neuropsychology eval  [ x  ] Individualized rec. therapy      RATIONALE FOR INPATIENT ADMISSION - Patient demonstrates the following: (check all that apply)  [X] Medically appropriate for acute rehabilitation admission. Requires interdisciplinary therapy consisting of at least PT and OT, at least 3 hrs. a day at least 5 days a week  [X] Has attainable rehab goals with an appropriate initial discharge plan  [X] Has rehabilitation potential (expected to make a significant improvement within a reasonable period of time)  [X] Requires close medical management by a rehab physician, rehab nursing care,  and comprehensive interdisciplinary team (including PT, OT)    [X] Requires evaluation by a physiatrist at least 3 days a week to evaluate and manage and coordinate rehab and medical problems   ASSESSMENT/PLAN    Rehab for multitrauma with head trauma but no LOC, right Sylvian fissure SAH, L2 and L4 transverse process fractures,  left posterior 6, 9-11th rib fractures    89 y.o. 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 CT scan and imaging studies demonstrated right Sylvian fissure SAH, left L2 and L4 transverse process fractures, and left posterior 6, 9-11th rib fractures    #Multitrauma with head trauma but no LOC   #Right Sylvian Fissure SAH   #Left L2 and L4 Transverse Process Fx   #Left Posterior 6, 9-11th Rib Fx's  #Trace left pleural effusion   No neurosurgical intervention   Cleared for chemical DVT ppx   Repeat CTH in 4wks oupt   Outpt follow up with Neurosurgery Dr Hood in 4 weeks with C[facility] prior to appointment   Neurosurgery has signed off   Pain control  (Tylenol 975 mg q6 hours, gabapentin 100 mg q8 hours, Robaxin 500 mg q8 hours, oxycodone 5 mg q8 hours PRN, lidocaine patch)   Encourage IS      #HTN   #pacemaker/AICD in place (2017)   #h/o CAD s/p PCI with stents (x4 to LAD), CABG x1 vessel (4-5 years ago)   #HFrEF   - continue home metoprolol 12.5 mg q12 hours   - continue home Entresto 49 mg-51 mg (previously confirmed with his cardiologist Dr. Miguel Fowler)   - Patient does not take Lasix, metolazone, or ivabridine   - HOLDING home Imdur (per patient, takes 30 mg qd)   - HOLDING home ranolazine, restart as needed     #Atrial fibrillation   - continue home amiodarone 200 mg qd     #Trigeminal neuralgia   - restarted home oxcarbazepine (150 mg q12)     #h/o CKD   - baseline creatinine 1.7-1.9     #DM   ISS   FS AC/HS      # Infrequent episodes of food regurgitation , symptoms not consistent with typical esophageal dysphagia   -  Pt was screened for dysphagia by S&S team on 7/2/2024  as he had an episode of water regurgitation earlier during the day. Pt again had 1 min postprandial regurgitation. Pt reports a similar episode 2 months ago when he was having dinner and then he regurgitated food.   - If symptoms persist will obtain barium esophogram     -Multimodal pain control  -GI/Bowel Mgmt: Miralax/senna    - Diet: puree DASH diet    Precautions / PROPHYLAXIS:    - Falls  - Aspiration    - DVT prophylaxis: heparin    MEDICAL PROGNOSIS: GOOD            REHAB POTENTIAL: GOOD             ESTIMATED DISPOSITION: HOME WITH HOME CARE       [ x ]  The goals of the IRF admission were discussed with the patient who understood and agreed             ELOS:  [ x   ] 7-14    [    ]  14-21    [    ]  Other    THERAPY ORDERS and INITIAL INDIVIDUALIZED PLAN OF CARE:  This initial individualized interdisciplinary plan of care, which was established by me (the attending physiatrist), is based on elements from the post admission evaluation. The interdisciplinary therapy program is to be at least 3 hrs a day, at least 5 days per week from from physical, occupational and/ or speech therapies as ordered by me below.    [ x  ] P.T. 90 mins. /day at least 5 out of 7 days:  [  x ] superficial  modalities prn, [ x  ] A/AAROM, [ x  ] PREs, [ x  ] transfer training,         [ x  ] progressive ambulation, [x   ] stairs                                             [ x  ] O.T. 90 mins. /day at least 5 out of 7 days::  [ x  ] modalities prn,  [ x  ]A/AAROM, [ x  ] PREs, [  x ] functional transfer training, [ x  ] ADL training         [   ] cognitive/ perceptual eval and training, [   ] splint eval                                         [ x  ] S.L.P:  [x   ] speech eval, [x   ] swallow eval   [  x ] Neuropsychology eval  [ x  ] Individualized rec. therapy      RATIONALE FOR INPATIENT ADMISSION - Patient demonstrates the following: (check all that apply)  [X] Medically appropriate for acute rehabilitation admission. Requires interdisciplinary therapy consisting of at least PT and OT, at least 3 hrs. a day at least 5 days a week  [X] Has attainable rehab goals with an appropriate initial discharge plan  [X] Has rehabilitation potential (expected to make a significant improvement within a reasonable period of time)  [X] Requires close medical management by a rehab physician, rehab nursing care,  and comprehensive interdisciplinary team (including PT, OT)    [X] Requires evaluation by a physiatrist at least 3 days a week to evaluate and manage and coordinate rehab and medical problems

## 2024-07-03 NOTE — H&P ADULT - HISTORY OF PRESENT ILLNESS
89 year old male with PMHx of CAD s/p PCI (x4 stents), CABG x1 vessel (4-5 years ago), afib, HFrEF s/p PPM (2017), HTN, CKD, HLD. 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 Patient was ambulating at home when he slipped and fell. He was able to ambulate after the incident but was experiencing persistent head and rib pain. Patient initially presented to [facility] for evaluation and was transferred to [facility] after CT scan demonstrated SAH and multiple L rib fractures. Patient had two repeat CT scans, both of which are stable. Hospital course was complicated by postprandial regurgitation.  Speech and swallow saw patient and said oropharyngeal swallow within functional limits with regurgitation within 1 minute. Patient was evaluated by GI and they rec speech and swallow evaluation if symptoms persist, will plan for barium esophogram after neuro and cardio clearance. Patient’s diet upgraded from clear liquids to full liquids 7/3. Patient medically stable for discharge to acute rehabilitation on 7/3/2024.     LS: Lives with his wife in a private house with 5 JEN   PLOF: Independent with ADLs, ambulated independently,   CLOF: Transfers Jessi, ambulated 50’x3 CG, UBD/LBD Jessi     During her stay, the patient was evaluated by physical and occupational therapy. Prior to admission, patient was independent with ambulation and ADLs. Patient was deemed a good acute rehab candidate due to decline in functional status and ability to carry out activities of daily living. Patient is able to tolerate 3 hours of therapy 5-6 days a week and is motivated to participate.        89 year old right-handed male with PMHx of CAD s/p PCI (x4 stents), CABG x1 vessel (4-5 years ago), AFib, HFrEF s/p PPM/AICD (2017), HTN, CKD, HLD. 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 Patient was ambulating at home when he slipped and fell. He was able to ambulate after the incident but was experiencing persistent head and rib pain. Patient initially presented to [facility] for evaluation and was transferred to [facility] after CT scan demonstrated SAH and multiple L rib fractures. Patient had two repeat CT scans, both of which are stable. Hospital course was complicated by postprandial regurgitation.  Speech and swallow saw patient and said oropharyngeal swallow within functional limits with regurgitation within 1 minute. Patient was evaluated by GI and they rec speech and swallow evaluation if symptoms persist, will plan for barium esophogram after neuro and cardio clearance. Patient’s diet upgraded from clear liquids to full liquids 7/3. Patient medically stable for discharge to acute rehabilitation on 7/3/2024.     LS: Lives with his wife in a private house with 5 JEN   PLOF: Independent with ADLs, ambulated independently,   CLOF: Transfers Jessi, ambulated 50’x3 CG, UBD/LBD Jessi     During her stay, the patient was evaluated by physical and occupational therapy. Prior to admission, patient was independent with ambulation and ADLs. Patient was deemed a good acute rehab candidate due to decline in functional status and ability to carry out activities of daily living. Patient is able to tolerate 3 hours of therapy 5-6 days a week and is motivated to participate.        89 year old right-handed,  male with PMHx of CAD s/p PCI (x4 stents), CABG x1 vessel (4-5 years ago), AFib, HFrEF s/p PPM/AICD (2017), HTN, CKD, HLD. 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 Patient was ambulating at home when he slipped and fell. He was able to ambulate after the incident but was experiencing persistent head and rib pain. Patient initially presented to [facility] for evaluation and was transferred to [facility] after CT scan demonstrated SAH and multiple L rib fractures. Patient had two repeat CT scans, both of which are stable. Hospital course was complicated by postprandial regurgitation.  Speech and swallow saw patient and said oropharyngeal swallow within functional limits with regurgitation within 1 minute. Patient was evaluated by GI and they recommended a  speech and swallow evaluation if symptoms persist, will plan for barium esophogram after neuro and cardio clearance. Patient’s diet was upgraded from clear liquids to full liquids 7/3/2024. Patient medically stable for discharge to acute rehabilitation on 7/3/2024.     LS: Lives with his wife in a private house with 5 JEN   PLOF: Independent with ADLs, ambulated independently,   CLOF: Transfers with  min A, ambulated 50’x3 with RW and  CG, UBD/LBD with min A     During her stay, the patient was evaluated by physical and occupational therapy. Prior to admission, patient was independent with ambulation and ADLs. Patient was deemed a good acute rehab candidate due to decline in functional status and ability to carry out activities of daily living. Patient is able to tolerate 3 hours of therapy 5-6 days a week and is motivated to participate.        89 year old right-handed,  male with PMHx of CAD s/p PCI (x4 stents), CABG x1 vessel (4-5 years ago), AFib, HFrEF s/p PPM/AICD (2017), HTN, CKD, HLD. 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 Patient was ambulating at home when he slipped and fell. He was able to ambulate after the incident but was experiencing persistent head and rib pain. Patient initially presented to [facility] for evaluation and was transferred to [facility] after CT scan demonstrated right Sylvian fissure SAH and multiple 6, 9-11th left rib fractures (and possible 8th rib fx). Patient had two repeat CT scans, both of which are stable. Hospital course was complicated by postprandial regurgitation.  Speech and swallow saw patient and said oropharyngeal swallow within functional limits with regurgitation within 1 minute. Patient was evaluated by GI and they recommended a  speech and swallow evaluation if symptoms persist, will plan for barium esophogram after neuro and cardio clearance. Patient’s diet was upgraded from clear liquids to full liquids 7/3/2024. Patient medically stable for discharge to acute rehabilitation on 7/3/2024.     LS: Lives with his wife in a private house with 5 JEN   PLOF: Independent with ADLs, ambulated independently,   CLOF: Transfers with  min A, ambulated 50’ x 3 with RW and  CG, UBD/LBD with min A     During her stay, the patient was evaluated by physical and occupational therapy. Prior to admission, patient was independent with ambulation and ADLs. Patient was deemed a good acute rehab candidate due to decline in functional status and ability to carry out activities of daily living. Patient is able to tolerate 3 hours of therapy 5-6 days a week and is motivated to participate.

## 2024-07-03 NOTE — H&P ADULT - NSICDXPASTSURGICALHX_GEN_ALL_CORE_FT
PAST SURGICAL HISTORY:  Artificial cardiac pacemaker     Basal cell carcinoma removal 07/18/2018    Elective surgery PCI with 2 stents    H/O ileostomy     History of coronary artery stent placement     History of eye surgery laser    History of partial colectomy

## 2024-07-04 LAB
ANION GAP SERPL CALC-SCNC: 10 MMOL/L — SIGNIFICANT CHANGE UP (ref 7–14)
APPEARANCE UR: CLEAR — SIGNIFICANT CHANGE UP
BACTERIA # UR AUTO: NEGATIVE /HPF — SIGNIFICANT CHANGE UP
BILIRUB UR-MCNC: NEGATIVE — SIGNIFICANT CHANGE UP
BUN SERPL-MCNC: 31 MG/DL — HIGH (ref 10–20)
CALCIUM SERPL-MCNC: 9.1 MG/DL — SIGNIFICANT CHANGE UP (ref 8.4–10.4)
CAST: 1 /LPF — SIGNIFICANT CHANGE UP (ref 0–4)
CHLORIDE SERPL-SCNC: 100 MMOL/L — SIGNIFICANT CHANGE UP (ref 98–110)
CO2 SERPL-SCNC: 26 MMOL/L — SIGNIFICANT CHANGE UP (ref 17–32)
COLOR SPEC: YELLOW — SIGNIFICANT CHANGE UP
CREAT SERPL-MCNC: 1.8 MG/DL — HIGH (ref 0.7–1.5)
DIFF PNL FLD: ABNORMAL
EGFR: 35 ML/MIN/1.73M2 — LOW
GLUCOSE BLDC GLUCOMTR-MCNC: 118 MG/DL — HIGH (ref 70–99)
GLUCOSE BLDC GLUCOMTR-MCNC: 119 MG/DL — HIGH (ref 70–99)
GLUCOSE BLDC GLUCOMTR-MCNC: 157 MG/DL — HIGH (ref 70–99)
GLUCOSE BLDC GLUCOMTR-MCNC: 173 MG/DL — HIGH (ref 70–99)
GLUCOSE SERPL-MCNC: 97 MG/DL — SIGNIFICANT CHANGE UP (ref 70–99)
GLUCOSE UR QL: 100 MG/DL
HCT VFR BLD CALC: 32.1 % — LOW (ref 42–52)
HGB BLD-MCNC: 10.4 G/DL — LOW (ref 14–18)
KETONES UR-MCNC: NEGATIVE MG/DL — SIGNIFICANT CHANGE UP
LEUKOCYTE ESTERASE UR-ACNC: NEGATIVE — SIGNIFICANT CHANGE UP
MCHC RBC-ENTMCNC: 30.3 PG — SIGNIFICANT CHANGE UP (ref 27–31)
MCHC RBC-ENTMCNC: 32.4 G/DL — SIGNIFICANT CHANGE UP (ref 32–37)
MCV RBC AUTO: 93.6 FL — SIGNIFICANT CHANGE UP (ref 80–94)
NITRITE UR-MCNC: NEGATIVE — SIGNIFICANT CHANGE UP
NRBC # BLD: 0 /100 WBCS — SIGNIFICANT CHANGE UP (ref 0–0)
PH UR: 6.5 — SIGNIFICANT CHANGE UP (ref 5–8)
PLATELET # BLD AUTO: 152 K/UL — SIGNIFICANT CHANGE UP (ref 130–400)
PMV BLD: 10.4 FL — SIGNIFICANT CHANGE UP (ref 7.4–10.4)
POTASSIUM SERPL-MCNC: 5.3 MMOL/L — HIGH (ref 3.5–5)
POTASSIUM SERPL-SCNC: 5.3 MMOL/L — HIGH (ref 3.5–5)
PROT UR-MCNC: 100 MG/DL
RBC # BLD: 3.43 M/UL — LOW (ref 4.7–6.1)
RBC # FLD: 14.8 % — HIGH (ref 11.5–14.5)
RBC CASTS # UR COMP ASSIST: 12 /HPF — HIGH (ref 0–4)
SODIUM SERPL-SCNC: 136 MMOL/L — SIGNIFICANT CHANGE UP (ref 135–146)
SP GR SPEC: 1.02 — SIGNIFICANT CHANGE UP (ref 1–1.03)
SQUAMOUS # UR AUTO: 1 /HPF — SIGNIFICANT CHANGE UP (ref 0–5)
UROBILINOGEN FLD QL: 1 MG/DL — SIGNIFICANT CHANGE UP (ref 0.2–1)
WBC # BLD: 5.77 K/UL — SIGNIFICANT CHANGE UP (ref 4.8–10.8)
WBC # FLD AUTO: 5.77 K/UL — SIGNIFICANT CHANGE UP (ref 4.8–10.8)
WBC UR QL: 0 /HPF — SIGNIFICANT CHANGE UP (ref 0–5)

## 2024-07-04 RX ORDER — INSULIN LISPRO 100 [IU]/ML
INJECTION, SOLUTION SUBCUTANEOUS
Refills: 0 | Status: DISCONTINUED | OUTPATIENT
Start: 2024-07-04 | End: 2024-07-05

## 2024-07-04 RX ORDER — BISACODYL 5 MG
10 TABLET, DELAYED RELEASE (ENTERIC COATED) ORAL AT BEDTIME
Refills: 0 | Status: DISCONTINUED | OUTPATIENT
Start: 2024-07-04 | End: 2024-07-12

## 2024-07-04 RX ORDER — PANTOPRAZOLE SODIUM 40 MG/10ML
40 INJECTION, POWDER, FOR SOLUTION INTRAVENOUS
Refills: 0 | Status: DISCONTINUED | OUTPATIENT
Start: 2024-07-04 | End: 2024-07-12

## 2024-07-04 RX ADMIN — SACUBITRIL AND VALSARTAN 1 TABLET(S): 97; 103 TABLET, FILM COATED ORAL at 18:04

## 2024-07-04 RX ADMIN — LIDOCAINE HCL 1 PATCH: 28 GEL TOPICAL at 07:07

## 2024-07-04 RX ADMIN — Medication 100 MILLIGRAM(S): at 21:09

## 2024-07-04 RX ADMIN — Medication 975 MILLIGRAM(S): at 18:04

## 2024-07-04 RX ADMIN — HEPARIN SODIUM 5000 UNIT(S): 50 INJECTION, SOLUTION INTRAVENOUS at 21:10

## 2024-07-04 RX ADMIN — Medication 1 APPLICATION(S): at 11:19

## 2024-07-04 RX ADMIN — Medication 500 MILLIGRAM(S): at 06:04

## 2024-07-04 RX ADMIN — Medication 100 MILLIGRAM(S): at 06:08

## 2024-07-04 RX ADMIN — Medication 12.5 MILLIGRAM(S): at 06:03

## 2024-07-04 RX ADMIN — OXCARBAZEPINE 150 MILLIGRAM(S): 600 TABLET, FILM COATED ORAL at 18:04

## 2024-07-04 RX ADMIN — LIDOCAINE HCL 1 PATCH: 28 GEL TOPICAL at 11:20

## 2024-07-04 RX ADMIN — OXCARBAZEPINE 150 MILLIGRAM(S): 600 TABLET, FILM COATED ORAL at 06:05

## 2024-07-04 RX ADMIN — LIDOCAINE HCL 1 PATCH: 28 GEL TOPICAL at 19:57

## 2024-07-04 RX ADMIN — Medication 975 MILLIGRAM(S): at 13:22

## 2024-07-04 RX ADMIN — Medication 975 MILLIGRAM(S): at 23:43

## 2024-07-04 RX ADMIN — TAMSULOSIN HYDROCHLORIDE 0.4 MILLIGRAM(S): 0.4 CAPSULE ORAL at 21:09

## 2024-07-04 RX ADMIN — Medication 975 MILLIGRAM(S): at 00:30

## 2024-07-04 RX ADMIN — AMIODARONE HYDROCHLORIDE 200 MILLIGRAM(S): 50 INJECTION, SOLUTION INTRAVENOUS at 06:03

## 2024-07-04 RX ADMIN — OXYCODONE HYDROCHLORIDE 5 MILLIGRAM(S): 100 SOLUTION ORAL at 02:19

## 2024-07-04 RX ADMIN — Medication 500 MILLIGRAM(S): at 13:22

## 2024-07-04 RX ADMIN — OXYCODONE HYDROCHLORIDE 5 MILLIGRAM(S): 100 SOLUTION ORAL at 11:18

## 2024-07-04 RX ADMIN — HEPARIN SODIUM 5000 UNIT(S): 50 INJECTION, SOLUTION INTRAVENOUS at 13:23

## 2024-07-04 RX ADMIN — Medication 975 MILLIGRAM(S): at 06:03

## 2024-07-04 RX ADMIN — SACUBITRIL AND VALSARTAN 1 TABLET(S): 97; 103 TABLET, FILM COATED ORAL at 06:06

## 2024-07-04 RX ADMIN — Medication 975 MILLIGRAM(S): at 19:56

## 2024-07-04 RX ADMIN — POLYETHYLENE GLYCOL 3350 17 GRAM(S): 1 POWDER ORAL at 11:18

## 2024-07-04 RX ADMIN — HEPARIN SODIUM 5000 UNIT(S): 50 INJECTION, SOLUTION INTRAVENOUS at 06:04

## 2024-07-04 RX ADMIN — Medication 975 MILLIGRAM(S): at 01:33

## 2024-07-04 RX ADMIN — OXYCODONE HYDROCHLORIDE 5 MILLIGRAM(S): 100 SOLUTION ORAL at 12:49

## 2024-07-04 RX ADMIN — Medication 975 MILLIGRAM(S): at 06:08

## 2024-07-04 RX ADMIN — ATORVASTATIN CALCIUM 40 MILLIGRAM(S): 20 TABLET, FILM COATED ORAL at 21:09

## 2024-07-04 RX ADMIN — Medication 975 MILLIGRAM(S): at 13:55

## 2024-07-04 RX ADMIN — Medication 100 MILLIGRAM(S): at 13:23

## 2024-07-04 RX ADMIN — Medication 500 MILLIGRAM(S): at 21:10

## 2024-07-04 RX ADMIN — Medication 2 TABLET(S): at 21:09

## 2024-07-04 NOTE — PROGRESS NOTE ADULT - SUBJECTIVE AND OBJECTIVE BOX
HPI: 89 year old right-handed,  male with PMHx of CAD s/p PCI (x4 stents), CABG x1 vessel (4-5 years ago), AFib, HFrEF s/p PPM/AICD (2017), HTN, CKD, HLD. 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 Patient was ambulating at home when he slipped and fell. He was able to ambulate after the incident but was experiencing persistent head and rib pain. Patient initially presented to [facility] for evaluation and was transferred to [facility] after CT scan demonstrated right Sylvian fissure SAH and multiple 6, 9-11th left rib fractures (and possible 8th rib fx). Patient had two repeat CT scans, both of which are stable. Hospital course was complicated by postprandial regurgitation.  Speech and swallow saw patient and said oropharyngeal swallow within functional limits with regurgitation within 1 minute. Patient was evaluated by GI and they recommended a  speech and swallow evaluation if symptoms persist, will plan for barium esophogram after neuro and cardio clearance. Patient’s diet was upgraded from clear liquids to full liquids 7/3/2024. Patient medically stable for discharge to acute rehabilitation on 7/3/2024.     LS: Lives with his wife in a private house with 5 JEN   PLOF: Independent with ADLs, ambulated independently,   CLOF: Transfers with  min A, ambulated 50’ x 3 with RW and  CG, UBD/LBD with min A     During her stay, the patient was evaluated by physical and occupational therapy. Prior to admission, patient was independent with ambulation and ADLs. Patient was deemed a good acute rehab candidate due to decline in functional status and ability to carry out activities of daily living. Patient is able to tolerate 3 hours of therapy 5-6 days a week and is motivated to participate.        Review of Systems:  Review of Systems: Constitutional:    [ x  ] WNL           [   ] poor appetite   [   ] insomnia   [   ] tired   Cardio:                [ x  ] WNL           [   ] CP   [   ] SANTAMARIA   [   ] palpitations               Resp:                   [x   ] WNL           [   ] SOB   [   ] cough   [   ] wheezing   GI:                        [  x ] WNL           [   ] constipation   [   ] diarrhea   [   ] abdominal pain   [   ] nausea   [   ] emesis                                :                      [   ] WNL           [   ] HARMAN  [ x  ] dysuria   [   ] difficulty voiding             Endo:                   [ x  ] WNL          [   ] polyuria   [   ] temperature intolerance                 Skin:                     [   ] WNL          [ x  ] pain: left ribs   [   ] wound   [   ] rash   MSK:                    [ x  ] WNL          [   ] muscle pain   [   ] joint pain/ stiffness   [   ] muscle tenderness   [   ] swelling   Neuro:                 [  x ] WNL          [   ] HA   [   ] change in vision   [   ] tremor   [   ] weakness   [   ]dysphagia              Cognitive:           [  x ] WNL           [   ]confusion      Psych:                  [ x  ] WNL           [   ] hallucinations   [   ]agitation   [   ] delusion   [   ]depression      Allergies and Intolerances:        Allergies:  	No Known Allergies:     Home Medications:   * Patient Currently Takes Medications as of 03-Jul-2024 11:37 documented in Structured Notes  · 	lidocaine 4% topical film: Apply topically to affected area once a day leave on for 12 hours, remove for 12 hours, wash hands after use.  · 	sacubitril-valsartan 49 mg-51 mg oral tablet: 1 tab(s) orally every 12 hours  · 	atorvastatin 40 mg oral tablet: 1 tab(s) orally once a day (at bedtime)  · 	isosorbide mononitrate 30 mg oral tablet, extended release: 1 tab(s) orally once a day  · 	amiodarone 200 mg oral tablet: 1 tab(s) orally once a day  · 	Metoprolol Succinate ER 25 mg oral tablet, extended release: 1 tab(s) orally once a day  · 	omega-3 polyunsaturated fatty acids 1200 mg oral capsule: 1 cap(s) orally 2 times a day  · 	gabapentin 300 mg oral tablet: 1 tab(s) orally once a day  · 	acarbose 25 mg oral tablet: 1 tab(s) orally 3 times a day  · 	tamsulosin 0.4 mg oral capsule: 1 cap(s) orally once a day (at bedtime)  · 	OXcarbazepine 150 mg oral tablet: 1 tab(s) orally every 12 hours  · 	ranolazine 500 mg oral tablet, extended release: 1 tab(s) orally every 12 hours    Patient History:   Past Medical, Past Surgical, and Family History:  PAST MEDICAL HISTORY:  Anemia slight    Asthma Mild only last attack yrs ago    CAD (coronary artery disease)     Diabetes     Eye problem with diabetes    Fistula of large intestine colo-vesicula fistula    GERD without esophagitis     H/O diverticulitis of colon     HLD (hyperlipidemia)     HTN (hypertension)     Recurrent UTI (urinary tract infection)     SOB (shortness of breath) on exertion     Stented coronary artery Proximal LAD 08/31/2016.     PAST SURGICAL HISTORY:  Artificial cardiac pacemaker     Basal cell carcinoma removal 07/18/2018    Elective surgery PCI with 2 stents    H/O ileostomy     History of coronary artery stent placement     History of eye surgery laser    History of partial colectomy.     FAMILY HISTORY:  CVA (cerebral vascular accident), hemorrhage    Mother  Still living? Unknown  Family history of colon cancer in mother, Age at diagnosis: Age Unknown    Grandparent  Still living? No  Family history of colon cancer in mother, Age at diagnosis: Age Unknown.    Social History:  · Substance use	No  · Social History (marital status, living situation, occupation, and sexual history)	LS: Lives with his wife in a private house with 5 JEN   PLOF: Independent with ADLs, ambulated independently   CLOF: Transfers with min A, ambulated 50’ x 3 with RW and CG, UBD/LBD with min A    Tobacco Screening:  · Core Measure Site	No  · Has the patient used tobacco in the past 30 days?	No    Risk Assessment:   Present on Admission:  Deep Venous Thrombosis	no  Pulmonary Embolus	no    HIV Screening:  · In accordance with [place] State law, we offer every patient who comes to our ED an HIV test. Would you like to be tested today?	Opt out    Hepatitis C Test Questions:  · In accordance with [place] State Law, we offer every patient a Hepatitis C test. Would you like to be tested today?	Opt out    Physical Exam:   Vital Signs Last 24 Hrs  T(C): 36.7 (04 Jul 2024 06:05), Max: 36.7 (04 Jul 2024 06:05)  T(F): 98.1 (04 Jul 2024 06:05), Max: 98.1 (04 Jul 2024 06:05)  HR: 60 (04 Jul 2024 06:05) (60 - 60)  BP: 130/61 (04 Jul 2024 06:05) (117/68 - 130/61)  BP(mean): 84 (04 Jul 2024 06:05) (84 - 85)  RR: 20 (04 Jul 2024 06:05) (19 - 20)  SpO2: 95% (03 Jul 2024 16:20) (95% - 95%)      General:[  x ] NAD, Resting Comfortable,   [   ] other: Hard of hearing                               HEENT: [ x  ] NC/AT, EOMI, PERRL , Normal Conjunctivae,   [   ] other:  Cardio: [ x  ] RRR, no murmur,   [   ] other:                              Pulm: [  x ] No Respiratory Distress,  Lungs CTAB,   [   ] other:                       Abdomen: [  x ]ND/NT, Soft,   [   ] other:    : [ x  ] NO HARMAN CATHETER, [   ] HARMAN CATHETER- no meatal tear, no discharge, [   ] other:                                            MSK: [   ] No joint swelling, Full ROM,   [ x ] other: TTP Left Rib cage                                         Ext: [ x  ]No C/C/E, No calf tenderness,   [  X ]other:  bilateral hands with intrinsic muscle atrophy  Skin: [ x  ]intact,   [   ] other:                                                                   Neurological Examination:  Cognitive: [  x  ] A&O x 3,   [    ]  other:                                                                      Attention:  [ x   ] intact,   [    ]  other:                            Memory: [ x   ] intact,    [    ]  other:     Mood/Affect: [  x  ] wnl,    [    ]  other:                                                                             Communication: [  x  ]Fluent, no dysarthria, following commands:  [    ] other:   CN II - XII:  [  x  ] intact,  [    ] other:                                                                                        Motor:   RIGHT UE: [  x ] 5/5 throughout  LEFT    UE: SA 4/5, EE/EF 4+/5, FG 5/5  RIGHT LE: HF 4-/5, KE 4+/5, DF/PF 5/5  LEFT    LE: HF 4-/5, KE 4+/5, DF/PF 5/5    Tone: [  X  ] wnl,   [    ]  other:  DTRs: [  X ]symmetric, [   ] other:  Coordination:   [  X  ] intact,   [    ] other:                                                                           Sensory: [  X  ] Intact to light touch,   [    ] other:      Labs and Results:  Labs, Radiology, Cardiology, and Other Results: < end of copied text >    < from: CT Chest w/ IV Cont (07.01.24 @ 11:27) >    ACC: [id number] EXAM:  CT CHEST IC   ORDERED BY: KATIE LIU     PROCEDURE DATE:  07/01/2024      < end of copied text >    < from: CT Chest w/ IV Cont (07.01.24 @ 11:27) >    IMPRESSION:    Minimally displaced left L2 and L4 transverse process fractures.    Displaced left posterior sixth rib fracture. Nondisplaced left posterior   ninth and 11th, and likely eighth rib fractures. No pneumothorax.    Increased density within the right renal pelvis may reflect hematuria   however an underlying lesion is not excluded. No hydronephrosis.    Trace left pleural effusion.    Additional findings are detailed in the body of the report.    Dr. Arzola spoke to Dr. Cm at 12:03 PM on 7/1/2024 regarding   findings with read back.      < end of copied text >    < from: CT Chest w/ IV Cont (07.01.24 @ 11:27) >    --- End of Report ---    < end of copied text >

## 2024-07-04 NOTE — PROGRESS NOTE ADULT - ASSESSMENT
ASSESSMENT/PLAN    Rehab for multitrauma with head trauma but no LOC, right Sylvian fissure SAH, L2 and L4 transverse process fractures,  left posterior 6, 9-11th rib fractures    89 y.o. 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 CT scan and imaging studies demonstrated right Sylvian fissure SAH, left L2 and L4 transverse process fractures, and left posterior 6, 9-11th rib fractures    #Multitrauma with head trauma but no LOC   #Right Sylvian Fissure SAH   #Left L2 and L4 Transverse Process Fx   #Left Posterior 6, 9-11th Rib Fx's  #Trace left pleural effusion   No neurosurgical intervention   Cleared for chemical DVT ppx   Repeat CTH in 4wks oupt   Outpt follow up with Neurosurgery Dr Hood in 4 weeks with CTH prior to appointment   Neurosurgery has signed off   Pain control  (Tylenol 975 mg q6 hours, gabapentin 100 mg q8 hours, Robaxin 500 mg q8 hours, oxycodone 5 mg q8 hours PRN, lidocaine patch)   Encourage IS      #HTN   #pacemaker/AICD in place (2017)   #h/o CAD s/p PCI with stents (x4 to LAD), CABG x1 vessel (4-5 years ago)   #HFrEF   - continue home metoprolol 12.5 mg q12 hours   - continue home Entresto 49 mg-51 mg (previously confirmed with his cardiologist Dr. Miguel Fowler)   - Patient does not take Lasix, metolazone, or ivabridine   - HOLDING home Imdur (per patient, takes 30 mg qd)   - HOLDING home ranolazine, restart as needed     #Atrial fibrillation   - continue home amiodarone 200 mg qd     #Trigeminal neuralgia   - restarted home oxcarbazepine (150 mg q12)     #h/o CKD   - baseline creatinine 1.7-1.9     #DM   ISS   FS AC/HS      # Infrequent episodes of food regurgitation , symptoms not consistent with typical esophageal dysphagia   -  Pt was screened for dysphagia by S&S team on 7/2/2024  as he had an episode of water regurgitation earlier during the day. Pt again had 1 min postprandial regurgitation. Pt reports a similar episode 2 months ago when he was having dinner and then he regurgitated food.   - If symptoms persist will obtain barium esophogram     -Multimodal pain control  -GI/Bowel Mgmt: Miralax/senna    - Diet: puree DASH diet    Precautions / PROPHYLAXIS:    - Falls  - Aspiration    - DVT prophylaxis: heparin

## 2024-07-04 NOTE — CHART NOTE - NSCHARTNOTEFT_GEN_A_CORE
Mukesh Kevin is a 89yo right-handed,  man with PMHx of CAD s/p PCI (x4 stents), CABG x1 vessel (4-5 years ago), AFib s/p Watchman , HFrEF s/p PPM/AICD (2017), HTN, CKD, HLD. 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 Martinez), facial BCC and SCC s/p excision, and recent microvascular strokes, who presented on 7/1/24 s/p mechanical fall (+HT, -LOC, -AC). He said he was carrying too many packages while on the steps and he fell backwards. He was able to ambulate after the incident but was experiencing persistent head and rib pain. He initially presented to [facility] for evaluation and was transferred to [facility] after CT scan demonstrated right Sylvian fissure SAH and multiple 6, 9-11th left rib fractures (and possible 8th rib fx). Patient had two repeat CT scans, both of which are stable. Hospital course was complicated by postprandial regurgitation.  Speech and swallow saw patient and said oropharyngeal swallow within functional limits with regurgitation within 1 minute. Patient was evaluated by GI and they recommended a  speech and swallow evaluation if symptoms persist, will plan for barium esophogram after neuro and cardio clearance. Patient’s diet was upgraded from clear liquids to full liquids 7/3/2024. Patient medically stable for discharge to acute rehabilitation on 7/3/2024.   When he was medically stable, he was evaluated by PM&R team and he was admitted to Rehab medicine service on 7/3/24.    LS: Lives with his wife in a private house with 5 JEN   PLOF: Independent with ADLs, ambulated independently,   CLOF: Transfers with  min A, ambulated 50’ x 3 with RW and  CG, UBD/LBD with min A     NO KNOWN ALLERGIES    Quit smoking 60 years ago    Home Medications:   * Patient Currently Takes Medications as of 03-Jul-2024 11:37 documented in Structured Notes  · 	lidocaine 4% topical film: Apply topically to affected area once a day leave on for 12 hours, remove for 12 hours, wash hands after use.  · 	sacubitril-valsartan 49 mg-51 mg oral tablet: 1 tab(s) orally every 12 hours  · 	atorvastatin 40 mg oral tablet: 1 tab(s) orally once a day (at bedtime)  · 	isosorbide mononitrate 30 mg oral tablet, extended release: 1 tab(s) orally once a day  · 	amiodarone 200 mg oral tablet: 1 tab(s) orally once a day  · 	Metoprolol Succinate ER 25 mg oral tablet, extended release: 1 tab(s) orally once a day  · 	omega-3 polyunsaturated fatty acids 1200 mg oral capsule: 1 cap(s) orally 2 times a day  · 	gabapentin 300 mg oral tablet: 1 tab(s) orally once a day  · 	acarbose 25 mg oral tablet: 1 tab(s) orally 3 times a day  · 	tamsulosin 0.4 mg oral capsule: 1 cap(s) orally once a day (at bedtime)  · 	OXcarbazepine 150 mg oral tablet: 1 tab(s) orally every 12 hours  · 	ranolazine 500 mg oral tablet, extended release: 1 tab(s) orally every 12 hours    MEDICATIONS  (STANDING):  acetaminophen     Tablet .. 975 milliGRAM(s) Oral every 6 hours  aMIOdarone    Tablet 200 milliGRAM(s) Oral daily  atorvastatin 40 milliGRAM(s) Oral at bedtime  chlorhexidine 2% Cloths 1 Application(s) Topical <User Schedule>  gabapentin 100 milliGRAM(s) Oral every 8 hours  heparin   Injectable 5000 Unit(s) SubCutaneous every 8 hours  insulin lispro (ADMELOG) corrective regimen sliding scale   SubCutaneous three times a day before meals  lidocaine   4% Patch 1 Patch Transdermal every 24 hours  methocarbamol 500 milliGRAM(s) Oral every 8 hours  metoprolol tartrate 12.5 milliGRAM(s) Oral every 12 hours  OXcarbazepine 150 milliGRAM(s) Oral every 12 hours  pantoprazole    Tablet 40 milliGRAM(s) Oral before breakfast  polyethylene glycol 3350 17 Gram(s) Oral daily  sacubitril 49 mG/valsartan 51 mG 1 Tablet(s) Oral every 12 hours  senna 2 Tablet(s) Oral at bedtime  tamsulosin 0.4 milliGRAM(s) Oral at bedtime    MEDICATIONS  (PRN):  bisacodyl 10 milliGRAM(s) Oral at bedtime PRN Constipation  oxyCODONE    IR 5 milliGRAM(s) Oral every 8 hours PRN Severe Pain (7 - 10)    Vital Signs Last 24 Hrs  T(C): 36.2 (04 Jul 2024 12:32), Max: 36.7 (04 Jul 2024 06:05)  T(F): 97.2 (04 Jul 2024 12:32), Max: 98.1 (04 Jul 2024 06:05)  HR: 68 (04 Jul 2024 12:32) (60 - 68)  BP: 116/62 (04 Jul 2024 12:32) (116/62 - 130/61)  BP(mean): 84 (04 Jul 2024 06:05) (84 - 85)  RR: 20 (04 Jul 2024 12:32) (19 - 20)  SpO2: 95% (03 Jul 2024 16:20) (95% - 95%)    PE the patient is alert, pleasant, oriented x 4, good historian,  has pain right lower posterior rib area with movement, otherwise NAD  Lungs are clear  Heart RR gr I/VI soft NOMAN at R and L 2nd ICS  Abdomen soft, bs+, non-tender  Extremities no CCE  CNS - no focal deficits      LABS:             10.4   5.77  )-----------( 152      ( 04 Jul 2024 07:21 )             32.1     07-04    136  |  100  |  31<H>  ----------------------------<  97   GFR = 35 (stable)  5.3<H>   |  26  |  1.8<H>    Ca    9.1      04 Jul 2024 07:21  Phos  2.9     07-02  Mg     2.0     07-02    < from: Transthoracic Echocardiogram (10.06.18 @ 06:43) >    Summary:   1. Mildly decreased global left ventricular systolic function.   2. LV Ejection Fraction by Warren's Method with a biplane EF of 47 %.   3. Spectral Doppler shows impaired relaxation pattern of left   ventricular myocardial filling (Grade I diastolic dysfunction).   4. Thickening and calcification of the anterior and posterior mitral   valve leaflets.   5. Mild-moderate tricuspid regurgitation.   6. Pulmonic valve regurgitation.   7. Estimated pulmonary arterysystolic pressure is 41.5 mmHg assuming a   right atrial pressure of 8 mmHg, which is consistent with mild pulmonary   hypertension.    < end of copied text >    < from: CT Head No Cont (07.02.24 @ 05:58) >    IMPRESSION:    Stable acute subarachnoid hemorrhages within the right central sulcus and   right sylvian fissure since the prior CT head from 7/1/2024.    Questionable focal patchy hypodensity in the right thalamus, not well   visualized on the prior exam could represent an age-indeterminate lacunar   infarct. Further evaluation with MRI can be considered if there is no   contraindication.    Stable mild chronic microvascular ischemic changes and scattered small   chronic infarcts.    --- End of Report ---    < end of copied text >    < from: Xray Chest 1 View- PORTABLE-Routine (Xray Chest 1 View- PORTABLE-Routine in AM.) (07.03.24 @ 06:37) >    Impression:    Left lung atelectasis. Support devices as described.    Rib fractures are better seen on CT scanning.    --- End of Report ---    < end of copied text >        ASSESSMENT/PLAN    Rehab for multitrauma with head trauma but no LOC, right Sylvian fissure SAH, L2 and L4 transverse process fractures,  left posterior 6, 9-11th rib fractures    #Multitrauma with head trauma but no LOC   #Right Sylvian Fissure SAH   #Left L2 and L4 Transverse Process Fx   #Left Posterior 6, 9-11th Rib Fx's  #Trace left pleural effusion   No neurosurgical intervention   Cleared for chemical DVT ppx   Repeat CTH in 4wks oupt   Outpt follow up with Neurosurgery Dr Hood in 4 weeks with CTH prior to appointment   Neurosurgery has signed off   Pain control  (Tylenol 975 mg q6 hours, gabapentin 100 mg q8 hours, Robaxin 500 mg q8 hours, oxycodone 5 mg q8 hours PRN, lidocaine patch)   Encourage IS      #HTN   #pacemaker/AICD in place (2017)   #h/o CAD s/p PCI with stents (x4 to LAD), CABG x1 vessel (4-5 years ago)   #HFrEF   - continue home metoprolol 12.5 mg q12 hours   - continue home Entresto 49 mg-51 mg (previously confirmed with his cardiologist Dr. Miguel Fowler)   - Patient does not take Lasix, metolazone, or ivabridine   - HOLDING home Imdur (per patient, takes 30 mg qd)   - HOLDING home ranolazine, restart as needed     #Atrial fibrillation s/p Watchman  - continue home amiodarone 200 mg qd     #Trigeminal neuralgia   - restarted home oxcarbazepine (150 mg q12)     #h/o CKD - stable  - baseline creatinine 1.7-1.9     #DM   ISS q AC  FS AC/HS    on acarbose at home    # Infrequent episodes of food regurgitation , symptoms not consistent with typical esophageal dysphagia   -  Pt was screened for dysphagia by S&S team on 7/2/2024  as he had an episode of water regurgitation earlier during the day. Pt again had 1 min postprandial regurgitation. Pt reports a similar episode 2 months ago when he was having dinner and then he regurgitated food.   - If symptoms persist will obtain barium esophogram     -Multimodal pain control  -GI/Bowel Mgmt: Miralax/senna    - Diet: puree DASH CHO consistent diet    Precautions / PROPHYLAXIS:    - Falls  - Aspiration    - DVT prophylaxis: heparin Mukesh Kevin is a 89yo right-handed,  man with PMHx of CAD s/p PCI (x4 stents), CABG x1 vessel (4-5 years ago), AFib s/p Watchman , HFrEF s/p PPM/AICD (2017), HTN, CKD, HLD. 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 Martinez), facial BCC and SCC s/p excision, and recent microvascular strokes, who presented on 7/1/24 s/p mechanical fall (+HT, -LOC, -AC). He said he was carrying too many packages while on the steps and he fell backwards. He was able to ambulate after the incident but was experiencing persistent head and rib pain. He initially presented to [facility] for evaluation and was transferred to [facility] after CT scan demonstrated right Sylvian fissure SAH and multiple 6, 9-11th left rib fractures (and possible 8th rib fx). Patient had two repeat CT scans, both of which are stable. Hospital course was complicated by postprandial regurgitation.  Speech and swallow saw patient and said oropharyngeal swallow within functional limits with regurgitation within 1 minute. Patient was evaluated by GI and they recommended a  speech and swallow evaluation if symptoms persist, will plan for barium esophogram after neuro and cardio clearance. Patient’s diet was upgraded from clear liquids to full liquids 7/3/2024. Patient medically stable for discharge to acute rehabilitation on 7/3/2024.   When he was medically stable, he was evaluated by PM&R team and he was admitted to Rehab medicine service on 7/3/24.    LS: Lives with his wife in a private house with 5 JEN   PLOF: Independent with ADLs, ambulated independently,   CLOF: Transfers with  min A, ambulated 50’ x 3 with RW and  CG, UBD/LBD with min A     NO KNOWN ALLERGIES    Quit smoking 60 years ago    Home Medications:   * Patient Currently Takes Medications as of 03-Jul-2024 11:37 documented in Structured Notes  · 	lidocaine 4% topical film: Apply topically to affected area once a day leave on for 12 hours, remove for 12 hours, wash hands after use.  · 	sacubitril-valsartan 49 mg-51 mg oral tablet: 1 tab(s) orally every 12 hours  · 	atorvastatin 40 mg oral tablet: 1 tab(s) orally once a day (at bedtime)  · 	isosorbide mononitrate 30 mg oral tablet, extended release: 1 tab(s) orally once a day  · 	amiodarone 200 mg oral tablet: 1 tab(s) orally once a day  · 	Metoprolol Succinate ER 25 mg oral tablet, extended release: 1 tab(s) orally once a day  · 	omega-3 polyunsaturated fatty acids 1200 mg oral capsule: 1 cap(s) orally 2 times a day  · 	gabapentin 300 mg oral tablet: 1 tab(s) orally once a day  · 	acarbose 25 mg oral tablet: 1 tab(s) orally 3 times a day  · 	tamsulosin 0.4 mg oral capsule: 1 cap(s) orally once a day (at bedtime)  · 	OXcarbazepine 150 mg oral tablet: 1 tab(s) orally every 12 hours  · 	ranolazine 500 mg oral tablet, extended release: 1 tab(s) orally every 12 hours    MEDICATIONS  (STANDING):  acetaminophen     Tablet .. 975 milliGRAM(s) Oral every 6 hours  aMIOdarone    Tablet 200 milliGRAM(s) Oral daily  atorvastatin 40 milliGRAM(s) Oral at bedtime  chlorhexidine 2% Cloths 1 Application(s) Topical <User Schedule>  gabapentin 100 milliGRAM(s) Oral every 8 hours  heparin   Injectable 5000 Unit(s) SubCutaneous every 8 hours  insulin lispro (ADMELOG) corrective regimen sliding scale   SubCutaneous three times a day before meals  lidocaine   4% Patch 1 Patch Transdermal every 24 hours  methocarbamol 500 milliGRAM(s) Oral every 8 hours  metoprolol tartrate 12.5 milliGRAM(s) Oral every 12 hours  OXcarbazepine 150 milliGRAM(s) Oral every 12 hours  pantoprazole    Tablet 40 milliGRAM(s) Oral before breakfast  polyethylene glycol 3350 17 Gram(s) Oral daily  sacubitril 49 mG/valsartan 51 mG 1 Tablet(s) Oral every 12 hours  senna 2 Tablet(s) Oral at bedtime  tamsulosin 0.4 milliGRAM(s) Oral at bedtime    MEDICATIONS  (PRN):  bisacodyl 10 milliGRAM(s) Oral at bedtime PRN Constipation  oxyCODONE    IR 5 milliGRAM(s) Oral every 8 hours PRN Severe Pain (7 - 10)    ROS - no bm x 3 days, not constipated at home          - mild dysuria, has hx recurrent UTI's    Vital Signs Last 24 Hrs  T(C): 36.2 (04 Jul 2024 12:32), Max: 36.7 (04 Jul 2024 06:05)  T(F): 97.2 (04 Jul 2024 12:32), Max: 98.1 (04 Jul 2024 06:05)  HR: 68 (04 Jul 2024 12:32) (60 - 68)  BP: 116/62 (04 Jul 2024 12:32) (116/62 - 130/61)  BP(mean): 84 (04 Jul 2024 06:05) (84 - 85)  RR: 20 (04 Jul 2024 12:32) (19 - 20)  SpO2: 95% (03 Jul 2024 16:20) (95% - 95%)    PE the patient is alert, pleasant, oriented x 4, good historian,  has pain right lower posterior rib area with movement, otherwise NAD  Lungs are clear  Heart RR gr I/VI soft NOMAN at R and L 2nd ICS  Abdomen soft, bs+, non-tender  Extremities no CCE  CNS - no focal deficits      LABS:             10.4   5.77  )-----------( 152      ( 04 Jul 2024 07:21 )             32.1     07-04    136  |  100  |  31<H>  ----------------------------<  97   GFR = 35 (stable)  5.3<H>   |  26  |  1.8<H>    Ca    9.1      04 Jul 2024 07:21  Phos  2.9     07-02  Mg     2.0     07-02    Urinalysis (07.01.24 @ 18:57)   Blood, Urine: Large  Glucose Qualitative, Urine: Negative mg/dL  pH Urine: 7.5  Color: Yellow  Urine Appearance: Clear  Bilirubin: Negative  Ketone - Urine: Negative mg/dL  Specific Gravity: 1.027  Protein, Urine: 100 mg/dL  Urobilinogen: 1.0 mg/dL  Nitrite: Negative  Leukocyte Esterase Concentration: Negative  Urine Microscopic-Add On (NC) (07.01.24 @ 18:57)   Cast: 0 /LPF  Epithelial Cells: 0 /HPF  Red Blood Cell - Urine: 313 /HPF  White Blood Cell - Urine: 1 /HPF  Bacteria: Negative /HPF    < from: CT Abdomen and Pelvis w/ IV Cont (07.01.24 @ 11:27) >      IMPRESSION:    Minimally displaced left L2 and L4 transverse process fractures.    Displaced left posterior sixth rib fracture. Nondisplaced left posterior   ninth and 11th, and likely eighth rib fractures. No pneumothorax.    No hydronephrosis.  **INCREASED DENSITY WITHIN THE RIGHT RENAL PELVIS MAY REFLECT HEMATURIA HOWEVER AN UNDERLYING  LESION IS NOT EXCLUDED.**    Trace left pleural effusion.    Additional findings are detailed in the body of the report.    Dr. Arzola spoke to Dr. Cm at 12:03 PM on 7/1/2024 regarding   findings with read back.    --- End of Report ---    < end of copied text >        < from: Transthoracic Echocardiogram (10.06.18 @ 06:43) >    Summary:   1. Mildly decreased global left ventricular systolic function.   2. LV Ejection Fraction by Warren's Method with a biplane EF of 47 %.   3. Spectral Doppler shows impaired relaxation pattern of left   ventricular myocardial filling (Grade I diastolic dysfunction).   4. Thickening and calcification of the anterior and posterior mitral   valve leaflets.   5. Mild-moderate tricuspid regurgitation.   6. Pulmonic valve regurgitation.   7. Estimated pulmonary artery systolic pressure is 41.5 mmHg assuming a   right atrial pressure of 8 mmHg, which is consistent with mild pulmonary   hypertension.    < end of copied text >    < from: CT Head No Cont (07.02.24 @ 05:58) >    IMPRESSION:    Stable acute subarachnoid hemorrhages within the right central sulcus and   right sylvian fissure since the prior CT head from 7/1/2024.    Questionable focal patchy hypodensity in the right thalamus, not well   visualized on the prior exam could represent an age-indeterminate lacunar   infarct. Further evaluation with MRI can be considered if there is no   contraindication.    Stable mild chronic microvascular ischemic changes and scattered small   chronic infarcts.    --- End of Report ---    < end of copied text >    < from: Xray Chest 1 View- PORTABLE-Routine (Xray Chest 1 View- PORTABLE-Routine in AM.) (07.03.24 @ 06:37) >    Impression:    Left lung atelectasis. Support devices as described.    Rib fractures are better seen on CT scanning.    --- End of Report ---    < end of copied text >        ASSESSMENT/PLAN    Rehab for multitrauma with head trauma but no LOC, right Sylvian fissure SAH, L2 and L4 transverse process fractures,  left posterior 6, 9-11th rib fractures    #Multitrauma with head trauma but no LOC   #Right Sylvian Fissure SAH   #Left L2 and L4 Transverse Process Fx   #Left Posterior 6, 9-11th Rib Fx's  #Trace left pleural effusion   No neurosurgical intervention   Cleared for chemical DVT ppx   Repeat CTH in 4wks oupt   Outpt follow up with Neurosurgery Dr Hood in 4 weeks with CTH prior to appointment   Neurosurgery has signed off   Pain control  (Tylenol 975 mg q6 hours, gabapentin 100 mg q8 hours, Robaxin 500 mg q8 hours, oxycodone 5 mg q8 hours PRN, lidocaine patch)   Encourage IS      #HTN   #pacemaker/AICD in place (2017)   #h/o CAD s/p PCI with stents (x4 to LAD), CABG x1 vessel (4-5 years ago)   #HFrEF   - continue home metoprolol 12.5 mg q12 hours   - continue home Entresto 49 mg-51 mg (previously confirmed with his cardiologist Dr. Miguel Fowler)   - Patient does not take Lasix, metolazone, or ivabridine   - HOLDING home Imdur (per patient, takes 30 mg qd)   - HOLDING home ranolazine, restart as needed     #Atrial fibrillation s/p Watchman  - continue home amiodarone 200 mg qd     #Trigeminal neuralgia   - restarted home oxcarbazepine (150 mg q12)     #h/o CKD - stable  - baseline creatinine 1.7-1.9   - K+=5.3 today, repeat BMP in AM tomorrow    #DM   ISS q AC  FS AC/HS    on acarbose at home    # Infrequent episodes of food regurgitation , symptoms not consistent with typical esophageal dysphagia   -  Pt was screened for dysphagia by S&S team on 7/2/2024  as he had an episode of water regurgitation earlier during the day. Pt again had 1 min postprandial regurgitation. Pt reports a similar episode 2 months ago when he was having dinner and then he regurgitated food.   - If symptoms persist will obtain barium esophogram     # HX recurrent UTI's/ hematuria on 1st UA/possible lesion R renal pelvis  - repeat UA and if + will get culture (c/o dysuria)  - if hematuria still present, will get US kidneys/bladder  - if any above findings are abnormal, will refer for  consult and will need out-patient follow up     -Multimodal pain control  -GI/Bowel Mgmt: Miralax/senna    - Diet: puree DASH CHO consistent diet    Precautions / PROPHYLAXIS:    - Falls  - Aspiration    - DVT prophylaxis: heparin    ** Repeat labs in Am tomorrow - including anemia w/u, A1C, vitamin D, BMP, TSH (on amiodarone)  ** Ordered prn dulcolax 10mg po q hs prn  for no BM x 3 days - he is already taking MiraLax and Senna - not usually constipated at home

## 2024-07-05 LAB
24R-OH-CALCIDIOL SERPL-MCNC: 46 NG/ML — SIGNIFICANT CHANGE UP (ref 30–80)
A1C WITH ESTIMATED AVERAGE GLUCOSE RESULT: 7.2 % — HIGH (ref 4–5.6)
ANION GAP SERPL CALC-SCNC: 11 MMOL/L — SIGNIFICANT CHANGE UP (ref 7–14)
BUN SERPL-MCNC: 34 MG/DL — HIGH (ref 10–20)
CALCIUM SERPL-MCNC: 9 MG/DL — SIGNIFICANT CHANGE UP (ref 8.4–10.5)
CHLORIDE SERPL-SCNC: 102 MMOL/L — SIGNIFICANT CHANGE UP (ref 98–110)
CO2 SERPL-SCNC: 22 MMOL/L — SIGNIFICANT CHANGE UP (ref 17–32)
CREAT SERPL-MCNC: 1.9 MG/DL — HIGH (ref 0.7–1.5)
EGFR: 33 ML/MIN/1.73M2 — LOW
ESTIMATED AVERAGE GLUCOSE: 160 MG/DL — HIGH (ref 68–114)
GLUCOSE BLDC GLUCOMTR-MCNC: 134 MG/DL — HIGH (ref 70–99)
GLUCOSE BLDC GLUCOMTR-MCNC: 134 MG/DL — HIGH (ref 70–99)
GLUCOSE BLDC GLUCOMTR-MCNC: 138 MG/DL — HIGH (ref 70–99)
GLUCOSE BLDC GLUCOMTR-MCNC: 168 MG/DL — HIGH (ref 70–99)
GLUCOSE SERPL-MCNC: 125 MG/DL — HIGH (ref 70–99)
IRON SATN MFR SERPL: 30 % — SIGNIFICANT CHANGE UP (ref 15–50)
IRON SATN MFR SERPL: 73 UG/DL — SIGNIFICANT CHANGE UP (ref 35–150)
POTASSIUM SERPL-MCNC: 4.2 MMOL/L — SIGNIFICANT CHANGE UP (ref 3.5–5)
POTASSIUM SERPL-SCNC: 4.2 MMOL/L — SIGNIFICANT CHANGE UP (ref 3.5–5)
SODIUM SERPL-SCNC: 135 MMOL/L — SIGNIFICANT CHANGE UP (ref 135–146)
TIBC SERPL-MCNC: 242 UG/DL — SIGNIFICANT CHANGE UP (ref 220–430)
TSH SERPL-MCNC: 4.68 UIU/ML — HIGH (ref 0.27–4.2)
UIBC SERPL-MCNC: 169 UG/DL — SIGNIFICANT CHANGE UP (ref 110–370)

## 2024-07-05 RX ORDER — GLUCAGON HYDROCHLORIDE 1 MG/ML
1 INJECTION, POWDER, FOR SOLUTION INTRAMUSCULAR; INTRAVENOUS; SUBCUTANEOUS ONCE
Refills: 0 | Status: DISCONTINUED | OUTPATIENT
Start: 2024-07-05 | End: 2024-07-12

## 2024-07-05 RX ORDER — DEXTROSE 30 % IN WATER 30 %
25 VIAL (ML) INTRAVENOUS ONCE
Refills: 0 | Status: DISCONTINUED | OUTPATIENT
Start: 2024-07-05 | End: 2024-07-10

## 2024-07-05 RX ORDER — BISACODYL 5 MG
10 TABLET, DELAYED RELEASE (ENTERIC COATED) ORAL ONCE
Refills: 0 | Status: COMPLETED | OUTPATIENT
Start: 2024-07-05 | End: 2024-07-05

## 2024-07-05 RX ORDER — INSULIN LISPRO 100 [IU]/ML
INJECTION, SOLUTION SUBCUTANEOUS
Refills: 0 | Status: DISCONTINUED | OUTPATIENT
Start: 2024-07-05 | End: 2024-07-10

## 2024-07-05 RX ADMIN — Medication 500 MILLIGRAM(S): at 15:48

## 2024-07-05 RX ADMIN — Medication 10 MILLIGRAM(S): at 15:49

## 2024-07-05 RX ADMIN — LIDOCAINE HCL 1 PATCH: 28 GEL TOPICAL at 19:34

## 2024-07-05 RX ADMIN — LIDOCAINE HCL 1 PATCH: 28 GEL TOPICAL at 08:10

## 2024-07-05 RX ADMIN — OXCARBAZEPINE 150 MILLIGRAM(S): 600 TABLET, FILM COATED ORAL at 17:54

## 2024-07-05 RX ADMIN — TAMSULOSIN HYDROCHLORIDE 0.4 MILLIGRAM(S): 0.4 CAPSULE ORAL at 22:04

## 2024-07-05 RX ADMIN — Medication 975 MILLIGRAM(S): at 12:22

## 2024-07-05 RX ADMIN — POLYETHYLENE GLYCOL 3350 17 GRAM(S): 1 POWDER ORAL at 12:22

## 2024-07-05 RX ADMIN — Medication 100 MILLIGRAM(S): at 05:31

## 2024-07-05 RX ADMIN — Medication 975 MILLIGRAM(S): at 00:35

## 2024-07-05 RX ADMIN — HEPARIN SODIUM 5000 UNIT(S): 50 INJECTION, SOLUTION INTRAVENOUS at 05:29

## 2024-07-05 RX ADMIN — PANTOPRAZOLE SODIUM 40 MILLIGRAM(S): 40 INJECTION, POWDER, FOR SOLUTION INTRAVENOUS at 06:18

## 2024-07-05 RX ADMIN — Medication 2 TABLET(S): at 22:04

## 2024-07-05 RX ADMIN — HEPARIN SODIUM 5000 UNIT(S): 50 INJECTION, SOLUTION INTRAVENOUS at 22:03

## 2024-07-05 RX ADMIN — Medication 975 MILLIGRAM(S): at 05:28

## 2024-07-05 RX ADMIN — Medication 975 MILLIGRAM(S): at 06:19

## 2024-07-05 RX ADMIN — Medication 975 MILLIGRAM(S): at 13:29

## 2024-07-05 RX ADMIN — OXCARBAZEPINE 150 MILLIGRAM(S): 600 TABLET, FILM COATED ORAL at 05:29

## 2024-07-05 RX ADMIN — Medication 100 MILLIGRAM(S): at 15:48

## 2024-07-05 RX ADMIN — Medication 12.5 MILLIGRAM(S): at 17:57

## 2024-07-05 RX ADMIN — LIDOCAINE HCL 1 PATCH: 28 GEL TOPICAL at 18:33

## 2024-07-05 RX ADMIN — Medication 975 MILLIGRAM(S): at 19:01

## 2024-07-05 RX ADMIN — Medication 100 MILLIGRAM(S): at 22:04

## 2024-07-05 RX ADMIN — ATORVASTATIN CALCIUM 40 MILLIGRAM(S): 20 TABLET, FILM COATED ORAL at 22:04

## 2024-07-05 RX ADMIN — SACUBITRIL AND VALSARTAN 1 TABLET(S): 97; 103 TABLET, FILM COATED ORAL at 17:54

## 2024-07-05 RX ADMIN — HEPARIN SODIUM 5000 UNIT(S): 50 INJECTION, SOLUTION INTRAVENOUS at 15:48

## 2024-07-05 RX ADMIN — AMIODARONE HYDROCHLORIDE 200 MILLIGRAM(S): 50 INJECTION, SOLUTION INTRAVENOUS at 05:29

## 2024-07-05 RX ADMIN — LIDOCAINE HCL 1 PATCH: 28 GEL TOPICAL at 06:20

## 2024-07-05 RX ADMIN — Medication 500 MILLIGRAM(S): at 05:29

## 2024-07-05 RX ADMIN — Medication 1 APPLICATION(S): at 05:30

## 2024-07-05 RX ADMIN — Medication 975 MILLIGRAM(S): at 17:53

## 2024-07-05 RX ADMIN — Medication 500 MILLIGRAM(S): at 22:04

## 2024-07-05 NOTE — PROGRESS NOTE ADULT - SUBJECTIVE AND OBJECTIVE BOX
Patient is a 89y old  Male who presents with a chief complaint of Rehab for multitrauma with head trauma but no LOC, right Sylvian fissure SAH, L2 and L4 transverse process fractures, left posterior 6, 9-11th rib fractures (04 Jul 2024 10:58)      HPI:       89 year old right-handed,  male with PMHx of CAD s/p PCI (x4 stents), CABG x1 vessel (4-5 years ago), AFib, HFrEF s/p PPM/AICD (2017), HTN, CKD, HLD. 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 Patient was ambulating at home when he slipped and fell. He was able to ambulate after the incident but was experiencing persistent head and rib pain. Patient initially presented to [facility] for evaluation and was transferred to [facility] after CT scan demonstrated right Sylvian fissure SAH and multiple 6, 9-11th left rib fractures (and possible 8th rib fx). Patient had two repeat CT scans, both of which are stable. Hospital course was complicated by postprandial regurgitation.  Speech and swallow saw patient and said oropharyngeal swallow within functional limits with regurgitation within 1 minute. Patient was evaluated by GI and they recommended a  speech and swallow evaluation if symptoms persist, will plan for barium esophogram after neuro and cardio clearance. Patient’s diet was upgraded from clear liquids to full liquids 7/3/2024. Patient medically stable for discharge to acute rehabilitation on 7/3/2024.     LS: Lives with his wife in a private house with 5 JEN   PLOF: Independent with ADLs, ambulated independently,   CLOF: Transfers with  min A, ambulated 50’ x 3 with RW and  CG, UBD/LBD with min A     During her stay, the patient was evaluated by physical and occupational therapy. Prior to admission, patient was independent with ambulation and ADLs. Patient was deemed a good acute rehab candidate due to decline in functional status and ability to carry out activities of daily living. Patient is able to tolerate 3 hours of therapy 5-6 days a week and is motivated to participate.   (03 Jul 2024 13:30)      I examined the patient and reviewed the chart. There have been no significant changes since my history and physical except where documented below.    TODAY'S SUBJECTIVE & REVIEW OF SYMPTOMS  Patient was seen and assessed at bedside.   No overnight events.   Patient denies any new complaints at this time.   Tolerating PT/OT.   Tolerating oral diet.   Voiding and passing stool spontaneously.   Vital signs reviewed.     Review of Systems: Constitutional:    [ x  ] WNL           [   ] poor appetite   [   ] insomnia   [   ] tired   Cardio:                [ x  ] WNL           [   ] CP   [   ] SANTAMARIA   [   ] palpitations               Resp:                   [x   ] WNL           [   ] SOB   [   ] cough   [   ] wheezing   GI:                        [  x ] WNL           [   ] constipation   [   ] diarrhea   [   ] abdominal pain   [   ] nausea   [   ] emesis                                :                      [   ] WNL           [   ] HARMAN  [ x  ] dysuria   [   ] difficulty voiding             Endo:                   [ x  ] WNL          [   ] polyuria   [   ] temperature intolerance                 Skin:                     [   ] WNL          [ x  ] pain: left ribs   [   ] wound   [   ] rash   MSK:                    [ x  ] WNL          [   ] muscle pain   [   ] joint pain/ stiffness   [   ] muscle tenderness   [   ] swelling   Neuro:                 [  x ] WNL          [   ] HA   [   ] change in vision   [   ] tremor   [   ] weakness   [   ]dysphagia              Cognitive:           [  x ] WNL           [   ]confusion      Psych:                  [ x  ] WNL           [   ] hallucinations   [   ]agitation   [   ] delusion   [   ]depression    CLOF: 50'x3 RW CG, Transfers Jessi, UBD Jessi, LBD Jessi    PHYSICAL EXAM    Vital Signs Last 24 Hrs  T(C): 36.4 (05 Jul 2024 13:41), Max: 36.6 (04 Jul 2024 19:55)  T(F): 97.5 (05 Jul 2024 13:41), Max: 97.8 (04 Jul 2024 19:55)  HR: 95 (05 Jul 2024 13:41) (59 - 95)  BP: 137/75 (05 Jul 2024 13:41) (114/63 - 137/75)  BP(mean): 86 (04 Jul 2024 19:55) (86 - 86)  RR: 18 (05 Jul 2024 13:41) (18 - 18)  SpO2: 96% (05 Jul 2024 05:00) (96% - 97%)    Parameters below as of 05 Jul 2024 05:00  Patient On (Oxygen Delivery Method): room air, MG    General:[  x ] NAD, Resting Comfortable,   [   ] other: Hard of hearing                               HEENT: [ x  ] NC/AT, EOMI, PERRL , Normal Conjunctivae,   [   ] other:  Cardio: [ x  ] RRR, no murmur,   [   ] other:                              Pulm: [  x ] No Respiratory Distress,  Lungs CTAB,   [   ] other:                       Abdomen: [  x ]ND/NT, Soft,   [   ] other:    : [ x  ] NO HARMAN CATHETER, [   ] HARMAN CATHETER- no meatal tear, no discharge, [   ] other:                                            MSK: [   ] No joint swelling, Full ROM,   [ x ] other: TTP Left Rib cage                                         Ext: [ x  ]No C/C/E, No calf tenderness,   [  X ]other:  bilateral hands with intrinsic muscle atrophy  Skin: [ x  ]intact,   [   ] other:                                                                   Neurological Examination:  Cognitive: [  x  ] A&O x 3,   [    ]  other:                                                                      Attention:  [ x   ] intact,   [    ]  other:                            Memory: [ x   ] intact,    [    ]  other:     Mood/Affect: [  x  ] wnl,    [    ]  other:                                                                             Communication: [  x  ]Fluent, no dysarthria, following commands:  [    ] other:   CN II - XII:  [  x  ] intact,  [    ] other:                                                                                        Motor:   RIGHT UE: [  x ] 5/5 throughout  LEFT    UE: SA 4/5, EE/EF 4+/5, FG 5/5  RIGHT LE: HF 4-/5, KE 4+/5, DF/PF 5/5  LEFT    LE: HF 4-/5, KE 4+/5, DF/PF 5/5    Tone: [  X  ] wnl,   [    ]  other:  DTRs: [  X ]symmetric, [   ] other:  Coordination:   [  X  ] intact,   [    ] other:                                                                           Sensory: [  X  ] Intact to light touch,   [    ] other:      acetaminophen     Tablet .. 975 milliGRAM(s) Oral every 6 hours  aMIOdarone    Tablet 200 milliGRAM(s) Oral daily  atorvastatin 40 milliGRAM(s) Oral at bedtime  bisacodyl 10 milliGRAM(s) Oral at bedtime PRN  chlorhexidine 2% Cloths 1 Application(s) Topical <User Schedule>  dextrose 50% Injectable 25 Gram(s) IV Push once  gabapentin 100 milliGRAM(s) Oral every 8 hours  glucagon  Injectable 1 milliGRAM(s) IntraMuscular once  heparin   Injectable 5000 Unit(s) SubCutaneous every 8 hours  insulin lispro (ADMELOG) corrective regimen sliding scale   SubCutaneous three times a day before meals  lidocaine   4% Patch 1 Patch Transdermal every 24 hours  methocarbamol 500 milliGRAM(s) Oral every 8 hours  metoprolol tartrate 12.5 milliGRAM(s) Oral every 12 hours  OXcarbazepine 150 milliGRAM(s) Oral every 12 hours  oxyCODONE    IR 5 milliGRAM(s) Oral every 8 hours PRN  pantoprazole    Tablet 40 milliGRAM(s) Oral before breakfast  polyethylene glycol 3350 17 Gram(s) Oral daily  sacubitril 49 mG/valsartan 51 mG 1 Tablet(s) Oral every 12 hours  senna 2 Tablet(s) Oral at bedtime  tamsulosin 0.4 milliGRAM(s) Oral at bedtime      RECENT LABS/IMAGING                        10.4   5.77  )-----------( 152      ( 04 Jul 2024 07:21 )             32.1     07-05    135  |  102  |  34<H>  ----------------------------<  125<H>  4.2   |  22  |  1.9<H>    Ca    9.0      05 Jul 2024 11:18        Urinalysis Basic - ( 05 Jul 2024 11:18 )    Color: x / Appearance: x / SG: x / pH: x  Gluc: 125 mg/dL / Ketone: x  / Bili: x / Urobili: x   Blood: x / Protein: x / Nitrite: x   Leuk Esterase: x / RBC: x / WBC x   Sq Epi: x / Non Sq Epi: x / Bacteria: x

## 2024-07-05 NOTE — PROGRESS NOTE ADULT - ASSESSMENT
ASSESSMENT/PLAN    Rehab for multitrauma with head trauma but no LOC, right Sylvian fissure SAH, L2 and L4 transverse process fractures,  left posterior 6, 9-11th rib fractures    89 y.o. 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 CT scan and imaging studies demonstrated right Sylvian fissure SAH, left L2 and L4 transverse process fractures, and left posterior 6, 9-11th rib fractures    #Multitrauma with head trauma but no LOC   #Right Sylvian Fissure SAH   #Left L2 and L4 Transverse Process Fx   #Left Posterior 6, 9-11th Rib Fx's  #Trace left pleural effusion   No neurosurgical intervention   Cleared for chemical DVT ppx   Repeat CTH in 4wks oupt   Outpt follow up with Neurosurgery Dr Hood in 4 weeks with CTH prior to appointment   Neurosurgery has signed off   Pain control  (Tylenol 975 mg q6 hours, gabapentin 100 mg q8 hours, Robaxin 500 mg q8 hours, oxycodone 5 mg q8 hours PRN, lidocaine patch)   Encourage IS      #HTN   #pacemaker/AICD in place (2017)   #h/o CAD s/p PCI with stents (x4 to LAD), CABG x1 vessel (4-5 years ago)   #HFrEF   - continue home metoprolol 12.5 mg q12 hours   - continue home Entresto 49 mg-51 mg (previously confirmed with his cardiologist Dr. Miguel Fowler)   - Patient does not take Lasix, metolazone, or ivabridine   - HOLDING home Imdur (per patient, takes 30 mg qd)   - HOLDING home ranolazine, restart as needed     #Atrial fibrillation   - continue home amiodarone 200 mg qd     #Trigeminal neuralgia   - continue home oxcarbazepine (150 mg q12)     #h/o CKD   - baseline creatinine 1.7-1.9     #DM   ISS   FS AC/HS      # Infrequent episodes of food regurgitation , symptoms not consistent with typical esophageal dysphagia   -  Pt was screened for dysphagia by S&S team on 7/2/2024  as he had an episode of water regurgitation earlier during the day. Pt again had 1 min postprandial regurgitation. Pt reports a similar episode 2 months ago when he was having dinner and then he regurgitated food.   - If symptoms persist will obtain barium esophogram   - 7/5 diet upgraded to minced and moist, will upgrade diet as tolerated.     -Multimodal pain control  -GI/Bowel Mgmt: Miralax/senna    - Diet: puree DASH diet    Precautions / PROPHYLAXIS:    - Falls  - Aspiration    - DVT prophylaxis: heparin     ASSESSMENT/PLAN    Rehab for multitrauma with head trauma but no LOC, right Sylvian fissure SAH, L2 and L4 transverse process fractures,  left posterior 6, 9-11th rib fractures    89 y.o. 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 CT scan and imaging studies demonstrated right Sylvian fissure SAH, left L2 and L4 transverse process fractures, and left posterior 6, 9-11th rib fractures    #Multitrauma with head trauma but no LOC   #Right Sylvian Fissure SAH   #Left L2 and L4 Transverse Process Fx   #Left Posterior 6, 9-11th Rib Fx's  #Trace left pleural effusion   No neurosurgical intervention   Cleared for chemical DVT ppx   Repeat CTH in 4wks oupt   Outpt follow up with Neurosurgery Dr Hood in 4 weeks with CTH prior to appointment   Neurosurgery has signed off   Pain control  (Tylenol 975 mg q6 hours, gabapentin 100 mg q8 hours, Robaxin 500 mg q8 hours, oxycodone 5 mg q8 hours PRN, lidocaine patch)   Encourage IS      #HTN   #pacemaker/AICD in place (2017)   #h/o CAD s/p PCI with stents (x4 to LAD), CABG x1 vessel (4-5 years ago)   #HFrEF   - continue home metoprolol 12.5 mg q12 hours   - continue home Entresto 49 mg-51 mg (previously confirmed with his cardiologist Dr. Miguel Fowler)   - Patient does not take Lasix, metolazone, or ivabridine   - HOLDING home Imdur (per patient, takes 30 mg qd)   - HOLDING home ranolazine, restart as needed     #Atrial fibrillation   - continue home amiodarone 200 mg qd     #Trigeminal neuralgia   - continue home oxcarbazepine (150 mg q12)     #h/o CKD   - baseline creatinine 1.7-1.9     #DM II  ISS   FS AC/HS      # Infrequent episodes of food regurgitation , symptoms not consistent with typical esophageal dysphagia   -  Pt was screened for dysphagia by S&S team on 7/2/2024  as he had an episode of water regurgitation earlier during the day. Pt again had 1 min postprandial regurgitation. Pt reports a similar episode 2 months ago when he was having dinner and then he regurgitated food.   - If symptoms persist will obtain barium esophogram   - 7/5 diet upgraded to minced and moist, will upgrade diet as tolerated.     -Multimodal pain control  -GI/Bowel Mgmt: Miralax/senna    - Diet: puree DASH diet    Precautions / PROPHYLAXIS:    - Falls  - Aspiration    - DVT prophylaxis: heparin

## 2024-07-06 LAB
FOLATE SERPL-MCNC: 5.7 NG/ML — SIGNIFICANT CHANGE UP
GLUCOSE BLDC GLUCOMTR-MCNC: 128 MG/DL — HIGH (ref 70–99)
GLUCOSE BLDC GLUCOMTR-MCNC: 136 MG/DL — HIGH (ref 70–99)
GLUCOSE BLDC GLUCOMTR-MCNC: 171 MG/DL — HIGH (ref 70–99)
GLUCOSE BLDC GLUCOMTR-MCNC: 172 MG/DL — HIGH (ref 70–99)
VIT B12 SERPL-MCNC: 693 PG/ML — SIGNIFICANT CHANGE UP (ref 232–1245)

## 2024-07-06 RX ADMIN — Medication 975 MILLIGRAM(S): at 07:00

## 2024-07-06 RX ADMIN — ATORVASTATIN CALCIUM 40 MILLIGRAM(S): 20 TABLET, FILM COATED ORAL at 21:39

## 2024-07-06 RX ADMIN — Medication 975 MILLIGRAM(S): at 06:52

## 2024-07-06 RX ADMIN — Medication 12.5 MILLIGRAM(S): at 06:53

## 2024-07-06 RX ADMIN — Medication 100 MILLIGRAM(S): at 07:00

## 2024-07-06 RX ADMIN — Medication 975 MILLIGRAM(S): at 12:26

## 2024-07-06 RX ADMIN — PANTOPRAZOLE SODIUM 40 MILLIGRAM(S): 40 INJECTION, POWDER, FOR SOLUTION INTRAVENOUS at 06:53

## 2024-07-06 RX ADMIN — Medication 975 MILLIGRAM(S): at 18:55

## 2024-07-06 RX ADMIN — OXCARBAZEPINE 150 MILLIGRAM(S): 600 TABLET, FILM COATED ORAL at 17:56

## 2024-07-06 RX ADMIN — HEPARIN SODIUM 5000 UNIT(S): 50 INJECTION, SOLUTION INTRAVENOUS at 21:39

## 2024-07-06 RX ADMIN — SACUBITRIL AND VALSARTAN 1 TABLET(S): 97; 103 TABLET, FILM COATED ORAL at 06:52

## 2024-07-06 RX ADMIN — OXCARBAZEPINE 150 MILLIGRAM(S): 600 TABLET, FILM COATED ORAL at 06:53

## 2024-07-06 RX ADMIN — LIDOCAINE HCL 1 PATCH: 28 GEL TOPICAL at 17:56

## 2024-07-06 RX ADMIN — Medication 500 MILLIGRAM(S): at 21:39

## 2024-07-06 RX ADMIN — Medication 500 MILLIGRAM(S): at 06:53

## 2024-07-06 RX ADMIN — HEPARIN SODIUM 5000 UNIT(S): 50 INJECTION, SOLUTION INTRAVENOUS at 13:03

## 2024-07-06 RX ADMIN — AMIODARONE HYDROCHLORIDE 200 MILLIGRAM(S): 50 INJECTION, SOLUTION INTRAVENOUS at 07:00

## 2024-07-06 RX ADMIN — Medication 975 MILLIGRAM(S): at 17:46

## 2024-07-06 RX ADMIN — Medication 975 MILLIGRAM(S): at 13:54

## 2024-07-06 RX ADMIN — Medication 975 MILLIGRAM(S): at 21:40

## 2024-07-06 RX ADMIN — LIDOCAINE HCL 1 PATCH: 28 GEL TOPICAL at 21:38

## 2024-07-06 RX ADMIN — Medication 1 APPLICATION(S): at 06:54

## 2024-07-06 RX ADMIN — Medication 12.5 MILLIGRAM(S): at 17:58

## 2024-07-06 RX ADMIN — HEPARIN SODIUM 5000 UNIT(S): 50 INJECTION, SOLUTION INTRAVENOUS at 06:54

## 2024-07-06 RX ADMIN — SACUBITRIL AND VALSARTAN 1 TABLET(S): 97; 103 TABLET, FILM COATED ORAL at 17:57

## 2024-07-06 RX ADMIN — TAMSULOSIN HYDROCHLORIDE 0.4 MILLIGRAM(S): 0.4 CAPSULE ORAL at 21:39

## 2024-07-06 RX ADMIN — Medication 100 MILLIGRAM(S): at 13:02

## 2024-07-06 RX ADMIN — Medication 500 MILLIGRAM(S): at 13:04

## 2024-07-06 RX ADMIN — Medication 100 MILLIGRAM(S): at 21:40

## 2024-07-06 NOTE — PROGRESS NOTE ADULT - ASSESSMENT
ASSESSMENT/PLAN    Rehab for multitrauma with head trauma but no LOC, right Sylvian fissure SAH, L2 and L4 transverse process fractures,  left posterior 6, 9-11th rib fractures    89 y.o. 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 CT scan and imaging studies demonstrated right Sylvian fissure SAH, left L2 and L4 transverse process fractures, and left posterior 6, 9-11th rib fractures    #Multitrauma with head trauma but no LOC   #Right Sylvian Fissure SAH   #Left L2 and L4 Transverse Process Fx   #Left Posterior 6, 9-11th Rib Fx's  #Trace left pleural effusion   No neurosurgical intervention   Cleared for chemical DVT ppx   Repeat CTH in 4wks oupt   Outpt follow up with Neurosurgery Dr Hood in 4 weeks with CTH prior to appointment   Neurosurgery has signed off   Pain control  (Tylenol 975 mg q6 hours, gabapentin 100 mg q8 hours, Robaxin 500 mg q8 hours, oxycodone 5 mg q8 hours PRN, lidocaine patch)   Encourage IS      #HTN   #pacemaker/AICD in place (2017)   #h/o CAD s/p PCI with stents (x4 to LAD), CABG x1 vessel (4-5 years ago)   #HFrEF   - continue home metoprolol 12.5 mg q12 hours   - continue home Entresto 49 mg-51 mg (previously confirmed with his cardiologist Dr. Miguel Fowler)   - Patient does not take Lasix, metolazone, or ivabridine   - HOLDING home Imdur (per patient, takes 30 mg qd)   - HOLDING home ranolazine, restart as needed     #Atrial fibrillation   - continue home amiodarone 200 mg qd     #Trigeminal neuralgia   - continue home oxcarbazepine (150 mg q12)     #h/o CKD   - baseline creatinine 1.7-1.9     #DM II  ISS   FS AC/HS      # Infrequent episodes of food regurgitation , symptoms not consistent with typical esophageal dysphagia   -  Pt was screened for dysphagia by S&S team on 7/2/2024  as he had an episode of water regurgitation earlier during the day. Pt again had 1 min postprandial regurgitation. Pt reports a similar episode 2 months ago when he was having dinner and then he regurgitated food.   - If symptoms persist will obtain barium esophogram   - 7/5 diet upgraded to minced and moist, will upgrade diet as tolerated.     -Multimodal pain control  -GI/Bowel Mgmt: Miralax/senna    - Diet: puree DASH diet    Precautions / PROPHYLAXIS:    - Falls  - Aspiration    - DVT prophylaxis: heparin

## 2024-07-06 NOTE — PROGRESS NOTE ADULT - SUBJECTIVE AND OBJECTIVE BOX
Patient is a 89y old  Male who presents with a chief complaint of Rehab for multitrauma with head trauma but no LOC, right Sylvian fissure SAH, L2 and L4 transverse process fractures, left posterior 6, 9-11th rib fractures (04 Jul 2024 10:58)      HPI:       89 year old right-handed,  male with PMHx of CAD s/p PCI (x4 stents), CABG x1 vessel (4-5 years ago), AFib, HFrEF s/p PPM/AICD (2017), HTN, CKD, HLD. 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 Patient was ambulating at home when he slipped and fell. He was able to ambulate after the incident but was experiencing persistent head and rib pain. Patient initially presented to [facility] for evaluation and was transferred to [facility] after CT scan demonstrated right Sylvian fissure SAH and multiple 6, 9-11th left rib fractures (and possible 8th rib fx). Patient had two repeat CT scans, both of which are stable. Hospital course was complicated by postprandial regurgitation.  Speech and swallow saw patient and said oropharyngeal swallow within functional limits with regurgitation within 1 minute. Patient was evaluated by GI and they recommended a  speech and swallow evaluation if symptoms persist, will plan for barium esophogram after neuro and cardio clearance. Patient’s diet was upgraded from clear liquids to full liquids 7/3/2024. Patient medically stable for discharge to acute rehabilitation on 7/3/2024.     LS: Lives with his wife in a private house with 5 JEN   PLOF: Independent with ADLs, ambulated independently,   CLOF: Transfers with  min A, ambulated 50’ x 3 with RW and  CG, UBD/LBD with min A     During her stay, the patient was evaluated by physical and occupational therapy. Prior to admission, patient was independent with ambulation and ADLs. Patient was deemed a good acute rehab candidate due to decline in functional status and ability to carry out activities of daily living. Patient is able to tolerate 3 hours of therapy 5-6 days a week and is motivated to participate.   (03 Jul 2024 13:30)      I examined the patient and reviewed the chart. There have been no significant changes since my history and physical except where documented below.    TODAY'S SUBJECTIVE & REVIEW OF SYMPTOMS  Patient was seen and assessed at bedside.   No overnight events.   Patient denies any new complaints at this time.   Tolerating PT/OT. Had one episode of lightheadedness this morning but vitals wnl. Patient states he was taken early to therapies and did not get a chance to eat his breakfast. After he returned to his room, he ate his food and endorsed improvement in his lightheadedness.   Tolerating oral diet.   Voiding and passing stool spontaneously.   Vital signs reviewed.     Review of Systems: Constitutional:    [ x  ] WNL           [   ] poor appetite   [   ] insomnia   [   ] tired   Cardio:                [ x  ] WNL           [   ] CP   [   ] SANTAMARIA   [   ] palpitations               Resp:                   [x   ] WNL           [   ] SOB   [   ] cough   [   ] wheezing   GI:                        [  x ] WNL           [   ] constipation   [   ] diarrhea   [   ] abdominal pain   [   ] nausea   [   ] emesis                                :                      [   ] WNL           [   ] HARMAN  [ x  ] dysuria   [   ] difficulty voiding             Endo:                   [ x  ] WNL          [   ] polyuria   [   ] temperature intolerance                 Skin:                     [   ] WNL          [ x  ] pain: left ribs   [   ] wound   [   ] rash   MSK:                    [ x  ] WNL          [   ] muscle pain   [   ] joint pain/ stiffness   [   ] muscle tenderness   [   ] swelling   Neuro:                 [  x ] WNL          [   ] HA   [   ] change in vision   [   ] tremor   [   ] weakness   [   ]dysphagia              Cognitive:           [  x ] WNL           [   ]confusion      Psych:                  [ x  ] WNL           [   ] hallucinations   [   ]agitation   [   ] delusion   [   ]depression    CLOF: 50'x3 RW CG, Transfers Jessi, UBD Jessi, LBD Jessi    PHYSICAL EXAM  T(C): 36.9 (07-06-24 @ 06:40), Max: 36.9 (07-06-24 @ 05:37)  T(F): 98.5 (07-06-24 @ 06:40), Max: 98.5 (07-06-24 @ 06:40)  HR: 60 (07-06-24 @ 06:40) (60 - 95)  BP: 139/61 (07-06-24 @ 06:40) (137/75 - 144/67)  ABP: --  ABP(mean): --  RR: 18 (07-06-24 @ 06:40) (18 - 18)  SpO2: 97% (07-06-24 @ 06:40) (97% - 98%)    General:[  x ] NAD, Resting Comfortable,   [   ] other: Hard of hearing                               HEENT: [ x  ] NC/AT, EOMI, PERRL , Normal Conjunctivae,   [   ] other:  Cardio: [ x  ] RRR, no murmur,   [   ] other:                              Pulm: [  x ] No Respiratory Distress,  Lungs CTAB,   [   ] other:                       Abdomen: [  x ]ND/NT, Soft,   [   ] other:    : [ x  ] NO HARMAN CATHETER, [   ] HARMAN CATHETER- no meatal tear, no discharge, [   ] other:                                            MSK: [   ] No joint swelling, Full ROM,   [ x ] other: TTP Left Rib cage                                         Ext: [ x  ]No C/C/E, No calf tenderness,   [  X ]other:  bilateral hands with intrinsic muscle atrophy  Skin: [ x  ]intact,   [   ] other:                                                                   Neurological Examination:  Cognitive: [  x  ] A&O x 3,   [    ]  other:                                                                      Attention:  [ x   ] intact,   [    ]  other:                            Memory: [ x   ] intact,    [    ]  other:     Mood/Affect: [  x  ] wnl,    [    ]  other:                                                                             Communication: [  x  ]Fluent, no dysarthria, following commands:  [    ] other:   CN II - XII:  [  x  ] intact,  [    ] other:                                                                                        Motor:   RIGHT UE: [  x ] 5/5 throughout  LEFT    UE: SA 4/5, EE/EF 4+/5, FG 5/5  RIGHT LE: HF 4-/5, KE 4+/5, DF/PF 5/5  LEFT    LE: HF 4-/5, KE 4+/5, DF/PF 5/5    Tone: [  X  ] wnl,   [    ]  other:  DTRs: [  X ]symmetric, [   ] other:  Coordination:   [  X  ] intact,   [    ] other:                                                                           Sensory: [  X  ] Intact to light touch,   [    ] other:      acetaminophen     Tablet .. 975 milliGRAM(s) Oral every 6 hours  aMIOdarone    Tablet 200 milliGRAM(s) Oral daily  atorvastatin 40 milliGRAM(s) Oral at bedtime  bisacodyl 10 milliGRAM(s) Oral at bedtime PRN  chlorhexidine 2% Cloths 1 Application(s) Topical <User Schedule>  dextrose 50% Injectable 25 Gram(s) IV Push once  gabapentin 100 milliGRAM(s) Oral every 8 hours  glucagon  Injectable 1 milliGRAM(s) IntraMuscular once  heparin   Injectable 5000 Unit(s) SubCutaneous every 8 hours  insulin lispro (ADMELOG) corrective regimen sliding scale   SubCutaneous three times a day before meals  lidocaine   4% Patch 1 Patch Transdermal every 24 hours  methocarbamol 500 milliGRAM(s) Oral every 8 hours  metoprolol tartrate 12.5 milliGRAM(s) Oral every 12 hours  OXcarbazepine 150 milliGRAM(s) Oral every 12 hours  oxyCODONE    IR 5 milliGRAM(s) Oral every 8 hours PRN  pantoprazole    Tablet 40 milliGRAM(s) Oral before breakfast  polyethylene glycol 3350 17 Gram(s) Oral daily  sacubitril 49 mG/valsartan 51 mG 1 Tablet(s) Oral every 12 hours  senna 2 Tablet(s) Oral at bedtime  tamsulosin 0.4 milliGRAM(s) Oral at bedtime      RECENT LABS/IMAGING                        10.4   5.77  )-----------( 152      ( 04 Jul 2024 07:21 )             32.1     07-05    135  |  102  |  34<H>  ----------------------------<  125<H>  4.2   |  22  |  1.9<H>    Ca    9.0      05 Jul 2024 11:18        Urinalysis Basic - ( 05 Jul 2024 11:18 )    Color: x / Appearance: x / SG: x / pH: x  Gluc: 125 mg/dL / Ketone: x  / Bili: x / Urobili: x   Blood: x / Protein: x / Nitrite: x   Leuk Esterase: x / RBC: x / WBC x   Sq Epi: x / Non Sq Epi: x / Bacteria: x

## 2024-07-07 LAB
GLUCOSE BLDC GLUCOMTR-MCNC: 140 MG/DL — HIGH (ref 70–99)
GLUCOSE BLDC GLUCOMTR-MCNC: 148 MG/DL — HIGH (ref 70–99)
GLUCOSE BLDC GLUCOMTR-MCNC: 181 MG/DL — HIGH (ref 70–99)
GLUCOSE BLDC GLUCOMTR-MCNC: 183 MG/DL — HIGH (ref 70–99)

## 2024-07-07 RX ORDER — SACUBITRIL AND VALSARTAN 97; 103 MG/1; MG/1
1 TABLET, FILM COATED ORAL
Refills: 0 | Status: DISCONTINUED | OUTPATIENT
Start: 2024-07-07 | End: 2024-07-11

## 2024-07-07 RX ADMIN — HEPARIN SODIUM 5000 UNIT(S): 50 INJECTION, SOLUTION INTRAVENOUS at 06:00

## 2024-07-07 RX ADMIN — Medication 975 MILLIGRAM(S): at 13:11

## 2024-07-07 RX ADMIN — Medication 500 MILLIGRAM(S): at 21:33

## 2024-07-07 RX ADMIN — Medication 500 MILLIGRAM(S): at 06:00

## 2024-07-07 RX ADMIN — Medication 975 MILLIGRAM(S): at 18:42

## 2024-07-07 RX ADMIN — OXCARBAZEPINE 150 MILLIGRAM(S): 600 TABLET, FILM COATED ORAL at 06:00

## 2024-07-07 RX ADMIN — Medication 975 MILLIGRAM(S): at 18:07

## 2024-07-07 RX ADMIN — SACUBITRIL AND VALSARTAN 1 TABLET(S): 97; 103 TABLET, FILM COATED ORAL at 18:08

## 2024-07-07 RX ADMIN — AMIODARONE HYDROCHLORIDE 200 MILLIGRAM(S): 50 INJECTION, SOLUTION INTRAVENOUS at 06:00

## 2024-07-07 RX ADMIN — Medication 975 MILLIGRAM(S): at 12:23

## 2024-07-07 RX ADMIN — OXCARBAZEPINE 150 MILLIGRAM(S): 600 TABLET, FILM COATED ORAL at 18:08

## 2024-07-07 RX ADMIN — Medication 975 MILLIGRAM(S): at 05:59

## 2024-07-07 RX ADMIN — Medication 100 MILLIGRAM(S): at 13:14

## 2024-07-07 RX ADMIN — LIDOCAINE HCL 1 PATCH: 28 GEL TOPICAL at 21:32

## 2024-07-07 RX ADMIN — Medication 975 MILLIGRAM(S): at 21:32

## 2024-07-07 RX ADMIN — HEPARIN SODIUM 5000 UNIT(S): 50 INJECTION, SOLUTION INTRAVENOUS at 13:15

## 2024-07-07 RX ADMIN — TAMSULOSIN HYDROCHLORIDE 0.4 MILLIGRAM(S): 0.4 CAPSULE ORAL at 21:32

## 2024-07-07 RX ADMIN — HEPARIN SODIUM 5000 UNIT(S): 50 INJECTION, SOLUTION INTRAVENOUS at 21:33

## 2024-07-07 RX ADMIN — ATORVASTATIN CALCIUM 40 MILLIGRAM(S): 20 TABLET, FILM COATED ORAL at 21:33

## 2024-07-07 RX ADMIN — Medication 500 MILLIGRAM(S): at 13:14

## 2024-07-07 RX ADMIN — Medication 2 TABLET(S): at 21:35

## 2024-07-07 RX ADMIN — PANTOPRAZOLE SODIUM 40 MILLIGRAM(S): 40 INJECTION, POWDER, FOR SOLUTION INTRAVENOUS at 06:00

## 2024-07-07 RX ADMIN — Medication 100 MILLIGRAM(S): at 21:32

## 2024-07-07 RX ADMIN — Medication 100 MILLIGRAM(S): at 06:02

## 2024-07-07 RX ADMIN — Medication 975 MILLIGRAM(S): at 06:03

## 2024-07-07 NOTE — PROGRESS NOTE ADULT - SUBJECTIVE AND OBJECTIVE BOX
Patient is a 89y old  Male who presents with a chief complaint of Rehab for multitrauma with head trauma but no LOC, right Sylvian fissure SAH, L2 and L4 transverse process fractures, left posterior 6, 9-11th rib fractures (04 Jul 2024 10:58)      HPI:       89 year old right-handed,  male with PMHx of CAD s/p PCI (x4 stents), CABG x1 vessel (4-5 years ago), AFib, HFrEF s/p PPM/AICD (2017), HTN, CKD, HLD. 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 Patient was ambulating at home when he slipped and fell. He was able to ambulate after the incident but was experiencing persistent head and rib pain. Patient initially presented to [facility] for evaluation and was transferred to [facility] after CT scan demonstrated right Sylvian fissure SAH and multiple 6, 9-11th left rib fractures (and possible 8th rib fx). Patient had two repeat CT scans, both of which are stable. Hospital course was complicated by postprandial regurgitation.  Speech and swallow saw patient and said oropharyngeal swallow within functional limits with regurgitation within 1 minute. Patient was evaluated by GI and they recommended a  speech and swallow evaluation if symptoms persist, will plan for barium esophogram after neuro and cardio clearance. Patient’s diet was upgraded from clear liquids to full liquids 7/3/2024. Patient medically stable for discharge to acute rehabilitation on 7/3/2024.     LS: Lives with his wife in a private house with 5 JEN   PLOF: Independent with ADLs, ambulated independently,   CLOF: Transfers with  min A, ambulated 50’ x 3 with RW and  CG, UBD/LBD with min A     During her stay, the patient was evaluated by physical and occupational therapy. Prior to admission, patient was independent with ambulation and ADLs. Patient was deemed a good acute rehab candidate due to decline in functional status and ability to carry out activities of daily living. Patient is able to tolerate 3 hours of therapy 5-6 days a week and is motivated to participate.   (03 Jul 2024 13:30)      I examined the patient and reviewed the chart. There have been no significant changes since my history and physical except where documented below.    TODAY'S SUBJECTIVE & REVIEW OF SYMPTOMS  Patient was seen and assessed at bedside.   No overnight events.   Patient denies any new complaints at this time.   Tolerating PT/OT.    Tolerating oral diet.   Voiding and passing stool spontaneously.   Vital signs reviewed.     Review of Systems: Constitutional:    [ x  ] WNL           [   ] poor appetite   [   ] insomnia   [   ] tired   Cardio:                [ x  ] WNL           [   ] CP   [   ] SANTAMARIA   [   ] palpitations               Resp:                   [x   ] WNL           [   ] SOB   [   ] cough   [   ] wheezing   GI:                        [  x ] WNL           [   ] constipation   [   ] diarrhea   [   ] abdominal pain   [   ] nausea   [   ] emesis                                :                      [   ] WNL           [   ] HARMAN  [ x  ] dysuria   [   ] difficulty voiding             Endo:                   [ x  ] WNL          [   ] polyuria   [   ] temperature intolerance                 Skin:                     [   ] WNL          [ x  ] pain: left ribs   [   ] wound   [   ] rash   MSK:                    [ x  ] WNL          [   ] muscle pain   [   ] joint pain/ stiffness   [   ] muscle tenderness   [   ] swelling   Neuro:                 [  x ] WNL          [   ] HA   [   ] change in vision   [   ] tremor   [   ] weakness   [   ]dysphagia              Cognitive:           [  x ] WNL           [   ]confusion      Psych:                  [ x  ] WNL           [   ] hallucinations   [   ]agitation   [   ] delusion   [   ]depression    CLOF: 50'x3 RW CG, Transfers Jessi, UBD Jessi, LBD Jessi    PHYSICAL EXAM  T(C): 36.7 (07-07-24 @ 04:55), Max: 36.7 (07-06-24 @ 11:37)  T(F): 98.1 (07-07-24 @ 04:55), Max: 98.1 (07-06-24 @ 11:37)  HR: 82 (07-07-24 @ 04:55) (58 - 82)  BP: 90/54 (07-07-24 @ 04:55) (90/54 - 152/64)  ABP: --  ABP(mean): --  RR: 18 (07-07-24 @ 04:55) (18 - 18)  SpO2: 97% (07-07-24 @ 04:55) (95% - 97%)    General:[  x ] NAD, Resting Comfortable,   [   ] other: Hard of hearing                               HEENT: [ x  ] NC/AT, EOMI, PERRL , Normal Conjunctivae,   [   ] other:  Cardio: [ x  ] RRR, no murmur,   [   ] other:                              Pulm: [  x ] No Respiratory Distress,  Lungs CTAB,   [   ] other:                       Abdomen: [  x ]ND/NT, Soft,   [   ] other:    : [ x  ] NO HARMAN CATHETER, [   ] HARMAN CATHETER- no meatal tear, no discharge, [   ] other:                                            MSK: [   ] No joint swelling, Full ROM,   [ x ] other: TTP Left Rib cage                                         Ext: [ x  ]No C/C/E, No calf tenderness,   [  X ]other:  bilateral hands with intrinsic muscle atrophy  Skin: [ x  ]intact,   [   ] other:                                                                   Neurological Examination:  Cognitive: [  x  ] A&O x 3,   [    ]  other:                                                                      Attention:  [ x   ] intact,   [    ]  other:                            Memory: [ x   ] intact,    [    ]  other:     Mood/Affect: [  x  ] wnl,    [    ]  other:                                                                             Communication: [  x  ]Fluent, no dysarthria, following commands:  [    ] other:   CN II - XII:  [  x  ] intact,  [    ] other:                                                                                        Motor:   RIGHT UE: [  x ] 5/5 throughout  LEFT    UE: SA 4/5, EE/EF 4+/5, FG 5/5  RIGHT LE: HF 4-/5, KE 4+/5, DF/PF 5/5  LEFT    LE: HF 4-/5, KE 4+/5, DF/PF 5/5    Tone: [  X  ] wnl,   [    ]  other:  DTRs: [  X ]symmetric, [   ] other:  Coordination:   [  X  ] intact,   [    ] other:                                                                           Sensory: [  X  ] Intact to light touch,   [    ] other:      acetaminophen     Tablet .. 975 milliGRAM(s) Oral every 6 hours  aMIOdarone    Tablet 200 milliGRAM(s) Oral daily  atorvastatin 40 milliGRAM(s) Oral at bedtime  bisacodyl 10 milliGRAM(s) Oral at bedtime PRN  chlorhexidine 2% Cloths 1 Application(s) Topical <User Schedule>  dextrose 50% Injectable 25 Gram(s) IV Push once  gabapentin 100 milliGRAM(s) Oral every 8 hours  glucagon  Injectable 1 milliGRAM(s) IntraMuscular once  heparin   Injectable 5000 Unit(s) SubCutaneous every 8 hours  insulin lispro (ADMELOG) corrective regimen sliding scale   SubCutaneous three times a day before meals  lidocaine   4% Patch 1 Patch Transdermal every 24 hours  methocarbamol 500 milliGRAM(s) Oral every 8 hours  metoprolol tartrate 12.5 milliGRAM(s) Oral every 12 hours  OXcarbazepine 150 milliGRAM(s) Oral every 12 hours  oxyCODONE    IR 5 milliGRAM(s) Oral every 8 hours PRN  pantoprazole    Tablet 40 milliGRAM(s) Oral before breakfast  polyethylene glycol 3350 17 Gram(s) Oral daily  sacubitril 49 mG/valsartan 51 mG 1 Tablet(s) Oral every 12 hours  senna 2 Tablet(s) Oral at bedtime  tamsulosin 0.4 milliGRAM(s) Oral at bedtime      RECENT LABS/IMAGING                        10.4   5.77  )-----------( 152      ( 04 Jul 2024 07:21 )             32.1     07-05    135  |  102  |  34<H>  ----------------------------<  125<H>  4.2   |  22  |  1.9<H>    Ca    9.0      05 Jul 2024 11:18        Urinalysis Basic - ( 05 Jul 2024 11:18 )    Color: x / Appearance: x / SG: x / pH: x  Gluc: 125 mg/dL / Ketone: x  / Bili: x / Urobili: x   Blood: x / Protein: x / Nitrite: x   Leuk Esterase: x / RBC: x / WBC x   Sq Epi: x / Non Sq Epi: x / Bacteria: x     Patient is a 89y old  Male who presents with a chief complaint of Rehab for multitrauma with head trauma but no LOC, right Sylvian fissure SAH, L2 and L4 transverse process fractures, left posterior 6, 9-11th rib fractures (04 Jul 2024 10:58)      HPI:       89 year old right-handed,  male with PMHx of CAD s/p PCI (x4 stents), CABG x1 vessel (4-5 years ago), AFib, HFrEF s/p PPM/AICD (2017), HTN, CKD, HLD. 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 Patient was ambulating at home when he slipped and fell. He was able to ambulate after the incident but was experiencing persistent head and rib pain. Patient initially presented to [facility] for evaluation and was transferred to [facility] after CT scan demonstrated right Sylvian fissure SAH and multiple 6, 9-11th left rib fractures (and possible 8th rib fx). Patient had two repeat CT scans, both of which are stable. Hospital course was complicated by postprandial regurgitation.  Speech and swallow saw patient and said oropharyngeal swallow within functional limits with regurgitation within 1 minute. Patient was evaluated by GI and they recommended a  speech and swallow evaluation if symptoms persist, will plan for barium esophogram after neuro and cardio clearance. Patient’s diet was upgraded from clear liquids to full liquids 7/3/2024. Patient medically stable for discharge to acute rehabilitation on 7/3/2024.     LS: Lives with his wife in a private house with 5 JEN   PLOF: Independent with ADLs, ambulated independently,   CLOF: Transfers with  min A, ambulated 50’ x 3 with RW and  CG, UBD/LBD with min A     During her stay, the patient was evaluated by physical and occupational therapy. Prior to admission, patient was independent with ambulation and ADLs. Patient was deemed a good acute rehab candidate due to decline in functional status and ability to carry out activities of daily living. Patient is able to tolerate 3 hours of therapy 5-6 days a week and is motivated to participate.   (03 Jul 2024 13:30)      I examined the patient and reviewed the chart. There have been no significant changes since my history and physical except where documented below.    TODAY'S SUBJECTIVE & REVIEW OF SYMPTOMS  Patient was seen and assessed at bedside.   No overnight events.   Patient denies any new complaints at this time.   Tolerating PT/OT.    Tolerating oral diet.   Voiding and passing stool spontaneously.   Vital signs reviewed; patient with orthostatic hypotension this morning. His metoprolol and entresto were held.      Review of Systems: Constitutional:    [ x  ] WNL           [   ] poor appetite   [   ] insomnia   [   ] tired   Cardio:                [ x  ] WNL           [   ] CP   [   ] SANTAMARIA   [   ] palpitations               Resp:                   [x   ] WNL           [   ] SOB   [   ] cough   [   ] wheezing   GI:                        [  x ] WNL           [   ] constipation   [   ] diarrhea   [   ] abdominal pain   [   ] nausea   [   ] emesis                                :                      [   ] WNL           [   ] HARMAN  [ x  ] dysuria   [   ] difficulty voiding             Endo:                   [ x  ] WNL          [   ] polyuria   [   ] temperature intolerance                 Skin:                     [   ] WNL          [ x  ] pain: left ribs   [   ] wound   [   ] rash   MSK:                    [ x  ] WNL          [   ] muscle pain   [   ] joint pain/ stiffness   [   ] muscle tenderness   [   ] swelling   Neuro:                 [  x ] WNL          [   ] HA   [   ] change in vision   [   ] tremor   [   ] weakness   [   ]dysphagia              Cognitive:           [  x ] WNL           [   ]confusion      Psych:                  [ x  ] WNL           [   ] hallucinations   [   ]agitation   [   ] delusion   [   ]depression    CLOF: 50'x3 RW CG, Transfers Jessi, UBD Jessi, LBD Jessi    PHYSICAL EXAM  T(C): 36.7 (07-07-24 @ 04:55), Max: 36.7 (07-06-24 @ 11:37)  T(F): 98.1 (07-07-24 @ 04:55), Max: 98.1 (07-06-24 @ 11:37)  HR: 82 (07-07-24 @ 04:55) (58 - 82)  BP: 90/54 (07-07-24 @ 04:55) (90/54 - 152/64)  ABP: --  ABP(mean): --  RR: 18 (07-07-24 @ 04:55) (18 - 18)  SpO2: 97% (07-07-24 @ 04:55) (95% - 97%)    General:[  x ] NAD, Resting Comfortable,   [   ] other: Hard of hearing                               HEENT: [ x  ] NC/AT, EOMI, PERRL , Normal Conjunctivae,   [   ] other:  Cardio: [ x  ] RRR, no murmur,   [   ] other:                              Pulm: [  x ] No Respiratory Distress,  Lungs CTAB,   [   ] other:                       Abdomen: [  x ]ND/NT, Soft,   [   ] other:    : [ x  ] NO HARMAN CATHETER, [   ] HARMAN CATHETER- no meatal tear, no discharge, [   ] other:                                            MSK: [   ] No joint swelling, Full ROM,   [ x ] other: TTP Left Rib cage                                         Ext: [ x  ]No C/C/E, No calf tenderness,   [  X ]other:  bilateral hands with intrinsic muscle atrophy  Skin: [ x  ]intact,   [   ] other:                                                                   Neurological Examination:  Cognitive: [  x  ] A&O x 3,   [    ]  other:                                                                      Attention:  [ x   ] intact,   [    ]  other:                            Memory: [ x   ] intact,    [    ]  other:     Mood/Affect: [  x  ] wnl,    [    ]  other:                                                                             Communication: [  x  ]Fluent, no dysarthria, following commands:  [    ] other:   CN II - XII:  [  x  ] intact,  [    ] other:                                                                                        Motor:   RIGHT UE: [  x ] 5/5 throughout  LEFT    UE: SA 4/5, EE/EF 4+/5, FG 5/5  RIGHT LE: HF 4-/5, KE 4+/5, DF/PF 5/5  LEFT    LE: HF 4-/5, KE 4+/5, DF/PF 5/5    Tone: [  X  ] wnl,   [    ]  other:  DTRs: [  X ]symmetric, [   ] other:  Coordination:   [  X  ] intact,   [    ] other:                                                                           Sensory: [  X  ] Intact to light touch,   [    ] other:      acetaminophen     Tablet .. 975 milliGRAM(s) Oral every 6 hours  aMIOdarone    Tablet 200 milliGRAM(s) Oral daily  atorvastatin 40 milliGRAM(s) Oral at bedtime  bisacodyl 10 milliGRAM(s) Oral at bedtime PRN  chlorhexidine 2% Cloths 1 Application(s) Topical <User Schedule>  dextrose 50% Injectable 25 Gram(s) IV Push once  gabapentin 100 milliGRAM(s) Oral every 8 hours  glucagon  Injectable 1 milliGRAM(s) IntraMuscular once  heparin   Injectable 5000 Unit(s) SubCutaneous every 8 hours  insulin lispro (ADMELOG) corrective regimen sliding scale   SubCutaneous three times a day before meals  lidocaine   4% Patch 1 Patch Transdermal every 24 hours  methocarbamol 500 milliGRAM(s) Oral every 8 hours  metoprolol tartrate 12.5 milliGRAM(s) Oral every 12 hours  OXcarbazepine 150 milliGRAM(s) Oral every 12 hours  oxyCODONE    IR 5 milliGRAM(s) Oral every 8 hours PRN  pantoprazole    Tablet 40 milliGRAM(s) Oral before breakfast  polyethylene glycol 3350 17 Gram(s) Oral daily  sacubitril 49 mG/valsartan 51 mG 1 Tablet(s) Oral every 12 hours  senna 2 Tablet(s) Oral at bedtime  tamsulosin 0.4 milliGRAM(s) Oral at bedtime      RECENT LABS/IMAGING                        10.4   5.77  )-----------( 152      ( 04 Jul 2024 07:21 )             32.1     07-05    135  |  102  |  34<H>  ----------------------------<  125<H>  4.2   |  22  |  1.9<H>    Ca    9.0      05 Jul 2024 11:18        Urinalysis Basic - ( 05 Jul 2024 11:18 )    Color: x / Appearance: x / SG: x / pH: x  Gluc: 125 mg/dL / Ketone: x  / Bili: x / Urobili: x   Blood: x / Protein: x / Nitrite: x   Leuk Esterase: x / RBC: x / WBC x   Sq Epi: x / Non Sq Epi: x / Bacteria: x     no

## 2024-07-07 NOTE — PROGRESS NOTE ADULT - ASSESSMENT
ASSESSMENT/PLAN    Rehab for multitrauma with head trauma but no LOC, right Sylvian fissure SAH, L2 and L4 transverse process fractures,  left posterior 6, 9-11th rib fractures    89 y.o. 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 CT scan and imaging studies demonstrated right Sylvian fissure SAH, left L2 and L4 transverse process fractures, and left posterior 6, 9-11th rib fractures    #Multitrauma with head trauma but no LOC   #Right Sylvian Fissure SAH   #Left L2 and L4 Transverse Process Fx   #Left Posterior 6, 9-11th Rib Fx's  #Trace left pleural effusion   No neurosurgical intervention   Cleared for chemical DVT ppx   Repeat [facility] in 4wks oupt   Outpt follow up with Neurosurgery Dr Hood in 4 weeks with [facility] prior to appointment   Neurosurgery has signed off   Pain control  (Tylenol 975 mg q6 hours, gabapentin 100 mg q8 hours, Robaxin 500 mg q8 hours, oxycodone 5 mg q8 hours PRN, lidocaine patch)   Encourage IS      #HTN   #pacemaker/AICD in place (2017)   #h/o CAD s/p PCI with stents (x4 to LAD), CABG x1 vessel (4-5 years ago)   #HFrEF   - continue home metoprolol 12.5 mg q12 hours   - continue home Entresto 49 mg-51 mg (previously confirmed with his cardiologist Dr. Miguel Fowler)   - Patient does not take Lasix, metolazone, or ivabridine   - HOLDING home Imdur (per patient, takes 30 mg qd)   - HOLDING home ranolazine, restart as needed     #Atrial fibrillation   - continue home amiodarone 200 mg qd     #Trigeminal neuralgia   - continue home oxcarbazepine (150 mg q12)     #h/o CKD   - baseline creatinine 1.7-1.9     #DM II  ISS   FS AC/HS      # Infrequent episodes of food regurgitation , symptoms not consistent with typical esophageal dysphagia   -  Pt was screened for dysphagia by S&S team on 7/2/2024  as he had an episode of water regurgitation earlier during the day. Pt again had 1 min postprandial regurgitation. Pt reports a similar episode 2 months ago when he was having dinner and then he regurgitated food.   - If symptoms persist will obtain barium esophogram   - 7/5 diet upgraded to minced and moist, will upgrade diet as tolerated.     -Multimodal pain control  -GI/Bowel Mgmt: Miralax/senna    - Diet: puree DASH diet    Precautions / PROPHYLAXIS:    - Falls  - Aspiration    - DVT prophylaxis: heparin     ASSESSMENT/PLAN    Rehab for multitrauma with head trauma but no LOC, right Sylvian fissure SAH, L2 and L4 transverse process fractures,  left posterior 6, 9-11th rib fractures    89 y.o. 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 CT scan and imaging studies demonstrated right Sylvian fissure SAH, left L2 and L4 transverse process fractures, and left posterior 6, 9-11th rib fractures    #Multitrauma with head trauma but no LOC   #Right Sylvian Fissure SAH   #Left L2 and L4 Transverse Process Fx   #Left Posterior 6, 9-11th Rib Fx's  #Trace left pleural effusion   No neurosurgical intervention   Cleared for chemical DVT ppx   Repeat [facility] in 4wks oupt   Outpt follow up with Neurosurgery Dr Hood in 4 weeks with [facility] prior to appointment   Neurosurgery has signed off   Pain control  (Tylenol 975 mg q6 hours, gabapentin 100 mg q8 hours, Robaxin 500 mg q8 hours, oxycodone 5 mg q8 hours PRN, lidocaine patch)   Encourage IS      #HTN   #pacemaker/AICD in place (2017)   #h/o CAD s/p PCI with stents (x4 to LAD), CABG x1 vessel (4-5 years ago)   #HFrEF   - continue home metoprolol 12.5 mg q12 hours   - continue home Entresto 49 mg-51 mg (previously confirmed with his cardiologist Dr. Miguel Fowler)   - Patient does not take Lasix, metolazone, or ivabridine   - HOLDING home Imdur (per patient, takes 30 mg qd)   - HOLDING home ranolazine, restart as needed   - As per patient, his EF is 30-40%.     #Atrial fibrillation   - continue home amiodarone 200 mg qd     #Trigeminal neuralgia   - continue home oxcarbazepine (150 mg q12)     #h/o CKD   - baseline creatinine 1.7-1.9     #DM II  ISS   FS AC/HS      # Infrequent episodes of food regurgitation , symptoms not consistent with typical esophageal dysphagia   -  Pt was screened for dysphagia by S&S team on 7/2/2024  as he had an episode of water regurgitation earlier during the day. Pt again had 1 min postprandial regurgitation. Pt reports a similar episode 2 months ago when he was having dinner and then he regurgitated food.   - If symptoms persist will obtain barium esophogram   - 7/5 diet upgraded to minced and moist, will upgrade diet as tolerated.     -Multimodal pain control  -GI/Bowel Mgmt: Miralax/senna    - Diet: puree DASH diet    Precautions / PROPHYLAXIS:    - Falls  - Aspiration    - DVT prophylaxis: heparin     ASSESSMENT/PLAN    Rehab for multitrauma with head trauma but no LOC, right Sylvian fissure SAH, L2 and L4 transverse process fractures,  left posterior 6, 9-11th rib fractures    89 y.o. 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 CT scan and imaging studies demonstrated right Sylvian fissure SAH, left L2 and L4 transverse process fractures, and left posterior 6, 9-11th rib fractures    #Multitrauma with head trauma but no LOC   #Right Sylvian Fissure SAH   #Left L2 and L4 Transverse Process Fx   #Left Posterior 6, 9-11th Rib Fx's  #Trace left pleural effusion   No neurosurgical intervention   Cleared for chemical DVT ppx   Repeat [facility] in 4wks oupt   Outpt follow up with Neurosurgery Dr Hood in 4 weeks with [facility] prior to appointment   Neurosurgery has signed off   Pain control  (Tylenol 975 mg q6 hours, gabapentin 100 mg q8 hours, Robaxin 500 mg q8 hours, oxycodone 5 mg q8 hours PRN, lidocaine patch)   Encourage IS      #HTN   #pacemaker/AICD in place (2017)   #h/o CAD s/p PCI with stents (x4 to LAD), CABG x1 vessel (4-5 years ago)   #HFrEF   - continue home metoprolol 12.5 mg q12 hours   - continue home Entresto 49 mg-51 mg (previously confirmed with his cardiologist Dr. Miguel Fowler)   - Patient does not take Lasix, metolazone, or ivabridine   - HOLDING home Imdur (per patient, takes 30 mg qd)   - HOLDING home ranolazine, restart as needed   - As per patient, his EF is 30-40%.  -Entresto lowered to 26 mg/24 mg q12h given blood pressure readings on 7/7.     #Atrial fibrillation   - continue home amiodarone 200 mg qd     #Trigeminal neuralgia   - continue home oxcarbazepine (150 mg q12)     #h/o CKD   - baseline creatinine 1.7-1.9     #DM II  ISS   FS AC/HS      # Infrequent episodes of food regurgitation , symptoms not consistent with typical esophageal dysphagia   -  Pt was screened for dysphagia by S&S team on 7/2/2024  as he had an episode of water regurgitation earlier during the day. Pt again had 1 min postprandial regurgitation. Pt reports a similar episode 2 months ago when he was having dinner and then he regurgitated food.   - If symptoms persist will obtain barium esophogram   - 7/5 diet upgraded to minced and moist, will upgrade diet as tolerated.     -Multimodal pain control  -GI/Bowel Mgmt: Miralax/senna    - Diet: puree DASH diet    Precautions / PROPHYLAXIS:    - Falls  - Aspiration    - DVT prophylaxis: heparin

## 2024-07-07 NOTE — PROGRESS NOTE ADULT - ATTENDING COMMENTS
Patient seen and examined with the resident. We discussed the case. I have directed the care. I edited the note. The patient requires acute rehab with 3 hours of daily therapies at least 5 out of 7 days and close physiatry follow up.  #Multitrauma with head trauma but no LOC   #Right Sylvian Fissure SAH   #Left L2 and L4 Transverse Process Fx   #Left Posterior 6, 9-11th Rib Fx's  #Trace left pleural effusion   No neurosurgical intervention   Cleared for chemical DVT ppx   Repeat CTH in 4wks oupt   Outpt follow up with Neurosurgery Dr Hood in 4 weeks with CTH prior to appointment   Neurosurgery has signed off   Pain control  (Tylenol 975 mg q6 hours, gabapentin 100 mg q8 hours, Robaxin 500 mg q8 hours, oxycodone 5 mg q8 hours PRN, lidocaine patch)   Encourage IS      #HTN   #pacemaker/AICD in place (2017)   #h/o CAD s/p PCI with stents (x4 to LAD), CABG x1 vessel (4-5 years ago)   #HFrEF   - continue home metoprolol 12.5 mg q12 hours   - continue home Entresto 49 mg-51 mg (previously confirmed with his cardiologist Dr. Miguel Fowler)   - Patient does not take Lasix, metolazone, or ivabridine   - HOLDING home Imdur (per patient, takes 30 mg qd)   - HOLDING home ranolazine, restart as needed   - As per patient, his EF is 30-40%.     #Atrial fibrillation   - continue home amiodarone 200 mg qd     #Trigeminal neuralgia   - continue home oxcarbazepine (150 mg q12)     #h/o CKD   - baseline creatinine 1.7-1.9     #DM II  ISS   FS AC/HS      # Infrequent episodes of food regurgitation , symptoms not consistent with typical esophageal dysphagia   -  Pt was screened for dysphagia by S&S team on 7/2/2024  as he had an episode of water regurgitation earlier during the day. Pt again had 1 min postprandial regurgitation. Pt reports a similar episode 2 months ago when he was having dinner and then he regurgitated food.   - If symptoms persist will obtain barium esophogram   - 7/5 diet upgraded to minced and moist, will upgrade diet as tolerated.     -Multimodal pain control  -GI/Bowel Mgmt: Miralax/senna    - Diet: puree DASH diet    Precautions / PROPHYLAXIS:    - Falls  - Aspiration    - DVT prophylaxis: heparin Patient seen and examined with the resident. We discussed the case. I have directed the care. I edited the note. The patient requires acute rehab with 3 hours of daily therapies at least 5 out of 7 days and close physiatry follow up. I lowered the Entresto to 26mg/24 mg po q12h.   #Multitrauma with head trauma but no LOC   #Right Sylvian Fissure SAH   #Left L2 and L4 Transverse Process Fx   #Left Posterior 6, 9-11th Rib Fx's  #Trace left pleural effusion   No neurosurgical intervention   Cleared for chemical DVT ppx   Repeat CTH in 4wks oupt   Outpt follow up with Neurosurgery Dr Hood in 4 weeks with CTH prior to appointment   Neurosurgery has signed off   Pain control  (Tylenol 975 mg q6 hours, gabapentin 100 mg q8 hours, Robaxin 500 mg q8 hours, oxycodone 5 mg q8 hours PRN, lidocaine patch)   Encourage IS      #HTN   #pacemaker/AICD in place (2017)   #h/o CAD s/p PCI with stents (x4 to LAD), CABG x1 vessel (4-5 years ago)   #HFrEF   - continue home metoprolol 12.5 mg q12 hours   - continue home Entresto 49 mg-51 mg (previously confirmed with his cardiologist Dr. Miguel Fowler)   - Patient does not take Lasix, metolazone, or ivabridine   - HOLDING home Imdur (per patient, takes 30 mg qd)   - HOLDING home ranolazine, restart as needed   - As per patient, his EF is 30-40%.   -Entresto lowered to 26 mg/24 mg q12h given blood pressure readings on 7/7.    #Atrial fibrillation   - continue home amiodarone 200 mg qd     #Trigeminal neuralgia   - continue home oxcarbazepine (150 mg q12)     #h/o CKD   - baseline creatinine 1.7-1.9     #DM II  ISS   FS AC/HS      # Infrequent episodes of food regurgitation , symptoms not consistent with typical esophageal dysphagia   -  Pt was screened for dysphagia by S&S team on 7/2/2024  as he had an episode of water regurgitation earlier during the day. Pt again had 1 min postprandial regurgitation. Pt reports a similar episode 2 months ago when he was having dinner and then he regurgitated food.   - If symptoms persist will obtain barium esophogram   - 7/5 diet upgraded to minced and moist, will upgrade diet as tolerated.     -Multimodal pain control  -GI/Bowel Mgmt: Miralax/senna    - Diet: puree DASH diet    Precautions / PROPHYLAXIS:    - Falls  - Aspiration    - DVT prophylaxis: heparin

## 2024-07-08 LAB
ALBUMIN SERPL ELPH-MCNC: 3.7 G/DL — SIGNIFICANT CHANGE UP (ref 3.5–5.2)
ALP SERPL-CCNC: 136 U/L — HIGH (ref 30–115)
ALT FLD-CCNC: 23 U/L — SIGNIFICANT CHANGE UP (ref 0–41)
ANION GAP SERPL CALC-SCNC: 13 MMOL/L — SIGNIFICANT CHANGE UP (ref 7–14)
AST SERPL-CCNC: 26 U/L — SIGNIFICANT CHANGE UP (ref 0–41)
BASOPHILS # BLD AUTO: 0.05 K/UL — SIGNIFICANT CHANGE UP (ref 0–0.2)
BASOPHILS NFR BLD AUTO: 0.8 % — SIGNIFICANT CHANGE UP (ref 0–1)
BILIRUB SERPL-MCNC: 0.6 MG/DL — SIGNIFICANT CHANGE UP (ref 0.2–1.2)
BUN SERPL-MCNC: 34 MG/DL — HIGH (ref 10–20)
CALCIUM SERPL-MCNC: 9.4 MG/DL — SIGNIFICANT CHANGE UP (ref 8.4–10.4)
CHLORIDE SERPL-SCNC: 99 MMOL/L — SIGNIFICANT CHANGE UP (ref 98–110)
CO2 SERPL-SCNC: 21 MMOL/L — SIGNIFICANT CHANGE UP (ref 17–32)
CREAT SERPL-MCNC: 2.1 MG/DL — HIGH (ref 0.7–1.5)
EGFR: 29 ML/MIN/1.73M2 — LOW
EOSINOPHIL # BLD AUTO: 0.24 K/UL — SIGNIFICANT CHANGE UP (ref 0–0.7)
EOSINOPHIL NFR BLD AUTO: 3.8 % — SIGNIFICANT CHANGE UP (ref 0–8)
GLUCOSE BLDC GLUCOMTR-MCNC: 131 MG/DL — HIGH (ref 70–99)
GLUCOSE BLDC GLUCOMTR-MCNC: 137 MG/DL — HIGH (ref 70–99)
GLUCOSE BLDC GLUCOMTR-MCNC: 188 MG/DL — HIGH (ref 70–99)
GLUCOSE SERPL-MCNC: 128 MG/DL — HIGH (ref 70–99)
HCT VFR BLD CALC: 36.3 % — LOW (ref 42–52)
HGB BLD-MCNC: 11.9 G/DL — LOW (ref 14–18)
IMM GRANULOCYTES NFR BLD AUTO: 0.8 % — HIGH (ref 0.1–0.3)
LYMPHOCYTES # BLD AUTO: 1.87 K/UL — SIGNIFICANT CHANGE UP (ref 1.2–3.4)
LYMPHOCYTES # BLD AUTO: 29.8 % — SIGNIFICANT CHANGE UP (ref 20.5–51.1)
MAGNESIUM SERPL-MCNC: 1.9 MG/DL — SIGNIFICANT CHANGE UP (ref 1.8–2.4)
MCHC RBC-ENTMCNC: 30.9 PG — SIGNIFICANT CHANGE UP (ref 27–31)
MCHC RBC-ENTMCNC: 32.8 G/DL — SIGNIFICANT CHANGE UP (ref 32–37)
MCV RBC AUTO: 94.3 FL — HIGH (ref 80–94)
MONOCYTES # BLD AUTO: 0.5 K/UL — SIGNIFICANT CHANGE UP (ref 0.1–0.6)
MONOCYTES NFR BLD AUTO: 8 % — SIGNIFICANT CHANGE UP (ref 1.7–9.3)
NEUTROPHILS # BLD AUTO: 3.56 K/UL — SIGNIFICANT CHANGE UP (ref 1.4–6.5)
NEUTROPHILS NFR BLD AUTO: 56.8 % — SIGNIFICANT CHANGE UP (ref 42.2–75.2)
NRBC # BLD: 0 /100 WBCS — SIGNIFICANT CHANGE UP (ref 0–0)
PLATELET # BLD AUTO: 193 K/UL — SIGNIFICANT CHANGE UP (ref 130–400)
PMV BLD: 10.2 FL — SIGNIFICANT CHANGE UP (ref 7.4–10.4)
POTASSIUM SERPL-MCNC: 4.7 MMOL/L — SIGNIFICANT CHANGE UP (ref 3.5–5)
POTASSIUM SERPL-SCNC: 4.7 MMOL/L — SIGNIFICANT CHANGE UP (ref 3.5–5)
PROT SERPL-MCNC: 5.6 G/DL — LOW (ref 6–8)
RBC # BLD: 3.85 M/UL — LOW (ref 4.7–6.1)
RBC # FLD: 15.5 % — HIGH (ref 11.5–14.5)
SODIUM SERPL-SCNC: 133 MMOL/L — LOW (ref 135–146)
WBC # BLD: 6.27 K/UL — SIGNIFICANT CHANGE UP (ref 4.8–10.8)
WBC # FLD AUTO: 6.27 K/UL — SIGNIFICANT CHANGE UP (ref 4.8–10.8)

## 2024-07-08 PROCEDURE — 93010 ELECTROCARDIOGRAM REPORT: CPT

## 2024-07-08 PROCEDURE — 99222 1ST HOSP IP/OBS MODERATE 55: CPT

## 2024-07-08 PROCEDURE — 93284 PRGRMG EVAL IMPLANTABLE DFB: CPT | Mod: 26

## 2024-07-08 RX ADMIN — LIDOCAINE HCL 1 PATCH: 28 GEL TOPICAL at 19:00

## 2024-07-08 RX ADMIN — Medication 100 MILLIGRAM(S): at 14:41

## 2024-07-08 RX ADMIN — Medication 975 MILLIGRAM(S): at 12:00

## 2024-07-08 RX ADMIN — OXCARBAZEPINE 150 MILLIGRAM(S): 600 TABLET, FILM COATED ORAL at 06:11

## 2024-07-08 RX ADMIN — Medication 100 MILLIGRAM(S): at 21:21

## 2024-07-08 RX ADMIN — Medication 975 MILLIGRAM(S): at 17:50

## 2024-07-08 RX ADMIN — Medication 500 MILLIGRAM(S): at 21:21

## 2024-07-08 RX ADMIN — Medication 975 MILLIGRAM(S): at 06:44

## 2024-07-08 RX ADMIN — Medication 975 MILLIGRAM(S): at 06:09

## 2024-07-08 RX ADMIN — HEPARIN SODIUM 5000 UNIT(S): 50 INJECTION, SOLUTION INTRAVENOUS at 14:42

## 2024-07-08 RX ADMIN — Medication 100 MILLIGRAM(S): at 06:09

## 2024-07-08 RX ADMIN — TAMSULOSIN HYDROCHLORIDE 0.4 MILLIGRAM(S): 0.4 CAPSULE ORAL at 21:22

## 2024-07-08 RX ADMIN — OXCARBAZEPINE 150 MILLIGRAM(S): 600 TABLET, FILM COATED ORAL at 17:51

## 2024-07-08 RX ADMIN — PANTOPRAZOLE SODIUM 40 MILLIGRAM(S): 40 INJECTION, POWDER, FOR SOLUTION INTRAVENOUS at 06:10

## 2024-07-08 RX ADMIN — LIDOCAINE HCL 1 PATCH: 28 GEL TOPICAL at 18:37

## 2024-07-08 RX ADMIN — Medication 500 MILLIGRAM(S): at 06:13

## 2024-07-08 RX ADMIN — POLYETHYLENE GLYCOL 3350 17 GRAM(S): 1 POWDER ORAL at 11:49

## 2024-07-08 RX ADMIN — SACUBITRIL AND VALSARTAN 1 TABLET(S): 97; 103 TABLET, FILM COATED ORAL at 06:12

## 2024-07-08 RX ADMIN — AMIODARONE HYDROCHLORIDE 200 MILLIGRAM(S): 50 INJECTION, SOLUTION INTRAVENOUS at 06:09

## 2024-07-08 RX ADMIN — Medication 975 MILLIGRAM(S): at 18:31

## 2024-07-08 RX ADMIN — ATORVASTATIN CALCIUM 40 MILLIGRAM(S): 20 TABLET, FILM COATED ORAL at 21:21

## 2024-07-08 RX ADMIN — Medication 975 MILLIGRAM(S): at 11:49

## 2024-07-08 RX ADMIN — HEPARIN SODIUM 5000 UNIT(S): 50 INJECTION, SOLUTION INTRAVENOUS at 06:10

## 2024-07-08 RX ADMIN — HEPARIN SODIUM 5000 UNIT(S): 50 INJECTION, SOLUTION INTRAVENOUS at 21:21

## 2024-07-08 RX ADMIN — SACUBITRIL AND VALSARTAN 1 TABLET(S): 97; 103 TABLET, FILM COATED ORAL at 17:52

## 2024-07-08 NOTE — CONSULT NOTE ADULT - ASSESSMENT
IMPRESSION  # Orthostatic hypotension - likely medication induced  # parox afib - uncontrolled     - s/p DCCV x2 (2023 and 2024)     - s/p LAAO Watchman 7/2019 for anemia  # Sick sinus syndrome s/p PPM around 2016 then BIV-AICD upgrade on 1/20/2021 for ICM  # ICM - HFrEF 3- euvolemic  # CAD with no MI, s/p multiple PCIs to LAD, including instent restenosis, last stent 5/2020, s/p robotic CABG LIMA-LAD 6/2020     - last cath 11/2021: prox % stenosis with LIMA to LAD graft patent MARYLIN III flow, RCA 40-50%        RECOMMENDATIONS  - resume home aspirin 81mg qd (if cleared by neurology)  - telemetry monitoring  - EP for ICD interrogation  - d/c ranexa   - resume lower dose entresto 24/26 bid  - resume metoprolol tartrate 12.5mg bid, amiodarone 200mg qd, lipitor 40mg qHS  - after HR control, o/p follow up with his EP for continued discussion regarding AF ablation   - will follow       THIS NOTE IS INCOMPLETE IMPRESSION  # Orthostatic hypotension - likely medication induced  # parox afib - controlled      - s/p DCCV x2 (2023 and 2024)     - s/p LAAO Watchman 7/2019 for anemia  # Sick sinus syndrome s/p PPM around 2016 then BIV-AICD upgrade on 1/20/2021 for ICM  # ICM - HFrEF 47% in 5/2024 - euvolemic     - last TTE 5/22/2024: EF 47%, mild LA dilation, mild AR, mod TR, mod MR, severe pHTN  # CAD with no MI, s/p multiple PCIs to LAD, including instent restenosis, last stent 5/2020, s/p robotic CABG LIMA-LAD 6/2020     - last cath 11/2021: prox % stenosis with LIMA to LAD graft patent MARYLIN III flow, RCA 40-50%        RECOMMENDATIONS  - resume home aspirin 81mg qd (if cleared by neurology)  - EP for ICD interrogation  - resume lower dose entresto 24/26 bid and repeat orthostatics tomorrow afternoon  if he is still orthostatic positive then d/c entresto   - stop metoprolol (patient reports stopping it days prior to admission)  - cw amiodarone 200mg qd, ranexa 500mg bid, and lipitor 40mg qHS

## 2024-07-08 NOTE — CONSULT NOTE ADULT - ATTENDING COMMENTS
From conversation with patient Med Rec is inaccurate as his metoprolol was recently discontinued due to symptomatic hypotension. Today, patient with positive orthostatics.     As per above, I recommend decreasing Entresto to 24/26 mg BID and discontinuing metoprolol. Can repeat orthostatics on 7/09 PM to see if his hypotension has improved on the lower dose of Entresto. If patient still with positive orthostatics, please hold Entresto.     Rest of recommendations as per above. Cardiology consult team to sign off.

## 2024-07-08 NOTE — CONSULT NOTE ADULT - SUBJECTIVE AND OBJECTIVE BOX
HPI:  89 year old right-handed,  male with PMHx of CAD s/p PCI (x4 stents), CABG x1 vessel (4-5 years ago), AFib, HFrEF s/p PPM/AICD (2017), HTN, CKD, HLD. 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 Patient was ambulating at home when he slipped and fell. He was able to ambulate after the incident but was experiencing persistent head and rib pain. Patient initially presented to [facility] for evaluation and was transferred to [facility] after CT scan demonstrated right Sylvian fissure SAH and multiple 6, 9-11th left rib fractures (and possible 8th rib fx). Patient had two repeat CT scans, both of which are stable. Hospital course was complicated by postprandial regurgitation.  Speech and swallow saw patient and said oropharyngeal swallow within functional limits with regurgitation within 1 minute. Patient was evaluated by GI and they recommended a  speech and swallow evaluation if symptoms persist, will plan for barium esophogram after neuro and cardio clearance. Patient’s diet was upgraded from clear liquids to full liquids 7/3/2024. Patient medically stable for discharge to acute rehabilitation on 7/3/2024.   During her stay, the patient was evaluated by physical and occupational therapy. Prior to admission, patient was independent with ambulation and ADLs. Patient was deemed a good acute rehab candidate due to decline in functional status and ability to carry out activities of daily living. Patient is able to tolerate 3 hours of therapy 5-6 days a week and is motivated to participate.   (2024 13:30)    CARDIOLOGY HPI  This is a pleasant 89yo male patient with CAD s/p PCIs to LAD including stent restenosis, last stent placed to pLAD on 2020, s/p robotic CABG LIMA-LAD on 2020, last cath 2021 with known LAD disease and RCA 40-50%      PAST MEDICAL & SURGICAL HISTORY  CAD (coronary artery disease)  Diabetes  Recurrent UTI (urinary tract infection)  H/O diverticulitis of colon  Fistula of large intestine colo-vesicula fistula  HTN (hypertension)  HLD (hyperlipidemia)  Asthma Mild only last attack yrs ago  SOB (shortness of breath) on exertion  GERD without esophagitis  Anemia  Eye problem  Stented coronary artery  H/O ileostomy  History of partial colectomy  Artificial cardiac pacemaker  History of eye surgery  laser  Basal cell carcinoma removal 2018  Elective surgery PCI with 2 stents  History of coronary artery stent placement        FAMILY HISTORY:  Family history of colon cancer in mother (Mother, Grandparent)    CVA (cerebral vascular accident)  hemorrhage        SOCIAL HISTORY:  []smoker: never  []Alcohol: social  []Drug: never    ALLERGIES:  No Known Allergies      MEDICATIONS:  MEDICATIONS  (STANDING):  acetaminophen     Tablet .. 975 milliGRAM(s) Oral every 6 hours  aMIOdarone    Tablet 200 milliGRAM(s) Oral daily  atorvastatin 40 milliGRAM(s) Oral at bedtime  chlorhexidine 2% Cloths 1 Application(s) Topical <User Schedule>  dextrose 50% Injectable 25 Gram(s) IV Push once  gabapentin 100 milliGRAM(s) Oral every 8 hours  glucagon  Injectable 1 milliGRAM(s) IntraMuscular once  heparin   Injectable 5000 Unit(s) SubCutaneous every 8 hours  insulin lispro (ADMELOG) corrective regimen sliding scale   SubCutaneous three times a day before meals  lidocaine   4% Patch 1 Patch Transdermal every 24 hours  methocarbamol 500 milliGRAM(s) Oral every 8 hours  metoprolol tartrate 12.5 milliGRAM(s) Oral every 12 hours  OXcarbazepine 150 milliGRAM(s) Oral every 12 hours  pantoprazole    Tablet 40 milliGRAM(s) Oral before breakfast  polyethylene glycol 3350 17 Gram(s) Oral daily  sacubitril 24 mG/valsartan 26 mG 1 Tablet(s) Oral two times a day  senna 2 Tablet(s) Oral at bedtime  tamsulosin 0.4 milliGRAM(s) Oral at bedtime    MEDICATIONS  (PRN):  bisacodyl 10 milliGRAM(s) Oral at bedtime PRN Constipation  oxyCODONE    IR 5 milliGRAM(s) Oral every 8 hours PRN Severe Pain (7 - 10)      HOME MEDICATIONS:  aspirin 81mg qd   acarbose 25 mg oral tablet: 1 tab(s) orally 3 times a day (2024 14:05)  amiodarone 200 mg oral tablet: 1 tab(s) orally once a day (2024 15:15)  gabapentin 300 mg oral tablet: 1 tab(s) orally once a day (2024 15:15)  lidocaine 4% topical film: Apply topically to affected area once a day leave on for 12 hours, remove for 12 hours, wash hands after use. (2024 11:37)  Metoprolol Succinate ER 25 mg oral tablet, extended release: 1 tab(s) orally once a day (2024 14:05)  omega-3 polyunsaturated fatty acids 1200 mg oral capsule: 1 cap(s) orally 2 times a day (2024 15:15)  OXcarbazepine 150 mg oral tablet: 1 tab(s) orally every 12 hours (2024 15:15)  ranolazine 500 mg oral tablet, extended release: 1 tab(s) orally every 12 hours (2024 15:15)  sacubitril-valsartan 49 mg-51 mg oral tablet: 1 tab(s) orally every 12 hours (2024 11:37)  tamsulosin 0.4 mg oral capsule: 1 cap(s) orally once a day (at bedtime) (2024 15:15)      VITALS:   T(F): 97.8 (-08 @ 12:32), Max: 98.5 (07-06 @ 06:40)  HR: 55 (-08 @ 12:32) (55 - 98)  BP: 125/57 (-08 @ 12:32) (90/54 - 152/64)  BP(mean): --  RR: 18 (-08 @ 12:32) (18 - 18)  SpO2: 98% (- @ 12:32) (95% - 98%)        PHYSICAL EXAM:  GENERAL: NAD, well-developed.  HEAD:  Atraumatic, Normocephalic.  EYES: conjunctiva and sclera clear  CHEST/LUNG: GBAE. No wheezing or crackles   HEART: regular rate and rhythm; S1/S2.  ABDOMEN: Soft, Nontender, Nondistended  EXTREMITIES: No edema.   PSYCH: AAOx3.  NEUROLOGY: non-focal; moves all extremities      LABS AND RADIOLOGY:                        11.9   6.27  )-----------( 193      ( 2024 07:04 )             36.3     07-08    133<L>  |  99  |  34<H>  ----------------------------<  128<H>  4.7   |  21  |  2.1<H>    Ca    9.4      2024 07:04  Mg     1.9         TPro  5.6<L>  /  Alb  3.7  /  TBili  0.6  /  DBili  x   /  AST  26  /  ALT  23  /  AlkPhos  136<H>      Troponin trend: none this admission  pro BNP: none this admission          CARDIOLOGY:  -Cardiologist: Dr De Los Santos  - EP: [facility] Gar[facility] at [facility]    -CXR: 7/3/2024: mild L effusion    -TTE: last one in : EF 30s%.  -TTE 10/2018: EF 47%, GIDD, mild-mod TR, mild WA, mild pHTN    Cardiac hx:  - 2017: PPM placed for sick sinus syndrome  -   -         EC2024: afib with Vpaced rhythm at 102bpm. no acute ischemic changes      TELEMETRY EVENTS:  not on tele     HPI:  89 year old right-handed,  male with PMHx of CAD s/p PCI (x4 stents), CABG x1 vessel (4-5 years ago), AFib, HFrEF s/p PPM/AICD (2017), HTN, CKD, HLD. 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 Patient was ambulating at home when he slipped and fell. He was able to ambulate after the incident but was experiencing persistent head and rib pain. Patient initially presented to [facility] for evaluation and was transferred to [facility] after CT scan demonstrated right Sylvian fissure SAH and multiple 6, 9-11th left rib fractures (and possible 8th rib fx). Patient had two repeat CT scans, both of which are stable. Hospital course was complicated by postprandial regurgitation.  Speech and swallow saw patient and said oropharyngeal swallow within functional limits with regurgitation within 1 minute. Patient was evaluated by GI and they recommended a  speech and swallow evaluation if symptoms persist, will plan for barium esophogram after neuro and cardio clearance. Patient’s diet was upgraded from clear liquids to full liquids 7/3/2024. Patient medically stable for discharge to acute rehabilitation on 7/3/2024.   During her stay, the patient was evaluated by physical and occupational therapy. Prior to admission, patient was independent with ambulation and ADLs. Patient was deemed a good acute rehab candidate due to decline in functional status and ability to carry out activities of daily living. Patient is able to tolerate 3 hours of therapy 5-6 days a week and is motivated to participate.   (2024 13:30)    CARDIOLOGY HPI  Cardiology are consulted for orthostatic hypotension and Abnormal HR.  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  Patient today is comfortable, denies chest pain, difficulty breathing, orthopnea, BLE edema. He reports occasional lightheadedness upon standing or walking, but he did not have these sx prior to the fall, the fall was mechanical. His cardiologist recently stopped his lasix and metoprolol for these sx and for soft BP.  he is very compliant with his medications and watches his diet.       PAST MEDICAL & SURGICAL HISTORY  CAD (coronary artery disease)  Diabetes  Recurrent UTI (urinary tract infection)  H/O diverticulitis of colon  Fistula of large intestine colo-vesicula fistula  HTN (hypertension)  HLD (hyperlipidemia)  Asthma Mild only last attack yrs ago  SOB (shortness of breath) on exertion  GERD without esophagitis  Anemia  Eye problem  Stented coronary artery  H/O ileostomy  History of partial colectomy  Artificial cardiac pacemaker  History of eye surgery  laser  Basal cell carcinoma removal 2018  Elective surgery PCI with 2 stents  History of coronary artery stent placement        FAMILY HISTORY:  Family history of colon cancer in mother (Mother, Grandparent)    CVA (cerebral vascular accident)  hemorrhage        SOCIAL HISTORY:  []smoker: never  []Alcohol: social  []Drug: never    ALLERGIES:  No Known Allergies      MEDICATIONS:  MEDICATIONS  (STANDING):  acetaminophen     Tablet .. 975 milliGRAM(s) Oral every 6 hours  aMIOdarone    Tablet 200 milliGRAM(s) Oral daily  atorvastatin 40 milliGRAM(s) Oral at bedtime  chlorhexidine 2% Cloths 1 Application(s) Topical <User Schedule>  dextrose 50% Injectable 25 Gram(s) IV Push once  gabapentin 100 milliGRAM(s) Oral every 8 hours  glucagon  Injectable 1 milliGRAM(s) IntraMuscular once  heparin   Injectable 5000 Unit(s) SubCutaneous every 8 hours  insulin lispro (ADMELOG) corrective regimen sliding scale   SubCutaneous three times a day before meals  lidocaine   4% Patch 1 Patch Transdermal every 24 hours  methocarbamol 500 milliGRAM(s) Oral every 8 hours  metoprolol tartrate 12.5 milliGRAM(s) Oral every 12 hours  OXcarbazepine 150 milliGRAM(s) Oral every 12 hours  pantoprazole    Tablet 40 milliGRAM(s) Oral before breakfast  polyethylene glycol 3350 17 Gram(s) Oral daily  sacubitril 24 mG/valsartan 26 mG 1 Tablet(s) Oral two times a day  senna 2 Tablet(s) Oral at bedtime  tamsulosin 0.4 milliGRAM(s) Oral at bedtime    MEDICATIONS  (PRN):  bisacodyl 10 milliGRAM(s) Oral at bedtime PRN Constipation  oxyCODONE    IR 5 milliGRAM(s) Oral every 8 hours PRN Severe Pain (7 - 10)      HOME MEDICATIONS:  aspirin 81mg qd   acarbose 25 mg oral tablet: 1 tab(s) orally 3 times a day (2024 14:05)  amiodarone 200 mg oral tablet: 1 tab(s) orally once a day (2024 15:15)  gabapentin 300 mg oral tablet: 1 tab(s) orally once a day (2024 15:15)  lidocaine 4% topical film: Apply topically to affected area once a day leave on for 12 hours, remove for 12 hours, wash hands after use. (2024 11:37)  Metoprolol Succinate ER 25 mg oral tablet, extended release: 1 tab(s) orally once a day (2024 14:05)  omega-3 polyunsaturated fatty acids 1200 mg oral capsule: 1 cap(s) orally 2 times a day (2024 15:15)  OXcarbazepine 150 mg oral tablet: 1 tab(s) orally every 12 hours (2024 15:15)  ranolazine 500 mg oral tablet, extended release: 1 tab(s) orally every 12 hours (2024 15:15)  sacubitril-valsartan 49 mg-51 mg oral tablet: 1 tab(s) orally every 12 hours (2024 11:37)  tamsulosin 0.4 mg oral capsule: 1 cap(s) orally once a day (at bedtime) (2024 15:15)      VITALS:   T(F): 97.8 (0708 @ 12:32), Max: 98.5 (07-06 @ 06:40)  HR: 55 ( @ 12:32) (55 - 98)  BP: 125/57 ( @ 12:32) (90/54 - 152/64)  BP(mean): --  RR: 18 ( @ 12:32) (18 - 18)  SpO2: 98% ( @ 12:32) (95% - 98%)        PHYSICAL EXAM:  GENERAL: NAD, well-developed.  HEAD:  Atraumatic, Normocephalic.  EYES: conjunctiva and sclera clear  CHEST/LUNG: GBAE. No wheezing or crackles   HEART: regular rate and rhythm; S1/S2.  ABDOMEN: Soft, Nontender, Nondistended  EXTREMITIES: No edema.   PSYCH: AAOx3.  NEUROLOGY: non-focal; moves all extremities      LABS AND RADIOLOGY:                        11.9   6.27  )-----------( 193      ( 2024 07:04 )             36.3     07-08    133<L>  |  99  |  34<H>  ----------------------------<  128<H>  4.7   |  21  |  2.1<H>    Ca    9.4      2024 07:04  Mg     1.9     07-08    TPro  5.6<L>  /  Alb  3.7  /  TBili  0.6  /  DBili  x   /  AST  26  /  ALT  23  /  AlkPhos  136<H>  07-08    Troponin trend: none this admission  pro BNP: none this admission          CARDIOLOGY:  -Cardiologist: Dr De Los Santos  - EP: Dr De La Garza at [facility] [place]    -CXR: 7/3/2024: mild L effusion    - TTE 2024: EF 47%, mild LA dilation, mild AR, mod TR, mod MR, severe pHTN  -TTE 10/2018: EF 47%, GIDD, mild-mod TR, mild NM, mild pHTN    Cardiac hx:  never had an MI    < 2016: patient had an LAD stent placed, unclear   - 2016: stress test for stable angina +ve reversible defects in ant and lat wall + fixed defect inf wall   - 2016: [facility] showed LAD 70% instent restenosis --> s/p PCI - (LCX dominant)  - 2017: PPM placed for sick sinus syndrome  - 2017: nuc stress test with mod fixed defect in inf wall   - 2018: LHC 10/2018: 80% stenosis prior to LAD stent --> s/p PCI   - 2019: WATCHMAN procedure   - 2019: nuc stress test mod -sev fixed inf defect  - 2020: LHC at [facility] showed prox LAD 80% stenosis s/p PCI with 40% residual stenosis  - 2020: Robotic minimally invasive CABG at [facility]: LIMA-LAD  - 2021: upgrade to Biv-AICD [place]   - : Stress test showed reversible defect in basal anteroseptal wall  - 2021: LHC showed prox % with patent graft, and RCA 40-50%  - currently on aspirin.         EC2024: afib with Vpaced rhythm at 102bpm. no acute ischemic changes      TELEMETRY EVENTS:  not on tele

## 2024-07-08 NOTE — PROGRESS NOTE ADULT - SUBJECTIVE AND OBJECTIVE BOX
Patient is a 89y old  Male who presents with a chief complaint of Rehab for multitrauma with head trauma but no LOC, right Sylvian fissure SAH, L2 and L4 transverse process fractures, left posterior 6, 9-11th rib fractures (04 Jul 2024 10:58)      HPI:       89 year old right-handed,  male with PMHx of CAD s/p PCI (x4 stents), CABG x1 vessel (4-5 years ago), AFib, HFrEF s/p PPM/AICD (2017), HTN, CKD, HLD. 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 Patient was ambulating at home when he slipped and fell. He was able to ambulate after the incident but was experiencing persistent head and rib pain. Patient initially presented to [facility] for evaluation and was transferred to [facility] after CT scan demonstrated right Sylvian fissure SAH and multiple 6, 9-11th left rib fractures (and possible 8th rib fx). Patient had two repeat CT scans, both of which are stable. Hospital course was complicated by postprandial regurgitation.  Speech and swallow saw patient and said oropharyngeal swallow within functional limits with regurgitation within 1 minute. Patient was evaluated by GI and they recommended a  speech and swallow evaluation if symptoms persist, will plan for barium esophogram after neuro and cardio clearance. Patient’s diet was upgraded from clear liquids to full liquids 7/3/2024. Patient medically stable for discharge to acute rehabilitation on 7/3/2024.     LS: Lives with his wife in a private house with 5 JEN   PLOF: Independent with ADLs, ambulated independently,   CLOF: Transfers with  min A, ambulated 50’ x 3 with RW and  CG, UBD/LBD with min A     During her stay, the patient was evaluated by physical and occupational therapy. Prior to admission, patient was independent with ambulation and ADLs. Patient was deemed a good acute rehab candidate due to decline in functional status and ability to carry out activities of daily living. Patient is able to tolerate 3 hours of therapy 5-6 days a week and is motivated to participate.   (03 Jul 2024 13:30)      I examined the patient and reviewed the chart. There have been no significant changes since my history and physical except where documented below.    TODAY'S SUBJECTIVE & REVIEW OF SYMPTOMS  Patient was seen and assessed at bedside.   No overnight events.   Patient denies any new complaints at this time.   Tolerating PT/OT.    Tolerating oral diet.   Voiding and passing stool spontaneously.     Patient reports feeling lightheaded today while participating in therapy. Noted to have bradycardic episode to 45bpm that self-resolved. Patient appears well, expressed concern about a recurrence of atrial fibrillation  Denies chest pain, nausea, vomiting, shortness of breath     Review of Systems: Constitutional:    [ x  ] WNL           [   ] poor appetite   [   ] insomnia   [   ] tired   Cardio:                [ x  ] WNL           [   ] CP   [   ] SANTAMARIA   [   ] palpitations               Resp:                   [x   ] WNL           [   ] SOB   [   ] cough   [   ] wheezing   GI:                        [  x ] WNL           [   ] constipation   [   ] diarrhea   [   ] abdominal pain   [   ] nausea   [   ] emesis                                :                      [   ] WNL           [   ] HARMAN  [ x  ] dysuria   [   ] difficulty voiding             Endo:                   [ x  ] WNL          [   ] polyuria   [   ] temperature intolerance                 Skin:                     [   ] WNL          [ x  ] pain: left ribs   [   ] wound   [   ] rash   MSK:                    [ x  ] WNL          [   ] muscle pain   [   ] joint pain/ stiffness   [   ] muscle tenderness   [   ] swelling   Neuro:                 [  x ] WNL          [   ] HA   [   ] change in vision   [   ] tremor   [   ] weakness   [   ]dysphagia              Cognitive:           [  x ] WNL           [   ]confusion      Psych:                  [ x  ] WNL           [   ] hallucinations   [   ]agitation   [   ] delusion   [   ]depression    CLOF: 50'x3 RW CG, Transfers Jessi, UBD Jessi, LBD Jessi    PHYSICAL EXAM  Vital Signs Last 24 Hrs  T(C): 36.6 (08 Jul 2024 12:32), Max: 36.7 (08 Jul 2024 05:00)  T(F): 97.8 (08 Jul 2024 12:32), Max: 98 (08 Jul 2024 05:00)  HR: 55 (08 Jul 2024 12:32) (55 - 98)  BP: 125/57 (08 Jul 2024 12:32) (110/69 - 131/62)  BP(mean): --  RR: 18 (08 Jul 2024 12:32) (18 - 18)  SpO2: 98% (08 Jul 2024 12:32) (97% - 98%)    Parameters below as of 07 Jul 2024 16:38  Patient On (Oxygen Delivery Method): room air      General:[  x ] NAD, Resting Comfortable,   [   ] other: Hard of hearing                               HEENT: [ x  ] NC/AT, EOMI, PERRL , Normal Conjunctivae,   [   ] other:  Cardio: [ x  ] RRR, no murmur,   [   ] other:                              Pulm: [  x ] No Respiratory Distress,  Lungs CTAB,   [   ] other:                       Abdomen: [  x ]ND/NT, Soft,   [   ] other:    : [ x  ] NO HARMAN CATHETER, [   ] HARMAN CATHETER- no meatal tear, no discharge, [   ] other:                                            MSK: [   ] No joint swelling, Full ROM,   [ x ] other: TTP Left Rib cage                                         Ext: [ x  ]No C/C/E, No calf tenderness,   [  X ]other:  bilateral hands with intrinsic muscle atrophy  Skin: [ x  ]intact,   [   ] other:                                                                   Neurological Examination:  Cognitive: [  x  ] A&O x 3,   [    ]  other:                                                                      Attention:  [ x   ] intact,   [    ]  other:                            Memory: [ x   ] intact,    [    ]  other:     Mood/Affect: [  x  ] wnl,    [    ]  other:                                                                             Communication: [  x  ]Fluent, no dysarthria, following commands:  [    ] other:   CN II - XII:  [  x  ] intact,  [    ] other:                                                                                        Motor:   RIGHT UE: [  x ] 5/5 throughout  LEFT    UE: SA 4/5, EE/EF 4+/5, FG 5/5  RIGHT LE: HF 4-/5, KE 4+/5, DF/PF 5/5  LEFT    LE: HF 4-/5, KE 4+/5, DF/PF 5/5    Tone: [  X  ] wnl,   [    ]  other:  DTRs: [  X ]symmetric, [   ] other:  Coordination:   [  X  ] intact,   [    ] other:                                                                           Sensory: [  X  ] Intact to light touch,   [    ] other:      MEDICATIONS  (STANDING):  acetaminophen     Tablet .. 975 milliGRAM(s) Oral every 6 hours  aMIOdarone    Tablet 200 milliGRAM(s) Oral daily  atorvastatin 40 milliGRAM(s) Oral at bedtime  chlorhexidine 2% Cloths 1 Application(s) Topical <User Schedule>  dextrose 50% Injectable 25 Gram(s) IV Push once  gabapentin 100 milliGRAM(s) Oral every 8 hours  glucagon  Injectable 1 milliGRAM(s) IntraMuscular once  heparin   Injectable 5000 Unit(s) SubCutaneous every 8 hours  insulin lispro (ADMELOG) corrective regimen sliding scale   SubCutaneous three times a day before meals  lidocaine   4% Patch 1 Patch Transdermal every 24 hours  methocarbamol 500 milliGRAM(s) Oral every 8 hours  metoprolol tartrate 12.5 milliGRAM(s) Oral every 12 hours  OXcarbazepine 150 milliGRAM(s) Oral every 12 hours  pantoprazole    Tablet 40 milliGRAM(s) Oral before breakfast  polyethylene glycol 3350 17 Gram(s) Oral daily  sacubitril 24 mG/valsartan 26 mG 1 Tablet(s) Oral two times a day  senna 2 Tablet(s) Oral at bedtime  tamsulosin 0.4 milliGRAM(s) Oral at bedtime    MEDICATIONS  (PRN):  bisacodyl 10 milliGRAM(s) Oral at bedtime PRN Constipation  oxyCODONE    IR 5 milliGRAM(s) Oral every 8 hours PRN Severe Pain (7 - 10)        RECENT LABS/IMAGING                        11.9   6.27  )-----------( 193      ( 08 Jul 2024 07:04 )             36.3     07-08    133<L>  |  99  |  34<H>  ----------------------------<  128<H>  4.7   |  21  |  2.1<H>    Ca    9.4      08 Jul 2024 07:04  Mg     1.9     07-08    TPro  5.6<L>  /  Alb  3.7  /  TBili  0.6  /  DBili  x   /  AST  26  /  ALT  23  /  AlkPhos  136<H>  07-08      Urinalysis Basic - ( 08 Jul 2024 07:04 )    Color: x / Appearance: x / SG: x / pH: x  Gluc: 128 mg/dL / Ketone: x  / Bili: x / Urobili: x   Blood: x / Protein: x / Nitrite: x   Leuk Esterase: x / RBC: x / WBC x   Sq Epi: x / Non Sq Epi: x / Bacteria: x      POCT Blood Glucose.: 131 mg/dL (07-08-24 @ 11:05)  POCT Blood Glucose.: 188 mg/dL (07-08-24 @ 07:38)   Patient is a 89y old  Male who presents with a chief complaint of Rehab for multitrauma with head trauma but no LOC, right Sylvian fissure SAH, L2 and L4 transverse process fractures, left posterior 6, 9-11th rib fractures (04 Jul 2024 10:58)      HPI:       89 year old right-handed,  male with PMHx of CAD s/p PCI (x4 stents), CABG x1 vessel (4-5 years ago), AFib, HFrEF s/p PPM/AICD (2017), HTN, CKD, HLD. 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 Patient was ambulating at home when he slipped and fell. He was able to ambulate after the incident but was experiencing persistent head and rib pain. Patient initially presented to [facility] for evaluation and was transferred to [facility] after CT scan demonstrated right Sylvian fissure SAH and multiple 6, 9-11th left rib fractures (and possible 8th rib fx). Patient had two repeat CT scans, both of which are stable. Hospital course was complicated by postprandial regurgitation.  Speech and swallow saw patient and said oropharyngeal swallow within functional limits with regurgitation within 1 minute. Patient was evaluated by GI and they recommended a  speech and swallow evaluation if symptoms persist, will plan for barium esophogram after neuro and cardio clearance. Patient’s diet was upgraded from clear liquids to full liquids 7/3/2024. Patient medically stable for discharge to acute rehabilitation on 7/3/2024.     LS: Lives with his wife in a private house with 5 JEN   PLOF: Independent with ADLs, ambulated independently,   CLOF: Transfers with  min A, ambulated 50’ x 3 with RW and  CG, UBD/LBD with min A     During her stay, the patient was evaluated by physical and occupational therapy. Prior to admission, patient was independent with ambulation and ADLs. Patient was deemed a good acute rehab candidate due to decline in functional status and ability to carry out activities of daily living. Patient is able to tolerate 3 hours of therapy 5-6 days a week and is motivated to participate.   (03 Jul 2024 13:30)      TODAY'S SUBJECTIVE & REVIEW OF SYMPTOMS  Patient was seen and assessed at bedside.   No overnight events.   Patient denies any new complaints at this time.   Tolerating PT/OT.    Tolerating oral diet.   Voiding and passing stool spontaneously.     Patient reports feeling lightheaded today while participating in therapy. Noted to have bradycardic episode to 45bpm that self-resolved. Patient appears well, expressed concern about a recurrence of atrial fibrillation  Denies chest pain, nausea, vomiting, shortness of breath     Review of Systems:    Constitutional:   [ x  ] WNL           [   ] poor appetite   [   ] insomnia   [   ] tired   Cardio:                [ x  ] WNL           [   ] CP   [   ] SANTAMARIA   [   ] palpitations               Resp:                   [x   ] WNL           [   ] SOB   [   ] cough   [   ] wheezing   GI:                        [  x ] WNL           [   ] constipation   [   ] diarrhea   [   ] abdominal pain   [   ] nausea   [   ] emesis                                :                      [   ] WNL           [   ] HARMAN  [ x  ] dysuria   [   ] difficulty voiding             Endo:                   [ x  ] WNL          [   ] polyuria   [   ] temperature intolerance                 Skin:                     [   ] WNL          [ x  ] pain: left ribs   [   ] wound   [   ] rash   MSK:                    [ x  ] WNL          [   ] muscle pain   [   ] joint pain/ stiffness   [   ] muscle tenderness   [   ] swelling   Neuro:                 [  x ] WNL          [   ] HA   [   ] change in vision   [   ] tremor   [   ] weakness   [   ]dysphagia              Cognitive:           [  x ] WNL           [   ]confusion      Psych:                  [ x  ] WNL           [   ] hallucinations   [   ]agitation   [   ] delusion   [   ]depression    CLOF: 50'x3 RW CG, Transfers Jessi, UBD Jessi, LBD Jessi    PHYSICAL EXAM  Vital Signs Last 24 Hrs  T(C): 36.6 (08 Jul 2024 12:32), Max: 36.7 (08 Jul 2024 05:00)  T(F): 97.8 (08 Jul 2024 12:32), Max: 98 (08 Jul 2024 05:00)  HR: 55 (08 Jul 2024 12:32) (55 - 98)  BP: 125/57 (08 Jul 2024 12:32) (110/69 - 131/62)  BP(mean): --  RR: 18 (08 Jul 2024 12:32) (18 - 18)  SpO2: 98% (08 Jul 2024 12:32) (97% - 98%)    Parameters below as of 07 Jul 2024 16:38  Patient On (Oxygen Delivery Method): room air      General:[  x ] NAD, Resting Comfortable,   [   ] other: Hard of hearing                               HEENT: [ x  ] NC/AT, EOMI, PERRL , Normal Conjunctivae,   [   ] other:  Cardio: [ x  ] RRR, no murmur,   [   ] other:                              Pulm: [  x ] No Respiratory Distress,  Lungs CTAB,   [   ] other:                       Abdomen: [  x ]ND/NT, Soft,   [   ] other:    : [ x  ] NO HARMAN CATHETER, [   ] HARMAN CATHETER- no meatal tear, no discharge, [   ] other:                                            MSK: [   ] No joint swelling, Full ROM,   [ x ] other: TTP Left Rib cage                                         Ext: [ x  ]No C/C/E, No calf tenderness,   [  X ]other:  bilateral hands with intrinsic muscle atrophy  Skin: [ x  ]intact,   [   ] other:                                                                   Neurological Examination:  Cognitive: [  x  ] A&O x 3,   [    ]  other:                                                                      Attention:  [ x   ] intact,   [    ]  other:                            Memory: [ x   ] intact,    [    ]  other:     Mood/Affect: [  x  ] wnl,    [    ]  other:                                                                             Communication: [  x  ]Fluent, no dysarthria, following commands:  [    ] other:   CN II - XII:  [  x  ] intact,  [    ] other:                                                                                        Motor:   RIGHT UE: [  x ] 5/5 throughout  LEFT    UE: SA 4/5, EE/EF 4+/5, FG 5/5  RIGHT LE: HF 4-/5, KE 4+/5, DF/PF 5/5  LEFT    LE: HF 4-/5, KE 4+/5, DF/PF 5/5    Tone: [  X  ] wnl,   [    ]  other:  DTRs: [  X ]symmetric, [   ] other:  Coordination:   [  X  ] intact,   [    ] other:                                                                           Sensory: [  X  ] Intact to light touch,   [    ] other:      MEDICATIONS  (STANDING):  acetaminophen     Tablet .. 975 milliGRAM(s) Oral every 6 hours  aMIOdarone    Tablet 200 milliGRAM(s) Oral daily  atorvastatin 40 milliGRAM(s) Oral at bedtime  chlorhexidine 2% Cloths 1 Application(s) Topical <User Schedule>  dextrose 50% Injectable 25 Gram(s) IV Push once  gabapentin 100 milliGRAM(s) Oral every 8 hours  glucagon  Injectable 1 milliGRAM(s) IntraMuscular once  heparin   Injectable 5000 Unit(s) SubCutaneous every 8 hours  insulin lispro (ADMELOG) corrective regimen sliding scale   SubCutaneous three times a day before meals  lidocaine   4% Patch 1 Patch Transdermal every 24 hours  methocarbamol 500 milliGRAM(s) Oral every 8 hours  metoprolol tartrate 12.5 milliGRAM(s) Oral every 12 hours  OXcarbazepine 150 milliGRAM(s) Oral every 12 hours  pantoprazole    Tablet 40 milliGRAM(s) Oral before breakfast  polyethylene glycol 3350 17 Gram(s) Oral daily  sacubitril 24 mG/valsartan 26 mG 1 Tablet(s) Oral two times a day  senna 2 Tablet(s) Oral at bedtime  tamsulosin 0.4 milliGRAM(s) Oral at bedtime    MEDICATIONS  (PRN):  bisacodyl 10 milliGRAM(s) Oral at bedtime PRN Constipation  oxyCODONE    IR 5 milliGRAM(s) Oral every 8 hours PRN Severe Pain (7 - 10)        RECENT LABS/IMAGING                        11.9   6.27  )-----------( 193      ( 08 Jul 2024 07:04 )             36.3     07-08    133<L>  |  99  |  34<H>  ----------------------------<  128<H>  4.7   |  21  |  2.1<H>    Ca    9.4      08 Jul 2024 07:04  Mg     1.9     07-08    TPro  5.6<L>  /  Alb  3.7  /  TBili  0.6  /  DBili  x   /  AST  26  /  ALT  23  /  AlkPhos  136<H>  07-08      POCT Blood Glucose.: 131 mg/dL (07-08-24 @ 11:05)  POCT Blood Glucose.: 188 mg/dL (07-08-24 @ 07:38)

## 2024-07-08 NOTE — PROGRESS NOTE ADULT - ASSESSMENT
ASSESSMENT/PLAN    Rehab for multitrauma with head trauma but no LOC, right Sylvian fissure SAH, L2 and L4 transverse process fractures,  left posterior 6, 9-11th rib fractures    89 y.o. 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 CT scan and imaging studies demonstrated right Sylvian fissure SAH, left L2 and L4 transverse process fractures, and left posterior 6, 9-11th rib fractures    #Multitrauma with head trauma but no LOC   #Right Sylvian Fissure SAH   #Left L2 and L4 Transverse Process Fx   #Left Posterior 6, 9-11th Rib Fx's  #Trace left pleural effusion   No neurosurgical intervention   Cleared for chemical DVT ppx   Repeat CTH in 4wks oupt   Outpt follow up with Neurosurgery Dr Hood in 4 weeks with CTH prior to appointment   Neurosurgery has signed off   Pain control  (Tylenol 975 mg q6 hours, gabapentin 100 mg q8 hours, Robaxin 500 mg q8 hours, oxycodone 5 mg q8 hours PRN, lidocaine patch)   Encourage IS      #HTN   #pacemaker/AICD in place (2017)   #h/o CAD s/p PCI with stents (x4 to LAD), CABG x1 vessel (4-5 years ago)   #HFrEF   #Lightheadedness, bradycardia possibly d/t cardiac etiology  #Orthostatic hypotension  - f/up EKG  - cardiology consult for CHF  - EP consult to interrogate pacemaker  - continue home metoprolol 12.5 mg q12 hours   - Entresto lowered to 26 mg/24 mg q12h given blood pressure readings on 7/7.   - Patient does not take Lasix, metolazone, or ivabridine   - HOLDING home Imdur (per patient, takes 30 mg qd)   - HOLDING home ranolazine, restart as needed   - As per patient, his EF is 30-40%.    #Atrial fibrillation   - continue home amiodarone 200 mg qd     #Trigeminal neuralgia   - continue home oxcarbazepine (150 mg q12)     #h/o CKD   - baseline creatinine 1.7-1.9     #DM II  ISS   FS AC/HS      # Infrequent episodes of food regurgitation , symptoms not consistent with typical esophageal dysphagia   -  Pt was screened for dysphagia by S&S team on 7/2/2024  as he had an episode of water regurgitation earlier during the day. Pt again had 1 min postprandial regurgitation. Pt reports a similar episode 2 months ago when he was having dinner and then he regurgitated food.   - If symptoms persist will obtain barium esophogram   - 7/5 diet upgraded to minced and moist, will upgrade diet as tolerated.     -Multimodal pain control  -GI/Bowel Mgmt: Miralax/senna    - Diet: puree DASH diet    Precautions / PROPHYLAXIS:    - Falls  - Aspiration    - DVT prophylaxis: heparin     ASSESSMENT/PLAN    Rehab for multitrauma with head trauma but no LOC, right Sylvian fissure SAH, L2 and L4 transverse process fractures,  left posterior 6, 9-11th rib fractures    89 y.o. 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 CT scan and imaging studies demonstrated right Sylvian fissure SAH, left L2 and L4 transverse process fractures, and left posterior 6, 9-11th rib fractures    #Multitrauma with head trauma but no LOC   #Right Sylvian Fissure SAH   #Left L2 and L4 Transverse Process Fx   #Left Posterior 6, 9-11th Rib Fx's  #Trace left pleural effusion   No neurosurgical intervention   Cleared for chemical DVT ppx   Repeat [facility]TH in 4wks oupt   Outpt follow up with Neurosurgery Dr Hood in 4 weeks with CTH prior to appointment   Neurosurgery has signed off   Pain control  (Tylenol 975 mg q6 hours, gabapentin 100 mg q8 hours, Robaxin 500 mg q8 hours, oxycodone 5 mg q8 hours PRN, lidocaine patch)   ASA on hold     #HTN   #Atrial fibrillation   #pacemaker/AICD in place (2017)   #h/o CAD s/p PCI with stents (x4 to LAD), CABG x1 vessel (4-5 years ago)   #HFrEF   #Lightheadedness, bradycardia possibly d/t cardiac etiology  #Orthostatic hypotension  - f/up EKG  - cardiology consult appreciated  - EP consult to interrogate pacemaker - 7/8: Underlying rhythm AT vs ST with occasional PAC falling into blanking period with subsequent skipped ventricular beat  - Entresto lowered to 26 mg/24 mg q12h given blood pressure readings on 7/7.   - Patient does not take Lasix, metolazone, or ivabridine   - HOLDING home Imdur (per patient, takes 30 mg qd)   - HOLDING home ranolazine, restart as needed   - As per patient, his EF is 30-40%.  - D/c Metoprolol per Cardio consult  - Monitor orthostatic BPs  - continue home amiodarone 200 mg qd     #Trigeminal neuralgia   - continue home oxcarbazepine (150 mg q12)     #h/o CKD   - baseline creatinine 1.7-1.9     #DM II  ISS   FS AC/HS      # Infrequent episodes of food regurgitation , symptoms not consistent with typical esophageal dysphagia   -  Pt was screened for dysphagia by S&S team on 7/2/2024  as he had an episode of water regurgitation earlier during the day. Pt again had 1 min postprandial regurgitation. Pt reports a similar episode 2 months ago when he was having dinner and then he regurgitated food.   - If symptoms persist will obtain barium esophogram   - 7/5 diet upgraded to minced and moist, will upgrade diet as tolerated.     -Multimodal pain control  -GI/Bowel Mgmt: Miralax/senna    Diet, Minced and Moist:   Consistent Carbohydrate No Snacks (CSTCHO)  No Concentrated Potassium (07-05-24 @ 12:05) [Active]      Precautions / PROPHYLAXIS:    - Falls  - Aspiration    - DVT prophylaxis: heparin

## 2024-07-08 NOTE — CHART NOTE - NSCHARTNOTEFT_GEN_A_CORE
Electrophysiology    Pts device __BiV ICD [place]____ was interrogated on 07-08-24  Device working properly  Events: no recent events  Heartlogic consistent fluid overload    Underlying rhythm AT vs ST with occasional PAC falling into blanking period with subsequent skipped ventricular beat   Battery 2yrs    Mode DDD   Paced A 8%, % %    results d/w with primary team  Reviewed by attending    Contact EP ACP with any questions [phone]

## 2024-07-08 NOTE — PROGRESS NOTE ADULT - ATTENDING COMMENTS
I reviewed the chart and examined the patient with the resident and we discussed the findings and treatment plan.  The patient is tolerating the rehab program well. I agree with the findings and treatment plan above, which I modified as indicated. The patient requires 3 hrs a day of acute inpatient rehab. Patient with no new complaints on rounds. In therapy gym, he c/o some light headedness and the therapist though his pulse was slow. When seen be me beside later in the morning, pulse was about 110 and irregular. He has had multiple medication changes since admission including decrease in Entresto and holding Metoprolol secondary to orthostasis, his Ranexa was stopped, and his ASA is on hold. He was evaluated by cardiology consult and EPS today. PPM interrogation found Underlying rhythm AT vs ST with occasional PAC falling into blanking period with subsequent skipped ventricular beat. Cardio consult recommended to continue lower dose of Entresto and d/c Metoprolol which he states that he was no taking at home, and they well f/u. We will check orthostatic BPs qam. Labs reviewed and stable. Tolerating therapies well. Continue full rehab program.    I read, edited and agree with the Assessment:  #Multitrauma with head trauma but no LOC   #Right Sylvian Fissure SAH   #Left L2 and L4 Transverse Process Fx   #Left Posterior 6, 9-11th Rib Fx's  #Trace left pleural effusion   No neurosurgical intervention   Cleared for chemical DVT ppx   Repeat CTH in 4wks oupt   Outpt follow up with Neurosurgery Dr Hood in 4 weeks with CTH prior to appointment   Neurosurgery has signed off   Pain control  (Tylenol 975 mg q6 hours, gabapentin 100 mg q8 hours, Robaxin 500 mg q8 hours, oxycodone 5 mg q8 hours PRN, lidocaine patch)   ASA on hold     #HTN   #Atrial fibrillation   #pacemaker/AICD in place (2017)   #h/o CAD s/p PCI with stents (x4 to LAD), CABG x1 vessel (4-5 years ago)   #HFrEF   #Lightheadedness, bradycardia possibly d/t cardiac etiology  #Orthostatic hypotension  - f/up EKG  - cardiology consult appreciated  - EP consult to interrogate pacemaker - 7/8: Underlying rhythm AT vs ST with occasional PAC falling into blanking period with subsequent skipped ventricular beat  - Entresto lowered to 26 mg/24 mg q12h given blood pressure readings on 7/7.   - Patient does not take Lasix, metolazone, or ivabridine   - HOLDING home Imdur (per patient, takes 30 mg qd)   - HOLDING home ranolazine, restart as needed   - As per patient, his EF is 30-40%.  - D/c Metoprolol per Cardio consult  - Monitor orthostatic BPs  - continue home amiodarone 200 mg qd     #Trigeminal neuralgia   - continue home oxcarbazepine (150 mg q12)     #h/o CKD   - baseline creatinine 1.7-1.9     #DM II  ISS   FS AC/HS      # Infrequent episodes of food regurgitation , symptoms not consistent with typical esophageal dysphagia   -  Pt was screened for dysphagia by S&S team on 7/2/2024  as he had an episode of water regurgitation earlier during the day. Pt again had 1 min postprandial regurgitation. Pt reports a similar episode 2 months ago when he was having dinner and then he regurgitated food.   - If symptoms persist will obtain barium esophogram   - 7/5 diet upgraded to minced and moist, will upgrade diet as tolerated.     -Multimodal pain control  -GI/Bowel Mgmt: Miralax/senna    Diet, Minced and Moist:   Consistent Carbohydrate No Snacks (CSTCHO)  No Concentrated Potassium (07-05-24 @ 12:05) [Active]

## 2024-07-08 NOTE — CONSULT NOTE ADULT - REASON FOR ADMISSION
Rehab for multitrauma with head trauma but no LOC, right Sylvian fissure SAH, L2 and L4 transverse process fractures, left posterior 6, 9-11th rib fractures

## 2024-07-09 LAB
GLUCOSE BLDC GLUCOMTR-MCNC: 144 MG/DL — HIGH (ref 70–99)
GLUCOSE BLDC GLUCOMTR-MCNC: 146 MG/DL — HIGH (ref 70–99)
GLUCOSE BLDC GLUCOMTR-MCNC: 150 MG/DL — HIGH (ref 70–99)

## 2024-07-09 RX ORDER — METHOCARBAMOL 500 MG
500 TABLET ORAL EVERY 8 HOURS
Refills: 0 | Status: DISCONTINUED | OUTPATIENT
Start: 2024-07-09 | End: 2024-07-10

## 2024-07-09 RX ORDER — RANOLAZINE 500 MG/1
500 TABLET, EXTENDED RELEASE ORAL
Refills: 0 | Status: DISCONTINUED | OUTPATIENT
Start: 2024-07-09 | End: 2024-07-12

## 2024-07-09 RX ORDER — ASPIRIN 325 MG/1
81 TABLET, FILM COATED ORAL DAILY
Refills: 0 | Status: DISCONTINUED | OUTPATIENT
Start: 2024-07-09 | End: 2024-07-12

## 2024-07-09 RX ORDER — OXYCODONE HYDROCHLORIDE 100 MG/5ML
5 SOLUTION ORAL EVERY 8 HOURS
Refills: 0 | Status: DISCONTINUED | OUTPATIENT
Start: 2024-07-11 | End: 2024-07-12

## 2024-07-09 RX ADMIN — Medication 975 MILLIGRAM(S): at 05:48

## 2024-07-09 RX ADMIN — Medication 975 MILLIGRAM(S): at 12:50

## 2024-07-09 RX ADMIN — Medication 975 MILLIGRAM(S): at 17:14

## 2024-07-09 RX ADMIN — RANOLAZINE 500 MILLIGRAM(S): 500 TABLET, EXTENDED RELEASE ORAL at 17:08

## 2024-07-09 RX ADMIN — Medication 975 MILLIGRAM(S): at 17:08

## 2024-07-09 RX ADMIN — SACUBITRIL AND VALSARTAN 1 TABLET(S): 97; 103 TABLET, FILM COATED ORAL at 05:46

## 2024-07-09 RX ADMIN — ASPIRIN 81 MILLIGRAM(S): 325 TABLET, FILM COATED ORAL at 12:50

## 2024-07-09 RX ADMIN — Medication 975 MILLIGRAM(S): at 14:40

## 2024-07-09 RX ADMIN — HEPARIN SODIUM 5000 UNIT(S): 50 INJECTION, SOLUTION INTRAVENOUS at 21:58

## 2024-07-09 RX ADMIN — AMIODARONE HYDROCHLORIDE 200 MILLIGRAM(S): 50 INJECTION, SOLUTION INTRAVENOUS at 05:45

## 2024-07-09 RX ADMIN — ATORVASTATIN CALCIUM 40 MILLIGRAM(S): 20 TABLET, FILM COATED ORAL at 21:57

## 2024-07-09 RX ADMIN — LIDOCAINE HCL 1 PATCH: 28 GEL TOPICAL at 21:55

## 2024-07-09 RX ADMIN — OXCARBAZEPINE 150 MILLIGRAM(S): 600 TABLET, FILM COATED ORAL at 05:45

## 2024-07-09 RX ADMIN — HEPARIN SODIUM 5000 UNIT(S): 50 INJECTION, SOLUTION INTRAVENOUS at 13:28

## 2024-07-09 RX ADMIN — Medication 500 MILLIGRAM(S): at 13:29

## 2024-07-09 RX ADMIN — HEPARIN SODIUM 5000 UNIT(S): 50 INJECTION, SOLUTION INTRAVENOUS at 05:45

## 2024-07-09 RX ADMIN — SACUBITRIL AND VALSARTAN 1 TABLET(S): 97; 103 TABLET, FILM COATED ORAL at 17:08

## 2024-07-09 RX ADMIN — Medication 500 MILLIGRAM(S): at 05:45

## 2024-07-09 RX ADMIN — Medication 975 MILLIGRAM(S): at 05:45

## 2024-07-09 RX ADMIN — Medication 100 MILLIGRAM(S): at 21:57

## 2024-07-09 RX ADMIN — PANTOPRAZOLE SODIUM 40 MILLIGRAM(S): 40 INJECTION, POWDER, FOR SOLUTION INTRAVENOUS at 06:27

## 2024-07-09 RX ADMIN — TAMSULOSIN HYDROCHLORIDE 0.4 MILLIGRAM(S): 0.4 CAPSULE ORAL at 22:00

## 2024-07-09 RX ADMIN — OXCARBAZEPINE 150 MILLIGRAM(S): 600 TABLET, FILM COATED ORAL at 17:08

## 2024-07-09 RX ADMIN — Medication 1 APPLICATION(S): at 05:46

## 2024-07-09 RX ADMIN — Medication 100 MILLIGRAM(S): at 13:28

## 2024-07-09 RX ADMIN — Medication 100 MILLIGRAM(S): at 05:45

## 2024-07-09 NOTE — PROGRESS NOTE ADULT - ATTENDING COMMENTS
I reviewed the chart and examined the patient with the resident and we discussed the findings and treatment plan.  The patient is tolerating the rehab program well. I agree with the findings and treatment plan above, which I modified as indicated. The patient requires 3 hrs a day of acute inpatient rehab. No new complaints. Denies dizziness or palpitation. Tolerating therapies well. VSS. Ranexa resumed per cardio. ASA 81 mg resumed after NSGY clearance. Transfers with CG, ambulates with CS. Continue rehab program.     I read, edited and agree with the Assessment:  #Multitrauma with head trauma but no LOC   #Right Sylvian Fissure SAH   #Left L2 and L4 Transverse Process Fx   #Left Posterior 6, 9-11th Rib Fx's  #Trace left pleural effusion   No neurosurgical intervention   Cleared for chemical DVT ppx   Repeat CTH in 4wks oupt   Outpt follow up with Neurosurgery Dr Hood in 4 weeks with CTH prior to appointment   Pain control  (Tylenol 975 mg q6 hours, gabapentin 100 mg q8 hours, Robaxin 500 mg q8 hours, oxycodone 5 mg q8 hours PRN, lidocaine patch) - will change Robaxin to prn  Can resume aspirin  81mg daily per NSGY and cardio      #HTN   #Atrial fibrillation   #pacemaker/AICD in place (2017)   #h/o CAD s/p PCI with stents (x4 to LAD), CABG x1 vessel (4-5 years ago)   #HFrEF   #Lightheadedness, bradycardia possibly d/t cardiac etiology  #Orthostatic hypotension  - f/up EKG  - cardiology consult appreciated  - EP consult to interrogate pacemaker - 7/8: Underlying rhythm AT vs ST with occasional PAC falling into blanking period with subsequent skipped ventricular beat  - Entresto lowered to 26 mg/24 mg q12h given blood pressure readings on 7/7.   - Patient does not take Lasix, metolazone, or ivabridine   - HOLDING home Imdur (per patient, takes 30 mg qd)   - resume home ranolazine 500mg PO q12  - As per patient, his EF is 30-40%.  - D/c Metoprolol per Cardio consult  - Monitor orthostatic BPs  - continue home amiodarone 200 mg qd  and resume ASA 81mg    #Trigeminal neuralgia   - continue home oxcarbazepine (150 mg q12)     #h/o CKD   - baseline creatinine 1.7-1.9     #DM II  ISS   FS AC/HS      # Infrequent episodes of food regurgitation , symptoms not consistent with typical esophageal dysphagia   -  Pt was screened for dysphagia by S&S team on 7/2/2024  as he had an episode of water regurgitation earlier during the day. Pt again had 1 min postprandial regurgitation. Pt reports a similar episode 2 months ago when he was having dinner and then he regurgitated food.   - If symptoms persist will obtain barium esophogram   - tolerated minced and moist diet, upgrade to regular per patient request, reflux precautions  -GI/Bowel Mgmt: Miralax/senna

## 2024-07-09 NOTE — PROGRESS NOTE ADULT - ASSESSMENT
ASSESSMENT/PLAN    Rehab for multitrauma with head trauma but no LOC, right Sylvian fissure SAH, L2 and L4 transverse process fractures,  left posterior 6, 9-11th rib fractures    89 y.o. 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 CT scan and imaging studies demonstrated right Sylvian fissure SAH, left L2 and L4 transverse process fractures, and left posterior 6, 9-11th rib fractures    #Multitrauma with head trauma but no LOC   #Right Sylvian Fissure SAH   #Left L2 and L4 Transverse Process Fx   #Left Posterior 6, 9-11th Rib Fx's  #Trace left pleural effusion   No neurosurgical intervention   Cleared for chemical DVT ppx   Repeat CTH in 4wks oupt   Outpt follow up with Neurosurgery Dr Hood in 4 weeks with CTH prior to appointment   Neurosurgery has signed off   Pain control  (Tylenol 975 mg q6 hours, gabapentin 100 mg q8 hours, Robaxin 500 mg q8 hours, oxycodone 5 mg q8 hours PRN, lidocaine patch)   ASA on hold     #HTN   #Atrial fibrillation   #pacemaker/AICD in place (2017)   #h/o CAD s/p PCI with stents (x4 to LAD), CABG x1 vessel (4-5 years ago)   #HFrEF   #Lightheadedness, bradycardia possibly d/t cardiac etiology  #Orthostatic hypotension  - f/up EKG  - cardiology consult appreciated  - EP consult to interrogate pacemaker - 7/8: Underlying rhythm AT vs ST with occasional PAC falling into blanking period with subsequent skipped ventricular beat  - Entresto lowered to 26 mg/24 mg q12h given blood pressure readings on 7/7.   - Patient does not take Lasix, metolazone, or ivabridine   - HOLDING home Imdur (per patient, takes 30 mg qd)   - HOLDING home ranolazine, restart as needed   - As per patient, his EF is 30-40%.  - D/c Metoprolol per Cardio consult  - Monitor orthostatic BPs  - continue home amiodarone 200 mg qd     #Trigeminal neuralgia   - continue home oxcarbazepine (150 mg q12)     #h/o CKD   - baseline creatinine 1.7-1.9     #DM II  ISS   FS AC/HS      # Infrequent episodes of food regurgitation , symptoms not consistent with typical esophageal dysphagia   -  Pt was screened for dysphagia by S&S team on 7/2/2024  as he had an episode of water regurgitation earlier during the day. Pt again had 1 min postprandial regurgitation. Pt reports a similar episode 2 months ago when he was having dinner and then he regurgitated food.   - If symptoms persist will obtain barium esophogram   - 7/5 diet upgraded to minced and moist, will upgrade diet as tolerated.     -Multimodal pain control  -GI/Bowel Mgmt: Miralax/senna    Diet, Minced and Moist:   Consistent Carbohydrate No Snacks (CSTCHO)  No Concentrated Potassium (07-05-24 @ 12:05) [Active]      Precautions / PROPHYLAXIS:    - Falls  - Aspiration    - DVT prophylaxis: heparin     ASSESSMENT/PLAN    Rehab for multitrauma with head trauma but no LOC, right Sylvian fissure SAH, L2 and L4 transverse process fractures,  left posterior 6, 9-11th rib fractures    89 y.o. 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 CT scan and imaging studies demonstrated right Sylvian fissure SAH, left L2 and L4 transverse process fractures, and left posterior 6, 9-11th rib fractures    #Multitrauma with head trauma but no LOC   #Right Sylvian Fissure SAH   #Left L2 and L4 Transverse Process Fx   #Left Posterior 6, 9-11th Rib Fx's  #Trace left pleural effusion   No neurosurgical intervention   Cleared for chemical DVT ppx   Repeat CTH in 4wks oupt   Outpt follow up with Neurosurgery Dr Hood in 4 weeks with CTH prior to appointment   Neurosurgery has signed off   Pain control  (Tylenol 975 mg q6 hours, gabapentin 100 mg q8 hours, Robaxin 500 mg q8 hours, oxycodone 5 mg q8 hours PRN, lidocaine patch)   resume aspirin  81mg daily     #HTN   #Atrial fibrillation   #pacemaker/AICD in place (2017)   #h/o CAD s/p PCI with stents (x4 to LAD), CABG x1 vessel (4-5 years ago)   #HFrEF   #Lightheadedness, bradycardia possibly d/t cardiac etiology  #Orthostatic hypotension  - f/up EKG  - cardiology consult appreciated  - EP consult to interrogate pacemaker - 7/8: Underlying rhythm AT vs ST with occasional PAC falling into blanking period with subsequent skipped ventricular beat  - Entresto lowered to 26 mg/24 mg q12h given blood pressure readings on 7/7.   - Patient does not take Lasix, metolazone, or ivabridine   - HOLDING home Imdur (per patient, takes 30 mg qd)   - resume home ranolazine 500mg PO q12  - As per patient, his EF is 30-40%.  - D/c Metoprolol per Cardio consult  - Monitor orthostatic BPs  - continue home amiodarone 200 mg qd     #Trigeminal neuralgia   - continue home oxcarbazepine (150 mg q12)     #h/o CKD   - baseline creatinine 1.7-1.9     #DM II  ISS   FS AC/HS      # Infrequent episodes of food regurgitation , symptoms not consistent with typical esophageal dysphagia   -  Pt was screened for dysphagia by S&S team on 7/2/2024  as he had an episode of water regurgitation earlier during the day. Pt again had 1 min postprandial regurgitation. Pt reports a similar episode 2 months ago when he was having dinner and then he regurgitated food.   - If symptoms persist will obtain barium esophogram   - tolerated minced and moist diet, upgrade to easy to chew    -Multimodal pain control  -GI/Bowel Mgmt: Miralax/senna    Diet: Consistent Carbohydrate/No Snacks (07-09-24 @ 11:24) [Active]    Precautions / PROPHYLAXIS:    - Falls  - Aspiration    - Reflux precautions  - DVT prophylaxis: heparin     ASSESSMENT/PLAN    Rehab for multitrauma with head trauma but no LOC, right Sylvian fissure SAH, L2 and L4 transverse process fractures,  left posterior 6, 9-11th rib fractures    89 y.o. 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 CT scan and imaging studies demonstrated right Sylvian fissure SAH, left L2 and L4 transverse process fractures, and left posterior 6, 9-11th rib fractures    #Multitrauma with head trauma but no LOC   #Right Sylvian Fissure SAH   #Left L2 and L4 Transverse Process Fx   #Left Posterior 6, 9-11th Rib Fx's  #Trace left pleural effusion   No neurosurgical intervention   Cleared for chemical DVT ppx   Repeat CTH in 4wks oupt   Outpt follow up with Neurosurgery Dr Hood in 4 weeks with CTH prior to appointment   Pain control  (Tylenol 975 mg q6 hours, gabapentin 100 mg q8 hours, Robaxin 500 mg q8 hours, oxycodone 5 mg q8 hours PRN, lidocaine patch) - will change Robaxin to prn  Can resume aspirin  81mg daily per NSGY and cardio      #HTN   #Atrial fibrillation   #pacemaker/AICD in place (2017)   #h/o CAD s/p PCI with stents (x4 to LAD), CABG x1 vessel (4-5 years ago)   #HFrEF   #Lightheadedness, bradycardia possibly d/t cardiac etiology  #Orthostatic hypotension  - f/up EKG  - cardiology consult appreciated  - EP consult to interrogate pacemaker - 7/8: Underlying rhythm AT vs ST with occasional PAC falling into blanking period with subsequent skipped ventricular beat  - Entresto lowered to 26 mg/24 mg q12h given blood pressure readings on 7/7.   - Patient does not take Lasix, metolazone, or ivabridine   - HOLDING home Imdur (per patient, takes 30 mg qd)   - resume home ranolazine 500mg PO q12  - As per patient, his EF is 30-40%.  - D/c Metoprolol per Cardio consult  - Monitor orthostatic BPs  - continue home amiodarone 200 mg qd  and resume ASA 81mg    #Trigeminal neuralgia   - continue home oxcarbazepine (150 mg q12)     #h/o CKD   - baseline creatinine 1.7-1.9     #DM II  ISS   FS AC/HS      # Infrequent episodes of food regurgitation , symptoms not consistent with typical esophageal dysphagia   -  Pt was screened for dysphagia by S&S team on 7/2/2024  as he had an episode of water regurgitation earlier during the day. Pt again had 1 min postprandial regurgitation. Pt reports a similar episode 2 months ago when he was having dinner and then he regurgitated food.   - If symptoms persist will obtain barium esophogram   - tolerated minced and moist diet, upgrade to regular per patient request, reflux precautions  -GI/Bowel Mgmt: Miralax/senna    Diet: Consistent Carbohydrate/No Snacks (07-09-24 @ 11:24) [Active]    Precautions / PROPHYLAXIS:    - Falls  - Aspiration    - Reflux precautions  - DVT prophylaxis: heparin

## 2024-07-09 NOTE — PROGRESS NOTE ADULT - SUBJECTIVE AND OBJECTIVE BOX
Patient is a 89y old  Male who presents with a chief complaint of Rehab for multitrauma with head trauma but no LOC, right Sylvian fissure SAH, L2 and L4 transverse process fractures, left posterior 6, 9-11th rib fractures (04 Jul 2024 10:58)      HPI:       89 year old right-handed,  male with PMHx of CAD s/p PCI (x4 stents), CABG x1 vessel (4-5 years ago), AFib, HFrEF s/p PPM/AICD (2017), HTN, CKD, HLD. 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 Patient was ambulating at home when he slipped and fell. He was able to ambulate after the incident but was experiencing persistent head and rib pain. Patient initially presented to [facility] for evaluation and was transferred to [facility] after CT scan demonstrated right Sylvian fissure SAH and multiple 6, 9-11th left rib fractures (and possible 8th rib fx). Patient had two repeat CT scans, both of which are stable. Hospital course was complicated by postprandial regurgitation.  Speech and swallow saw patient and said oropharyngeal swallow within functional limits with regurgitation within 1 minute. Patient was evaluated by GI and they recommended a  speech and swallow evaluation if symptoms persist, will plan for barium esophogram after neuro and cardio clearance. Patient’s diet was upgraded from clear liquids to full liquids 7/3/2024. Patient medically stable for discharge to acute rehabilitation on 7/3/2024.     LS: Lives with his wife in a private house with 5 JEN   PLOF: Independent with ADLs, ambulated independently,   CLOF: Transfers with  min A, ambulated 50’ x 3 with RW and  CG, UBD/LBD with min A     During her stay, the patient was evaluated by physical and occupational therapy. Prior to admission, patient was independent with ambulation and ADLs. Patient was deemed a good acute rehab candidate due to decline in functional status and ability to carry out activities of daily living. Patient is able to tolerate 3 hours of therapy 5-6 days a week and is motivated to participate.   (03 Jul 2024 13:30)      TODAY'S SUBJECTIVE & REVIEW OF SYMPTOMS  Patient was seen and assessed at bedside.   No overnight events.   Patient denies any new complaints at this time.   Tolerating PT/OT.    Tolerating oral diet.   Voiding and passing stool spontaneously.     Patient reports feeling well. States lightheadedness has resolved. Tolerating and engaged in therapies. Expressed feeling stronger overall and interest in discharge     Review of Systems:    Constitutional:   [ x  ] WNL           [   ] poor appetite   [   ] insomnia   [   ] tired   Cardio:                [ x  ] WNL           [   ] CP   [   ] SANTAMARIA   [   ] palpitations               Resp:                   [x   ] WNL           [   ] SOB   [   ] cough   [   ] wheezing   GI:                        [  x ] WNL           [   ] constipation   [   ] diarrhea   [   ] abdominal pain   [   ] nausea   [   ] emesis                                :                      [   ] WNL           [   ] HARMAN  [ x  ] dysuria   [   ] difficulty voiding             Endo:                   [ x  ] WNL          [   ] polyuria   [   ] temperature intolerance                 Skin:                     [   ] WNL          [ x  ] pain: left ribs   [   ] wound   [   ] rash   MSK:                    [ x  ] WNL          [   ] muscle pain   [   ] joint pain/ stiffness   [   ] muscle tenderness   [   ] swelling   Neuro:                 [  x ] WNL          [   ] HA   [   ] change in vision   [   ] tremor   [   ] weakness   [   ]dysphagia              Cognitive:           [  x ] WNL           [   ]confusion      Psych:                  [ x  ] WNL           [   ] hallucinations   [   ]agitation   [   ] delusion   [   ]depression    CLOF: 50'x3 RW CG, Transfers Jessi, UBD Jessi, LBD Jessi    PHYSICAL EXAM  Vital Signs Last 24 Hrs  T(C): 37 (09 Jul 2024 05:11), Max: 37 (09 Jul 2024 05:11)  T(F): 98.6 (09 Jul 2024 05:11), Max: 98.6 (09 Jul 2024 05:11)  HR: 67 (08 Jul 2024 20:47) (55 - 67)  BP: 136/66 (09 Jul 2024 05:11) (100/63 - 136/66)  BP(mean): 89 (09 Jul 2024 05:11) (76 - 89)  RR: 20 (09 Jul 2024 05:11) (18 - 20)  SpO2: 98% (08 Jul 2024 12:32) (98% - 98%)    General:[  x ] NAD, Resting Comfortable,   [   ] other: Hard of hearing                               HEENT: [ x  ] NC/AT, EOMI, PERRL , Normal Conjunctivae,   [   ] other:  Cardio: [ x  ] RRR, no murmur,   [   ] other:                              Pulm: [  x ] No Respiratory Distress,  Lungs CTAB,   [   ] other:                       Abdomen: [  x ]ND/NT, Soft,   [   ] other:    : [ x  ] NO HARMAN CATHETER, [   ] HARMAN CATHETER- no meatal tear, no discharge, [   ] other:                                            MSK: [   ] No joint swelling, Full ROM,   [ x ] other: TTP Left Rib cage                                         Ext: [ x  ]No C/C/E, No calf tenderness,   [  X ]other:  bilateral hands with intrinsic muscle atrophy  Skin: [ x  ]intact,   [   ] other:                                                                   Neurological Examination:  Cognitive: [  x  ] A&O x 3,   [    ]  other:                                                                      Attention:  [ x   ] intact,   [    ]  other:                            Memory: [ x   ] intact,    [    ]  other:     Mood/Affect: [  x  ] wnl,    [    ]  other:                                                                             Communication: [  x  ]Fluent, no dysarthria, following commands:  [    ] other:   CN II - XII:  [  x  ] intact,  [    ] other:                                                                                        Motor:   RIGHT UE: [  x ] 5/5 throughout  LEFT    UE: SA 4/5, EE/EF 4+/5, FG 5/5  RIGHT LE: HF 4-/5, KE 4+/5, DF/PF 5/5  LEFT    LE: HF 4-/5, KE 4+/5, DF/PF 5/5    Tone: [  X  ] wnl,   [    ]  other:  DTRs: [  X ]symmetric, [   ] other:  Coordination:   [  X  ] intact,   [    ] other:                                                                           Sensory: [  X  ] Intact to light touch,   [    ] other:    MEDICATIONS  (STANDING):  acetaminophen     Tablet .. 975 milliGRAM(s) Oral every 6 hours  aMIOdarone    Tablet 200 milliGRAM(s) Oral daily  atorvastatin 40 milliGRAM(s) Oral at bedtime  chlorhexidine 2% Cloths 1 Application(s) Topical <User Schedule>  dextrose 50% Injectable 25 Gram(s) IV Push once  gabapentin 100 milliGRAM(s) Oral every 8 hours  glucagon  Injectable 1 milliGRAM(s) IntraMuscular once  heparin   Injectable 5000 Unit(s) SubCutaneous every 8 hours  insulin lispro (ADMELOG) corrective regimen sliding scale   SubCutaneous three times a day before meals  lidocaine   4% Patch 1 Patch Transdermal every 24 hours  methocarbamol 500 milliGRAM(s) Oral every 8 hours  OXcarbazepine 150 milliGRAM(s) Oral every 12 hours  pantoprazole    Tablet 40 milliGRAM(s) Oral before breakfast  polyethylene glycol 3350 17 Gram(s) Oral daily  ranolazine 500 milliGRAM(s) Oral two times a day  sacubitril 24 mG/valsartan 26 mG 1 Tablet(s) Oral two times a day  senna 2 Tablet(s) Oral at bedtime  tamsulosin 0.4 milliGRAM(s) Oral at bedtime    MEDICATIONS  (PRN):  bisacodyl 10 milliGRAM(s) Oral at bedtime PRN Constipation  oxyCODONE    IR 5 milliGRAM(s) Oral every 8 hours PRN Severe Pain (7 - 10)      RECENT LABS/IMAGING                        11.9   6.27  )-----------( 193      ( 08 Jul 2024 07:04 )             36.3     07-08    133<L>  |  99  |  34<H>  ----------------------------<  128<H>  4.7   |  21  |  2.1<H>    Ca    9.4      08 Jul 2024 07:04  Mg     1.9     07-08    TPro  5.6<L>  /  Alb  3.7  /  TBili  0.6  /  DBili  x   /  AST  26  /  ALT  23  /  AlkPhos  136<H>  07-08      POCT Blood Glucose.: 131 mg/dL (07-08-24 @ 11:05)  POCT Blood Glucose.: 188 mg/dL (07-08-24 @ 07:38)   Patient is a 89y old  Male who presents with a chief complaint of Rehab for multitrauma with head trauma but no LOC, right Sylvian fissure SAH, L2 and L4 transverse process fractures, left posterior 6, 9-11th rib fractures (04 Jul 2024 10:58)      HPI:       89 year old right-handed,  male with PMHx of CAD s/p PCI (x4 stents), CABG x1 vessel (4-5 years ago), AFib, HFrEF s/p PPM/AICD (2017), HTN, CKD, HLD. 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 Patient was ambulating at home when he slipped and fell. He was able to ambulate after the incident but was experiencing persistent head and rib pain. Patient initially presented to [facility] for evaluation and was transferred to [facility] after CT scan demonstrated right Sylvian fissure SAH and multiple 6, 9-11th left rib fractures (and possible 8th rib fx). Patient had two repeat CT scans, both of which are stable. Hospital course was complicated by postprandial regurgitation.  Speech and swallow saw patient and said oropharyngeal swallow within functional limits with regurgitation within 1 minute. Patient was evaluated by GI and they recommended a  speech and swallow evaluation if symptoms persist, will plan for barium esophogram after neuro and cardio clearance. Patient’s diet was upgraded from clear liquids to full liquids 7/3/2024. Patient medically stable for discharge to acute rehabilitation on 7/3/2024.     LS: Lives with his wife in a private house with 5 JEN   PLOF: Independent with ADLs, ambulated independently,   CLOF: Transfers with  min A, ambulated 50’ x 3 with RW and  CG, UBD/LBD with min A     During her stay, the patient was evaluated by physical and occupational therapy. Prior to admission, patient was independent with ambulation and ADLs. Patient was deemed a good acute rehab candidate due to decline in functional status and ability to carry out activities of daily living. Patient is able to tolerate 3 hours of therapy 5-6 days a week and is motivated to participate.   (03 Jul 2024 13:30)      TODAY'S SUBJECTIVE & REVIEW OF SYMPTOMS  Patient was seen and assessed at bedside.   No overnight events.   Patient denies any new complaints at this time.   Tolerating PT/OT.    Tolerating oral diet.   Voiding and passing stool spontaneously.     Patient reports feeling well. States lightheadedness has resolved. Tolerating and engaged in therapies. Expressed feeling stronger overall and interest in discharge     Review of Systems:    Constitutional:   [ x  ] WNL           [   ] poor appetite   [   ] insomnia   [   ] tired   Cardio:                [ x  ] WNL           [   ] CP   [   ] SANTAMARIA   [   ] palpitations               Resp:                   [x   ] WNL           [   ] SOB   [   ] cough   [   ] wheezing   GI:                        [  x ] WNL           [   ] constipation   [   ] diarrhea   [   ] abdominal pain   [   ] nausea   [   ] emesis                                :                      [ x  ] WNL           [   ] HARMAN  [   ] dysuria   [   ] difficulty voiding             Endo:                   [ x  ] WNL          [   ] polyuria   [   ] temperature intolerance                 Skin:                     [   ] WNL          [ x  ] pain: left ribs   [   ] wound   [   ] rash   MSK:                    [ x  ] WNL          [   ] muscle pain   [   ] joint pain/ stiffness   [   ] muscle tenderness   [   ] swelling   Neuro:                 [  x ] WNL          [   ] HA   [   ] change in vision   [   ] tremor   [   ] weakness   [   ]dysphagia              Cognitive:           [  x ] WNL           [   ]confusion      Psych:                  [ x  ] WNL           [   ] hallucinations   [   ]agitation   [   ] delusion   [   ]depression    CLOF: 50'x3 RW CG/ CS, Transfers CG, LBD Jessi    PHYSICAL EXAM  Vital Signs Last 24 Hrs  T(C): 37 (09 Jul 2024 05:11), Max: 37 (09 Jul 2024 05:11)  T(F): 98.6 (09 Jul 2024 05:11), Max: 98.6 (09 Jul 2024 05:11)  HR: 67 (08 Jul 2024 20:47) (55 - 67)  BP: 136/66 (09 Jul 2024 05:11) (100/63 - 136/66)  BP(mean): 89 (09 Jul 2024 05:11) (76 - 89)  RR: 20 (09 Jul 2024 05:11) (18 - 20)  SpO2: 98% (08 Jul 2024 12:32) (98% - 98%)    General:[  x ] NAD, Resting Comfortable,   [   ] other: Hard of hearing                               HEENT: [ x  ] NC/AT, EOMI, PERRL , Normal Conjunctivae,   [   ] other:  Cardio: [ x  ] RRR, no murmur,   [   ] other:                              Pulm: [  x ] No Respiratory Distress,  Lungs CTAB,   [   ] other:                       Abdomen: [  x ]ND/NT, Soft,   [   ] other:    : [ x  ] NO HARMAN CATHETER, [   ] HARMAN CATHETER- no meatal tear, no discharge, [   ] other:                                            MSK: [   ] No joint swelling, Full ROM,   [ x ] other: TTP Left Rib cage                                         Ext: [ x  ]No C/C/E, No calf tenderness,   [  X ]other:  bilateral hands with intrinsic muscle atrophy  Skin: [ x  ]intact,   [   ] other:                                                                   Neurological Examination:  Cognitive: [  x  ] A&O x 3,   [    ]  other:                                                                      Attention:  [ x   ] intact,   [    ]  other:                            Memory: [ x   ] intact,    [    ]  other:     Mood/Affect: [  x  ] wnl,    [    ]  other:                                                                             Communication: [  x  ]Fluent, no dysarthria, following commands:  [    ] other:   CN II - XII:  [  x  ] intact,  [    ] other:                                                                                        Motor:   RIGHT UE: [  x ] 5/5 throughout  LEFT    UE: SA 4/5, EE/EF 4+/5, FG 5/5  RIGHT LE: HF 4-/5, KE 4+/5, DF/PF 5/5  LEFT    LE: HF 4-/5, KE 4+/5, DF/PF 5/5    Tone: [  X  ] wnl,   [    ]  other:  DTRs: [  X ]symmetric, [   ] other:  Coordination:   [  X  ] intact,   [    ] other:                                                                           Sensory: [  X  ] Intact to light touch,   [    ] other:    MEDICATIONS  (STANDING):  acetaminophen     Tablet .. 975 milliGRAM(s) Oral every 6 hours  aMIOdarone    Tablet 200 milliGRAM(s) Oral daily  atorvastatin 40 milliGRAM(s) Oral at bedtime  chlorhexidine 2% Cloths 1 Application(s) Topical <User Schedule>  dextrose 50% Injectable 25 Gram(s) IV Push once  gabapentin 100 milliGRAM(s) Oral every 8 hours  glucagon  Injectable 1 milliGRAM(s) IntraMuscular once  heparin   Injectable 5000 Unit(s) SubCutaneous every 8 hours  insulin lispro (ADMELOG) corrective regimen sliding scale   SubCutaneous three times a day before meals  lidocaine   4% Patch 1 Patch Transdermal every 24 hours  methocarbamol 500 milliGRAM(s) Oral every 8 hours  OXcarbazepine 150 milliGRAM(s) Oral every 12 hours  pantoprazole    Tablet 40 milliGRAM(s) Oral before breakfast  polyethylene glycol 3350 17 Gram(s) Oral daily  ranolazine 500 milliGRAM(s) Oral two times a day  sacubitril 24 mG/valsartan 26 mG 1 Tablet(s) Oral two times a day  senna 2 Tablet(s) Oral at bedtime  tamsulosin 0.4 milliGRAM(s) Oral at bedtime    MEDICATIONS  (PRN):  bisacodyl 10 milliGRAM(s) Oral at bedtime PRN Constipation  oxyCODONE    IR 5 milliGRAM(s) Oral every 8 hours PRN Severe Pain (7 - 10)      RECENT LABS/IMAGING                        11.9   6.27  )-----------( 193      ( 08 Jul 2024 07:04 )             36.3     07-08    133<L>  |  99  |  34<H>  ----------------------------<  128<H>  4.7   |  21  |  2.1<H>    Ca    9.4      08 Jul 2024 07:04  Mg     1.9     07-08    TPro  5.6<L>  /  Alb  3.7  /  TBili  0.6  /  DBili  x   /  AST  26  /  ALT  23  /  AlkPhos  136<H>  07-08      POCT Blood Glucose.: 131 mg/dL (07-08-24 @ 11:05)  POCT Blood Glucose.: 188 mg/dL (07-08-24 @ 07:38)

## 2024-07-09 NOTE — CHART NOTE - NSCHARTNOTEFT_GEN_A_CORE
Mr Kevin was seen by Cardiology yesterday for orthostatic hypotension and tachycardia. Metoprolol was d/c'd and to monitor orthostatic BP's today - if he is still orthostatic, will d/c Entresto as per Cardiology recommendation. Entresto dose was lowered from 49/51 to 24/26 on 7/8.  Cardiology also stated that he needs the ASA for severe CAD. ASA has been on hold due to traumatic SAH, which has been stable.  Spoke to KAREN Ayala, who discussed with Dr Hood, and they said that ASA 81mg po daily can be restarted. Ordered to resume today.  Will also discuss with team today re: upgrading his diet to soft and bite-sized; he has been on restricted diet due to GERD, but he has been tolerating the minced and moist without any episodes of regurgitation and he would like to eat more solid foods.    < from: CT Head No Cont (07.02.24 @ 05:58) >    IMPRESSION:    Stable acute subarachnoid hemorrhages within the right central sulcus and   right sylvian fissure since the prior CT head from 7/1/2024.    Questionable focal patchy hypodensity in the right thalamus, not well   visualized on the prior exam could represent an age-indeterminate lacunar   infarct. Further evaluation with MRI can be considered if there is no   contraindication.    Stable mild chronic microvascular ischemic changes and scattered small   chronic infarcts.    --- End of Report ---    < end of copied text >    Vital Signs Last 24 Hrs  T(C): 37 (09 Jul 2024 05:11), Max: 37 (09 Jul 2024 05:11)  T(F): 98.6 (09 Jul 2024 05:11), Max: 98.6 (09 Jul 2024 05:11)  HR: 67 (08 Jul 2024 20:47) (55 - 67)  BP: 136/66 (09 Jul 2024 05:11) (100/63 - 136/66)  BP(mean): 89 (09 Jul 2024 05:11) (76 - 89)  RR: 20 (09 Jul 2024 05:11) (18 - 20)  SpO2: 98% (08 Jul 2024 12:32) (98% - 98%)      LABS:                        11.9   6.27  )-----------( 193      ( 08 Jul 2024 07:04 )             36.3     07-08    133<L>  |  99  |  34<H>  ----------------------------<  128<H>  4.7   |  21  |  2.1<H>    Ca    9.4      08 Jul 2024 07:04  Mg     1.9     07-08    TPro  5.6<L>  /  Alb  3.7  /  TBili  0.6  /  DBili  x   /  AST  26  /  ALT  23  /  AlkPhos  136<H>  07-08 Mr Kevin was seen by Cardiology yesterday for orthostatic hypotension and tachycardia. Metoprolol was d/c'd and to monitor orthostatic BP's today - if he is still orthostatic, will d/c Entresto as per Cardiology recommendation. Entresto dose was lowered from 49/51 to 24/26 on 7/8.  Cardiology also stated that he needs the ASA for severe CAD. ASA has been on hold due to traumatic SAH, which has been stable.  Spoke to KAREN Ayala, who discussed with Dr Hood, and they said that ASA 81mg po daily can be restarted. Ordered to resume today. Ranexa was also resumed at 500mg po q12h as per Cardiology.  Will also discuss with team today re: upgrading his diet to soft and bite-sized; he has been on restricted diet due to GERD, but he has been tolerating the minced and moist without any episodes of regurgitation and he would like to eat more solid foods.    < from: CT Head No Cont (07.02.24 @ 05:58) >    IMPRESSION:    Stable acute subarachnoid hemorrhages within the right central sulcus and   right sylvian fissure since the prior CT head from 7/1/2024.    Questionable focal patchy hypodensity in the right thalamus, not well   visualized on the prior exam could represent an age-indeterminate lacunar   infarct. Further evaluation with MRI can be considered if there is no   contraindication.    Stable mild chronic microvascular ischemic changes and scattered small   chronic infarcts.    --- End of Report ---    < end of copied text >    Vital Signs Last 24 Hrs  T(C): 37 (09 Jul 2024 05:11), Max: 37 (09 Jul 2024 05:11)  T(F): 98.6 (09 Jul 2024 05:11), Max: 98.6 (09 Jul 2024 05:11)  HR: 67 (08 Jul 2024 20:47) (55 - 67)  BP: 136/66 (09 Jul 2024 05:11) (100/63 - 136/66)  BP(mean): 89 (09 Jul 2024 05:11) (76 - 89)  RR: 20 (09 Jul 2024 05:11) (18 - 20)  SpO2: 98% (08 Jul 2024 12:32) (98% - 98%)      LABS:                        11.9   6.27  )-----------( 193      ( 08 Jul 2024 07:04 )             36.3     07-08    133<L>  |  99  |  34<H>  ----------------------------<  128<H>  4.7   |  21  |  2.1<H>    Ca    9.4      08 Jul 2024 07:04  Mg     1.9     07-08    TPro  5.6<L>  /  Alb  3.7  /  TBili  0.6  /  DBili  x   /  AST  26  /  ALT  23  /  AlkPhos  136<H>  07-08

## 2024-07-10 ENCOUNTER — TRANSCRIPTION ENCOUNTER (OUTPATIENT)
Age: 89
End: 2024-07-10

## 2024-07-10 DIAGNOSIS — J45.909 UNSPECIFIED ASTHMA, UNCOMPLICATED: ICD-10-CM

## 2024-07-10 DIAGNOSIS — I48.91 UNSPECIFIED ATRIAL FIBRILLATION: ICD-10-CM

## 2024-07-10 DIAGNOSIS — M54.9 DORSALGIA, UNSPECIFIED: ICD-10-CM

## 2024-07-10 DIAGNOSIS — I25.10 ATHEROSCLEROTIC HEART DISEASE OF NATIVE CORONARY ARTERY WITHOUT ANGINA PECTORIS: ICD-10-CM

## 2024-07-10 DIAGNOSIS — K21.9 GASTRO-ESOPHAGEAL REFLUX DISEASE WITHOUT ESOPHAGITIS: ICD-10-CM

## 2024-07-10 DIAGNOSIS — D64.9 ANEMIA, UNSPECIFIED: ICD-10-CM

## 2024-07-10 DIAGNOSIS — Z90.49 ACQUIRED ABSENCE OF OTHER SPECIFIED PARTS OF DIGESTIVE TRACT: ICD-10-CM

## 2024-07-10 DIAGNOSIS — E78.5 HYPERLIPIDEMIA, UNSPECIFIED: ICD-10-CM

## 2024-07-10 DIAGNOSIS — Z95.5 PRESENCE OF CORONARY ANGIOPLASTY IMPLANT AND GRAFT: ICD-10-CM

## 2024-07-10 DIAGNOSIS — S06.6X0A TRAUMATIC SUBARACHNOID HEMORRHAGE WITHOUT LOSS OF CONSCIOUSNESS, INITIAL ENCOUNTER: ICD-10-CM

## 2024-07-10 DIAGNOSIS — Z79.82 LONG TERM (CURRENT) USE OF ASPIRIN: ICD-10-CM

## 2024-07-10 DIAGNOSIS — I50.22 CHRONIC SYSTOLIC (CONGESTIVE) HEART FAILURE: ICD-10-CM

## 2024-07-10 DIAGNOSIS — E11.22 TYPE 2 DIABETES MELLITUS WITH DIABETIC CHRONIC KIDNEY DISEASE: ICD-10-CM

## 2024-07-10 DIAGNOSIS — Z79.84 LONG TERM (CURRENT) USE OF ORAL HYPOGLYCEMIC DRUGS: ICD-10-CM

## 2024-07-10 DIAGNOSIS — N18.9 CHRONIC KIDNEY DISEASE, UNSPECIFIED: ICD-10-CM

## 2024-07-10 DIAGNOSIS — S32.029A UNSPECIFIED FRACTURE OF SECOND LUMBAR VERTEBRA, INITIAL ENCOUNTER FOR CLOSED FRACTURE: ICD-10-CM

## 2024-07-10 DIAGNOSIS — S22.42XA MULTIPLE FRACTURES OF RIBS, LEFT SIDE, INITIAL ENCOUNTER FOR CLOSED FRACTURE: ICD-10-CM

## 2024-07-10 DIAGNOSIS — G50.0 TRIGEMINAL NEURALGIA: ICD-10-CM

## 2024-07-10 DIAGNOSIS — R11.10 VOMITING, UNSPECIFIED: ICD-10-CM

## 2024-07-10 DIAGNOSIS — Z95.810 PRESENCE OF AUTOMATIC (IMPLANTABLE) CARDIAC DEFIBRILLATOR: ICD-10-CM

## 2024-07-10 DIAGNOSIS — Z79.02 LONG TERM (CURRENT) USE OF ANTITHROMBOTICS/ANTIPLATELETS: ICD-10-CM

## 2024-07-10 DIAGNOSIS — S32.039A UNSPECIFIED FRACTURE OF THIRD LUMBAR VERTEBRA, INITIAL ENCOUNTER FOR CLOSED FRACTURE: ICD-10-CM

## 2024-07-10 DIAGNOSIS — Z95.1 PRESENCE OF AORTOCORONARY BYPASS GRAFT: ICD-10-CM

## 2024-07-10 DIAGNOSIS — Z86.73 PERSONAL HISTORY OF TRANSIENT ISCHEMIC ATTACK (TIA), AND CEREBRAL INFARCTION WITHOUT RESIDUAL DEFICITS: ICD-10-CM

## 2024-07-10 DIAGNOSIS — I13.0 HYPERTENSIVE HEART AND CHRONIC KIDNEY DISEASE WITH HEART FAILURE AND STAGE 1 THROUGH STAGE 4 CHRONIC KIDNEY DISEASE, OR UNSPECIFIED CHRONIC KIDNEY DISEASE: ICD-10-CM
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
## 2024-07-10 DIAGNOSIS — S32.049A UNSPECIFIED FRACTURE OF FOURTH LUMBAR VERTEBRA, INITIAL ENCOUNTER FOR CLOSED FRACTURE: ICD-10-CM

## 2024-07-10 LAB
ANION GAP SERPL CALC-SCNC: 11 MMOL/L — SIGNIFICANT CHANGE UP (ref 7–14)
BUN SERPL-MCNC: 38 MG/DL — HIGH (ref 10–20)
CALCIUM SERPL-MCNC: 9.3 MG/DL — SIGNIFICANT CHANGE UP (ref 8.4–10.5)
CHLORIDE SERPL-SCNC: 102 MMOL/L — SIGNIFICANT CHANGE UP (ref 98–110)
CO2 SERPL-SCNC: 21 MMOL/L — SIGNIFICANT CHANGE UP (ref 17–32)
CREAT SERPL-MCNC: 2 MG/DL — HIGH (ref 0.7–1.5)
EGFR: 31 ML/MIN/1.73M2 — LOW
GLUCOSE BLDC GLUCOMTR-MCNC: 134 MG/DL — HIGH (ref 70–99)
GLUCOSE SERPL-MCNC: 128 MG/DL — HIGH (ref 70–99)
POTASSIUM SERPL-MCNC: 5.1 MMOL/L — HIGH (ref 3.5–5)
POTASSIUM SERPL-SCNC: 5.1 MMOL/L — HIGH (ref 3.5–5)
SODIUM SERPL-SCNC: 134 MMOL/L — LOW (ref 135–146)

## 2024-07-10 RX ADMIN — Medication 975 MILLIGRAM(S): at 06:06

## 2024-07-10 RX ADMIN — HEPARIN SODIUM 5000 UNIT(S): 50 INJECTION, SOLUTION INTRAVENOUS at 06:07

## 2024-07-10 RX ADMIN — AMIODARONE HYDROCHLORIDE 200 MILLIGRAM(S): 50 INJECTION, SOLUTION INTRAVENOUS at 06:07

## 2024-07-10 RX ADMIN — Medication 1 APPLICATION(S): at 06:07

## 2024-07-10 RX ADMIN — HEPARIN SODIUM 5000 UNIT(S): 50 INJECTION, SOLUTION INTRAVENOUS at 22:32

## 2024-07-10 RX ADMIN — Medication 975 MILLIGRAM(S): at 18:25

## 2024-07-10 RX ADMIN — LIDOCAINE HCL 1 PATCH: 28 GEL TOPICAL at 22:32

## 2024-07-10 RX ADMIN — OXCARBAZEPINE 150 MILLIGRAM(S): 600 TABLET, FILM COATED ORAL at 18:25

## 2024-07-10 RX ADMIN — Medication 100 MILLIGRAM(S): at 06:09

## 2024-07-10 RX ADMIN — TAMSULOSIN HYDROCHLORIDE 0.4 MILLIGRAM(S): 0.4 CAPSULE ORAL at 22:32

## 2024-07-10 RX ADMIN — OXCARBAZEPINE 150 MILLIGRAM(S): 600 TABLET, FILM COATED ORAL at 06:07

## 2024-07-10 RX ADMIN — Medication 975 MILLIGRAM(S): at 06:12

## 2024-07-10 RX ADMIN — ATORVASTATIN CALCIUM 40 MILLIGRAM(S): 20 TABLET, FILM COATED ORAL at 22:33

## 2024-07-10 RX ADMIN — POLYETHYLENE GLYCOL 3350 17 GRAM(S): 1 POWDER ORAL at 12:52

## 2024-07-10 RX ADMIN — ASPIRIN 81 MILLIGRAM(S): 325 TABLET, FILM COATED ORAL at 12:54

## 2024-07-10 RX ADMIN — HEPARIN SODIUM 5000 UNIT(S): 50 INJECTION, SOLUTION INTRAVENOUS at 12:54

## 2024-07-10 RX ADMIN — Medication 975 MILLIGRAM(S): at 12:52

## 2024-07-10 RX ADMIN — Medication 975 MILLIGRAM(S): at 19:20

## 2024-07-10 RX ADMIN — SACUBITRIL AND VALSARTAN 1 TABLET(S): 97; 103 TABLET, FILM COATED ORAL at 06:07

## 2024-07-10 RX ADMIN — Medication 975 MILLIGRAM(S): at 13:20

## 2024-07-10 RX ADMIN — RANOLAZINE 500 MILLIGRAM(S): 500 TABLET, EXTENDED RELEASE ORAL at 06:07

## 2024-07-10 RX ADMIN — Medication 100 MILLIGRAM(S): at 12:54

## 2024-07-10 RX ADMIN — RANOLAZINE 500 MILLIGRAM(S): 500 TABLET, EXTENDED RELEASE ORAL at 18:25

## 2024-07-10 RX ADMIN — PANTOPRAZOLE SODIUM 40 MILLIGRAM(S): 40 INJECTION, POWDER, FOR SOLUTION INTRAVENOUS at 06:07

## 2024-07-10 NOTE — PROGRESS NOTE ADULT - ASSESSMENT
ASSESSMENT/PLAN    Rehab for multitrauma with head trauma but no LOC, right Sylvian fissure SAH, L2 and L4 transverse process fractures,  left posterior 6, 9-11th rib fractures    89 y.o. 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 CT scan and imaging studies demonstrated right Sylvian fissure SAH, left L2 and L4 transverse process fractures, and left posterior 6, 9-11th rib fractures    #Multitrauma with head trauma but no LOC   #Right Sylvian Fissure SAH   #Left L2 and L4 Transverse Process Fx   #Left Posterior 6, 9-11th Rib Fx's  #Trace left pleural effusion   No neurosurgical intervention   Cleared for chemical DVT ppx   Repeat CTH in 4wks oupt   Outpt follow up with Neurosurgery Dr Hood in 4 weeks with CTH prior to appointment   Pain control  (Tylenol 975 mg q6 hours, gabapentin 100 mg q8 hours, Robaxin 500 mg q8 hours, oxycodone 5 mg q8 hours PRN, lidocaine patch) - will change Robaxin to prn. - Well controlled. D/c gabapentin.  Can resume aspirin  81mg daily per NSGY and cardio      #HTN   #Atrial fibrillation   #pacemaker/AICD in place (2017)   #h/o CAD s/p PCI with stents (x4 to LAD), CABG x1 vessel (4-5 years ago)   #HFrEF   #Lightheadedness, bradycardia possibly d/t cardiac etiology  #Orthostatic hypotension  - f/up EKG  - cardiology consult appreciated  - EP consult to interrogate pacemaker - 7/8: Underlying rhythm AT vs ST with occasional PAC falling into blanking period with subsequent skipped ventricular beat  - Entresto lowered to 26 mg/24 mg q12h given blood pressure readings on 7/7.   - Patient does not take Lasix, metolazone, or ivabridine   - HOLDING home Imdur (per patient, takes 30 mg qd)   - resume home ranolazine 500mg PO q12  - As per patient, his EF is 30-40%.  - D/c Metoprolol per Cardio consult  - Monitor orthostatic BPs  - continue home amiodarone 200 mg qd  and resume ASA 81mg  - 7/10 Orthostatic BP - systolic dropped to 70s with standing. Asymptomatic. Told to drink more fluid. If severe orthostasis continues, will have to d/c Entresto    #Trigeminal neuralgia   - continue home oxcarbazepine (150 mg q12)     #h/o CKD   - baseline creatinine 1.7-1.9     #DM II  ISS   FS AC/HS      # Infrequent episodes of food regurgitation , symptoms not consistent with typical esophageal dysphagia   -  Pt was screened for dysphagia by S&S team on 7/2/2024  as he had an episode of water regurgitation earlier during the day. Pt again had 1 min postprandial regurgitation. Pt reports a similar episode 2 months ago when he was having dinner and then he regurgitated food.   - If symptoms persist will obtain barium esophogram   - tolerated minced and moist diet, upgrade to regular per patient request, reflux precautions  -GI/Bowel Mgmt: Miralax/senna    Diet: Consistent Carbohydrate/No Snacks (07-09-24 @ 11:24) [Active]    Precautions / PROPHYLAXIS:    - Falls  - Aspiration    - Reflux precautions  - DVT prophylaxis: heparin

## 2024-07-10 NOTE — DISCHARGE NOTE NURSING/CASE MANAGEMENT/SOCIAL WORK - NSDCPEFALRISK_GEN_ALL_CORE
For information on Fall & Injury Prevention, visit: [email or web address] OR  [email or web address] OR  https://www.cdc.gov/steadi/patient.html

## 2024-07-10 NOTE — CHART NOTE - NSCHARTNOTEFT_GEN_A_CORE
This  89y old  Male who  Admitted to hospital after a multitrauma  Including head trauma but no LOC, right Sylvian fissure SAH, L2 and L4 transverse process fractures, left posterior 6, 9-11th rib fractures, He will require Rolling walker due to above for a safe Discharge. His Mobility  deficit can be sufficiently resolved with the use of  the Rolling walker.  He is able to use the rolling walker safely.

## 2024-07-10 NOTE — PROGRESS NOTE ADULT - SUBJECTIVE AND OBJECTIVE BOX
Patient is a 89y old  Male who presents with a chief complaint of Rehab for multitrauma with head trauma but no LOC, right Sylvian fissure SAH, L2 and L4 transverse process fractures, left posterior 6, 9-11th rib fractures (04 Jul 2024 10:58)      HPI:       89 year old right-handed,  male with PMHx of CAD s/p PCI (x4 stents), CABG x1 vessel (4-5 years ago), AFib, HFrEF s/p PPM/AICD (2017), HTN, CKD, HLD. 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 Patient was ambulating at home when he slipped and fell. He was able to ambulate after the incident but was experiencing persistent head and rib pain. Patient initially presented to [facility] for evaluation and was transferred to [facility] after CT scan demonstrated right Sylvian fissure SAH and multiple 6, 9-11th left rib fractures (and possible 8th rib fx). Patient had two repeat CT scans, both of which are stable. Hospital course was complicated by postprandial regurgitation.  Speech and swallow saw patient and said oropharyngeal swallow within functional limits with regurgitation within 1 minute. Patient was evaluated by GI and they recommended a  speech and swallow evaluation if symptoms persist, will plan for barium esophogram after neuro and cardio clearance. Patient’s diet was upgraded from clear liquids to full liquids 7/3/2024. Patient medically stable for discharge to acute rehabilitation on 7/3/2024.     LS: Lives with his wife in a private house with 5 JEN   PLOF: Independent with ADLs, ambulated independently,   CLOF: Transfers with  min A, ambulated 50’ x 3 with RW and  CG, UBD/LBD with min A     During her stay, the patient was evaluated by physical and occupational therapy. Prior to admission, patient was independent with ambulation and ADLs. Patient was deemed a good acute rehab candidate due to decline in functional status and ability to carry out activities of daily living. Patient is able to tolerate 3 hours of therapy 5-6 days a week and is motivated to participate.   (03 Jul 2024 13:30)      TODAY'S SUBJECTIVE & REVIEW OF SYMPTOMS  Patient was seen and assessed at bedside.   No overnight events.   Patient denies any new complaints at this time.   Tolerating PT/OT.    Tolerating oral diet.   Voiding and passing stool spontaneously.        Review of Systems:    Constitutional:   [ x  ] WNL           [   ] poor appetite   [   ] insomnia   [   ] tired   Cardio:                [ x  ] WNL           [   ] CP   [   ] SANTAMARIA   [   ] palpitations               Resp:                   [x   ] WNL           [   ] SOB   [   ] cough   [   ] wheezing   GI:                        [  x ] WNL           [   ] constipation   [   ] diarrhea   [   ] abdominal pain   [   ] nausea   [   ] emesis                                :                      [ x  ] WNL           [   ] HARMAN  [   ] dysuria   [   ] difficulty voiding             Endo:                   [ x  ] WNL          [   ] polyuria   [   ] temperature intolerance                 Skin:                     [   ] WNL          [ x  ] pain: left ribs   [   ] wound   [   ] rash   MSK:                    [ x  ] WNL          [   ] muscle pain   [   ] joint pain/ stiffness   [   ] muscle tenderness   [   ] swelling   Neuro:                 [  x ] WNL          [   ] HA   [   ] change in vision   [   ] tremor   [   ] weakness   [   ]dysphagia              Cognitive:           [  x ] WNL           [   ]confusion      Psych:                  [ x  ] WNL           [   ] hallucinations   [   ]agitation   [   ] delusion   [   ]depression    CLOF: 50'x3 RW CG/ CS, Transfers CG, LBD Jessi    PHYSICAL EXAM    Orthostatic VS    07-10-24 @ 08:50  Lying BP: --/-- HR: --   Sitting BP: Orthostatic BP (Sitting Systolic): 90/Orthostatic BP (Sitting Diastolic (mm Hg)): 42 HR: Orthostatic Pulse (Heart Rate (beats/min)): 58  Standing BP: Orthostatic BP (Standing Systolic): 84/Orthostatic BP (Standing Diastolic (mm Hg)): 42 HR: --  Site: --   Mode: --     Vital Signs Last 24 Hrs  T(C): 36.4 (10 Jul 2024 12:02), Max: 36.7 (09 Jul 2024 19:44)  T(F): 97.5 (10 Jul 2024 12:02), Max: 98 (09 Jul 2024 19:44)  HR: 48 (10 Jul 2024 05:48) (48 - 94)  BP: 105/72 (10 Jul 2024 12:02) (105/69 - 132/66)  BP(mean): 81 (09 Jul 2024 19:44) (81 - 81)  RR: 18 (10 Jul 2024 12:02) (17 - 18)  SpO2: --        General:[  x ] NAD, Resting Comfortable,   [   ] other: Hard of hearing                               HEENT: [ x  ] NC/AT, EOMI, PERRL , Normal Conjunctivae,   [   ] other:  Cardio: [ x  ] RRR, no murmur,   [   ] other:                              Pulm: [  x ] No Respiratory Distress,  Lungs CTAB,   [   ] other:                       Abdomen: [  x ]ND/NT, Soft,   [   ] other:    : [ x  ] NO HARMAN CATHETER, [   ] HARMAN CATHETER- no meatal tear, no discharge, [   ] other:                                            MSK: [   ] No joint swelling, Full ROM,   [ x ] other: TTP Left Rib cage                                         Ext: [ x  ]No C/C/E, No calf tenderness,   [  X ]other:  bilateral hands with intrinsic muscle atrophy  Skin: [ x  ]intact,   [   ] other:                                                                   Neurological Examination:  Cognitive: [  x  ] A&O x 3,   [    ]  other:                                                                      Attention:  [ x   ] intact,   [    ]  other:                            Memory: [ x   ] intact,    [    ]  other:     Mood/Affect: [  x  ] wnl,    [    ]  other:                                                                             Communication: [  x  ]Fluent, no dysarthria, following commands:  [    ] other:   CN II - XII:  [  x  ] intact,  [    ] other:                                                                                        Motor:   RIGHT UE: [  x ] 5/5 throughout  LEFT    UE: SA 4/5, EE/EF 4+/5, FG 5/5  RIGHT LE: HF 4-/5, KE 4+/5, DF/PF 5/5  LEFT    LE: HF 4-/5, KE 4+/5, DF/PF 5/5    Tone: [  X  ] wnl,   [    ]  other:  DTRs: [  X ]symmetric, [   ] other:  Coordination:   [  X  ] intact,   [    ] other:                                                                           Sensory: [  X  ] Intact to light touch,   [    ] other:    MEDICATIONS  (STANDING):  acetaminophen     Tablet .. 975 milliGRAM(s) Oral every 6 hours  aMIOdarone    Tablet 200 milliGRAM(s) Oral daily  aspirin enteric coated 81 milliGRAM(s) Oral daily  atorvastatin 40 milliGRAM(s) Oral at bedtime  chlorhexidine 2% Cloths 1 Application(s) Topical <User Schedule>  gabapentin 100 milliGRAM(s) Oral every 8 hours  glucagon  Injectable 1 milliGRAM(s) IntraMuscular once  heparin   Injectable 5000 Unit(s) SubCutaneous every 8 hours  lidocaine   4% Patch 1 Patch Transdermal every 24 hours  OXcarbazepine 150 milliGRAM(s) Oral every 12 hours  pantoprazole    Tablet 40 milliGRAM(s) Oral before breakfast  polyethylene glycol 3350 17 Gram(s) Oral daily  ranolazine 500 milliGRAM(s) Oral two times a day  sacubitril 24 mG/valsartan 26 mG 1 Tablet(s) Oral two times a day  senna 2 Tablet(s) Oral at bedtime  tamsulosin 0.4 milliGRAM(s) Oral at bedtime    MEDICATIONS  (PRN):  bisacodyl 10 milliGRAM(s) Oral at bedtime PRN Constipation  methocarbamol 500 milliGRAM(s) Oral every 8 hours PRN Muscle Spasm/ pain  oxyCODONE    IR 5 milliGRAM(s) Oral every 8 hours PRN Severe Pain (7 - 10)        RECENT LABS/IMAGING                        11.9   6.27  )-----------( 193      ( 08 Jul 2024 07:04 )             36.3     07-08    133<L>  |  99  |  34<H>  ----------------------------<  128<H>  4.7   |  21  |  2.1<H>    Ca    9.4      08 Jul 2024 07:04  Mg     1.9     07-08    TPro  5.6<L>  /  Alb  3.7  /  TBili  0.6  /  DBili  x   /  AST  26  /  ALT  23  /  AlkPhos  136<H>  07-08      POCT Blood Glucose.: 131 mg/dL (07-08-24 @ 11:05)  POCT Blood Glucose.: 188 mg/dL (07-08-24 @ 07:38)

## 2024-07-10 NOTE — DISCHARGE NOTE NURSING/CASE MANAGEMENT/SOCIAL WORK - PATIENT PORTAL LINK FT
You can access the FollowMyHealth Patient Portal offered by [facility] by registering at the following website: [email or web address]. By joining [employer]’s FollowMyHealth portal, you will also be able to view your health information using other applications (apps) compatible with our system.

## 2024-07-11 LAB
GLUCOSE BLDC GLUCOMTR-MCNC: 137 MG/DL — HIGH (ref 70–99)
GLUCOSE BLDC GLUCOMTR-MCNC: 144 MG/DL — HIGH (ref 70–99)

## 2024-07-11 RX ORDER — GABAPENTIN
1 POWDER (GRAM) MISCELLANEOUS
Refills: 0 | DISCHARGE

## 2024-07-11 RX ORDER — ASPIRIN 325 MG/1
1 TABLET, FILM COATED ORAL
Qty: 0 | Refills: 0 | DISCHARGE
Start: 2024-07-11

## 2024-07-11 RX ORDER — ACETAMINOPHEN 325 MG
2 TABLET ORAL
Qty: 0 | Refills: 0 | DISCHARGE
Start: 2024-07-11

## 2024-07-11 RX ORDER — SACUBITRIL AND VALSARTAN 97; 103 MG/1; MG/1
1 TABLET, FILM COATED ORAL
Refills: 0 | Status: DISCONTINUED | OUTPATIENT
Start: 2024-07-11 | End: 2024-07-11

## 2024-07-11 RX ADMIN — Medication 975 MILLIGRAM(S): at 18:06

## 2024-07-11 RX ADMIN — RANOLAZINE 500 MILLIGRAM(S): 500 TABLET, EXTENDED RELEASE ORAL at 06:03

## 2024-07-11 RX ADMIN — ATORVASTATIN CALCIUM 40 MILLIGRAM(S): 20 TABLET, FILM COATED ORAL at 22:06

## 2024-07-11 RX ADMIN — LIDOCAINE HCL 1 PATCH: 28 GEL TOPICAL at 22:05

## 2024-07-11 RX ADMIN — Medication 975 MILLIGRAM(S): at 14:06

## 2024-07-11 RX ADMIN — OXCARBAZEPINE 150 MILLIGRAM(S): 600 TABLET, FILM COATED ORAL at 17:30

## 2024-07-11 RX ADMIN — Medication 975 MILLIGRAM(S): at 06:11

## 2024-07-11 RX ADMIN — PANTOPRAZOLE SODIUM 40 MILLIGRAM(S): 40 INJECTION, POWDER, FOR SOLUTION INTRAVENOUS at 06:05

## 2024-07-11 RX ADMIN — Medication 1 APPLICATION(S): at 06:05

## 2024-07-11 RX ADMIN — Medication 975 MILLIGRAM(S): at 13:05

## 2024-07-11 RX ADMIN — OXCARBAZEPINE 150 MILLIGRAM(S): 600 TABLET, FILM COATED ORAL at 06:04

## 2024-07-11 RX ADMIN — Medication 975 MILLIGRAM(S): at 17:31

## 2024-07-11 RX ADMIN — POLYETHYLENE GLYCOL 3350 17 GRAM(S): 1 POWDER ORAL at 17:30

## 2024-07-11 RX ADMIN — HEPARIN SODIUM 5000 UNIT(S): 50 INJECTION, SOLUTION INTRAVENOUS at 13:05

## 2024-07-11 RX ADMIN — TAMSULOSIN HYDROCHLORIDE 0.4 MILLIGRAM(S): 0.4 CAPSULE ORAL at 22:06

## 2024-07-11 RX ADMIN — Medication 975 MILLIGRAM(S): at 06:06

## 2024-07-11 RX ADMIN — AMIODARONE HYDROCHLORIDE 200 MILLIGRAM(S): 50 INJECTION, SOLUTION INTRAVENOUS at 06:07

## 2024-07-11 RX ADMIN — RANOLAZINE 500 MILLIGRAM(S): 500 TABLET, EXTENDED RELEASE ORAL at 17:30

## 2024-07-11 RX ADMIN — HEPARIN SODIUM 5000 UNIT(S): 50 INJECTION, SOLUTION INTRAVENOUS at 22:06

## 2024-07-11 RX ADMIN — Medication 975 MILLIGRAM(S): at 22:06

## 2024-07-11 RX ADMIN — ASPIRIN 81 MILLIGRAM(S): 325 TABLET, FILM COATED ORAL at 13:05

## 2024-07-11 RX ADMIN — HEPARIN SODIUM 5000 UNIT(S): 50 INJECTION, SOLUTION INTRAVENOUS at 06:04

## 2024-07-11 NOTE — PROGRESS NOTE ADULT - ASSESSMENT
ASSESSMENT/PLAN    Rehab for multitrauma with head trauma but no LOC, right Sylvian fissure SAH, L2 and L4 transverse process fractures,  left posterior 6, 9-11th rib fractures    89 y.o. right-handed,  M with PMH of CAD s/p PCI, HFrEF s/p pacemaker/AICD, HTN, CKD, HLD, DM, diverticulitis with colovesicular fistula s/p partial colectomy with ileostomy, now reversed (2018, Gordoni), facial BCC and SCC s/p excision, and recent microvascular strokes present s/p mechanical fall (+HT, -LOC, -AC). CT scan and imaging studies demonstrated right Sylvian fissure SAH, left L2 and L4 transverse process fractures, and left posterior 6, 9-11th rib fractures    #Multitrauma with head trauma but no LOC   #Right Sylvian Fissure SAH   #Left L2 and L4 Transverse Process Fx   #Left Posterior 6, 9-11th Rib Fx's  #Trace left pleural effusion   No neurosurgical intervention   Cleared for chemical DVT ppx   Repeat CTH in 4wks oupt   Outpt follow up with Neurosurgery Dr Hood in 4 weeks with CTH prior to appointment   Pain control  (Tylenol 975 mg q6 hours, gabapentin 100 mg q8 hours, Robaxin 500 mg q8 hours, oxycodone 5 mg q8 hours PRN, lidocaine patch) - will change Robaxin to prn. - Well controlled. D/c gabapentin.  Can resume aspirin  81mg daily per NSGY and cardio   Doing well in therapies. Ambulates with RW and supervision. Antic d/c home in am     #History of HTN - current hypotension  #Atrial fibrillation   #pacemaker/AICD in place (2017)   #h/o CAD s/p PCI with stents (x4 to LAD), CABG x1 vessel (4-5 years ago)   #HFrEF   #Lightheadedness, bradycardia possibly d/t cardiac etiology  #Orthostatic hypotension  - f/up EKG  - cardiology consult appreciated  - EP consult to interrogate pacemaker - 7/8: Underlying rhythm AT vs ST with occasional PAC falling into blanking period with subsequent skipped ventricular beat  - Entresto lowered to 26 mg/24 mg q12h given blood pressure readings on 7/7.   - Patient does not take Lasix, metolazone, or ivabridine   - HOLDING home Imdur (per patient, takes 30 mg qd)   - resume home ranolazine 500mg PO q12  - As per patient, his EF is 30-40%.  - D/c Metoprolol per Cardio consult  - Monitor orthostatic BPs  - continue home amiodarone 200 mg qd  and resume ASA 81mg  - 7/10 Orthostatic BP - systolic dropped to 70s with standing. Asymptomatic. Told to drink more fluid. If severe orthostasis continues, will have to d/c Entresto per cardio recs, jaylin given fall at home that may have been provoked by low BP    #Trigeminal neuralgia   - continue home oxcarbazepine (150 mg q12)     #h/o CKD   - baseline creatinine 1.7-1.9     #DM II  ISS   FS AC/HS      # Infrequent episodes of food regurgitation , symptoms not consistent with typical esophageal dysphagia   -  Pt was screened for dysphagia by S&S team on 7/2/2024  as he had an episode of water regurgitation earlier during the day. Pt again had 1 min postprandial regurgitation. Pt reports a similar episode 2 months ago when he was having dinner and then he regurgitated food.   - If symptoms persist will obtain barium esophogram   - tolerated minced and moist diet, upgrade to regular per patient request, reflux precautions  -GI/Bowel Mgmt: Miralax/senna    Diet: Consistent Carbohydrate/No Snacks (07-09-24 @ 11:24) [Active]    Precautions / PROPHYLAXIS:    - Falls  - Aspiration    - Reflux precautions  - DVT prophylaxis: heparin

## 2024-07-11 NOTE — PROGRESS NOTE ADULT - SUBJECTIVE AND OBJECTIVE BOX
Patient is a 89y old  Male who presents with a chief complaint of Rehab for multitrauma with head trauma but no LOC, right Sylvian fissure SAH, L2 and L4 transverse process fractures, left posterior 6, 9-11th rib fractures (04 Jul 2024 10:58)      HPI:       89 year old right-handed,  male with PMHx of CAD s/p PCI (x4 stents), CABG x1 vessel (4-5 years ago), AFib, HFrEF s/p PPM/AICD (2017), HTN, CKD, HLD. 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 Patient was ambulating at home when he slipped and fell. He was able to ambulate after the incident but was experiencing persistent head and rib pain. Patient initially presented to [facility] for evaluation and was transferred to [facility] after CT scan demonstrated right Sylvian fissure SAH and multiple 6, 9-11th left rib fractures (and possible 8th rib fx). Patient had two repeat CT scans, both of which are stable. Hospital course was complicated by postprandial regurgitation.  Speech and swallow saw patient and said oropharyngeal swallow within functional limits with regurgitation within 1 minute. Patient was evaluated by GI and they recommended a  speech and swallow evaluation if symptoms persist, will plan for barium esophogram after neuro and cardio clearance. Patient’s diet was upgraded from clear liquids to full liquids 7/3/2024. Patient medically stable for discharge to acute rehabilitation on 7/3/2024.     LS: Lives with his wife in a private house with 5 JEN   PLOF: Independent with ADLs, ambulated independently,   CLOF: Transfers with  min A, ambulated 50’ x 3 with RW and  CG, UBD/LBD with min A     During her stay, the patient was evaluated by physical and occupational therapy. Prior to admission, patient was independent with ambulation and ADLs. Patient was deemed a good acute rehab candidate due to decline in functional status and ability to carry out activities of daily living. Patient is able to tolerate 3 hours of therapy 5-6 days a week and is motivated to participate.   (03 Jul 2024 13:30)      TODAY'S SUBJECTIVE & REVIEW OF SYMPTOMS  Patient was seen and assessed at bedside.   No overnight events.   Patient denies any new complaints at this time.   Tolerating PT/OT.    Tolerating oral diet.   Voiding and passing stool spontaneously.        Review of Systems:    Constitutional:   [ x  ] WNL           [   ] poor appetite   [   ] insomnia   [   ] tired   Cardio:                [ x  ] WNL           [   ] CP   [   ] SANTAMARIA   [   ] palpitations               Resp:                   [x   ] WNL           [   ] SOB   [   ] cough   [   ] wheezing   GI:                        [  x ] WNL           [   ] constipation   [   ] diarrhea   [   ] abdominal pain   [   ] nausea   [   ] emesis                                :                      [ x  ] WNL           [   ] HARMAN  [   ] dysuria   [   ] difficulty voiding             Endo:                   [ x  ] WNL          [   ] polyuria   [   ] temperature intolerance                 Skin:                     [   ] WNL          [ x  ] pain: left ribs   [   ] wound   [   ] rash   MSK:                    [ x  ] WNL          [   ] muscle pain   [   ] joint pain/ stiffness   [   ] muscle tenderness   [   ] swelling   Neuro:                 [  x ] WNL          [   ] HA   [   ] change in vision   [   ] tremor   [   ] weakness   [   ]dysphagia              Cognitive:           [  x ] WNL           [   ]confusion      Psych:                  [ x  ] WNL           [   ] hallucinations   [   ]agitation   [   ] delusion   [   ]depression    CLOF: 50'x3 RW CS, Transfers CG, LBD Jessi    PHYSICAL EXAM    Orthostatic VS    ICU Vital Signs Last 24 Hrs  T(C): 36.8 (11 Jul 2024 12:25), Max: 36.8 (11 Jul 2024 12:25)  T(F): 98.2 (11 Jul 2024 12:25), Max: 98.2 (11 Jul 2024 12:25)  HR: 102 (11 Jul 2024 12:25) (56 - 102)  BP: 105/63 (11 Jul 2024 12:25) (97/66 - 117/78)  BP(mean): 76 (11 Jul 2024 05:18) (76 - 91)  RR: 19 (11 Jul 2024 12:25) (8 - 19)  SpO2: 98% (10 Jul 2024 18:21) (98% - 98%)    O2 Parameters below as of 10 Jul 2024 18:21  Patient On (Oxygen Delivery Method): room air    Orthostatic VS    07-10-24 @ 08:50  Lying BP: --/-- HR: --   Sitting BP: Orthostatic BP (Sitting Systolic): 90/Orthostatic BP (Sitting Diastolic (mm Hg)): 42 HR: Orthostatic Pulse (Heart Rate (beats/min)): 58  Standing BP: Orthostatic BP (Standing Systolic): 84/Orthostatic BP (Standing Diastolic (mm Hg)): 42 HR: --      General:[  x ] NAD, Resting Comfortable,   [   ] other: Hard of hearing                               HEENT: [ x  ] NC/AT, EOMI, PERRL , Normal Conjunctivae,   [   ] other:  Cardio: [ x  ] RRR, no murmur,   [   ] other:                              Pulm: [  x ] No Respiratory Distress,  Lungs CTAB,   [   ] other:                       Abdomen: [  x ]ND/NT, Soft,   [   ] other:    : [ x  ] NO HARMAN CATHETER, [   ] HARMAN CATHETER- no meatal tear, no discharge, [   ] other:                                            MSK: [   ] No joint swelling, Full ROM,   [ x ] other: TTP Left Rib cage                                         Ext: [ x  ]No C/C/E, No calf tenderness,   [  X ]other:  bilateral hands with intrinsic muscle atrophy  Skin: [ x  ]intact,   [   ] other:                                                                   Neurological Examination:  Cognitive: [  x  ] A&O x 3,   [    ]  other:                                                                      Attention:  [ x   ] intact,   [    ]  other:                            Memory: [ x   ] intact,    [    ]  other:     Mood/Affect: [  x  ] wnl,    [    ]  other:                                                                             Communication: [  x  ]Fluent, no dysarthria, following commands:  [    ] other:   CN II - XII:  [  x  ] intact,  [    ] other:                                                                                        Motor:   RIGHT UE: [  x ] 5/5 throughout  LEFT    UE: SA 4/5, EE/EF 4+/5, FG 5/5  RIGHT LE: HF 4-/5, KE 4+/5, DF/PF 5/5  LEFT    LE: HF 4-/5, KE 4+/5, DF/PF 5/5    Tone: [  X  ] wnl,   [    ]  other:  DTRs: [  X ]symmetric, [   ] other:  Coordination:   [  X  ] intact,   [    ] other:                                                                           Sensory: [  X  ] Intact to light touch,   [    ] other:    MEDICATIONS  (STANDING):  acetaminophen     Tablet .. 975 milliGRAM(s) Oral every 6 hours  aMIOdarone    Tablet 200 milliGRAM(s) Oral daily  aspirin enteric coated 81 milliGRAM(s) Oral daily  atorvastatin 40 milliGRAM(s) Oral at bedtime  chlorhexidine 2% Cloths 1 Application(s) Topical <User Schedule>  glucagon  Injectable 1 milliGRAM(s) IntraMuscular once  heparin   Injectable 5000 Unit(s) SubCutaneous every 8 hours  lidocaine   4% Patch 1 Patch Transdermal every 24 hours  OXcarbazepine 150 milliGRAM(s) Oral every 12 hours  pantoprazole    Tablet 40 milliGRAM(s) Oral before breakfast  polyethylene glycol 3350 17 Gram(s) Oral daily  ranolazine 500 milliGRAM(s) Oral two times a day  senna 2 Tablet(s) Oral at bedtime  tamsulosin 0.4 milliGRAM(s) Oral at bedtime    MEDICATIONS  (PRN):  bisacodyl 10 milliGRAM(s) Oral at bedtime PRN Constipation  oxyCODONE    IR 5 milliGRAM(s) Oral every 8 hours PRN Severe Pain (7 - 10)        RECENT LABS/IMAGING      07-10    134<L>  |  102  |  38<H>  ----------------------------<  128<H>  5.1<H>   |  21  |  2.0<H>    Ca    9.3      10 Jul 2024 07:21      POCT Blood Glucose.: 144 mg/dL (07-11-24 @ 11:27)  POCT Blood Glucose.: 137 mg/dL (07-11-24 @ 07:43)  POCT Blood Glucose.: 134 mg/dL (07-10-24 @ 07:26)  POCT Blood Glucose.: 146 mg/dL (07-09-24 @ 16:57)  POCT Blood Glucose.: 150 mg/dL (07-09-24 @ 11:15)  POCT Blood Glucose.: 144 mg/dL (07-09-24 @ 07:19)  POCT Blood Glucose.: 137 mg/dL (07-08-24 @ 16:07)  POCT Blood Glucose.: 131 mg/dL (07-08-24 @ 11:05)  POCT Blood Glucose.: 188 mg/dL (07-08-24 @ 07:38)  POCT Blood Glucose.: 183 mg/dL (07-07-24 @ 21:07)                          11.9   6.27  )-----------( 193      ( 08 Jul 2024 07:04 )             36.3     07-08    133<L>  |  99  |  34<H>  ----------------------------<  128<H>  4.7   |  21  |  2.1<H>    Ca    9.4      08 Jul 2024 07:04  Mg     1.9     07-08    TPro  5.6<L>  /  Alb  3.7  /  TBili  0.6  /  DBili  x   /  AST  26  /  ALT  23  /  AlkPhos  136<H>  07-08

## 2024-07-12 ENCOUNTER — TRANSCRIPTION ENCOUNTER (OUTPATIENT)
Age: 89
End: 2024-07-12

## 2024-07-12 VITALS
SYSTOLIC BLOOD PRESSURE: 112 MMHG | RESPIRATION RATE: 19 BRPM | TEMPERATURE: 98 F | HEART RATE: 88 BPM | DIASTOLIC BLOOD PRESSURE: 80 MMHG

## 2024-07-12 RX ORDER — PANTOPRAZOLE SODIUM 40 MG/10ML
1 INJECTION, POWDER, FOR SOLUTION INTRAVENOUS
Qty: 0 | Refills: 0 | DISCHARGE
Start: 2024-07-12

## 2024-07-12 RX ORDER — RANOLAZINE 500 MG/1
1 TABLET, EXTENDED RELEASE ORAL
Refills: 0 | DISCHARGE

## 2024-07-12 RX ORDER — ATORVASTATIN CALCIUM 20 MG/1
1 TABLET, FILM COATED ORAL
Qty: 30 | Refills: 0
Start: 2024-07-12 | End: 2024-08-10

## 2024-07-12 RX ORDER — AMIODARONE HYDROCHLORIDE 50 MG/ML
1 INJECTION, SOLUTION INTRAVENOUS
Qty: 30 | Refills: 0
Start: 2024-07-12 | End: 2024-08-10

## 2024-07-12 RX ORDER — AMIODARONE HYDROCHLORIDE 50 MG/ML
1 INJECTION, SOLUTION INTRAVENOUS
Refills: 0 | DISCHARGE

## 2024-07-12 RX ORDER — PANTOPRAZOLE SODIUM 40 MG/10ML
1 INJECTION, POWDER, FOR SOLUTION INTRAVENOUS
Qty: 30 | Refills: 0
Start: 2024-07-12 | End: 2024-08-10

## 2024-07-12 RX ORDER — TAMSULOSIN HYDROCHLORIDE 0.4 MG/1
1 CAPSULE ORAL
Qty: 30 | Refills: 0
Start: 2024-07-12 | End: 2024-08-10

## 2024-07-12 RX ORDER — LIDOCAINE HCL 28 MG/G
1 GEL TOPICAL
Qty: 3 | Refills: 0
Start: 2024-07-12

## 2024-07-12 RX ORDER — FAMOTIDINE 40 MG
20 TABLET ORAL ONCE
Refills: 0 | Status: COMPLETED | OUTPATIENT
Start: 2024-07-12 | End: 2024-07-12

## 2024-07-12 RX ORDER — RANOLAZINE 500 MG/1
1 TABLET, EXTENDED RELEASE ORAL
Qty: 60 | Refills: 0
Start: 2024-07-12 | End: 2024-08-10

## 2024-07-12 RX ORDER — OXCARBAZEPINE 600 MG/1
1 TABLET, FILM COATED ORAL
Qty: 60 | Refills: 0
Start: 2024-07-12 | End: 2024-08-10

## 2024-07-12 RX ORDER — TAMSULOSIN HYDROCHLORIDE 0.4 MG/1
1 CAPSULE ORAL
Refills: 0 | DISCHARGE

## 2024-07-12 RX ORDER — POLYETHYLENE GLYCOL 3350 1 G/G
17 POWDER ORAL
Qty: 0 | Refills: 0 | DISCHARGE
Start: 2024-07-12

## 2024-07-12 RX ORDER — SENNOSIDES 8.6 MG
2 TABLET ORAL
Qty: 0 | Refills: 0 | DISCHARGE
Start: 2024-07-12

## 2024-07-12 RX ORDER — OXCARBAZEPINE 600 MG/1
1 TABLET, FILM COATED ORAL
Refills: 0 | DISCHARGE

## 2024-07-12 RX ADMIN — Medication 975 MILLIGRAM(S): at 12:45

## 2024-07-12 RX ADMIN — ASPIRIN 81 MILLIGRAM(S): 325 TABLET, FILM COATED ORAL at 12:45

## 2024-07-12 RX ADMIN — RANOLAZINE 500 MILLIGRAM(S): 500 TABLET, EXTENDED RELEASE ORAL at 06:17

## 2024-07-12 RX ADMIN — Medication 975 MILLIGRAM(S): at 06:15

## 2024-07-12 RX ADMIN — Medication 20 MILLIGRAM(S): at 03:48

## 2024-07-12 RX ADMIN — PANTOPRAZOLE SODIUM 40 MILLIGRAM(S): 40 INJECTION, POWDER, FOR SOLUTION INTRAVENOUS at 06:17

## 2024-07-12 RX ADMIN — HEPARIN SODIUM 5000 UNIT(S): 50 INJECTION, SOLUTION INTRAVENOUS at 06:16

## 2024-07-12 RX ADMIN — HEPARIN SODIUM 5000 UNIT(S): 50 INJECTION, SOLUTION INTRAVENOUS at 12:46

## 2024-07-12 RX ADMIN — Medication 975 MILLIGRAM(S): at 06:17

## 2024-07-12 RX ADMIN — AMIODARONE HYDROCHLORIDE 200 MILLIGRAM(S): 50 INJECTION, SOLUTION INTRAVENOUS at 06:17

## 2024-07-12 RX ADMIN — OXCARBAZEPINE 150 MILLIGRAM(S): 600 TABLET, FILM COATED ORAL at 06:17

## 2024-07-12 NOTE — DISCHARGE NOTE PROVIDER - CARE PROVIDERS DIRECT ADDRESSES
[email or web address]

## 2024-07-12 NOTE — PROGRESS NOTE ADULT - SUBJECTIVE AND OBJECTIVE BOX
Patient is a 89y old  Male who presents with a chief complaint of Rehab for multitrauma with head trauma but no LOC, right Sylvian fissure SAH, L2 and L4 transverse process fractures, left posterior 6, 9-11th rib fractures (04 Jul 2024 10:58)      HPI:       89 year old right-handed,  male with PMHx of CAD s/p PCI (x4 stents), CABG x1 vessel (4-5 years ago), AFib, HFrEF s/p PPM/AICD (2017), HTN, CKD, HLD. 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 Patient was ambulating at home when he slipped and fell. He was able to ambulate after the incident but was experiencing persistent head and rib pain. Patient initially presented to [facility] for evaluation and was transferred to [facility] after CT scan demonstrated right Sylvian fissure SAH and multiple 6, 9-11th left rib fractures (and possible 8th rib fx). Patient had two repeat CT scans, both of which are stable. Hospital course was complicated by postprandial regurgitation.  Speech and swallow saw patient and said oropharyngeal swallow within functional limits with regurgitation within 1 minute. Patient was evaluated by GI and they recommended a  speech and swallow evaluation if symptoms persist, will plan for barium esophogram after neuro and cardio clearance. Patient’s diet was upgraded from clear liquids to full liquids 7/3/2024. Patient medically stable for discharge to acute rehabilitation on 7/3/2024.     LS: Lives with his wife in a private house with 5 JEN   PLOF: Independent with ADLs, ambulated independently,   CLOF: Transfers with  min A, ambulated 50’ x 3 with RW and  CG, UBD/LBD with min A     During her stay, the patient was evaluated by physical and occupational therapy. Prior to admission, patient was independent with ambulation and ADLs. Patient was deemed a good acute rehab candidate due to decline in functional status and ability to carry out activities of daily living. Patient is able to tolerate 3 hours of therapy 5-6 days a week and is motivated to participate.   (03 Jul 2024 13:30)      TODAY'S SUBJECTIVE & REVIEW OF SYMPTOMS  Patient was seen and assessed at bedside.   No overnight events.   Patient denies any new complaints at this time.   Tolerating PT/OT.    Tolerating oral diet.   Voiding and passing stool spontaneously.   Ambulating with RW and supervision. For d/c home today with VN services       Review of Systems:    Constitutional:   [ x  ] WNL           [   ] poor appetite   [   ] insomnia   [   ] tired   Cardio:                [ x  ] WNL           [   ] CP   [   ] SANTAMARIA   [   ] palpitations               Resp:                   [x   ] WNL           [   ] SOB   [   ] cough   [   ] wheezing   GI:                        [  x ] WNL           [   ] constipation   [   ] diarrhea   [   ] abdominal pain   [   ] nausea   [   ] emesis                                :                      [ x  ] WNL           [   ] HARMAN  [   ] dysuria   [   ] difficulty voiding             Endo:                   [ x  ] WNL          [   ] polyuria   [   ] temperature intolerance                 Skin:                     [   ] WNL          [ x  ] pain: left ribs   [   ] wound   [   ] rash   MSK:                    [ x  ] WNL          [   ] muscle pain   [   ] joint pain/ stiffness   [   ] muscle tenderness   [   ] swelling   Neuro:                 [  x ] WNL          [   ] HA   [   ] change in vision   [   ] tremor   [   ] weakness   [   ]dysphagia              Cognitive:           [  x ] WNL           [   ]confusion      Psych:                  [ x  ] WNL           [   ] hallucinations   [   ]agitation   [   ] delusion   [   ]depression    CLOF: 50'x3 RW CS, Transfers CG, LBD Jessi    PHYSICAL EXAM    Orthostatic VS    ICU Vital Signs Last 24 Hrs  T(C): 36.7 (12 Jul 2024 04:11), Max: 36.8 (11 Jul 2024 12:25)  T(F): 98 (12 Jul 2024 04:11), Max: 98.2 (11 Jul 2024 12:25)  HR: 66 (12 Jul 2024 04:11) (66 - 102)  BP: 100/71 (12 Jul 2024 04:11) (100/71 - 112/62)  BP(mean): 81 (12 Jul 2024 04:11) (81 - 81)  ABP: --  ABP(mean): --  RR: 19 (12 Jul 2024 04:11) (19 - 20)  SpO2: 97% (12 Jul 2024 04:11) (97% - 97%)          General:[  x ] NAD, Resting Comfortable,   [   ] other: Hard of hearing                               HEENT: [ x  ] NC/AT, EOMI, PERRL , Normal Conjunctivae,   [   ] other:  Cardio: [   ] RRR, no murmur,   [ x  ] other:   reg- irreg ~ 105                           Pulm: [  x ] No Respiratory Distress,  Lungs CTAB,   [   ] other:                       Abdomen: [  x ]ND/NT, Soft,   [   ] other:    : [ x  ] NO HARMAN CATHETER, [   ] HARMAN CATHETER- no meatal tear, no discharge, [   ] other:                                            MSK: [  x ] No joint swelling, Full ROM,   [ x ] other: TTP Left Rib cage                                         Ext: [ x  ]No C/C/E, No calf tenderness,   [  X ]other:  bilateral hands with intrinsic muscle atrophy  Skin: [ x  ]intact,   [   ] other:                                                                   Neurological Examination:  Cognitive: [  x  ] A&O x 3,   [    ]  other:                                                                      Attention:  [ x   ] intact,   [    ]  other:                            Memory: [ x   ] intact,    [    ]  other:     Mood/Affect: [  x  ] wnl,    [    ]  other:                                                                             Communication: [  x  ]Fluent, no dysarthria, following commands:  [    ] other:   CN II - XII:  [  x  ] intact,  [    ] other:                                                                                        Motor:   RIGHT UE: [  x ] 5/5 throughout  LEFT    UE: SA 4/5, EE/EF 4+/5, FG 5/5  RIGHT LE: HF 4-/5, KE 4+/5, DF/PF 5/5  LEFT    LE: HF 4-/5, KE 4+/5, DF/PF 5/5    Tone: [  X  ] wnl,   [    ]  other:  DTRs: [  X ]symmetric, [   ] other:  Coordination:   [  X  ] intact,   [    ] other:                                                                           Sensory: [  X  ] Intact to light touch,   [    ] other:    MEDICATIONS  (STANDING):  acetaminophen     Tablet .. 975 milliGRAM(s) Oral every 6 hours  aMIOdarone    Tablet 200 milliGRAM(s) Oral daily  aspirin enteric coated 81 milliGRAM(s) Oral daily  atorvastatin 40 milliGRAM(s) Oral at bedtime  chlorhexidine 2% Cloths 1 Application(s) Topical <User Schedule>  glucagon  Injectable 1 milliGRAM(s) IntraMuscular once  heparin   Injectable 5000 Unit(s) SubCutaneous every 8 hours  lidocaine   4% Patch 1 Patch Transdermal every 24 hours  OXcarbazepine 150 milliGRAM(s) Oral every 12 hours  pantoprazole    Tablet 40 milliGRAM(s) Oral before breakfast  polyethylene glycol 3350 17 Gram(s) Oral daily  ranolazine 500 milliGRAM(s) Oral two times a day  senna 2 Tablet(s) Oral at bedtime  tamsulosin 0.4 milliGRAM(s) Oral at bedtime    MEDICATIONS  (PRN):  bisacodyl 10 milliGRAM(s) Oral at bedtime PRN Constipation  oxyCODONE    IR 5 milliGRAM(s) Oral every 8 hours PRN Severe Pain (7 - 10)      RECENT LABS/IMAGING      07-10    134<L>  |  102  |  38<H>  ----------------------------<  128<H>  5.1<H>   |  21  |  2.0<H>    Ca    9.3      10 Jul 2024 07:21    CAPILLARY BLOOD GLUCOSE    POCT Blood Glucose.: 144 mg/dL (07-11-24 @ 11:27)  POCT Blood Glucose.: 137 mg/dL (07-11-24 @ 07:43)  POCT Blood Glucose.: 134 mg/dL (07-10-24 @ 07:26)  POCT Blood Glucose.: 146 mg/dL (07-09-24 @ 16:57)  POCT Blood Glucose.: 150 mg/dL (07-09-24 @ 11:15)  POCT Blood Glucose.: 144 mg/dL (07-09-24 @ 07:19)  POCT Blood Glucose.: 137 mg/dL (07-08-24 @ 16:07)  POCT Blood Glucose.: 131 mg/dL (07-08-24 @ 11:05)  POCT Blood Glucose.: 188 mg/dL (07-08-24 @ 07:38)  POCT Blood Glucose.: 183 mg/dL (07-07-24 @ 21:07)                          11.9   6.27  )-----------( 193      ( 08 Jul 2024 07:04 )             36.3     07-08    133<L>  |  99  |  34<H>  ----------------------------<  128<H>  4.7   |  21  |  2.1<H>    Ca    9.4      08 Jul 2024 07:04  Mg     1.9     07-08    TPro  5.6<L>  /  Alb  3.7  /  TBili  0.6  /  DBili  x   /  AST  26  /  ALT  23  /  AlkPhos  136<H>  07-08

## 2024-07-12 NOTE — DISCHARGE NOTE PROVIDER - PROVIDER TOKENS
PROVIDER:[TOKEN:[[id number]]],PROVIDER:[TOKEN:[[id number]]],PROVIDER:[TOKEN:[[id number]]],PROVIDER:[TOKEN:[[id number]]]

## 2024-07-12 NOTE — DISCHARGE NOTE PROVIDER - NSDCFUADDINST_GEN_ALL_CORE_FT
Activity - Ambulate as Tolerated:  Please Take these papers with you for all follow up medical appointments ,

## 2024-07-12 NOTE — DISCHARGE NOTE PROVIDER - CARE PROVIDER_API CALL
Roman Hood  Neurosurgery  [address], [address]  [place] [address]  Phone: [phone]  Fax: [phone]  Follow Up Time:     Miguel Fowler.  Cardiology  [address] [place] [address]  Phone: [phone]  Fax: [phone]  Follow Up Time:     Ro De La Garza  Cardiac Electrophysiology  [address] [place] [address]  Phone: [phone]  Fax: [phone]  Follow Up Time:     Alvino De La Torre  Internal Medicine  [address] [place] [address]  Phone: [phone]  Fax: [phone]  Follow Up Time:

## 2024-07-12 NOTE — DISCHARGE NOTE PROVIDER - NSDCMRMEDTOKEN_GEN_ALL_CORE_FT
acetaminophen 500 mg oral tablet: 2 tab(s) orally every 8 hours as needed for pain or fever  amiodarone 200 mg oral tablet: 1 tab(s) orally once a day  aspirin 81 mg oral delayed release tablet: 1 tab(s) orally once a day , take with food  atorvastatin 40 mg oral tablet: 1 tab(s) orally once a day (at bedtime)  lidocaine 4% topical film: Apply topically to affected area once a day leave on for 12 hours, remove for 12 hours, wash hands after use.  omega-3 polyunsaturated fatty acids 1200 mg oral capsule: 1 cap(s) orally 2 times a day  OXcarbazepine 150 mg oral tablet: 1 tab(s) orally every 12 hours ( two times a day)  pantoprazole 40 mg oral delayed release tablet: 1 tab(s) orally once a day (in the morning) , take 30 minutes before breakfast  polyethylene glycol 3350 oral powder for reconstitution: 17 gram(s) orally 1 to 2 times a day as needed for  constipation  ranolazine 500 mg oral tablet, extended release: 1 tab(s) orally every 12 hours ( 2 times a day)  senna leaf extract oral tablet: 2 tab(s) orally once a day (at bedtime) as needed for  constipation  tamsulosin 0.4 mg oral capsule: 1 cap(s) orally once a day (at bedtime)

## 2024-07-12 NOTE — DISCHARGE NOTE PROVIDER - HOSPITAL COURSE
89 year old right-handed,  male with PMHx of CAD s/p PCI (x4 stents), CABG x1 vessel (4-5 years ago), AFib, HFrEF s/p PPM/AICD (2017), HTN, CKD, HLD. 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 Patient was ambulating at home when he slipped and fell. He was able to ambulate after the incident but was experiencing persistent head and rib pain. Patient initially presented to [facility] for evaluation and was transferred to [facility] after CT scan demonstrated right Sylvian fissure SAH and multiple 6, 9-11th left rib fractures (and possible 8th rib fx). Patient had two repeat CT scans, both of which are stable. Hospital course was complicated by postprandial regurgitation.  Speech and swallow saw patient and said oropharyngeal swallow within functional limits with regurgitation within 1 minute. Patient was evaluated by GI and they recommended a  speech and swallow evaluation if symptoms persist, will plan for barium esophogram after neuro and cardio clearance. Patient’s diet was upgraded from clear liquids to full liquids 7/3/2024. Patient medically stable for discharge to acute rehabilitation on 7/3/2024.  CLOF: Transfers with  min A, ambulated 50’ x 3 with RW and  CG, UBD/LBD with min A Ambulating with RW and supervision. For d/c home today with  servicesRehab of:  #Multitrauma with head trauma but no LOC   #Right Sylvian Fissure SAH   #Left L2 and L4 Transverse Process Fx   #Left Posterior 6, 9-11th Rib Fx's  #Trace left pleural effusion   No neurosurgical intervention   Cleared for chemical DVT ppx   Repeat CTH in 4wks oupt   Outpt follow up with Neurosurgery Dr Hood in 4 weeks with CTH prior to appointment , a script given for CT head to be done next week .   Pain control  (Tylenol 975 mg q6 hours, gabapentin 100 mg q8 hours, Robaxin 500 mg q8 hours, oxycodone 5 mg q8 hours PRN, lidocaine patch) - will change Robaxin to prn. - Well controlled. D/c gabapentin.  Can resume aspirin  81mg daily per NSGY and cardio   Doing well in therapies. Ambulates with RW and supervision.   D/c home today with VN HHA/ therapies.      #History of HTN - current hypotension  #Atrial fibrillation   #pacemaker/AICD in place (2017)   #h/o CAD s/p PCI with stents (x4 to LAD), CABG x1 vessel (4-5 years ago)   #HFrEF   #Lightheadedness, bradycardia possibly d/t cardiac etiology  #Orthostatic hypotension  - f/up EKG  - cardiology consult appreciated  - EP consult to interrogate pacemaker - 7/8: Underlying rhythm AT vs ST with occasional PAC falling into blanking period with subsequent skipped ventricular beat  - Entresto lowered to 26 mg/24 mg q12h given blood pressure readings on 7/7.   - Patient does not take Lasix, metolazone, or ivabridine   - resume home ranolazine 500mg PO q12  - As per patient, his EF is 30-40%.  - D/c Metoprolol per Cardio consult for low BPs. HR acceptable.   - Monitor orthostatic BPs  - continue home amiodarone 200 mg qd  and resume ASA 81mg  - 7/10 Orthostatic BP - systolic dropped to 70s with standing. Asymptomatic. Told to drink more fluid. d/c Entresto for low BPs/ orthostasis per cardio recs, jaylin given fall at home that may have been provoked by low BP.  To f/u with Dr. Fowler and Dr. Landaverde (cardio and EPS) ASAP upon d/c.    #Trigeminal neuralgia   - continue home oxcarbazepine (150 mg q12)     #h/o CKD   - baseline creatinine 1.7-1.9   f/u PMD    #DM II  well controlled  resume home meds    # Infrequent episodes of food regurgitation , symptoms not consistent with typical esophageal dysphagia   -  Pt was screened for dysphagia by S&S team on 7/2/2024  as he had an episode of water regurgitation earlier during the day. Pt again had 1 min postprandial regurgitation. Pt reports a similar episode 2 months ago when he was having dinner and then he regurgitated food.   - If symptoms persist will obtain barium esophogram   - tolerated minced and moist diet, upgrade to regular per patient request, reflux precautions  Tolerating regular diet.  F/U PMD/ GI as needed    Diet: Consistent Carbohydrate/No Snacks .      He will be dischhrged home todat with [facility] care with instructions to follow up with neurosurgeon Erwin after Ct head  ( AN RX given to patient ) , Pmd ,cardiologist and Ep cardiologist and Gi doctor if Gerd symptoms persists .

## 2024-07-12 NOTE — PROGRESS NOTE ADULT - PROVIDER SPECIALTY LIST ADULT
Rehab Medicine
Physiatry
Rehab Medicine
Rehab Medicine

## 2024-07-12 NOTE — PROGRESS NOTE ADULT - ASSESSMENT
ASSESSMENT/PLAN    Rehab for multitrauma with head trauma but no LOC, right Sylvian fissure SAH, L2 and L4 transverse process fractures,  left posterior 6, 9-11th rib fractures    89 y.o. 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 CT scan and imaging studies demonstrated right Sylvian fissure SAH, left L2 and L4 transverse process fractures, and left posterior 6, 9-11th rib fractures    Rehab of:  #Multitrauma with head trauma but no LOC   #Right Sylvian Fissure SAH   #Left L2 and L4 Transverse Process Fx   #Left Posterior 6, 9-11th Rib Fx's  #Trace left pleural effusion   No neurosurgical intervention   Cleared for chemical DVT ppx   Repeat CTH in 4wks oupt   Outpt follow up with Neurosurgery Dr Hood in 4 weeks with CTH prior to appointment   Pain control  (Tylenol 975 mg q6 hours, gabapentin 100 mg q8 hours, Robaxin 500 mg q8 hours, oxycodone 5 mg q8 hours PRN, lidocaine patch) - will change Robaxin to prn. - Well controlled. D/c gabapentin.  Can resume aspirin  81mg daily per NSGY and cardio   Doing well in therapies. Ambulates with RW and supervision.   D/c home today with VN HHA/ therapies.      #History of HTN - current hypotension  #Atrial fibrillation   #pacemaker/AICD in place (2017)   #h/o CAD s/p PCI with stents (x4 to LAD), CABG x1 vessel (4-5 years ago)   #HFrEF   #Lightheadedness, bradycardia possibly d/t cardiac etiology  #Orthostatic hypotension  - f/up EKG  - cardiology consult appreciated  - EP consult to interrogate pacemaker - 7/8: Underlying rhythm AT vs ST with occasional PAC falling into blanking period with subsequent skipped ventricular beat  - Entresto lowered to 26 mg/24 mg q12h given blood pressure readings on 7/7.   - Patient does not take Lasix, metolazone, or ivabridine   - resume home ranolazine 500mg PO q12  - As per patient, his EF is 30-40%.  - D/c Metoprolol per Cardio consult for low BPs. HR acceptable.   - Monitor orthostatic BPs  - continue home amiodarone 200 mg qd  and resume ASA 81mg  - 7/10 Orthostatic BP - systolic dropped to 70s with standing. Asymptomatic. Told to drink more fluid. d/c Entresto for low BPs/ orthostasis per cardio recs, jaylin given fall at home that may have been provoked by low BP.  To f/u with Dr. Fowler and Dr. Landaverde (cardio and EPS) ASAP upon d/c.    #Trigeminal neuralgia   - continue home oxcarbazepine (150 mg q12)     #h/o CKD   - baseline creatinine 1.7-1.9   f/u PMD    #DM II  well controlled  resume home meds    # Infrequent episodes of food regurgitation , symptoms not consistent with typical esophageal dysphagia   -  Pt was screened for dysphagia by S&S team on 7/2/2024  as he had an episode of water regurgitation earlier during the day. Pt again had 1 min postprandial regurgitation. Pt reports a similar episode 2 months ago when he was having dinner and then he regurgitated food.   - If symptoms persist will obtain barium esophogram   - tolerated minced and moist diet, upgrade to regular per patient request, reflux precautions  Tolerating regular diet.  F/U PMD/ GI as needed    Diet: Consistent Carbohydrate/No Snacks (07-09-24 @ 11:24) [Active]    For further summary of hospital course and d/c med rec and patient instructions, see Provider D/C Note, reviewed and signed by me.

## 2024-07-12 NOTE — DISCHARGE NOTE PROVIDER - NSDCCPCAREPLAN_GEN_ALL_CORE_FT
PRINCIPAL DISCHARGE DIAGNOSIS  Diagnosis: Head trauma  Assessment and Plan of Treatment: You were admitted to [facility] i 7/1 after a fall with head trauma an dsubarachnoid hemorrhage ( Left sylvian fissure of brain )  You were taken care by neurosurgery team , repeat ct scans were done  , bleed was stable and Transferred to acute rehab for this and Lumbar spine at L2 and L4 area fractures  and 6 , 9 to 11 rib fractures on left posterior side .  Pain is better ,  ready to be discharged home today  , need to continue physical therapy , follow up visit with neuro surgeon dr Hood or dr chao  after ct head was done as explaied , ( ct head prescription given to you )    You were placed on blood clot prevention injections as cleared by neuro surgery , No neuro surgical intervention needed while  in the hospital , You were placed on pain medications including gabapentin, oxycodone  and robaxin , all these were discontinued  since you were not needing  anymore ,  You were cleared to be on baby aspirin   as per neurosurgery .      SECONDARY DISCHARGE DIAGNOSES  Diagnosis: History of trigeminal neuralgia  Assessment and Plan of Treatment: You were continued on medication Trileptal or ( Oxcarbazepine 150 mg every 12 hours ,    Diagnosis: Chronic HFrEF (heart failure with reduced ejection fraction)  Assessment and Plan of Treatment: You have History of this  and hypertension or blood pressure   with low blood pressure  , The medication entresto dose was reduced then stoppe ddue to low blood pressures , EP cardiologt team had innterrogated pace maker , and Pace maker an dAicd working well , no arrythmias noted , You were seen by cardiology team at [facility] multiple times to manage heart failure with low blood pressure , stopped medications including lasix ,metoprolol, resumed Ranexa anad continued amiodaron, You need to follow up with Your cardiologist dr Fowler and Ep  cardiologist  Dr. De La Garza in 2 weeks ,  No lasix,  no  metalozoone or Ivabridine or  Corlanor  for Now ,    Diagnosis: Diabetes mellitus  Assessment and Plan of Treatment: You are not on any medicine since blood glucose levels are normal .  Please continue a diet carb consistent , low fat and low cholesterol diet ..    Diagnosis: GERD (gastroesophageal reflux disease)  Assessment and Plan of Treatment: You have history of this infrequent episodes of food regurgitaion , continue taking  Protonix and if symptoms  persiists or worsened  pleas efollow up with Your Gi doctor  .     PRINCIPAL DISCHARGE DIAGNOSIS  Diagnosis: Head trauma  Assessment and Plan of Treatment: You were admitted to [facility] i 7/1 after a fall with head trauma an dsubarachnoid hemorrhage ( Left sylvian fissure of brain )  You were taken care by neurosurgery team , repeat ct scans were done  , bleed was stable and Transferred to acute rehab for this and Lumbar spine at L2 and L4 area fractures  and 6 , 9 to 11 rib fractures on left posterior side .  Pain is better ,  ready to be discharged home today  , need to continue physical therapy , follow up visit with neuro surgeon dr Hood or dr chao  after CT head  was done as explained , ( CT  head prescription given to you )    You were placed on blood clot prevention injections as cleared by neuro surgery , No neuro surgical intervention needed while  in the hospital , You were placed on pain medications including gabapentin, oxycodone  and robaxin , all these were discontinued  since you were not needing  anymore ,  You were cleared to be on baby aspirin   as per neurosurgery .      SECONDARY DISCHARGE DIAGNOSES  Diagnosis: History of trigeminal neuralgia  Assessment and Plan of Treatment: You were continued on medication Trileptal or ( Oxcarbazepine 150 mg every 12 hours ,    Diagnosis: Chronic HFrEF (heart failure with reduced ejection fraction)  Assessment and Plan of Treatment: You have History of this  and hypertension or blood pressure   with low blood pressure  , The medication entresto dose was reduced then stoppe ddue to low blood pressures , EP cardiologt team had innterrogated pace maker , and Pace maker an dAicd working well , no arrythmias noted , You were seen by cardiology team at [facility] multiple times to manage heart failure with low blood pressure , stopped medications including lasix ,metoprolol, resumed Ranexa anad continued amiodaron, You need to follow up with Your cardiologist dr Fowler and Ep  cardiologist  Dr. De La Garza in 2 weeks ,  No lasix,  no  metalozoone or Ivabridine or  Corlanor  for Now ,  Please weigh  your self daily  and if there is weight gain more than 3 lbs in   2 to 3 days  or  in a week  or edema or swelling on your feet or  feeling shortness of  Breath please notify your cardiologist/ Pmd and stop drinking fluids more than 4 cups in a day.    Diagnosis: Diabetes mellitus  Assessment and Plan of Treatment: You are not on any medicine since blood glucose levels are normal .  Please continue a diet carb consistent , low fat and low cholesterol diet .. Please check with Your doctor  about the need for acarbose  ?    Diagnosis: GERD (gastroesophageal reflux disease)  Assessment and Plan of Treatment: You have history of this infrequent episodes of food regurgitaion , continue taking  Protonix and if symptoms  persiists or worsened  pleas efollow up with Your Gi doctor  .

## 2024-07-17 DIAGNOSIS — J45.909 UNSPECIFIED ASTHMA, UNCOMPLICATED: ICD-10-CM

## 2024-07-17 DIAGNOSIS — I50.22 CHRONIC SYSTOLIC (CONGESTIVE) HEART FAILURE: ICD-10-CM

## 2024-07-17 DIAGNOSIS — Z87.440 PERSONAL HISTORY OF URINARY (TRACT) INFECTIONS: ICD-10-CM
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
## 2024-07-17 DIAGNOSIS — Z85.828 PERSONAL HISTORY OF OTHER MALIGNANT NEOPLASM OF SKIN: ICD-10-CM

## 2024-07-17 DIAGNOSIS — Z79.84 LONG TERM (CURRENT) USE OF ORAL HYPOGLYCEMIC DRUGS: ICD-10-CM

## 2024-07-17 DIAGNOSIS — K21.9 GASTRO-ESOPHAGEAL REFLUX DISEASE WITHOUT ESOPHAGITIS: ICD-10-CM

## 2024-07-17 DIAGNOSIS — Z95.5 PRESENCE OF CORONARY ANGIOPLASTY IMPLANT AND GRAFT: ICD-10-CM

## 2024-07-17 DIAGNOSIS — Z79.02 LONG TERM (CURRENT) USE OF ANTITHROMBOTICS/ANTIPLATELETS: ICD-10-CM

## 2024-07-17 DIAGNOSIS — S32.048D OTHER FRACTURE OF FOURTH LUMBAR VERTEBRA, SUBSEQUENT ENCOUNTER FOR FRACTURE WITH ROUTINE HEALING: ICD-10-CM

## 2024-07-17 DIAGNOSIS — I95.1 ORTHOSTATIC HYPOTENSION: ICD-10-CM

## 2024-07-17 DIAGNOSIS — Z95.810 PRESENCE OF AUTOMATIC (IMPLANTABLE) CARDIAC DEFIBRILLATOR: ICD-10-CM

## 2024-07-17 DIAGNOSIS — N28.89 OTHER SPECIFIED DISORDERS OF KIDNEY AND URETER: ICD-10-CM

## 2024-07-17 DIAGNOSIS — Z98.890 OTHER SPECIFIED POSTPROCEDURAL STATES: ICD-10-CM

## 2024-07-17 DIAGNOSIS — Z86.73 PERSONAL HISTORY OF TRANSIENT ISCHEMIC ATTACK (TIA), AND CEREBRAL INFARCTION WITHOUT RESIDUAL DEFICITS: ICD-10-CM

## 2024-07-17 DIAGNOSIS — I48.91 UNSPECIFIED ATRIAL FIBRILLATION: ICD-10-CM

## 2024-07-17 DIAGNOSIS — E78.5 HYPERLIPIDEMIA, UNSPECIFIED: ICD-10-CM

## 2024-07-17 DIAGNOSIS — S32.022D UNSTABLE BURST FRACTURE OF SECOND LUMBAR VERTEBRA, SUBSEQUENT ENCOUNTER FOR FRACTURE WITH ROUTINE HEALING: ICD-10-CM

## 2024-07-17 DIAGNOSIS — N18.9 CHRONIC KIDNEY DISEASE, UNSPECIFIED: ICD-10-CM
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
## 2024-07-17 DIAGNOSIS — E11.22 TYPE 2 DIABETES MELLITUS WITH DIABETIC CHRONIC KIDNEY DISEASE: ICD-10-CM

## 2024-07-17 DIAGNOSIS — I49.5 SICK SINUS SYNDROME: ICD-10-CM

## 2024-07-17 DIAGNOSIS — Z79.82 LONG TERM (CURRENT) USE OF ASPIRIN: ICD-10-CM

## 2024-07-17 DIAGNOSIS — R31.9 HEMATURIA, UNSPECIFIED: ICD-10-CM

## 2024-07-17 DIAGNOSIS — S06.6X0D TRAUMATIC SUBARACHNOID HEMORRHAGE WITHOUT LOSS OF CONSCIOUSNESS, SUBSEQUENT ENCOUNTER: ICD-10-CM

## 2024-07-17 DIAGNOSIS — Z95.1 PRESENCE OF AORTOCORONARY BYPASS GRAFT: ICD-10-CM

## 2024-07-17 DIAGNOSIS — Z90.49 ACQUIRED ABSENCE OF OTHER SPECIFIED PARTS OF DIGESTIVE TRACT: ICD-10-CM

## 2024-07-17 DIAGNOSIS — S32.038D OTHER FRACTURE OF THIRD LUMBAR VERTEBRA, SUBSEQUENT ENCOUNTER FOR FRACTURE WITH ROUTINE HEALING: ICD-10-CM

## 2024-07-17 DIAGNOSIS — I25.10 ATHEROSCLEROTIC HEART DISEASE OF NATIVE CORONARY ARTERY WITHOUT ANGINA PECTORIS: ICD-10-CM

## 2024-07-17 DIAGNOSIS — D64.9 ANEMIA, UNSPECIFIED: ICD-10-CM

## 2024-07-17 DIAGNOSIS — G50.0 TRIGEMINAL NEURALGIA: ICD-10-CM

## 2024-07-17 DIAGNOSIS — R63.8 OTHER SYMPTOMS AND SIGNS CONCERNING FOOD AND FLUID INTAKE: ICD-10-CM

## 2024-07-17 DIAGNOSIS — E11.39 TYPE 2 DIABETES MELLITUS WITH OTHER DIABETIC OPHTHALMIC COMPLICATION: ICD-10-CM

## 2024-07-17 DIAGNOSIS — I13.0 HYPERTENSIVE HEART AND CHRONIC KIDNEY DISEASE WITH HEART FAILURE AND STAGE 1 THROUGH STAGE 4 CHRONIC KIDNEY DISEASE, OR UNSPECIFIED CHRONIC KIDNEY DISEASE: ICD-10-CM

## 2024-07-17 DIAGNOSIS — S22.42XD MULTIPLE FRACTURES OF RIBS, LEFT SIDE, SUBSEQUENT ENCOUNTER FOR FRACTURE WITH ROUTINE HEALING: ICD-10-CM

## 2024-07-17 DIAGNOSIS — I25.5 ISCHEMIC CARDIOMYOPATHY: ICD-10-CM

## 2024-12-09 ENCOUNTER — EMERGENCY (EMERGENCY)
Facility: HOSPITAL | Age: 88
LOS: 0 days | Discharge: HOME | End: 2024-12-09
Attending: EMERGENCY MEDICINE
Payer: MEDICARE

## 2024-12-09 VITALS
TEMPERATURE: 98 F | SYSTOLIC BLOOD PRESSURE: 157 MMHG | RESPIRATION RATE: 18 BRPM | OXYGEN SATURATION: 98 % | DIASTOLIC BLOOD PRESSURE: 95 MMHG | HEART RATE: 95 BPM

## 2024-12-09 VITALS
RESPIRATION RATE: 19 BRPM | SYSTOLIC BLOOD PRESSURE: 163 MMHG | OXYGEN SATURATION: 99 % | HEART RATE: 60 BPM | TEMPERATURE: 98 F | DIASTOLIC BLOOD PRESSURE: 91 MMHG
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
## 2024-12-09 DIAGNOSIS — Z41.9 ENCOUNTER FOR PROCEDURE FOR PURPOSES OTHER THAN REMEDYING HEALTH STATE, UNSPECIFIED: Chronic | ICD-10-CM

## 2024-12-09 DIAGNOSIS — I50.20 UNSPECIFIED SYSTOLIC (CONGESTIVE) HEART FAILURE: ICD-10-CM
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
## 2024-12-09 DIAGNOSIS — I25.10 ATHEROSCLEROTIC HEART DISEASE OF NATIVE CORONARY ARTERY WITHOUT ANGINA PECTORIS: ICD-10-CM

## 2024-12-09 DIAGNOSIS — Z95.810 PRESENCE OF AUTOMATIC (IMPLANTABLE) CARDIAC DEFIBRILLATOR: ICD-10-CM

## 2024-12-09 DIAGNOSIS — Z98.890 OTHER SPECIFIED POSTPROCEDURAL STATES: Chronic | ICD-10-CM

## 2024-12-09 DIAGNOSIS — E78.5 HYPERLIPIDEMIA, UNSPECIFIED: ICD-10-CM

## 2024-12-09 DIAGNOSIS — S01.81XA LACERATION WITHOUT FOREIGN BODY OF OTHER PART OF HEAD, INITIAL ENCOUNTER: ICD-10-CM

## 2024-12-09 DIAGNOSIS — I48.91 UNSPECIFIED ATRIAL FIBRILLATION: ICD-10-CM

## 2024-12-09 DIAGNOSIS — Z95.5 PRESENCE OF CORONARY ANGIOPLASTY IMPLANT AND GRAFT: ICD-10-CM

## 2024-12-09 DIAGNOSIS — C44.91 BASAL CELL CARCINOMA OF SKIN, UNSPECIFIED: Chronic | ICD-10-CM

## 2024-12-09 DIAGNOSIS — Z95.5 PRESENCE OF CORONARY ANGIOPLASTY IMPLANT AND GRAFT: Chronic | ICD-10-CM

## 2024-12-09 DIAGNOSIS — E11.22 TYPE 2 DIABETES MELLITUS WITH DIABETIC CHRONIC KIDNEY DISEASE: ICD-10-CM

## 2024-12-09 DIAGNOSIS — Z95.0 PRESENCE OF CARDIAC PACEMAKER: Chronic | ICD-10-CM

## 2024-12-09 LAB
ALBUMIN SERPL ELPH-MCNC: 3.9 G/DL — SIGNIFICANT CHANGE UP (ref 3.5–5.2)
ALP SERPL-CCNC: 131 U/L — HIGH (ref 30–115)
ALT FLD-CCNC: 43 U/L — HIGH (ref 0–41)
ANION GAP SERPL CALC-SCNC: 12 MMOL/L — SIGNIFICANT CHANGE UP (ref 7–14)
APTT BLD: 26.1 SEC — LOW (ref 27–39.2)
AST SERPL-CCNC: 32 U/L — SIGNIFICANT CHANGE UP (ref 0–41)
BASOPHILS # BLD AUTO: 0.04 K/UL — SIGNIFICANT CHANGE UP (ref 0–0.2)
BASOPHILS NFR BLD AUTO: 0.7 % — SIGNIFICANT CHANGE UP (ref 0–1)
BILIRUB SERPL-MCNC: 0.5 MG/DL — SIGNIFICANT CHANGE UP (ref 0.2–1.2)
BUN SERPL-MCNC: 39 MG/DL — HIGH (ref 10–20)
CALCIUM SERPL-MCNC: 9.2 MG/DL — SIGNIFICANT CHANGE UP (ref 8.4–10.4)
CHLORIDE SERPL-SCNC: 98 MMOL/L — SIGNIFICANT CHANGE UP (ref 98–110)
CO2 SERPL-SCNC: 25 MMOL/L — SIGNIFICANT CHANGE UP (ref 17–32)
CREAT SERPL-MCNC: 1.6 MG/DL — HIGH (ref 0.7–1.5)
EGFR: 40 ML/MIN/1.73M2 — LOW
EOSINOPHIL # BLD AUTO: 0.07 K/UL — SIGNIFICANT CHANGE UP (ref 0–0.7)
EOSINOPHIL NFR BLD AUTO: 1.2 % — SIGNIFICANT CHANGE UP (ref 0–8)
ETHANOL SERPL-MCNC: <10 MG/DL — SIGNIFICANT CHANGE UP
GLUCOSE SERPL-MCNC: 131 MG/DL — HIGH (ref 70–99)
HCT VFR BLD CALC: 41.4 % — LOW (ref 42–52)
HGB BLD-MCNC: 13.4 G/DL — LOW (ref 14–18)
IMM GRANULOCYTES NFR BLD AUTO: 0.8 % — HIGH (ref 0.1–0.3)
INR BLD: 0.89 RATIO — SIGNIFICANT CHANGE UP (ref 0.65–1.3)
LYMPHOCYTES # BLD AUTO: 0.73 K/UL — LOW (ref 1.2–3.4)
LYMPHOCYTES # BLD AUTO: 12.1 % — LOW (ref 20.5–51.1)
MCHC RBC-ENTMCNC: 31.5 PG — HIGH (ref 27–31)
MCHC RBC-ENTMCNC: 32.4 G/DL — SIGNIFICANT CHANGE UP (ref 32–37)
MCV RBC AUTO: 97.4 FL — HIGH (ref 80–94)
MONOCYTES # BLD AUTO: 0.47 K/UL — SIGNIFICANT CHANGE UP (ref 0.1–0.6)
MONOCYTES NFR BLD AUTO: 7.8 % — SIGNIFICANT CHANGE UP (ref 1.7–9.3)
NEUTROPHILS # BLD AUTO: 4.67 K/UL — SIGNIFICANT CHANGE UP (ref 1.4–6.5)
NEUTROPHILS NFR BLD AUTO: 77.4 % — HIGH (ref 42.2–75.2)
NRBC # BLD: 0 /100 WBCS — SIGNIFICANT CHANGE UP (ref 0–0)
PLATELET # BLD AUTO: 142 K/UL — SIGNIFICANT CHANGE UP (ref 130–400)
PMV BLD: 10.5 FL — HIGH (ref 7.4–10.4)
POTASSIUM SERPL-MCNC: 4.2 MMOL/L — SIGNIFICANT CHANGE UP (ref 3.5–5)
POTASSIUM SERPL-SCNC: 4.2 MMOL/L — SIGNIFICANT CHANGE UP (ref 3.5–5)
PROT SERPL-MCNC: 6.4 G/DL — SIGNIFICANT CHANGE UP (ref 6–8)
PROTHROM AB SERPL-ACNC: 10.5 SEC — SIGNIFICANT CHANGE UP (ref 9.95–12.87)
RBC # BLD: 4.25 M/UL — LOW (ref 4.7–6.1)
RBC # FLD: 16.4 % — HIGH (ref 11.5–14.5)
SODIUM SERPL-SCNC: 135 MMOL/L — SIGNIFICANT CHANGE UP (ref 135–146)
WBC # BLD: 6.03 K/UL — SIGNIFICANT CHANGE UP (ref 4.8–10.8)
WBC # FLD AUTO: 6.03 K/UL — SIGNIFICANT CHANGE UP (ref 4.8–10.8)

## 2024-12-09 PROCEDURE — 80307 DRUG TEST PRSMV CHEM ANLYZR: CPT

## 2024-12-09 PROCEDURE — 72125 CT NECK SPINE W/O DYE: CPT | Mod: MC

## 2024-12-09 PROCEDURE — 80053 COMPREHEN METABOLIC PANEL: CPT

## 2024-12-09 PROCEDURE — 85610 PROTHROMBIN TIME: CPT

## 2024-12-09 PROCEDURE — 36415 COLL VENOUS BLD VENIPUNCTURE: CPT

## 2024-12-09 PROCEDURE — 36000 PLACE NEEDLE IN VEIN: CPT

## 2024-12-09 PROCEDURE — 99284 EMERGENCY DEPT VISIT MOD MDM: CPT

## 2024-12-09 PROCEDURE — 70486 CT MAXILLOFACIAL W/O DYE: CPT | Mod: MC

## 2024-12-09 PROCEDURE — 85025 COMPLETE CBC W/AUTO DIFF WBC: CPT

## 2024-12-09 PROCEDURE — 70450 CT HEAD/BRAIN W/O DYE: CPT | Mod: MC

## 2024-12-09 PROCEDURE — 72125 CT NECK SPINE W/O DYE: CPT | Mod: 26,MC

## 2024-12-09 PROCEDURE — 71045 X-RAY EXAM CHEST 1 VIEW: CPT

## 2024-12-09 PROCEDURE — 70450 CT HEAD/BRAIN W/O DYE: CPT | Mod: 26,MC

## 2024-12-09 PROCEDURE — 70486 CT MAXILLOFACIAL W/O DYE: CPT | Mod: 26,MC

## 2024-12-09 PROCEDURE — 99284 EMERGENCY DEPT VISIT MOD MDM: CPT | Mod: 25

## 2024-12-09 PROCEDURE — 85730 THROMBOPLASTIN TIME PARTIAL: CPT

## 2024-12-09 PROCEDURE — 71045 X-RAY EXAM CHEST 1 VIEW: CPT | Mod: 26

## 2024-12-09 RX ORDER — SODIUM CHLORIDE 9 MG/ML
500 INJECTION, SOLUTION INTRAMUSCULAR; INTRAVENOUS; SUBCUTANEOUS ONCE
Refills: 0 | Status: COMPLETED | OUTPATIENT
Start: 2024-12-09 | End: 2024-12-09

## 2024-12-09 RX ADMIN — SODIUM CHLORIDE 500 MILLILITER(S): 9 INJECTION, SOLUTION INTRAMUSCULAR; INTRAVENOUS; SUBCUTANEOUS at 15:51

## 2024-12-09 NOTE — ED PROVIDER NOTE - NSFOLLOWUPINSTRUCTIONS_ED_ALL_ED_FT
Facial Laceration    WHAT YOU NEED TO KNOW:    A facial laceration is a cut, tear, or gash in your skin and soft tissue under it. Facial lacerations may be closed within 24 hours of injury.    DISCHARGE INSTRUCTIONS:    Return to the emergency department if:    You have heavy bleeding or bleeding that does not stop after 10 minutes of holding firm, direct pressure over the wound.    Your wound reopens.    You have new numbness or tingling near your wound, or weakness of your facial muscles.  Call your doctor if:    You have a fever or chills.    Your wound is red, warm, or swollen.    You have pain that gets worse, even after treatment.    Your wound is not healing, or you think there is an object in the wound.    You have questions or concerns about your condition or care.  Medicines: You may need any of the following:    Antibiotics may be given to prevent an infection if your wound was deep and had to be cleaned out.    Acetaminophen decreases pain and fever. It is available without a doctor's order. Ask how much to take and how often to take it. Follow directions. Read the labels of all other medicines you are using to see if they also contain acetaminophen, or ask your doctor or pharmacist. Acetaminophen can cause liver damage if not taken correctly.    NSAIDs, such as ibuprofen, help decrease swelling, pain, and fever. This medicine is available with or without a doctor's order. NSAIDs can cause stomach bleeding or kidney problems in certain people. If you take blood thinner medicine, always ask your healthcare provider if NSAIDs are safe for you. Always read the medicine label and follow directions.    Prescription pain medicine may be given. Ask your healthcare provider how to take this medicine safely. Some prescription pain medicines contain acetaminophen. Do not take other medicines that contain acetaminophen without talking to your healthcare provider. Too much acetaminophen may cause liver damage. Prescription pain medicine may cause constipation. Ask your healthcare provider how to prevent or treat constipation.    Take your medicine as directed. Contact your healthcare provider if you think your medicine is not helping or if you have side effects. Tell your provider if you are allergic to any medicine. Keep a list of the medicines, vitamins, and herbs you take. Include the amounts, and when and why you take them. Bring the list or the pill bottles to follow-up visits. Carry your medicine list with you in case of an emergency.  Care for your wound:    Change your bandages when they get wet, dirty, or after washing. Apply new, clean bandages as directed. Do not apply elastic bandages or tape too tightly. Do not put powders or lotions on your wound.    Apply antibiotic ointment as directed. Your provider may give you antibiotic ointment to put over your wound if you have stitches. If you have strips of tape over your wound, let them dry up and fall off on their own. You may gently remove strips of tape if they do not fall off within 14 days. If you have glue over your wound, do not remove or pick at it. If the glue comes off, do not replace it with glue that you have at home.    Check your wound every day for signs of infection. Signs of infection include swelling, warmth, redness, or pus.    Do not smoke. Nicotine and other chemicals in cigarettes and cigars can prevent your wound from healing. Ask your provider for information if you currently smoke and need help to quit. E-cigarettes or smokeless tobacco still contain nicotine. Talk to your provider before you use these products.  Self-care:    Rest as needed. Some activities, such as bending over, may cause too much pressure in your face. Your laceration may begin to bleed.    Apply ice to the wound for 15 to 20 minutes every hour or as directed. Use an ice pack, or put crushed ice in a plastic bag. Cover the bag with a towel before you apply it. Ice helps decrease swelling and pain.    Decrease scarring. The skin around your wound may turn a different color if it is exposed to direct sunlight. After your wound is healed, use sunscreen over the area when you are out in the sun. You should do this for at least 6 months to 1 year after your injury. Some wounds scar less if they are covered while they heal.  Follow up with your doctor as directed: You may need to return in 24 to 48 hours to have your wound checked for infection. You will need to return in 3 to 14 days if you have stitches or staples so they can be removed. Write down your questions so you remember to ask them during your visits.

## 2024-12-09 NOTE — ED PROVIDER NOTE - PATIENT PORTAL LINK FT
You can access the FollowMyHealth Patient Portal offered by [facility] by registering at the following website: [email or web address]. By joining [employer]’s FollowMyHealth portal, you will also be able to view your health information using other applications (apps) compatible with our system.

## 2024-12-09 NOTE — ED PROVIDER NOTE - CLINICAL SUMMARY MEDICAL DECISION MAKING FREE TEXT BOX
Patient evaluated for facial laceration from fall on anticoagulation CT imaging without acute trauma lab work reviewed and normal patient did not want to wait for final CT reading and left AMA.  Indications return to the ED were discussed.  At the time prior to leaving wound care was discussed and patient prefers to follow-up with his primary care doctor.  Patient is ANO x 3 and able to make his decisions

## 2024-12-09 NOTE — ED PROVIDER NOTE - OBJECTIVE STATEMENT
89-year-old male with PMH with PMH CAD s/p 4 stents, A-fib not on AC, HFrEF s/p AICD, CKD, DM, HTN, HLD prior diverticulitis s/p partial colectomy with ileostomy now reversed, facial BCC and SCC and recent microvascular stroke who presents to the ED after a ground-level fall and was a patient fall from 1 step onto tile.  It was not witnessed.  He hit his head and sustained a laceration to his left forehead.  Currently, he is endorsing mild left forehead pain.  He denies LOC.  Denies nausea, vomiting.  Has been ambulatory after the incident.

## 2024-12-09 NOTE — ED ADULT NURSE NOTE - OBJECTIVE STATEMENT
pt sp trip and fall down 1 step. + head injury. pt only on baby asa. laceration above left eyebrow and nose. pt also complains of left shoulder pain.

## 2024-12-09 NOTE — ED PROVIDER NOTE - PHYSICAL EXAMINATION
VITAL SIGNS: I have reviewed nursing notes and confirm.  CONSTITUTIONAL: Well-developed; well-nourished; in no acute distress.  SKIN: Skin exam is warm and dry, no acute rash.   HEAD: Laceration to the left forehead just superior to the left eyebrow. Small skin tear on the nasal bridge. Normocephalic; atraumatic.  EYES: PERRL, EOM intact; conjunctiva and sclera clear.  ENT: MMM. No nasal discharge; airway clear. TMs clear b/l no hemotympanum  NECK: Supple; non tender. No midline C spine ttp  CARD: S1, S2 normal; no murmurs, gallops, or rubs. Regular rate and rhythm. 2+ distal pulses  RESP: Normal respiratory effort, no tachypnea or distress. Lungs CTAB, no wheezes, rales or rhonchi.  chest wall non-tender  back with no midline c/t/l/s spinal or paraspinal ttp  pelvis stable  ABD: soft, NT/ND.  EXT: Normal ROM. No clubbing, cyanosis or edema.  Neuro: A&Ox3, normal speech, CN II-XII intact, FROM & strength 5/5 x4 extremities. Sensation intact and equal.   PSYCH: Cooperative, appropriate.

## 2024-12-09 NOTE — ED ADULT NURSE NOTE - NSFALLHARMRISKINTERV_ED_ALL_ED
Assistance OOB with selected safe patient handling equipment if applicable/Assistance with ambulation/Communicate risk of Fall with Harm to all staff, patient, and family/Monitor gait and stability/Provide visual cue: red socks, yellow wristband, yellow gown, etc/Reinforce activity limits and safety measures with patient and family/Bed in lowest position, wheels locked, appropriate side rails in place/Call bell, personal items and telephone in reach/Instruct patient to call for assistance before getting out of bed/chair/stretcher/Non-slip footwear applied when patient is off stretcher/[place] to call system/Physically safe environment - no spills, clutter or unnecessary equipment/Purposeful Proactive Rounding/Room/bathroom lighting operational, light cord in reach

## 2024-12-09 NOTE — ED ADULT NURSE REASSESSMENT NOTE - NS ED NURSE REASSESS COMMENT FT1
Pt requesting to go to bathroom, A&O4. Explained to pt he can not ambulate until CTs result. Pt refusing to use urinal states he wants to ambulate anyways.   Pt son at bedside, states they want to leave and refuse further care. Risks explained. Dr Velasco made aware of situation and will speak with pt.

## 2024-12-09 NOTE — ED PROVIDER NOTE - ATTENDING CONTRIBUTION TO CARE
89-year-old male with PMH with PMH CAD s/p 4 stents, A-fib not on AC, HFrEF s/p AICD, CKD, DM, HTN, HLD prior diverticulitis s/p partial colectomy with ileostomy now reversed, facial BCC and SCC and recent microvascular stroke who presents to the ED after a ground-level fall. pt missed the last step and fell hitting head. + L forehead lac. no loc/n/v/neck pain/change in behavior. pt ambulatory since incident.    vss  gen- NAD, aaox3  card-rrr  lungs-ctab, no wheezing or rhonchi  abd-sntnd, no guarding or rebound  neuro- full str/sensation, cn ii-xii grossly intact, normal coordination and gait  Spine- no midline c/t/l spine ttp, neck supple, FROM to neck  hent- ~2cm laceration above eyebrow, basal bridge abrasion  eyes- eomi/perrl b/l

## 2024-12-09 NOTE — ED ADULT NURSE NOTE - EXPLANATION OF PATIENT'S REASON FOR LEAVING
"refuses further care" wants to leave as he "feels good now, no pain". son at bedside also verbalizes wants to take pt home

## 2024-12-09 NOTE — ED PROVIDER NOTE - PROGRESS NOTE DETAILS
De Yolie: Patient pending final CT scan and wants to leave AMA. Signed paperwork. Also refusing to come back to the ER for his wound check after repair and prefers to follow up with his PCP instead.

## 2025-01-08 ENCOUNTER — APPOINTMENT (OUTPATIENT)
Dept: PULMONOLOGY | Facility: CLINIC | Age: 89
End: 2025-01-08

## 2025-01-20 NOTE — DISCHARGE NOTE PROVIDER - PROVIDER RX CONTACT NUMBER
--------------- APPROVED REPORT --------------





Height: 5 ft  9in

Weight:  333 lbs



TEST INDICATIONS

Diabetes, Dyspnea 



The imaging protocol used to acquire images was Rest Tc-99m/stress Tc-99m 1 day



Consent: The procedure was explained and understood by the patient. Informerd consent was witnessed Jan Alfaro RN

First, low dose rest was performed then high dose stress.



RESTING DATA: 

The resting ekg shows: NSR with RBBB

Rest SPECT myocardial perfusion imaging was performed in supine position 82 minutes following the int
ravenous injection of 11.2 mCi of Tc-99 Sestamibi.

Time of rest injection: 09:05:     Date: 2025

Time of rest imaging:  10:27:     Date: 2025



PHARMACOLOGIC STRESS: 

Pharmacologic stress test was performed by injecting regadenoson 0.4 mg IV push followed by the intra
venous injection of NSR mCi of Tc-99 Sestamibi.

Time of stress injection: 10:49:     Date: 2025

Time of stress imagin:31:     Date: 2025

Heart Rate at time of stress injection: 63 bpm.

Gated Stress SPECT was performed 102 minutes after stress injection.

The images were gated to evaluate regional wall motion and calculate left ventricular ejection fracti
on. 



STRESS DETAILS

Reason for Termination: Infusion complete

Stress Symptoms: No chest pain or symptoms

Max HR Achieved: 83 bpm

% of APMHR Achieved: 53

Max Blood Pressure: 153/75 mmHg

Stress ECG: NSR with RBBB

Arrhythmia: No. 

ST Change: No. 



Study quality was fair.

Lung uptake was Normal.

Artifact:   No artifact



LEFT VENTRICLE

Size: The left ventricular size is normal. 

Systolic Function:The left ventricular systolic function is borderline. 

Wall Motion:  Hypokinesis of the mid to distal inferolateral and lateral segments.

The left ventricular ejection fraction was calculated to be 48%.TID = .



LV PERFUSION

Large size moderate severity predominently fixed perfusion defect of the inferolateral and inferior w
alls with partial reversibility. 

No transient ischemic dilation of the left ventricle.



RV Size/Shape

Not visualized.



IMPRESSION

Abnormal  pharmacologic nuclear stress test.

Global LV Function: Mildy reduced

Stress ECG Summary: Nondiagnostic

LV Perfusion Summary: Abnormal

LV Viability Summary: Potentially viable myocardium



Conclusion

The left ventricular systolic function is borderline. LVEF 48%

Large size predominently fixed perfusion defect of the inferolateral and inferior walls with partial 
reversibility consistent with infarct and mild sami-infarct ischemia. 

No transient ischemic dilation of the left ventricle. [phone]

## 2025-03-13 ENCOUNTER — APPOINTMENT (OUTPATIENT)
Dept: PULMONOLOGY | Facility: CLINIC | Age: 89
End: 2025-03-13
Payer: MEDICARE

## 2025-03-13 VITALS
HEIGHT: 64 IN | WEIGHT: 137 LBS | SYSTOLIC BLOOD PRESSURE: 140 MMHG | HEART RATE: 79 BPM | BODY MASS INDEX: 23.39 KG/M2 | RESPIRATION RATE: 15 BRPM | OXYGEN SATURATION: 99 % | DIASTOLIC BLOOD PRESSURE: 70 MMHG

## 2025-03-13 DIAGNOSIS — R06.00 DYSPNEA, UNSPECIFIED: ICD-10-CM

## 2025-03-13 DIAGNOSIS — J45.909 UNSPECIFIED ASTHMA, UNCOMPLICATED: ICD-10-CM

## 2025-03-13 PROCEDURE — G2211 COMPLEX E/M VISIT ADD ON: CPT

## 2025-03-13 PROCEDURE — 99214 OFFICE O/P EST MOD 30 MIN: CPT

## 2025-03-13 RX ORDER — FUROSEMIDE 40 MG/1
40 TABLET ORAL DAILY
Qty: 45 | Refills: 0 | Status: ACTIVE | COMMUNITY
Start: 2025-03-13 | End: 1900-01-01

## 2025-04-24 ENCOUNTER — APPOINTMENT (OUTPATIENT)
Dept: PULMONOLOGY | Facility: CLINIC | Age: 89
End: 2025-04-24
Payer: MEDICARE

## 2025-04-24 VITALS
SYSTOLIC BLOOD PRESSURE: 100 MMHG | OXYGEN SATURATION: 99 % | HEIGHT: 64 IN | HEART RATE: 88 BPM | BODY MASS INDEX: 22.36 KG/M2 | DIASTOLIC BLOOD PRESSURE: 62 MMHG | WEIGHT: 131 LBS | RESPIRATION RATE: 15 BRPM

## 2025-04-24 DIAGNOSIS — R06.00 DYSPNEA, UNSPECIFIED: ICD-10-CM

## 2025-04-24 DIAGNOSIS — J45.909 UNSPECIFIED ASTHMA, UNCOMPLICATED: ICD-10-CM

## 2025-04-24 PROCEDURE — G2211 COMPLEX E/M VISIT ADD ON: CPT

## 2025-04-24 PROCEDURE — 99213 OFFICE O/P EST LOW 20 MIN: CPT

## 2025-06-23 ENCOUNTER — EMERGENCY (EMERGENCY)
Facility: HOSPITAL | Age: 89
LOS: 0 days | Discharge: HOME | End: 2025-06-23
Attending: EMERGENCY MEDICINE
Payer: MEDICARE

## 2025-06-23 VITALS
RESPIRATION RATE: 18 BRPM | SYSTOLIC BLOOD PRESSURE: 127 MMHG | HEART RATE: 80 BPM | DIASTOLIC BLOOD PRESSURE: 73 MMHG | OXYGEN SATURATION: 97 %

## 2025-06-23 VITALS
HEIGHT: 64 IN | TEMPERATURE: 98 F | RESPIRATION RATE: 19 BRPM | SYSTOLIC BLOOD PRESSURE: 123 MMHG | HEART RATE: 90 BPM | WEIGHT: 130.07 LBS | DIASTOLIC BLOOD PRESSURE: 67 MMHG

## 2025-06-23 DIAGNOSIS — I50.20 UNSPECIFIED SYSTOLIC (CONGESTIVE) HEART FAILURE: ICD-10-CM

## 2025-06-23 DIAGNOSIS — C44.91 BASAL CELL CARCINOMA OF SKIN, UNSPECIFIED: Chronic | ICD-10-CM

## 2025-06-23 DIAGNOSIS — R79.89 OTHER SPECIFIED ABNORMAL FINDINGS OF BLOOD CHEMISTRY: ICD-10-CM

## 2025-06-23 DIAGNOSIS — Z41.9 ENCOUNTER FOR PROCEDURE FOR PURPOSES OTHER THAN REMEDYING HEALTH STATE, UNSPECIFIED: Chronic | ICD-10-CM

## 2025-06-23 DIAGNOSIS — Z95.5 PRESENCE OF CORONARY ANGIOPLASTY IMPLANT AND GRAFT: Chronic | ICD-10-CM

## 2025-06-23 DIAGNOSIS — Z90.49 ACQUIRED ABSENCE OF OTHER SPECIFIED PARTS OF DIGESTIVE TRACT: ICD-10-CM

## 2025-06-23 DIAGNOSIS — I25.10 ATHEROSCLEROTIC HEART DISEASE OF NATIVE CORONARY ARTERY WITHOUT ANGINA PECTORIS: ICD-10-CM

## 2025-06-23 DIAGNOSIS — Z95.0 PRESENCE OF CARDIAC PACEMAKER: ICD-10-CM

## 2025-06-23 DIAGNOSIS — R06.02 SHORTNESS OF BREATH: ICD-10-CM

## 2025-06-23 DIAGNOSIS — Z98.890 OTHER SPECIFIED POSTPROCEDURAL STATES: Chronic | ICD-10-CM

## 2025-06-23 DIAGNOSIS — R60.0 LOCALIZED EDEMA: ICD-10-CM

## 2025-06-23 DIAGNOSIS — Z95.0 PRESENCE OF CARDIAC PACEMAKER: Chronic | ICD-10-CM

## 2025-06-23 DIAGNOSIS — I13.0 HYPERTENSIVE HEART AND CHRONIC KIDNEY DISEASE WITH HEART FAILURE AND STAGE 1 THROUGH STAGE 4 CHRONIC KIDNEY DISEASE, OR UNSPECIFIED CHRONIC KIDNEY DISEASE: ICD-10-CM

## 2025-06-23 DIAGNOSIS — N18.9 CHRONIC KIDNEY DISEASE, UNSPECIFIED: ICD-10-CM

## 2025-06-23 DIAGNOSIS — Z95.5 PRESENCE OF CORONARY ANGIOPLASTY IMPLANT AND GRAFT: ICD-10-CM

## 2025-06-23 DIAGNOSIS — E11.22 TYPE 2 DIABETES MELLITUS WITH DIABETIC CHRONIC KIDNEY DISEASE: ICD-10-CM

## 2025-06-23 DIAGNOSIS — E78.5 HYPERLIPIDEMIA, UNSPECIFIED: ICD-10-CM

## 2025-06-23 DIAGNOSIS — Z95.810 PRESENCE OF AUTOMATIC (IMPLANTABLE) CARDIAC DEFIBRILLATOR: ICD-10-CM

## 2025-06-23 DIAGNOSIS — I48.91 UNSPECIFIED ATRIAL FIBRILLATION: ICD-10-CM

## 2025-06-23 LAB
ALBUMIN SERPL ELPH-MCNC: 3.8 G/DL — SIGNIFICANT CHANGE UP (ref 3.5–5.2)
ALP SERPL-CCNC: 118 U/L — HIGH (ref 30–115)
ALT FLD-CCNC: 26 U/L — SIGNIFICANT CHANGE UP (ref 0–41)
ANION GAP SERPL CALC-SCNC: 16 MMOL/L — HIGH (ref 7–14)
AST SERPL-CCNC: 21 U/L — SIGNIFICANT CHANGE UP (ref 0–41)
BASOPHILS # BLD AUTO: 0.04 K/UL — SIGNIFICANT CHANGE UP (ref 0–0.2)
BASOPHILS NFR BLD AUTO: 0.4 % — SIGNIFICANT CHANGE UP (ref 0–2)
BILIRUB SERPL-MCNC: 0.9 MG/DL — SIGNIFICANT CHANGE UP (ref 0.2–1.2)
BUN SERPL-MCNC: 71 MG/DL — CRITICAL HIGH (ref 10–20)
CALCIUM SERPL-MCNC: 8.7 MG/DL — SIGNIFICANT CHANGE UP (ref 8.4–10.5)
CHLORIDE SERPL-SCNC: 93 MMOL/L — LOW (ref 98–110)
CO2 SERPL-SCNC: 20 MMOL/L — SIGNIFICANT CHANGE UP (ref 17–32)
CREAT SERPL-MCNC: 2.2 MG/DL — HIGH (ref 0.7–1.5)
EGFR: 28 ML/MIN/1.73M2 — LOW
EGFR: 28 ML/MIN/1.73M2 — LOW
EOSINOPHIL # BLD AUTO: 0.05 K/UL — SIGNIFICANT CHANGE UP (ref 0–0.5)
EOSINOPHIL NFR BLD AUTO: 0.5 % — SIGNIFICANT CHANGE UP (ref 0–6)
GLUCOSE SERPL-MCNC: 144 MG/DL — HIGH (ref 70–99)
HCT VFR BLD CALC: 33.5 % — LOW (ref 39–50)
HGB BLD-MCNC: 11.1 G/DL — LOW (ref 13–17)
IMM GRANULOCYTES # BLD AUTO: 0.08 K/UL — HIGH (ref 0–0.07)
IMM GRANULOCYTES NFR BLD AUTO: 0.9 % — SIGNIFICANT CHANGE UP (ref 0–0.9)
LYMPHOCYTES # BLD AUTO: 0.69 K/UL — LOW (ref 1–3.3)
LYMPHOCYTES NFR BLD AUTO: 7.6 % — LOW (ref 13–44)
MCHC RBC-ENTMCNC: 30.2 PG — SIGNIFICANT CHANGE UP (ref 27–34)
MCHC RBC-ENTMCNC: 33.1 G/DL — SIGNIFICANT CHANGE UP (ref 32–36)
MCV RBC AUTO: 91.3 FL — SIGNIFICANT CHANGE UP (ref 80–100)
MONOCYTES # BLD AUTO: 0.63 K/UL — SIGNIFICANT CHANGE UP (ref 0–0.9)
MONOCYTES NFR BLD AUTO: 6.9 % — SIGNIFICANT CHANGE UP (ref 2–14)
NEUTROPHILS # BLD AUTO: 7.61 K/UL — HIGH (ref 1.8–7.4)
NEUTROPHILS NFR BLD AUTO: 83.7 % — HIGH (ref 43–77)
NRBC # BLD AUTO: 0 K/UL — SIGNIFICANT CHANGE UP (ref 0–0)
NRBC # FLD: 0 K/UL — SIGNIFICANT CHANGE UP (ref 0–0)
NRBC BLD AUTO-RTO: 0 /100 WBCS — SIGNIFICANT CHANGE UP (ref 0–0)
NT-PROBNP SERPL-SCNC: HIGH PG/ML (ref 0–300)
PLATELET # BLD AUTO: 120 K/UL — LOW (ref 150–400)
PMV BLD: 10.7 FL — SIGNIFICANT CHANGE UP (ref 7–13)
POTASSIUM SERPL-MCNC: 4 MMOL/L — SIGNIFICANT CHANGE UP (ref 3.5–5)
POTASSIUM SERPL-SCNC: 4 MMOL/L — SIGNIFICANT CHANGE UP (ref 3.5–5)
PROT SERPL-MCNC: 5.9 G/DL — LOW (ref 6–8)
RBC # BLD: 3.67 M/UL — LOW (ref 4.2–5.8)
RBC # FLD: 15.5 % — HIGH (ref 10.3–14.5)
SODIUM SERPL-SCNC: 129 MMOL/L — LOW (ref 135–146)
TROPONIN T, HIGH SENSITIVITY RESULT: 52 NG/L — CRITICAL HIGH (ref 6–21)
TROPONIN T, HIGH SENSITIVITY RESULT: 56 NG/L — CRITICAL HIGH (ref 6–21)
WBC # BLD: 9.1 K/UL — SIGNIFICANT CHANGE UP (ref 3.8–10.5)
WBC # FLD AUTO: 9.1 K/UL — SIGNIFICANT CHANGE UP (ref 3.8–10.5)

## 2025-06-23 PROCEDURE — 93010 ELECTROCARDIOGRAM REPORT: CPT

## 2025-06-23 PROCEDURE — 84484 ASSAY OF TROPONIN QUANT: CPT | Mod: 91

## 2025-06-23 PROCEDURE — 71045 X-RAY EXAM CHEST 1 VIEW: CPT

## 2025-06-23 PROCEDURE — 85025 COMPLETE CBC W/AUTO DIFF WBC: CPT

## 2025-06-23 PROCEDURE — 36415 COLL VENOUS BLD VENIPUNCTURE: CPT

## 2025-06-23 PROCEDURE — 96374 THER/PROPH/DIAG INJ IV PUSH: CPT

## 2025-06-23 PROCEDURE — 99285 EMERGENCY DEPT VISIT HI MDM: CPT | Mod: 25

## 2025-06-23 PROCEDURE — 99285 EMERGENCY DEPT VISIT HI MDM: CPT | Mod: GC

## 2025-06-23 PROCEDURE — 76604 US EXAM CHEST: CPT

## 2025-06-23 PROCEDURE — 80053 COMPREHEN METABOLIC PANEL: CPT

## 2025-06-23 PROCEDURE — 93005 ELECTROCARDIOGRAM TRACING: CPT

## 2025-06-23 PROCEDURE — 83880 ASSAY OF NATRIURETIC PEPTIDE: CPT

## 2025-06-23 PROCEDURE — 71045 X-RAY EXAM CHEST 1 VIEW: CPT | Mod: 26

## 2025-06-23 PROCEDURE — 76604 US EXAM CHEST: CPT | Mod: 26

## 2025-06-23 RX ORDER — FUROSEMIDE 10 MG/ML
1.5 INJECTION INTRAMUSCULAR; INTRAVENOUS
Qty: 22.5 | Refills: 0
Start: 2025-06-23 | End: 2025-07-07

## 2025-06-23 RX ORDER — FUROSEMIDE 10 MG/ML
20 INJECTION INTRAMUSCULAR; INTRAVENOUS ONCE
Refills: 0 | Status: COMPLETED | OUTPATIENT
Start: 2025-06-23 | End: 2025-06-23

## 2025-06-23 RX ADMIN — FUROSEMIDE 20 MILLIGRAM(S): 10 INJECTION INTRAMUSCULAR; INTRAVENOUS at 10:28

## 2025-06-23 NOTE — ED ADULT TRIAGE NOTE - IDEAL BODY WEIGHT(KG)
Appointment Type:  Follow Up   Was this lab appointment scheduled per protocol/appropriate location?:   Yes    Are correct labs ordered? Yes  External records requested:   N/A      External records received: NA                                                   59

## 2025-06-23 NOTE — ED ADULT NURSE NOTE - NSICDXFAMILYHX_GEN_ALL_CORE_FT
FAMILY HISTORY:  CVA (cerebral vascular accident), hemorrhage    Mother  Still living? Unknown  Family history of colon cancer in mother, Age at diagnosis: Age Unknown    Grandparent  Still living? No  Family history of colon cancer in mother, Age at diagnosis: Age Unknown     Lower/Full/Upper

## 2025-06-23 NOTE — ED PROVIDER NOTE - PROGRESS NOTE DETAILS
DEVEN Frank MD:  Pt. axox3, in NAD, verbalizes improvement of SOB. Discussed elevated bnp and offered admission. Pt. refuses and states " I am stable now and I want to go home". Friend will come to pick him up.   Lasix increased. Will follow up with primary.

## 2025-06-23 NOTE — ED PROVIDER NOTE - NSFOLLOWUPINSTRUCTIONS_ED_ALL_ED_FT
Increased your Lasix from 20mg to 40mg     See your primary physician this week    Shortness of Breath, Adult  Shortness of breath is when a person has trouble breathing or when a person feels like she or he is having trouble breathing in enough air. Shortness of breath could be a sign of a medical problem.    Follow these instructions at home:  A sign showing that a person should not smoke.  Pollutants    Do not use any products that contain nicotine or tobacco. These products include cigarettes, chewing tobacco, and vaping devices, such as e-cigarettes. This also includes cigars and pipes. If you need help quitting, ask your health care provider.  Avoid things that can irritate your airways, including:  Smoke. This includes campfire smoke, forest fire smoke, and secondhand smoke from tobacco products. Do not smoke or allow others to smoke in your home.  Mold.  Dust.  Air pollution.  Chemical fumes.  Things that can give you an allergic reaction (allergens) if you have allergies. Common allergens include pollen from grasses or trees and animal dander.  Keep your living space clean and free of mold and dust.  General instructions    Pay attention to any changes in your symptoms.  Take over-the-counter and prescription medicines only as told by your health care provider. This includes oxygen therapy and inhaled medicines.  Rest as needed.  Return to your normal activities as told by your health care provider. Ask your health care provider what activities are safe for you.  Keep all follow-up visits. This is important.  Contact a health care provider if:  Your condition does not improve as soon as expected.  You have a hard time doing your normal activities, even after you rest.  You have new symptoms.  You cannot walk up stairs or exercise the way that you normally do.  Get help right away if:  Your shortness of breath gets worse.  You have shortness of breath when you are resting.  You feel light-headed or you faint.  You have a cough that is not controlled with medicines.  You cough up blood.  You have pain with breathing.  You have pain in your chest, arms, shoulders, or abdomen.  You have a fever.  These symptoms may be an emergency. Get help right away. Call 911.  Do not wait to see if the symptoms will go away.  Do not drive yourself to the hospital.  Summary  Shortness of breath is when a person has trouble breathing enough air. It can be a sign of a medical problem.  Avoid things that irritate your lungs, such as smoking, pollution, mold, and dust.  Pay attention to changes in your symptoms and contact your health care provider if you have a hard time completing daily activities because of shortness of breath.  This information is not intended to replace advice given to you by your health care provider. Make sure you discuss any questions you have with your health care provider. Increased your Lasix from 40mg to 60mg  daily     See your primary physician this week    Shortness of Breath, Adult  Shortness of breath is when a person has trouble breathing or when a person feels like she or he is having trouble breathing in enough air. Shortness of breath could be a sign of a medical problem.    Follow these instructions at home:  A sign showing that a person should not smoke.  Pollutants    Do not use any products that contain nicotine or tobacco. These products include cigarettes, chewing tobacco, and vaping devices, such as e-cigarettes. This also includes cigars and pipes. If you need help quitting, ask your health care provider.  Avoid things that can irritate your airways, including:  Smoke. This includes campfire smoke, forest fire smoke, and secondhand smoke from tobacco products. Do not smoke or allow others to smoke in your home.  Mold.  Dust.  Air pollution.  Chemical fumes.  Things that can give you an allergic reaction (allergens) if you have allergies. Common allergens include pollen from grasses or trees and animal dander.  Keep your living space clean and free of mold and dust.  General instructions    Pay attention to any changes in your symptoms.  Take over-the-counter and prescription medicines only as told by your health care provider. This includes oxygen therapy and inhaled medicines.  Rest as needed.  Return to your normal activities as told by your health care provider. Ask your health care provider what activities are safe for you.  Keep all follow-up visits. This is important.  Contact a health care provider if:  Your condition does not improve as soon as expected.  You have a hard time doing your normal activities, even after you rest.  You have new symptoms.  You cannot walk up stairs or exercise the way that you normally do.  Get help right away if:  Your shortness of breath gets worse.  You have shortness of breath when you are resting.  You feel light-headed or you faint.  You have a cough that is not controlled with medicines.  You cough up blood.  You have pain with breathing.  You have pain in your chest, arms, shoulders, or abdomen.  You have a fever.  These symptoms may be an emergency. Get help right away. Call 911.  Do not wait to see if the symptoms will go away.  Do not drive yourself to the hospital.  Summary  Shortness of breath is when a person has trouble breathing enough air. It can be a sign of a medical problem.  Avoid things that irritate your lungs, such as smoking, pollution, mold, and dust.  Pay attention to changes in your symptoms and contact your health care provider if you have a hard time completing daily activities because of shortness of breath.  This information is not intended to replace advice given to you by your health care provider. Make sure you discuss any questions you have with your health care provider.

## 2025-06-23 NOTE — ED PROVIDER NOTE - PATIENT PORTAL LINK FT
You can access the FollowMyHealth Patient Portal offered by [facility] by registering at the following website: [email or web address]. By joining [facility]’s FollowMyHealth portal, you will also be able to view your health information using other applications (apps) compatible with our system.

## 2025-06-23 NOTE — ED PROVIDER NOTE - STUDIES
EKG - see results section for interpretation/Xray Image(s) - see wet read section for interpretation EKG - see results section for interpretation/Xray Image(s) - see wet read section for interpretation/Ultrasound images

## 2025-06-23 NOTE — ED PROVIDER NOTE - ATTENDING CONTRIBUTION TO CARE
Patient complains of shortness of breath.  Has history of congestive heart failure.  Vital signs noted.  No apparent distress.   Positive JVD.  Positive rales at the bases.  Heart regular rate no murmur.  Abdomen nontender.  Extremity positive edema.  Diagnostic testing reviewed.  Bedside sonogram of the lungs show B-lines consistent with CHF.  Chest x-ray is consistent with CHF.  EKG shows paced rhythm similar to previous EKGs.  Labs show elevated troponin with no delta.  This is likely due to renal insufficiency.  Creatinine is elevated.  He has had elevated creatinines similarly in the past.  BNP is elavated grossly.  Patient given intravenous Lasix.  Disposition options discussed with patient.  He strongly prefers outpatient management.  In my opinion, outpatient follow-up and treatment are medically appropriate and acceptable.  Strict return precautions discussed with patient.

## 2025-06-23 NOTE — ED PROVIDER NOTE - OBJECTIVE STATEMENT
89-year-old male with past medical history of CAD status post 4 stents, A-fib, not on AC, heart failure with reduced ejection fraction status post AICD, CKD, diabetes, hypertension, hyperlipidemia, diverticulitis status post partial colectomy with ileostomy reversed, presents to the ED due to shortness of breath.  Patient states this morning he felt short of breath, attempted to use his inhaler but it was not working, so he came to the ED.  Denies any chest pain, states that shortness of breath has improved, no nausea, vomiting, abdominal pain, fever or other complaints.

## 2025-06-23 NOTE — ED PROVIDER NOTE - PHYSICAL EXAMINATION
VITAL SIGNS: I have reviewed nursing notes and confirm.  CONSTITUTIONAL: well-appearing, non-toxic, NAD  SKIN: Warm dry, normal skin turgor  HEAD: NCAT  NECK: Supple; non tender. Full ROM. No cervical LAD  CARD: RRR  RESP: clear to ausculation b/l  ABD: soft,  non-tender, non-distended, no rebound or guarding.   EXT: Full ROM. + 2 pitting edemal bilat lower ext  NEURO: normal motor. normal sensory  PSYCH: Cooperative, appropriate. VITAL SIGNS: I have reviewed nursing notes and confirm.  CONSTITUTIONAL: well-appearing, non-toxic, NAD  SKIN: Warm dry, normal skin turgor  HEAD: NCAT  NECK: Supple; non tender. Full ROM. No cervical LAD  CARD: RRR  RESP: clear to ausculation b/l  ABD: soft,  non-tender, non-distended, no rebound or guarding.   EXT: Full ROM. + 2 pitting edema bilat lower ext  NEURO: normal motor. normal sensory  PSYCH: Cooperative, appropriate.

## 2025-06-23 NOTE — ED PROVIDER NOTE - RHYTHM STRIP/ULTRASOUND IMAGES/CT SCAN IMAGES SHOWED
Bedside sono shows B-lines bilaterally consistent with congestive heart failure.  Positive small pleural effusion.

## 2025-08-27 ENCOUNTER — RESULT REVIEW (OUTPATIENT)
Age: 89
End: 2025-08-27

## 2025-09-03 ENCOUNTER — TRANSCRIPTION ENCOUNTER (OUTPATIENT)
Age: 89
End: 2025-09-03

## 2025-09-09 ENCOUNTER — TRANSCRIPTION ENCOUNTER (OUTPATIENT)
Age: 89
End: 2025-09-09